# Patient Record
Sex: MALE | Race: WHITE | NOT HISPANIC OR LATINO
[De-identification: names, ages, dates, MRNs, and addresses within clinical notes are randomized per-mention and may not be internally consistent; named-entity substitution may affect disease eponyms.]

---

## 2018-12-13 ENCOUNTER — APPOINTMENT (OUTPATIENT)
Dept: ORTHOPEDIC SURGERY | Facility: CLINIC | Age: 78
End: 2018-12-13
Payer: MEDICARE

## 2018-12-13 ENCOUNTER — APPOINTMENT (OUTPATIENT)
Dept: ULTRASOUND IMAGING | Facility: CLINIC | Age: 78
End: 2018-12-13
Payer: MEDICARE

## 2018-12-13 ENCOUNTER — OUTPATIENT (OUTPATIENT)
Dept: OUTPATIENT SERVICES | Facility: HOSPITAL | Age: 78
LOS: 1 days | End: 2018-12-13
Payer: MEDICARE

## 2018-12-13 VITALS — HEIGHT: 70 IN | BODY MASS INDEX: 23.91 KG/M2 | WEIGHT: 167 LBS

## 2018-12-13 DIAGNOSIS — Z00.8 ENCOUNTER FOR OTHER GENERAL EXAMINATION: ICD-10-CM

## 2018-12-13 DIAGNOSIS — L03.115 CELLULITIS OF RIGHT LOWER LIMB: ICD-10-CM

## 2018-12-13 DIAGNOSIS — M79.604 PAIN IN RIGHT LEG: ICD-10-CM

## 2018-12-13 PROCEDURE — 99204 OFFICE O/P NEW MOD 45 MIN: CPT

## 2018-12-13 PROCEDURE — 93971 EXTREMITY STUDY: CPT

## 2018-12-13 PROCEDURE — 73610 X-RAY EXAM OF ANKLE: CPT | Mod: RT

## 2018-12-13 PROCEDURE — 73590 X-RAY EXAM OF LOWER LEG: CPT | Mod: RT

## 2018-12-13 PROCEDURE — 93971 EXTREMITY STUDY: CPT | Mod: 26,RT

## 2018-12-13 RX ORDER — AMOXICILLIN AND CLAVULANATE POTASSIUM 500; 125 MG/1; MG/1
500-125 TABLET, FILM COATED ORAL
Qty: 20 | Refills: 0 | Status: ACTIVE | COMMUNITY
Start: 2018-12-13 | End: 1900-01-01

## 2018-12-20 ENCOUNTER — OTHER (OUTPATIENT)
Age: 78
End: 2018-12-20

## 2021-02-16 ENCOUNTER — INPATIENT (INPATIENT)
Facility: HOSPITAL | Age: 81
LOS: 35 days | DRG: 291 | End: 2021-03-24
Attending: SPECIALIST | Admitting: INTERNAL MEDICINE
Payer: COMMERCIAL

## 2021-02-16 VITALS
HEART RATE: 85 BPM | WEIGHT: 164.91 LBS | HEIGHT: 68 IN | DIASTOLIC BLOOD PRESSURE: 64 MMHG | RESPIRATION RATE: 16 BRPM | TEMPERATURE: 98 F | SYSTOLIC BLOOD PRESSURE: 106 MMHG | OXYGEN SATURATION: 100 %

## 2021-02-16 DIAGNOSIS — R53.1 WEAKNESS: ICD-10-CM

## 2021-02-16 LAB
ALBUMIN SERPL ELPH-MCNC: 3.3 G/DL — SIGNIFICANT CHANGE UP (ref 3.3–5)
ALP SERPL-CCNC: 362 U/L — HIGH (ref 40–120)
ALT FLD-CCNC: 45 U/L — SIGNIFICANT CHANGE UP (ref 10–45)
ANION GAP SERPL CALC-SCNC: 12 MMOL/L — SIGNIFICANT CHANGE UP (ref 5–17)
APTT BLD: 32.7 SEC — SIGNIFICANT CHANGE UP (ref 27.5–35.5)
AST SERPL-CCNC: 48 U/L — HIGH (ref 10–40)
BASE EXCESS BLDV CALC-SCNC: -2 MMOL/L — SIGNIFICANT CHANGE UP (ref -2–2)
BASOPHILS # BLD AUTO: 0.04 K/UL — SIGNIFICANT CHANGE UP (ref 0–0.2)
BASOPHILS NFR BLD AUTO: 0.5 % — SIGNIFICANT CHANGE UP (ref 0–2)
BILIRUB SERPL-MCNC: 1.2 MG/DL — SIGNIFICANT CHANGE UP (ref 0.2–1.2)
BUN SERPL-MCNC: 96 MG/DL — HIGH (ref 7–23)
CA-I SERPL-SCNC: 1.35 MMOL/L — HIGH (ref 1.12–1.3)
CALCIUM SERPL-MCNC: 9.6 MG/DL — SIGNIFICANT CHANGE UP (ref 8.4–10.5)
CHLORIDE BLDV-SCNC: 100 MMOL/L — SIGNIFICANT CHANGE UP (ref 96–108)
CHLORIDE SERPL-SCNC: 95 MMOL/L — LOW (ref 96–108)
CO2 BLDV-SCNC: 25 MMOL/L — SIGNIFICANT CHANGE UP (ref 22–30)
CO2 SERPL-SCNC: 20 MMOL/L — LOW (ref 22–31)
CREAT SERPL-MCNC: 2.12 MG/DL — HIGH (ref 0.5–1.3)
EOSINOPHIL # BLD AUTO: 0.04 K/UL — SIGNIFICANT CHANGE UP (ref 0–0.5)
EOSINOPHIL NFR BLD AUTO: 0.5 % — SIGNIFICANT CHANGE UP (ref 0–6)
GAS PNL BLDV: 124 MMOL/L — LOW (ref 135–145)
GAS PNL BLDV: SIGNIFICANT CHANGE UP
GAS PNL BLDV: SIGNIFICANT CHANGE UP
GLUCOSE BLDV-MCNC: 175 MG/DL — HIGH (ref 70–99)
GLUCOSE SERPL-MCNC: 182 MG/DL — HIGH (ref 70–99)
HCO3 BLDV-SCNC: 24 MMOL/L — SIGNIFICANT CHANGE UP (ref 21–29)
HCT VFR BLD CALC: 29 % — LOW (ref 39–50)
HCT VFR BLDA CALC: 31 % — LOW (ref 39–50)
HGB BLD CALC-MCNC: 9.9 G/DL — LOW (ref 13–17)
HGB BLD-MCNC: 9.3 G/DL — LOW (ref 13–17)
IMM GRANULOCYTES NFR BLD AUTO: 0.7 % — SIGNIFICANT CHANGE UP (ref 0–1.5)
INR BLD: 1.36 RATIO — HIGH (ref 0.88–1.16)
LACTATE BLDV-MCNC: 1.5 MMOL/L — SIGNIFICANT CHANGE UP (ref 0.7–2)
LIDOCAIN IGE QN: 185 U/L — HIGH (ref 7–60)
LYMPHOCYTES # BLD AUTO: 0.63 K/UL — LOW (ref 1–3.3)
LYMPHOCYTES # BLD AUTO: 8.5 % — LOW (ref 13–44)
MCHC RBC-ENTMCNC: 30.1 PG — SIGNIFICANT CHANGE UP (ref 27–34)
MCHC RBC-ENTMCNC: 32.1 GM/DL — SIGNIFICANT CHANGE UP (ref 32–36)
MCV RBC AUTO: 93.9 FL — SIGNIFICANT CHANGE UP (ref 80–100)
MONOCYTES # BLD AUTO: 0.69 K/UL — SIGNIFICANT CHANGE UP (ref 0–0.9)
MONOCYTES NFR BLD AUTO: 9.3 % — SIGNIFICANT CHANGE UP (ref 2–14)
NEUTROPHILS # BLD AUTO: 5.98 K/UL — SIGNIFICANT CHANGE UP (ref 1.8–7.4)
NEUTROPHILS NFR BLD AUTO: 80.5 % — HIGH (ref 43–77)
NRBC # BLD: 0 /100 WBCS — SIGNIFICANT CHANGE UP (ref 0–0)
PCO2 BLDV: 46 MMHG — SIGNIFICANT CHANGE UP (ref 35–50)
PH BLDV: 7.32 — LOW (ref 7.35–7.45)
PLATELET # BLD AUTO: 284 K/UL — SIGNIFICANT CHANGE UP (ref 150–400)
PO2 BLDV: 24 MMHG — LOW (ref 25–45)
POTASSIUM BLDV-SCNC: 5.8 MMOL/L — HIGH (ref 3.5–5.3)
POTASSIUM SERPL-MCNC: 6 MMOL/L — HIGH (ref 3.5–5.3)
POTASSIUM SERPL-SCNC: 6 MMOL/L — HIGH (ref 3.5–5.3)
PROT SERPL-MCNC: 7.2 G/DL — SIGNIFICANT CHANGE UP (ref 6–8.3)
PROTHROM AB SERPL-ACNC: 16.1 SEC — HIGH (ref 10.6–13.6)
RBC # BLD: 3.09 M/UL — LOW (ref 4.2–5.8)
RBC # FLD: 18 % — HIGH (ref 10.3–14.5)
SAO2 % BLDV: 30 % — LOW (ref 67–88)
SODIUM SERPL-SCNC: 127 MMOL/L — LOW (ref 135–145)
TRIGL SERPL-MCNC: 57 MG/DL — SIGNIFICANT CHANGE UP
WBC # BLD: 7.43 K/UL — SIGNIFICANT CHANGE UP (ref 3.8–10.5)
WBC # FLD AUTO: 7.43 K/UL — SIGNIFICANT CHANGE UP (ref 3.8–10.5)

## 2021-02-16 PROCEDURE — 99285 EMERGENCY DEPT VISIT HI MDM: CPT

## 2021-02-16 PROCEDURE — 71045 X-RAY EXAM CHEST 1 VIEW: CPT | Mod: 26

## 2021-02-16 RX ORDER — PIPERACILLIN AND TAZOBACTAM 4; .5 G/20ML; G/20ML
3.38 INJECTION, POWDER, LYOPHILIZED, FOR SOLUTION INTRAVENOUS ONCE
Refills: 0 | Status: COMPLETED | OUTPATIENT
Start: 2021-02-16 | End: 2021-02-16

## 2021-02-16 RX ORDER — FUROSEMIDE 40 MG
40 TABLET ORAL EVERY 12 HOURS
Refills: 0 | Status: DISCONTINUED | OUTPATIENT
Start: 2021-02-16 | End: 2021-02-19

## 2021-02-16 RX ORDER — PIPERACILLIN AND TAZOBACTAM 4; .5 G/20ML; G/20ML
3.38 INJECTION, POWDER, LYOPHILIZED, FOR SOLUTION INTRAVENOUS EVERY 8 HOURS
Refills: 0 | Status: DISCONTINUED | OUTPATIENT
Start: 2021-02-17 | End: 2021-02-19

## 2021-02-16 RX ORDER — INFLUENZA VIRUS VACCINE 15; 15; 15; 15 UG/.5ML; UG/.5ML; UG/.5ML; UG/.5ML
0.5 SUSPENSION INTRAMUSCULAR ONCE
Refills: 0 | Status: DISCONTINUED | OUTPATIENT
Start: 2021-02-16 | End: 2021-03-24

## 2021-02-16 RX ORDER — SODIUM CHLORIDE 9 MG/ML
1000 INJECTION INTRAMUSCULAR; INTRAVENOUS; SUBCUTANEOUS
Refills: 0 | Status: DISCONTINUED | OUTPATIENT
Start: 2021-02-16 | End: 2021-02-16

## 2021-02-16 RX ORDER — HEPARIN SODIUM 5000 [USP'U]/ML
5000 INJECTION INTRAVENOUS; SUBCUTANEOUS EVERY 8 HOURS
Refills: 0 | Status: DISCONTINUED | OUTPATIENT
Start: 2021-02-16 | End: 2021-03-24

## 2021-02-16 RX ADMIN — Medication 40 MILLIGRAM(S): at 22:55

## 2021-02-16 RX ADMIN — SODIUM CHLORIDE 75 MILLILITER(S): 9 INJECTION INTRAMUSCULAR; INTRAVENOUS; SUBCUTANEOUS at 21:55

## 2021-02-16 RX ADMIN — PIPERACILLIN AND TAZOBACTAM 200 GRAM(S): 4; .5 INJECTION, POWDER, LYOPHILIZED, FOR SOLUTION INTRAVENOUS at 21:55

## 2021-02-16 NOTE — ED PROVIDER NOTE - CLINICAL SUMMARY MEDICAL DECISION MAKING FREE TEXT BOX
80 y old male with multiple medical issues send from nephrology office for worsening renal failure and hyponatremia ,pt complains of weakness fatigue and difficulty walking ,he also complains of abdominal and distention ,will repeated the blood work .nephrology consultation and admission to the hospital Z[facility]

## 2021-02-16 NOTE — H&P ADULT - HISTORY OF PRESENT ILLNESS
79 yo male w h/o DM, CAD, s/p CABG, CKD 3-4. He saw my partner Dr. Abel Luz yesterday as a new consult for elevated creatinine. Bloodwork today returned notable for  and Na 126; in light of these findings, Dr. Luz sent the patient to the ER. Lately ptn has been having abdominal pain with elevated amylase and lipase.   As per ptn's wife: on and off R abd pain w generalized weakness and poor po intake. PCP DCed po Lasix in past few days 2/2 abnormal blood test results.  Ptn denies recent NSAID use.  He was diagnosed with diabetes 18 years ago, and denies retinopathy (last saw Optho around 2 years ago). Cardiologist has alluded to the patient having mild renal impairment in the past, as per wife.    Patient's PCP is Dr. Shyann De León; GI Dr. Wilman Villagran; Cardiology Dr. Boyce.

## 2021-02-16 NOTE — H&P ADULT - NSHPPHYSICALEXAM_GEN_ALL_CORE
T(F): 97.5 (02-16-21 @ 20:07), Max: 98.1 (02-16-21 @ 14:56)  HR: 73 (02-16-21 @ 20:07) (73 - 85)  BP: 109/56 (02-16-21 @ 20:07) (100/57 - 109/56)  RR: 18 (02-16-21 @ 20:07) (16 - 18)  SpO2: 100% (02-16-21 @ 20:07) (100% - 100%)      PHYSICAL EXAM:  GENERAL: NAD, well-developed  HEAD:  Atraumatic, Normocephalic  EYES: EOMI, PERRLA, conjunctiva and sclera clear  NECK: Supple, No JVD  CHEST/LUNG: Clear to auscultation bilaterally; No wheeze  HEART: Regular rate and rhythm; No murmurs, rubs, or gallops  ABDOMEN: Soft, Nontender, Nondistended; Bowel sounds present  EXTREMITIES:  2+ Peripheral Pulses, No clubbing, cyanosis, or edema  PSYCH: AAOx3  NEUROLOGY: non-focal  SKIN: No rashes or lesions

## 2021-02-16 NOTE — ED PROVIDER NOTE - OBJECTIVE STATEMENT
80 year-old Icelandic-speaking man PMH diabetes mellitus, coronary artery disease s/p bypass, and chronic kidney disease. He saw Dr. Abel Luz as a new consult for elevated creatinine. Blood work returned notable for  and Na 126; in light of these findings, Dr. Luz sent the patient to the ER. Dr. Luz also noted on recent blood work to have an elevated amylase and lipase. Patient's PCP is Dr. Shyann De León; GI Dr. Wilman Villagran; Cardiology Dr. Boyce.     He has been dealing with mild intermittent right abdominal pain for "a long time"; he complains of worsening fatigue/generalized weakness for the past few months. His wife shares that routine blood work performed last week with Dr. De León returned just a few days ago; in light of the results, Dr. De León discontinued his standing daily Lasix 3 days prior to admission. Mr. Morelos does not drink much fluid in general; his wife admits that she has been pushing him to drink extra fluid the past few days, given the abnormal bloodwork results from last week. Mr. Morelos denies recent NSAID use. He admits to dark urine at times, and denies other urinary changes. He was diagnosed with diabetes 18 years ago, and denies retinopathy (last saw Optho around 2 years ago). Cardiologist has alluded to the patient having mild renal impairment in the past, per wife.

## 2021-02-16 NOTE — ED ADULT NURSE NOTE - OBJECTIVE STATEMENT
80 year old male, A+Ox3 and moves all four extremities. Pt sent to ED by PCP c/o abnormal labs and weakness. Pt reports right abdominal pain "for a long time", and worsening generalized weakness/fatigue for the past couple of months. Upon assessment, pt presents to ED well-appearing. Pt denies headache, dizziness, chest pain, SOB, N/V, urinary or bowel symptoms, fevers or chills. Pt resting comfortably in stretcher, call bell in reach. Pt aware of plan of care, pending lab results at this time.

## 2021-02-16 NOTE — ED PROVIDER NOTE - CONSTITUTIONAL, MLM
normal... chronically ill looking , awake, alert, oriented to person, place, time/situation and in no apparent distress.

## 2021-02-16 NOTE — H&P ADULT - ASSESSMENT
79 yo male w h/o ICM, CAD,  s/p CABG, has AICD, DM, HTN presents w volume overload and abd pain  get Abd sono   Clinically no sign of cholecystitis/pancreatitis.   place on Zosyn, get gi and id consults  ptn has abd distention prob ascites 2/2 systolic chf, probably worse 2/2 acute on chronic systolic chf,   will start diuresing w IV lasix  has AICD, will get interrogation   keep on tele  Hyponatremia  Izabela, CKD4  Hyperkalemia, Insulin, Dextrose, Lokelma as per   card renal consults  dvt ppx w HSC

## 2021-02-16 NOTE — H&P ADULT - NSICDXPASTMEDICALHX_GEN_ALL_CORE_FT
Yes
PAST MEDICAL HISTORY:  CAD (coronary artery disease)     Diabetes mellitus     Hypertension     Renal failure

## 2021-02-16 NOTE — ED PROVIDER NOTE - ENDOCRINE [+], MLM
Unable to get an appointment with derm in [place]. Gina states she would like to be placed on the wait list for [facility] in [place]. Plan to try Kenalog cream. Could consider biopsy if no relief or change in symptoms.   
HYPERGLYCEMIA

## 2021-02-16 NOTE — ED ADULT NURSE REASSESSMENT NOTE - NS ED NURSE REASSESS COMMENT FT1
VSS, medication administered. Pt aware of plan of care, admitted to medicine for weakness. Report given to JAVAD uA in ED Holding.

## 2021-02-16 NOTE — CONSULT NOTE ADULT - SUBJECTIVE AND OBJECTIVE BOX
HPI: Mr. Morelos is a 80 year-old man with history of multiple medical issues including diabetes mellitus, coronary artery disease s/p bypass, and chronic kidney disease. He saw my partner Dr. Abel Luz yesterday as a new consult for elevated creatinine. Bloodwork today returned notable for   PAST MEDICAL & SURGICAL HISTORY: DM2 CAD-CABG/stent ICD CKD  Allergies No Known Allergies  SOCIAL HISTORY: Denies ETOh,Smoking,   FAMILY HISTORY: (+)DM and CAD (father)  Current Medications Folic Acid 1 MG Oral Tablet: 1 Tablet(s) daily , Qty 90 Tablet(s) For 90 Day(s) Refill 3 Iron (Ferrous Sulfate) 325 (65 Fe) MG Oral Tablet: Oral Irbesartan 75 MG Oral Tablet: Take 1 tablet by mouth in the evenings, Qty 90 Tablet(s) For 90 Day(s) Refill 3 Atorvastatin Calcium 80 MG Oral Tablet: 1 Tablet(s) daily  Metoprolol Succinate ER 25 MG Oral Tablet Extended Release 24 Hour: 1 Tablet(s) nightly, Qty 90 Tablet(s) For 90 Day(s) Refill 2 Aspirin 81 81 MG Oral Tablet Delayed Release: 1 Tablet(s) daily  Glimepiride 1 MG Oral Tablet: .5 Tablet(s) daily Prasugrel HCl 10 MG Oral Tablet: 1 Tablet(s) daily   REVIEW OF SYSTEMS: CONSTITUTIONAL: No weakness, fevers or chills EYES/ENT: No visual changes;  No vertigo or throat pain  NECK: No pain or stiffness RESPIRATORY: No cough, wheezing, hemoptysis; No shortness of breath CARDIOVASCULAR: No chest pain or palpitations GASTROINTESTINAL: No abdominal or epigastric pain. No nausea, vomiting, or hematemesis; No diarrhea or constipation. No melena or hematochezia. GENITOURINARY: No dysuria, frequency or hematuria NEUROLOGICAL: No numbness or weakness SKIN: No itching, burning, rashes, or lesions  All other review of systems is negative unless indicated above.  VITAL: T(C): , Max: 36.7 (02-16-21 @ 14:56) T(F): , Max: 98.1 (02-16-21 @ 14:56) HR: 85 (02-16-21 @ 14:56) BP: 106/64 (02-16-21 @ 14:56) RR: 16 (02-16-21 @ 14:56) SpO2: 100% (02-16-21 @ 14:56)  PHYSICAL EXAM: Constitutional: NAD, Alert HEENT: NCAT, MMM Neck: Supple, No JVD Respiratory: CTA-b/l Cardiovascular: RRR s1s2, no m/r/g Gastrointestinal: BS+, soft, NT/ND Extremities: No peripheral edema b/l Neurological: no focal deficits; strength grossly intact Back: no CVAT b/l Skin: No rashes, no nevi   LABS: (2/15/21) - Urine protein/creatinine 740mg/g (2/15/21) - , Cr 2.27, Na 126, K 5.6, Cl 93, HCO3 19, Bili 1.3, Tsat 15, Uric 8.4, PO4 3.6, PTH 48, Alb 3.3   ASSESSMENT: (1)Renal -  (2)Hyponatremia -  (3)Metabolic acidosis -   RECOMMEND:   Thank you for involving [facility] in this patient's care.  With warm regards,  Suraj Garcia MD  [facility] Office: [phone] Cell: [phone]          HPI: Mr. Morelos is a 80 year-old Nigerian-speaking man with history of multiple medical issues including diabetes mellitus, coronary artery disease s/p bypass, and chronic kidney disease. He saw my partner Dr. Abel Luz yesterday as a new consult for elevated creatinine. Bloodwork today returned notable for  and Na 126; in light of these findings, Dr. Luz sent the patient to the ER. Dr. Luz shares with me that the patient was noted on recent bloodwork of late to also have an elevated amylase and lipase. Patient's PCP is Dr. Shyann De León; GI Dr. Wilman Villagran; Cardiology Dr. Boyce.   Mr. Morelos's wife is at his side and serves as a  on his behalf. They share that he has been dealing with mild intermittent right abdominal pain for "a long time"; he complains of worsening fatigue/generalized weakness for the past few months. His wife shares that routine bloodwork performed last week with Dr. De León returned just a few days ago; in light of the results, Dr. De León discontinued his standing daily Lasix 3 days prior to admission. Mr. Morelos does not drink much fluid in general; his wife admits that she has been pushing him to drink extra fluid the past few days, given the abnormal bloodwork results from last week. Mr. Morelos denies recent NSAID use. He admits to dark urine at times, and denies other urinary changes. He was diagnosed with diabetes 18 years ago, and denies retinopathy (last saw Optho around 2 years ago). Cardiologist has alluded to the patient having mild renal impairment in the past, per wife.  PAST MEDICAL & SURGICAL HISTORY: DM2 CAD-CABG/stent ICD CKD  Allergies No Known Allergies  SOCIAL HISTORY: Denies ETOh,Smoking,   FAMILY HISTORY: (+)DM and CAD (father)  Current Medications Folic Acid 1 MG Oral Tablet: 1 Tablet(s) daily , Qty 90 Tablet(s) For 90 Day(s) Refill 3 Iron (Ferrous Sulfate) 325 (65 Fe) MG Oral Tablet: Oral Irbesartan 75 MG Oral Tablet: Take 1 tablet by mouth in the evenings, Qty 90 Tablet(s) For 90 Day(s) Refill 3 Atorvastatin Calcium 80 MG Oral Tablet: 1 Tablet(s) daily  Metoprolol Succinate ER 25 MG Oral Tablet Extended Release 24 Hour: 1 Tablet(s) nightly, Qty 90 Tablet(s) For 90 Day(s) Refill 2 Aspirin 81 81 MG Oral Tablet Delayed Release: 1 Tablet(s) daily  Glimepiride 1 MG Oral Tablet: .5 Tablet(s) daily Prasugrel HCl 10 MG Oral Tablet: 1 Tablet(s) daily   REVIEW OF SYSTEMS: CONSTITUTIONAL: (+)generalized weakness, (+)fatigue; no fevers or chills EYES/ENT: No visual changes;  No vertigo or throat pain  NECK: No pain or stiffness RESPIRATORY: No cough, wheezing, hemoptysis; No shortness of breath CARDIOVASCULAR: No chest pain or palpitations GASTROINTESTINAL: (+)right abdominal pain; no nausea or vomiting GENITOURINARY: (+)dark urine; no dysuria, frequency or hematuria NEUROLOGICAL: (+)difficulty walking/gait instability SKIN: No itching, burning, rashes, or lesions  All other review of systems is negative unless indicated above.  VITAL: T(C): , Max: 36.7 (02-16-21 @ 14:56) T(F): , Max: 98.1 (02-16-21 @ 14:56) HR: 85 (02-16-21 @ 14:56) BP: 106/64 (02-16-21 @ 14:56) RR: 16 (02-16-21 @ 14:56) SpO2: 100% (02-16-21 @ 14:56)  PHYSICAL EXAM: Constitutional: NAD, Alert HEENT: NCAT, DMM Neck: Supple, No JVD Respiratory: CTA-b/l Cardiovascular: RRR s1s2, no m/r/g Gastrointestinal: BS+, soft, NT/ND Extremities: 2+ b/l LE edema Neurological: no focal deficits; strength grossly intact Back: no CVAT b/l Skin: No rashes, no nevi   LABS: (2/15/21) - Urine protein/creatinine 740mg/g (2/15/21) - , Cr 2.27, Na 126, K 5.6, Cl 93, HCO3 19, Bili 1.3, Tsat 15, Uric 8.4, PO4 3.6, PTH 48, Alb 3.3   ASSESSMENT: (1)Renal - presumed CKD3-4, with non-nephrotic range proteinuria. Very high BUN/creatinine ratio - due to intravascular depletion? (2)Hyponatremia - likely from RAAS hyperactivation (3)Hyperkalemia - likely due to renal disease, ARB, and lack of dietary K+ limitation. Doubt adrenal insufficency, but should rule it out given the fatigue and concomitant hyponatremia (4)Metabolic acidosis - likely renally mediated (5)GI - pancreatitis?   RECOMMEND: (1)D/C ARB (2)NS x 1L (3)Low-K+ diet/1.2L free water restriction (4)Check Amylase/Lipase in ER (5)Check CMP, uric acid, tsh, serum osm, cortisol (6)Check urine: lytes (Na/K/Cl), osm (7)Dose new meds for GFR 15-20ml/min (8)If for admission, please call me [phone]  Thank you for involving [facility] in this patient's care.  With warm regards,  Suraj Garcia MD  [facility] Office: [phone] Cell: [phone]

## 2021-02-16 NOTE — ED PROVIDER NOTE - PMH
Diabetes mellitus     CAD (coronary artery disease)    Diabetes mellitus    Hypertension    Renal failure

## 2021-02-16 NOTE — ED PROVIDER NOTE - CADM POA CENTRAL LINE
Patient is calling stating she I going out of town in an hour and didn't realize she has no refills.         Patient, Emiliana CELESTIN Calvin calling for medication refill. Medication(s) set up as pending orders from medication list.    Caller has been advised that their call does not guarantee an immediate refill. This refill will be reviewed within 72 hours by a qualified provider who will determine whether he or she can refill the medication.    Patient has contacted the pharmacy?  No    Additional information:     Patient is completely out of medications    Patient’s preferred pharmacy has been noted and populated.          No

## 2021-02-17 ENCOUNTER — APPOINTMENT (OUTPATIENT)
Dept: VASCULAR SURGERY | Facility: CLINIC | Age: 81
End: 2021-02-17

## 2021-02-17 DIAGNOSIS — E11.9 TYPE 2 DIABETES MELLITUS WITHOUT COMPLICATIONS: ICD-10-CM

## 2021-02-17 DIAGNOSIS — I50.20 UNSPECIFIED SYSTOLIC (CONGESTIVE) HEART FAILURE: ICD-10-CM

## 2021-02-17 DIAGNOSIS — I25.10 ATHEROSCLEROTIC HEART DISEASE OF NATIVE CORONARY ARTERY WITHOUT ANGINA PECTORIS: ICD-10-CM

## 2021-02-17 DIAGNOSIS — N19 UNSPECIFIED KIDNEY FAILURE: ICD-10-CM

## 2021-02-17 DIAGNOSIS — R10.9 UNSPECIFIED ABDOMINAL PAIN: ICD-10-CM

## 2021-02-17 LAB
ALBUMIN SERPL ELPH-MCNC: 3.2 G/DL — LOW (ref 3.3–5)
ALP SERPL-CCNC: 332 U/L — HIGH (ref 40–120)
ALT FLD-CCNC: 41 U/L — SIGNIFICANT CHANGE UP (ref 10–45)
AMYLASE P1 CFR SERPL: 217 U/L — HIGH (ref 25–125)
ANION GAP SERPL CALC-SCNC: 10 MMOL/L — SIGNIFICANT CHANGE UP (ref 5–17)
ANION GAP SERPL CALC-SCNC: 8 MMOL/L — SIGNIFICANT CHANGE UP (ref 5–17)
ANION GAP SERPL CALC-SCNC: 9 MMOL/L — SIGNIFICANT CHANGE UP (ref 5–17)
APPEARANCE UR: CLEAR — SIGNIFICANT CHANGE UP
AST SERPL-CCNC: 42 U/L — HIGH (ref 10–40)
BILIRUB DIRECT SERPL-MCNC: 0.7 MG/DL — HIGH (ref 0–0.2)
BILIRUB INDIRECT FLD-MCNC: 0.6 MG/DL — SIGNIFICANT CHANGE UP (ref 0.2–1)
BILIRUB SERPL-MCNC: 1.3 MG/DL — HIGH (ref 0.2–1.2)
BILIRUB UR-MCNC: NEGATIVE — SIGNIFICANT CHANGE UP
BUN SERPL-MCNC: 100 MG/DL — HIGH (ref 7–23)
BUN SERPL-MCNC: 90 MG/DL — HIGH (ref 7–23)
BUN SERPL-MCNC: 96 MG/DL — HIGH (ref 7–23)
CALCIUM SERPL-MCNC: 10.3 MG/DL — SIGNIFICANT CHANGE UP (ref 8.4–10.5)
CALCIUM SERPL-MCNC: 9.5 MG/DL — SIGNIFICANT CHANGE UP (ref 8.4–10.5)
CALCIUM SERPL-MCNC: 9.9 MG/DL — SIGNIFICANT CHANGE UP (ref 8.4–10.5)
CHLORIDE SERPL-SCNC: 95 MMOL/L — LOW (ref 96–108)
CHLORIDE SERPL-SCNC: 95 MMOL/L — LOW (ref 96–108)
CHLORIDE SERPL-SCNC: 96 MMOL/L — SIGNIFICANT CHANGE UP (ref 96–108)
CHLORIDE UR-SCNC: 58 MMOL/L — SIGNIFICANT CHANGE UP
CK MB BLD-MCNC: 5.9 % — HIGH (ref 0–3.5)
CK MB CFR SERPL CALC: 4.1 NG/ML — SIGNIFICANT CHANGE UP (ref 0–6.7)
CK SERPL-CCNC: 69 U/L — SIGNIFICANT CHANGE UP (ref 30–200)
CO2 SERPL-SCNC: 22 MMOL/L — SIGNIFICANT CHANGE UP (ref 22–31)
CO2 SERPL-SCNC: 23 MMOL/L — SIGNIFICANT CHANGE UP (ref 22–31)
CO2 SERPL-SCNC: 25 MMOL/L — SIGNIFICANT CHANGE UP (ref 22–31)
COLOR SPEC: SIGNIFICANT CHANGE UP
CORTIS AM PEAK SERPL-MCNC: 15.9 UG/DL — SIGNIFICANT CHANGE UP (ref 6–18.4)
CREAT SERPL-MCNC: 1.98 MG/DL — HIGH (ref 0.5–1.3)
CREAT SERPL-MCNC: 2 MG/DL — HIGH (ref 0.5–1.3)
CREAT SERPL-MCNC: 2.56 MG/DL — HIGH (ref 0.5–1.3)
D DIMER BLD IA.RAPID-MCNC: 327 NG/ML DDU — HIGH
DIFF PNL FLD: NEGATIVE — SIGNIFICANT CHANGE UP
FOLATE SERPL-MCNC: >20 NG/ML — SIGNIFICANT CHANGE UP
GGT SERPL-CCNC: 232 U/L — HIGH (ref 9–50)
GLUCOSE BLDC GLUCOMTR-MCNC: 158 MG/DL — HIGH (ref 70–99)
GLUCOSE BLDC GLUCOMTR-MCNC: 258 MG/DL — HIGH (ref 70–99)
GLUCOSE BLDC GLUCOMTR-MCNC: 317 MG/DL — HIGH (ref 70–99)
GLUCOSE BLDC GLUCOMTR-MCNC: 81 MG/DL — SIGNIFICANT CHANGE UP (ref 70–99)
GLUCOSE BLDC GLUCOMTR-MCNC: 86 MG/DL — SIGNIFICANT CHANGE UP (ref 70–99)
GLUCOSE SERPL-MCNC: 122 MG/DL — HIGH (ref 70–99)
GLUCOSE SERPL-MCNC: 123 MG/DL — HIGH (ref 70–99)
GLUCOSE SERPL-MCNC: 98 MG/DL — SIGNIFICANT CHANGE UP (ref 70–99)
GLUCOSE UR QL: NEGATIVE — SIGNIFICANT CHANGE UP
HCT VFR BLD CALC: 27.8 % — LOW (ref 39–50)
HGB BLD-MCNC: 9.1 G/DL — LOW (ref 13–17)
IRON SATN MFR SERPL: 35 UG/DL — LOW (ref 45–165)
KETONES UR-MCNC: NEGATIVE — SIGNIFICANT CHANGE UP
LEUKOCYTE ESTERASE UR-ACNC: NEGATIVE — SIGNIFICANT CHANGE UP
MAGNESIUM SERPL-MCNC: 2.5 MG/DL — SIGNIFICANT CHANGE UP (ref 1.6–2.6)
MCHC RBC-ENTMCNC: 29.9 PG — SIGNIFICANT CHANGE UP (ref 27–34)
MCHC RBC-ENTMCNC: 32.7 GM/DL — SIGNIFICANT CHANGE UP (ref 32–36)
MCV RBC AUTO: 91.4 FL — SIGNIFICANT CHANGE UP (ref 80–100)
NITRITE UR-MCNC: NEGATIVE — SIGNIFICANT CHANGE UP
NRBC # BLD: 0 /100 WBCS — SIGNIFICANT CHANGE UP (ref 0–0)
OSMOLALITY UR: 293 MOSM/KG — SIGNIFICANT CHANGE UP (ref 50–1200)
PH UR: 5.5 — SIGNIFICANT CHANGE UP (ref 5–8)
PHOSPHATE SERPL-MCNC: 4 MG/DL — SIGNIFICANT CHANGE UP (ref 2.5–4.5)
PLATELET # BLD AUTO: 261 K/UL — SIGNIFICANT CHANGE UP (ref 150–400)
POTASSIUM SERPL-MCNC: 4.6 MMOL/L — SIGNIFICANT CHANGE UP (ref 3.5–5.3)
POTASSIUM SERPL-MCNC: 5.5 MMOL/L — HIGH (ref 3.5–5.3)
POTASSIUM SERPL-MCNC: 5.7 MMOL/L — HIGH (ref 3.5–5.3)
POTASSIUM SERPL-SCNC: 4.6 MMOL/L — SIGNIFICANT CHANGE UP (ref 3.5–5.3)
POTASSIUM SERPL-SCNC: 5.5 MMOL/L — HIGH (ref 3.5–5.3)
POTASSIUM SERPL-SCNC: 5.7 MMOL/L — HIGH (ref 3.5–5.3)
POTASSIUM UR-SCNC: 24 MMOL/L — SIGNIFICANT CHANGE UP
PROCALCITONIN SERPL-MCNC: 0.2 NG/ML — HIGH (ref 0.02–0.1)
PROT SERPL-MCNC: 7.2 G/DL — SIGNIFICANT CHANGE UP (ref 6–8.3)
PROT UR-MCNC: NEGATIVE — SIGNIFICANT CHANGE UP
RBC # BLD: 3.04 M/UL — LOW (ref 4.2–5.8)
RBC # FLD: 17.9 % — HIGH (ref 10.3–14.5)
SARS-COV-2 IGG SERPL QL IA: NEGATIVE — SIGNIFICANT CHANGE UP
SARS-COV-2 IGM SERPL IA-ACNC: <0.1 INDEX — SIGNIFICANT CHANGE UP
SARS-COV-2 RNA SPEC QL NAA+PROBE: SIGNIFICANT CHANGE UP
SODIUM SERPL-SCNC: 127 MMOL/L — LOW (ref 135–145)
SODIUM SERPL-SCNC: 128 MMOL/L — LOW (ref 135–145)
SODIUM SERPL-SCNC: 128 MMOL/L — LOW (ref 135–145)
SODIUM UR-SCNC: 50 MMOL/L — SIGNIFICANT CHANGE UP
SP GR SPEC: 1.01 — SIGNIFICANT CHANGE UP (ref 1.01–1.02)
TROPONIN T, HIGH SENSITIVITY RESULT: 98 NG/L — HIGH (ref 0–51)
TSH SERPL-MCNC: 2.25 UIU/ML — SIGNIFICANT CHANGE UP (ref 0.27–4.2)
UROBILINOGEN FLD QL: NEGATIVE — SIGNIFICANT CHANGE UP
VIT B12 SERPL-MCNC: 1645 PG/ML — HIGH (ref 232–1245)
VIT D25+D1,25 OH+D1,25 PNL SERPL-MCNC: 25.3 PG/ML — SIGNIFICANT CHANGE UP (ref 19.9–79.3)
WBC # BLD: 8.21 K/UL — SIGNIFICANT CHANGE UP (ref 3.8–10.5)
WBC # FLD AUTO: 8.21 K/UL — SIGNIFICANT CHANGE UP (ref 3.8–10.5)

## 2021-02-17 PROCEDURE — 74176 CT ABD & PELVIS W/O CONTRAST: CPT | Mod: 26

## 2021-02-17 PROCEDURE — 93010 ELECTROCARDIOGRAM REPORT: CPT | Mod: 76

## 2021-02-17 PROCEDURE — 76700 US EXAM ABDOM COMPLETE: CPT | Mod: 26

## 2021-02-17 PROCEDURE — 78580 LUNG PERFUSION IMAGING: CPT | Mod: 26

## 2021-02-17 PROCEDURE — 93970 EXTREMITY STUDY: CPT | Mod: 26

## 2021-02-17 PROCEDURE — 71250 CT THORAX DX C-: CPT | Mod: 26

## 2021-02-17 PROCEDURE — 93010 ELECTROCARDIOGRAM REPORT: CPT

## 2021-02-17 PROCEDURE — 93010 ELECTROCARDIOGRAM REPORT: CPT | Mod: 77

## 2021-02-17 RX ORDER — INSULIN LISPRO 100/ML
VIAL (ML) SUBCUTANEOUS
Refills: 0 | Status: DISCONTINUED | OUTPATIENT
Start: 2021-02-17 | End: 2021-03-24

## 2021-02-17 RX ORDER — SODIUM CHLORIDE 9 MG/ML
1000 INJECTION, SOLUTION INTRAVENOUS
Refills: 0 | Status: DISCONTINUED | OUTPATIENT
Start: 2021-02-17 | End: 2021-03-24

## 2021-02-17 RX ORDER — DEXTROSE 50 % IN WATER 50 %
15 SYRINGE (ML) INTRAVENOUS ONCE
Refills: 0 | Status: DISCONTINUED | OUTPATIENT
Start: 2021-02-17 | End: 2021-03-24

## 2021-02-17 RX ORDER — METOPROLOL TARTRATE 50 MG
25 TABLET ORAL DAILY
Refills: 0 | Status: DISCONTINUED | OUTPATIENT
Start: 2021-02-17 | End: 2021-02-17

## 2021-02-17 RX ORDER — INSULIN LISPRO 100/ML
VIAL (ML) SUBCUTANEOUS AT BEDTIME
Refills: 0 | Status: DISCONTINUED | OUTPATIENT
Start: 2021-02-17 | End: 2021-03-24

## 2021-02-17 RX ORDER — DEXTROSE 50 % IN WATER 50 %
25 SYRINGE (ML) INTRAVENOUS ONCE
Refills: 0 | Status: DISCONTINUED | OUTPATIENT
Start: 2021-02-17 | End: 2021-03-24

## 2021-02-17 RX ORDER — GLUCAGON INJECTION, SOLUTION 0.5 MG/.1ML
1 INJECTION, SOLUTION SUBCUTANEOUS ONCE
Refills: 0 | Status: DISCONTINUED | OUTPATIENT
Start: 2021-02-17 | End: 2021-03-24

## 2021-02-17 RX ORDER — DEXTROSE 50 % IN WATER 50 %
12.5 SYRINGE (ML) INTRAVENOUS ONCE
Refills: 0 | Status: DISCONTINUED | OUTPATIENT
Start: 2021-02-17 | End: 2021-03-24

## 2021-02-17 RX ORDER — SODIUM ZIRCONIUM CYCLOSILICATE 10 G/10G
10 POWDER, FOR SUSPENSION ORAL ONCE
Refills: 0 | Status: COMPLETED | OUTPATIENT
Start: 2021-02-17 | End: 2021-02-17

## 2021-02-17 RX ORDER — METOPROLOL TARTRATE 50 MG
25 TABLET ORAL DAILY
Refills: 0 | Status: DISCONTINUED | OUTPATIENT
Start: 2021-02-17 | End: 2021-02-18

## 2021-02-17 RX ORDER — PANTOPRAZOLE SODIUM 20 MG/1
40 TABLET, DELAYED RELEASE ORAL
Refills: 0 | Status: DISCONTINUED | OUTPATIENT
Start: 2021-02-17 | End: 2021-03-24

## 2021-02-17 RX ADMIN — PIPERACILLIN AND TAZOBACTAM 25 GRAM(S): 4; .5 INJECTION, POWDER, LYOPHILIZED, FOR SOLUTION INTRAVENOUS at 16:35

## 2021-02-17 RX ADMIN — Medication 2: at 17:03

## 2021-02-17 RX ADMIN — HEPARIN SODIUM 5000 UNIT(S): 5000 INJECTION INTRAVENOUS; SUBCUTANEOUS at 06:12

## 2021-02-17 RX ADMIN — SODIUM ZIRCONIUM CYCLOSILICATE 10 GRAM(S): 10 POWDER, FOR SUSPENSION ORAL at 12:12

## 2021-02-17 RX ADMIN — PANTOPRAZOLE SODIUM 40 MILLIGRAM(S): 20 TABLET, DELAYED RELEASE ORAL at 12:17

## 2021-02-17 RX ADMIN — Medication 40 MILLIGRAM(S): at 12:17

## 2021-02-17 RX ADMIN — HEPARIN SODIUM 5000 UNIT(S): 5000 INJECTION INTRAVENOUS; SUBCUTANEOUS at 16:34

## 2021-02-17 RX ADMIN — Medication 25 MILLIGRAM(S): at 22:42

## 2021-02-17 RX ADMIN — PIPERACILLIN AND TAZOBACTAM 25 GRAM(S): 4; .5 INJECTION, POWDER, LYOPHILIZED, FOR SOLUTION INTRAVENOUS at 12:11

## 2021-02-17 RX ADMIN — PIPERACILLIN AND TAZOBACTAM 25 GRAM(S): 4; .5 INJECTION, POWDER, LYOPHILIZED, FOR SOLUTION INTRAVENOUS at 03:16

## 2021-02-17 NOTE — PROGRESS NOTE ADULT - ASSESSMENT
79 yo male w h/o ICM< CAD< s/p CABG, has AICD, DM, HTN presents w volume overload and abd pain  Abd sono shows gallstones, but no sign of cholecystitis/pancreatitis. abd pain resolved on Zosyn, will cont empirically for 7 days  ascites 2/2 systolic chf, probably worse 2/2 acute on chronic systolic chf, will cont diuresing w IV lasix  on interrogation of ICD, has a lof of arrhythmia burden, will consult EPS, card in agreement. cont BB  Hyponatremia  Izabela, CKD4  Hyperkalemia, resolved  dvt ppx w HSC

## 2021-02-17 NOTE — CONSULT NOTE ADULT - ASSESSMENT
79 yo male w h/o DM, CAD, s/p CABG, CKD 3-4.   Recently evaluated by Nephrology for elevated creatinine. Bloodwork  returned notable for  and Na 126; in light of these findings, Dr. Luz (Renal) sent the patient to the ER. Lately ptn has been having abdominal pain with elevated amylase and lipase.   As per ptn's wife: on and off R abd pain w generalized weakness and poor po intake. PCP DCed po Lasix in past few days 2/2 abnormal blood test results.  Ptn denies recent NSAID use.  He was diagnosed with diabetes 18 years ago, and denies retinopathy (last saw Optho around 2 years ago). Cardiologist has alluded to the patient having mild renal impairment in the past, as per wife.    Patient's PCP is Dr. Shyann De León; GI Dr. Wilman Villagran; Cardiology Dr. Boyce.  (16 Feb 2021 21:05)    ER vs: T 98.1, P 85, /64. WBC 8.2.  Bun/Cr - 100/2.0.  LFTs mildly elevated, Roxana 217, Lip 185.  Pct 0.2.  UA (-).  Ct c/a/p shows pulmonary edema, sm B/L pleural effusions, sm volume ascites.  VQ scan with very low probability for PE.      Pt started on zosyn to cover for intra-abd source.  ID consulted.     Gallstone pancreatitis:    - Pt p/w abdominal pain, epigastric and RUQ pain, noted to have elevated LFTs and amylase/lipase.  Possible gallstone pancreatitis.    - f/u  U/S abdomen pending, evaluate for cholelithiasis, cholecystitis.  CTap w/o acute intra-abd pathology    - monitor LFTs, Roxana, Lipase    - Monitor temp curve and WBC.      - f/u blood cultures.  Cont zosyn on empiric basis.        {[id number]}Renal failure:    - Renal following for CKD.  Dose abx for GFR 15-20      Will follow,    Rupali Schwarz  [phone]

## 2021-02-17 NOTE — CONSULT NOTE ADULT - ASSESSMENT
79 yo male w h/o DM, CAD, s/p CABG, ICM s/p ICD , CKD 3-4 and now admitted for elevated creatinine. He was sent to ER by his nephrologist.   Lately ptn has been having abdominal pain with elevated amylase and lipase.   As per ptn's wife: on and off R abd pain w generalized weakness and poor po intake. PCP DCed po Lasix in past few days 2/2 abnormal blood test results.  Ptn denies recent NSAID use.

## 2021-02-17 NOTE — PROGRESS NOTE ADULT - SUBJECTIVE AND OBJECTIVE BOX
Patient is a 80y old  Male who presents with a chief complaint of acute renal failure (2021 18:53)      SUBJECTIVE / OVERNIGHT EVENTS:    MEDICATIONS  (STANDING):  dextrose 40% Gel 15 Gram(s) Oral once  dextrose 5%. 1000 milliLiter(s) (50 mL/Hr) IV Continuous <Continuous>  dextrose 5%. 1000 milliLiter(s) (100 mL/Hr) IV Continuous <Continuous>  dextrose 50% Injectable 25 Gram(s) IV Push once  dextrose 50% Injectable 12.5 Gram(s) IV Push once  dextrose 50% Injectable 25 Gram(s) IV Push once  furosemide   Injectable 40 milliGRAM(s) IV Push every 12 hours  glucagon  Injectable 1 milliGRAM(s) IntraMuscular once  heparin   Injectable 5000 Unit(s) SubCutaneous every 12 hours  influenza   Vaccine 0.5 milliLiter(s) IntraMuscular once  insulin lispro (ADMELOG) corrective regimen sliding scale   SubCutaneous three times a day before meals  insulin lispro (ADMELOG) corrective regimen sliding scale   SubCutaneous at bedtime  pantoprazole    Tablet 40 milliGRAM(s) Oral before breakfast  piperacillin/tazobactam IVPB.. 3.375 Gram(s) IV Intermittent every 8 hours    MEDICATIONS  (PRN):      Vital Signs Last 24 Hrs  T(F): 97.8 (21 @ 21:05), Max: 97.9 (21 @ 19:31)  HR: 78 (21 @ 21:05) (70 - 78)  BP: 93/57 (21 @ 21:05) (90/42 - 112/52)  RR: 18 (21 @ 21:05) (18 - 20)  SpO2: 99% (21 @ 21:05) (96% - 100%)  Telemetry:   CAPILLARY BLOOD GLUCOSE      POCT Blood Glucose.: 158 mg/dL (2021 21:01)  POCT Blood Glucose.: 258 mg/dL (2021 16:54)  POCT Blood Glucose.: 317 mg/dL (2021 16:22)  POCT Blood Glucose.: 81 mg/dL (2021 12:20)  POCT Blood Glucose.: 86 mg/dL (2021 07:14)    I&O's Summary    2021 07:01  -  2021 07:00  --------------------------------------------------------  IN: 100 mL / OUT: 500 mL / NET: -400 mL    2021 07:01  -  2021 21:18  --------------------------------------------------------  IN: 800 mL / OUT: 2000 mL / NET: -1200 mL        PHYSICAL EXAM:  GENERAL: NAD, well-developed  HEAD:  Atraumatic, Normocephalic  EYES: EOMI, PERRLA, conjunctiva and sclera clear  NECK: Supple, No JVD  CHEST/LUNG: Clear to auscultation bilaterally; No wheeze  HEART: Regular rate and rhythm; No murmurs, rubs, or gallops  ABDOMEN: Soft, Nontender, Nondistended; Bowel sounds present  EXTREMITIES:  2+ Peripheral Pulses, No clubbing, cyanosis, or edema  PSYCH: AAOx3  NEUROLOGY: non-focal  SKIN: No rashes or lesions    LABS:                        9.1    8.21  )-----------( 261      ( 2021 03:21 )             27.8     02-17    127<L>  |  95<L>  |  96<H>  ----------------------------<  98  5.5<H>   |  22  |  1.98<H>    Ca    9.9      2021 03:17    TPro  7.2  /  Alb  3.2<L>  /  TBili  1.3<H>  /  DBili  0.7<H>  /  AST  42<H>  /  ALT  41  /  AlkPhos  332<H>      PT/INR - ( 2021 20:25 )   PT: 16.1 sec;   INR: 1.36 ratio         PTT - ( 2021 20:25 )  PTT:32.7 sec      Urinalysis Basic - ( 2021 07:16 )    Color: Light Yellow / Appearance: Clear / S.010 / pH: x  Gluc: x / Ketone: Negative  / Bili: Negative / Urobili: Negative   Blood: x / Protein: Negative / Nitrite: Negative   Leuk Esterase: Negative / RBC: x / WBC x   Sq Epi: x / Non Sq Epi: x / Bacteria: x        RADIOLOGY & ADDITIONAL TESTS:    Imaging Personally Reviewed:    Consultant(s) Notes Reviewed:      Care Discussed with Consultants/Other Providers:   Patient is a 80y old  Male who presents with a chief complaint of acute renal failure (2021 18:53)      SUBJECTIVE / OVERNIGHT EVENTS: dyspnea has improved, abd pain resolved, urinating a lot , no c/o otherwise    MEDICATIONS  (STANDING):  dextrose 40% Gel 15 Gram(s) Oral once  dextrose 5%. 1000 milliLiter(s) (50 mL/Hr) IV Continuous <Continuous>  dextrose 5%. 1000 milliLiter(s) (100 mL/Hr) IV Continuous <Continuous>  dextrose 50% Injectable 25 Gram(s) IV Push once  dextrose 50% Injectable 12.5 Gram(s) IV Push once  dextrose 50% Injectable 25 Gram(s) IV Push once  furosemide   Injectable 40 milliGRAM(s) IV Push every 12 hours  glucagon  Injectable 1 milliGRAM(s) IntraMuscular once  heparin   Injectable 5000 Unit(s) SubCutaneous every 12 hours  influenza   Vaccine 0.5 milliLiter(s) IntraMuscular once  insulin lispro (ADMELOG) corrective regimen sliding scale   SubCutaneous three times a day before meals  insulin lispro (ADMELOG) corrective regimen sliding scale   SubCutaneous at bedtime  pantoprazole    Tablet 40 milliGRAM(s) Oral before breakfast  piperacillin/tazobactam IVPB.. 3.375 Gram(s) IV Intermittent every 8 hours    MEDICATIONS  (PRN):      Vital Signs Last 24 Hrs  T(F): 97.8 (21 @ 21:05), Max: 97.9 (21 @ 19:31)  HR: 78 (21 @ 21:05) (70 - 78)  BP: 93/57 (21 @ 21:05) (90/42 - 112/52)  RR: 18 (21 @ 21:05) (18 - 20)  SpO2: 99% (21 @ 21:05) (96% - 100%)  Telemetry:   CAPILLARY BLOOD GLUCOSE      POCT Blood Glucose.: 158 mg/dL (2021 21:01)  POCT Blood Glucose.: 258 mg/dL (2021 16:54)  POCT Blood Glucose.: 317 mg/dL (2021 16:22)  POCT Blood Glucose.: 81 mg/dL (2021 12:20)  POCT Blood Glucose.: 86 mg/dL (2021 07:14)    I&O's Summary    2021 07:01  -  2021 07:00  --------------------------------------------------------  IN: 100 mL / OUT: 500 mL / NET: -400 mL    2021 07:01  -  2021 21:18  --------------------------------------------------------  IN: 800 mL / OUT: 2000 mL / NET: -1200 mL        PHYSICAL EXAM:  GENERAL: NAD, well-developed  HEAD:  Atraumatic, Normocephalic  EYES: EOMI, PERRLA, conjunctiva and sclera clear  NECK: Supple, No JVD  CHEST/LUNG: Clear to auscultation bilaterally; No wheeze  HEART: Regular rate and rhythm; No murmurs, rubs, or gallops  ABDOMEN: Soft, Nontender, Nondistended; Bowel sounds present  EXTREMITIES:  2+ Peripheral Pulses, No clubbing, cyanosis, or edema  PSYCH: AAOx3  NEUROLOGY: non-focal  SKIN: No rashes or lesions    LABS:                        9.1    8.21  )-----------( 261      ( 2021 03:21 )             27.8     02-17    127<L>  |  95<L>  |  96<H>  ----------------------------<  98  5.5<H>   |  22  |  1.98<H>    Ca    9.9      2021 03:17    TPro  7.2  /  Alb  3.2<L>  /  TBili  1.3<H>  /  DBili  0.7<H>  /  AST  42<H>  /  ALT  41  /  AlkPhos  332<H>  0217    PT/INR - ( 2021 20:25 )   PT: 16.1 sec;   INR: 1.36 ratio         PTT - ( 2021 20:25 )  PTT:32.7 sec      Urinalysis Basic - ( 2021 07:16 )    Color: Light Yellow / Appearance: Clear / S.010 / pH: x  Gluc: x / Ketone: Negative  / Bili: Negative / Urobili: Negative   Blood: x / Protein: Negative / Nitrite: Negative   Leuk Esterase: Negative / RBC: x / WBC x   Sq Epi: x / Non Sq Epi: x / Bacteria: x        RADIOLOGY & ADDITIONAL TESTS:    Imaging Personally Reviewed:    Consultant(s) Notes Reviewed:      Care Discussed with Consultants/Other Providers:

## 2021-02-17 NOTE — CONSULT NOTE ADULT - SUBJECTIVE AND OBJECTIVE BOX
Chief Complaint:  Patient is a 80y old  Male who presents with a chief complaint of acute renal failure (2021 07:34)      HPI:  81 yo male w h/o DM, CAD, s/p CABG, CKD 3-4 sent to ED by his nephrologist for abnormal lab findings.   GI asked to evaluate given abdominal pain with associated elevated amylase and lipase  The patient states he has been experiencing epigastric and RUQ abdominal pain for the last month with associated post-prandial pain, nausea, vomiting, and weakness. He denies recent NSAID use. He denies prior history of pancreatitis. He denies tobacco use or chronic ETOH use.     Allergies:  No Known Allergies      Medications:  dextrose 40% Gel 15 Gram(s) Oral once  dextrose 5%. 1000 milliLiter(s) IV Continuous <Continuous>  dextrose 5%. 1000 milliLiter(s) IV Continuous <Continuous>  dextrose 50% Injectable 25 Gram(s) IV Push once  dextrose 50% Injectable 12.5 Gram(s) IV Push once  dextrose 50% Injectable 25 Gram(s) IV Push once  furosemide   Injectable 40 milliGRAM(s) IV Push every 12 hours  glucagon  Injectable 1 milliGRAM(s) IntraMuscular once  heparin   Injectable 5000 Unit(s) SubCutaneous every 12 hours  influenza   Vaccine 0.5 milliLiter(s) IntraMuscular once  insulin lispro (ADMELOG) corrective regimen sliding scale   SubCutaneous three times a day before meals  insulin lispro (ADMELOG) corrective regimen sliding scale   SubCutaneous at bedtime  piperacillin/tazobactam IVPB.. 3.375 Gram(s) IV Intermittent every 8 hours  sodium zirconium cyclosilicate 10 Gram(s) Oral once      PMHX/PSHX:  Renal failure    CAD (coronary artery disease)    Hypertension    Diabetes mellitus        Family history:      Social History:     ROS:     General:  No wt loss, fevers, chills, night sweats, +fatigue,   Eyes:  Good vision, no reported pain  ENT:  No sore throat, pain, runny nose, dysphagia  CV:  No pain, palpitations, hypo/hypertension  Resp:  No dyspnea, cough, tachypnea, wheezing  GI:  +pain, +nausea, No vomiting, No diarrhea, No constipation, No weight loss, No fever, No pruritis, No rectal bleeding, No tarry stools, No dysphagia,  :  No pain, bleeding, incontinence, nocturia  Muscle:  No pain, weakness  Neuro:  No weakness, tingling, memory problems  Psych:  No fatigue, insomnia, mood problems, depression  Endocrine:  No polyuria, polydipsia, cold/heat intolerance  Heme:  No petechiae, ecchymosis, easy bruisability  Skin:  No rash, tattoos, scars, edema      PHYSICAL EXAM:   Vital Signs:  Vital Signs Last 24 Hrs  T(C): 36.3 (2021 07:03), Max: 36.7 (2021 14:56)  T(F): 97.4 (2021 07:03), Max: 98.1 (2021 14:56)  HR: 74 (2021 07:03) (70 - 85)  BP: 92/42 (2021 08:13) (90/42 - 112/52)  BP(mean): 69 (2021 21:56) (69 - 69)  RR: 18 (2021 07:03) (16 - 20)  SpO2: 96% (2021 07:03) (96% - 100%)  Daily Height in cm: 172.72 (2021 14:56)    Daily     GENERAL:   no distress  HEENT:  NC/AT  ABDOMEN:  Soft, mild epigastric/RUQ tenderness, non-distended  EXTEREMITIES:  +bilateral LE edema  NEURO:  awake, alert     LABS:                        9.1    8.21  )-----------( 261      ( 2021 03:21 )             27.8     02-17    127<L>  |  95<L>  |  96<H>  ----------------------------<  98  5.5<H>   |  22  |  1.98<H>    Ca    9.9      2021 03:17    TPro  7.2  /  Alb  3.2<L>  /  TBili  1.3<H>  /  DBili  0.7<H>  /  AST  42<H>  /  ALT  41  /  AlkPhos  332<H>  0217    LIVER FUNCTIONS - ( 2021 04:40 )  Alb: x     / Pro: x     / ALK PHOS: x     / ALT: x     / AST: x     / GGT: 232 U/L       PT/INR - ( 2021 20:25 )   PT: 16.1 sec;   INR: 1.36 ratio         PTT - ( 2021 20:25 )  PTT:32.7 sec  Urinalysis Basic - ( 2021 07:16 )    Color: Light Yellow / Appearance: Clear / S.010 / pH: x  Gluc: x / Ketone: Negative  / Bili: Negative / Urobili: Negative   Blood: x / Protein: Negative / Nitrite: Negative   Leuk Esterase: Negative / RBC: x / WBC x   Sq Epi: x / Non Sq Epi: x / Bacteria: x      Amylase [id number]      Lipase serum--       Ammonia--  Amylase Serum--      Lipase [id number]       Ammonia--      Imaging:

## 2021-02-17 NOTE — CHART NOTE - NSCHARTNOTEFT_GEN_A_CORE
Pt with 2 minutes WCT on tele.  Pt examined at bedside, with help of JULIENNE Street who translated [place] Island[place]. Pt denies SOB, chest pain, fatigue, palpitations, leg pain, and lightheadedness. States sometimes he feels short of breath in the mornings, but currently denies any dyspnea.      Vital Signs Last 24 Hrs  T(C): 36.4 (17 Feb 2021 22:33), Max: 36.6 (17 Feb 2021 19:31)  T(F): 97.6 (17 Feb 2021 22:33), Max: 97.9 (17 Feb 2021 19:31)  HR: 72 (17 Feb 2021 22:33) (72 - 78)  BP: 93/56 (17 Feb 2021 22:33) (90/42 - 112/52)  BP(mean): --  RR: 18 (17 Feb 2021 22:33) (18 - 18)  SpO2: 96% (17 Feb 2021 22:33) (96% - 99%)    Physical Exam:  General: WN/WD NAD  Neurology: A&Ox3, nonfocal, JOHNSON x 4  Head:  Normocephalic, atraumatic  Respiratory: CTA B/L  CV: RRR, S1S2  MSK: + peripheral pulses, FROM all 4 extremity  Labs:        Assessment & Plan:  81 yo male w h/o DM, CAD, s/p CABG, CKD 3-4, admitted for hyperkalemia, CALVIN on CKD, hyponatremia, with recurrent WCT on tele.  > 12-lead EKG:  v-paced WCT, HR of 96, RBBB  > STAT Labs: BMP, Mag, Phos, Cardiac Enzymes  > Pt on Metoprolol succinate 25 mg daily at home. D/w cards Dr. Velásquez, who agreed to start pt on Metoprolol    Monique Lees PA-C (Medicine PA)  # Pt with 2 minutes WCT on tele, HR of 120s.  Pt examined at bedside, with help of JULIENNE Street who translated Citizen of [place]. Pt denies SOB, chest pain, fatigue, palpitations, leg pain, and lightheadedness. States sometimes he feels short of breath in the mornings, but currently denies any dyspnea.      Vital Signs Last 24 Hrs  T(C): 36.4 (17 Feb 2021 22:33), Max: 36.6 (17 Feb 2021 19:31)  T(F): 97.6 (17 Feb 2021 22:33), Max: 97.9 (17 Feb 2021 19:31)  HR: 72 (17 Feb 2021 22:33) (72 - 78)  BP: 93/56 (17 Feb 2021 22:33) (90/42 - 112/52)  BP(mean): --  RR: 18 (17 Feb 2021 22:33) (18 - 18)  SpO2: 96% (17 Feb 2021 22:33) (96% - 99%)    Physical Exam:  General: WN/WD NAD  Neurology: A&Ox3, nonfocal, JOHNSON x 4  Head:  Normocephalic, atraumatic  Respiratory: CTA B/L  CV: RRR, S1S2  MSK: + peripheral pulses, FROM all 4 extremity      Labs:        Assessment & Plan:  79 yo male w h/o DM, CAD, s/p CABG, CKD 3-4, admitted for hyperkalemia, CALVIN on CKD, hyponatremia, with recurrent WCT on tele.  > 12-lead EKG:  v-paced WCT, HR of 96, RBBB  > STAT Labs: BMP, Mag, Phos, Cardiac Enzymes        > Troponin elevated, likely in setting of CKD, as pt is asymptomatic and no new ST changes on EKG. Will repeat cardiac enzymes in AM        > electrolytes wnl  > D/w cards Dr. Velásquez, who agreed to start pt on his home Metoprolol succinate dose (25 mg daily)  > Continue to monitor on tele  > Left voicemail for Dr. Rosario. Follow up with Dr. Rosario regarding resuming other home meds    Monique Lees PA-C (Medicine PA)  #[id number] Pt with 2 minutes WCT on tele, HR of 120s.  Pt examined at bedside, with help of JULIENNE Street who translated [place]. Pt denies SOB, chest pain, fatigue, palpitations, leg pain, and lightheadedness. States sometimes he feels short of breath in the mornings, but currently denies any dyspnea.      Vital Signs Last 24 Hrs  T(C): 36.4 (17 Feb 2021 22:33), Max: 36.6 (17 Feb 2021 19:31)  T(F): 97.6 (17 Feb 2021 22:33), Max: 97.9 (17 Feb 2021 19:31)  HR: 72 (17 Feb 2021 22:33) (72 - 78)  BP: 93/56 (17 Feb 2021 22:33) (90/42 - 112/52)  BP(mean): --  RR: 18 (17 Feb 2021 22:33) (18 - 18)  SpO2: 96% (17 Feb 2021 22:33) (96% - 99%)    Physical Exam:  General: WN/WD NAD  Neurology: A&Ox3, nonfocal, JOHNSON x 4  Head:  Normocephalic, atraumatic  Respiratory: CTA B/L  CV: RRR, S1S2  MSK: + peripheral pulses, FROM all 4 extremity      Labs:        Assessment & Plan:  81 yo male w h/o DM, CAD, s/p CABG, CKD 3-4, admitted for hyperkalemia, CALVIN on CKD, hyponatremia, with recurrent WCT on tele.  > 12-lead EKG:  v-paced WCT, HR of 96, RBBB  > STAT Labs: BMP, Mag, Phos, Cardiac Enzymes        > Troponin elevated, likely in setting of CKD, as pt is asymptomatic and no new ST changes on EKG. Will repeat cardiac enzymes in AM        > electrolytes wnl  > D/w cards Dr. Velásquez, who agreed to start pt on his home Metoprolol succinate dose (25 mg daily)  > Dr. Velásquez to call EP consult in AM  > Continue to monitor on tele  > Left voicemail for Dr. Rosario. Follow up with Dr. Rosario regarding resuming other home meds    Monique Lees PA-C (Medicine PA)  #[id number]

## 2021-02-17 NOTE — CONSULT NOTE ADULT - SUBJECTIVE AND OBJECTIVE BOX
CHIEF COMPLAINT:    HISTORY OF PRESENT ILLNESS:         PAST MEDICAL & SURGICAL HISTORY:  Renal failure    CAD (coronary artery disease)    Hypertension    Diabetes mellitus            MEDICATIONS:  furosemide   Injectable 40 milliGRAM(s) IV Push every 12 hours  heparin   Injectable 5000 Unit(s) SubCutaneous every 12 hours    piperacillin/tazobactam IVPB.. 3.375 Gram(s) IV Intermittent every 8 hours        pantoprazole    Tablet 40 milliGRAM(s) Oral before breakfast    dextrose 40% Gel 15 Gram(s) Oral once  dextrose 50% Injectable 25 Gram(s) IV Push once  dextrose 50% Injectable 12.5 Gram(s) IV Push once  dextrose 50% Injectable 25 Gram(s) IV Push once  glucagon  Injectable 1 milliGRAM(s) IntraMuscular once  insulin lispro (ADMELOG) corrective regimen sliding scale   SubCutaneous three times a day before meals  insulin lispro (ADMELOG) corrective regimen sliding scale   SubCutaneous at bedtime    dextrose 5%. 1000 milliLiter(s) IV Continuous <Continuous>  dextrose 5%. 1000 milliLiter(s) IV Continuous <Continuous>  influenza   Vaccine 0.5 milliLiter(s) IntraMuscular once      FAMILY HISTORY:      SOCIAL HISTORY:    [ ] Non-smoker  [ ] Smoker  [ ] Alcohol    Allergies    No Known Allergies    Intolerances    	    REVIEW OF SYSTEMS:  CONSTITUTIONAL: No fever, weight loss, + fatigue  EYES: No eye pain, visual disturbances, or discharge  ENMT:  No difficulty hearing, tinnitus, vertigo; No sinus or throat pain  NECK: No pain or stiffness  RESPIRATORY: No cough, wheezing, chills or hemoptysis;+ Shortness of Breath  CARDIOVASCULAR: No chest pain, palpitations, passing out, dizziness, or leg swelling  GASTROINTESTINAL: No abdominal or epigastric pain. No nausea, vomiting, or hematemesis; No diarrhea or constipation. No melena or hematochezia.  GENITOURINARY: No dysuria, frequency, hematuria, or incontinence  NEUROLOGICAL: No headaches, memory loss, loss of strength, numbness, or tremors  SKIN: No itching, burning, rashes, or lesions   LYMPH Nodes: No enlarged glands  ENDOCRINE: No heat or cold intolerance; No hair loss  MUSCULOSKELETAL: No joint pain or swelling; No muscle, back, or extremity pain  PSYCHIATRIC: No depression, anxiety, mood swings, or difficulty sleeping  HEME/LYMPH: No easy bruising, or bleeding gums  ALLERY AND IMMUNOLOGIC: No hives or eczema	    [ ] All others negative	  [ ] Unable to obtain    PHYSICAL EXAM:  T(C): 36.4 (02-17-21 @ 12:08), Max: 36.4 (02-16-21 @ 20:07)  HR: 78 (02-17-21 @ 12:08) (70 - 78)  BP: 101/59 (02-17-21 @ 12:08) (90/42 - 112/52)  RR: 18 (02-17-21 @ 12:08) (18 - 20)  SpO2: 96% (02-17-21 @ 12:08) (96% - 100%)  Wt(kg): --  I&O's Summary    16 Feb 2021 07:01  -  17 Feb 2021 07:00  --------------------------------------------------------  IN: 100 mL / OUT: 500 mL / NET: -400 mL    17 Feb 2021 07:01  -  17 Feb 2021 18:54  --------------------------------------------------------  IN: 800 mL / OUT: 2000 mL / NET: -1200 mL        Appearance: Normal	  HEENT:   Normal oral mucosa, PERRL, EOMI	  Lymphatic: No lymphadenopathy  Cardiovascular: Normal S1 S2, No JVD, No murmurs  Respiratory: Decreased bs   Psychiatry: A & O x 3, Mood & affect appropriate  Gastrointestinal:  Soft, Non-tender, + BS	  Skin: No rashes, No ecchymoses, No cyanosis	  Neurologic: Non-focal  Extremities: Normal range of motion, No clubbing, cyanosis + edema  Vascular: Peripheral pulses palpable 2+ bilaterally    TELEMETRY: SR 	    ECG:  	NSR, First degree AVB, lateral infarct   RADIOLOGY:  < from: Xray Chest 1 View-PORTABLE IMMEDIATE (Xray Chest 1 View-PORTABLE IMMEDIATE .) (02.16.21 @ 20:27) >    EXAM:  XR CHEST PORTABLE IMMED 1V                            PROCEDURE DATE:  02/16/2021            INTERPRETATION:  A single chest x-ray was obtained on February 16, 2021.    Indication: Cough.    Impression:    The heart is markedly enlarged. Pulmonary vascular congestion. Underlying chronic lung changes cannot be ruled out. No previous studies for comparison are available. A pacer is in good position. Status post sternotomy.      < end of copied text >  < from: CT Chest No Cont (02.17.21 @ 01:24) >    EXAM:  CT CHEST                          EXAM:  CT ABDOMEN AND PELVIS OC                            PROCEDURE DATE:  02/17/2021            INTERPRETATION:  CLINICAL INFORMATION: Dyspnea. Abdominal pain, diabetes, CALVIN.    COMPARISON: None.    PROCEDURE:  CT of the Chest, Abdomen and Pelvis was performed without intravenous contrast.  Intravenous contrast: None.  Oral contrast: Positive contrast was administered.  Sagittal and coronal reformats were performed.    FINDINGS:    CHEST:  LUNGS AND LARGE AIRWAYS: Patent central airways. Interlobular septal thickening and bilateral groundglass opacities suggestive of pulmonary edema.  PLEURA: Small bilateral pleural effusions, right greater than left.  VESSELS: Atherosclerotic changes of the aorta and coronary arteries.  HEART: Cardiomegaly. No pericardial effusion. Status post CABG.  MEDIASTINUM AND EVELYN: No lymphadenopathy.  CHEST WALL AND LOWER NECK: Status post median sternotomy. Left chest wall cardiac device.    ABDOMEN AND PELVIS:  LIVER: Within normal limits.  BILE DUCTS: Normal caliber.  GALLBLADDER: Within normal limits.  SPLEEN: Within normal limits.  PANCREAS: Within normal limits.  ADRENALS: Within normal limits.  KIDNEYS/URETERS: No hydronephrosis. Punctate nonobstructing right renal calculus. Bilateral renal cysts and hypodensities too small to characterize.    BLADDER: Within normal limits.  REPRODUCTIVE ORGANS: Prostate is enlarged.    BOWEL: No bowel obstruction. Appendix is not visualized.  PERITONEUM: Small volume ascites.  VESSELS: Atherosclerotic changes.  RETROPERITONEUM/LYMPH NODES: No lymphadenopathy.  ABDOMINAL WALL: Fluid and fat-containing right inguinal hernia and fat-containing left inguinal hernia.  BONES: Degenerative changes.    IMPRESSION:    Pulmonary edema with small bilateral pleural effusions.    Small volume ascites.                MCKENNA SUE MD; Resident Radiology  This document has been electronically signed.  MONE NAVARRETE MD; Attending Radiologist  This document has been electronically signed. Feb 17 2021 11:51AM    < end of copied text >    OTHER: 	  	  LABS:	 	    CARDIAC MARKERS:                                  9.1    8.21  )-----------( 261      ( 17 Feb 2021 03:21 )             27.8     02-17    127<L>  |  95<L>  |  96<H>  ----------------------------<  98  5.5<H>   |  22  |  1.98<H>    Ca    9.9      17 Feb 2021 03:17    TPro  7.2  /  Alb  3.2<L>  /  TBili  1.3<H>  /  DBili  0.7<H>  /  AST  42<H>  /  ALT  41  /  AlkPhos  332<H>  02-17    proBNP:   Lipid Profile:   HgA1c:   TSH: Thyroid Stimulating Hormone, Serum: 2.25 uIU/mL (02-17 @ 06:11)             CHIEF COMPLAINT:   He sees Dr. Luis Sharpe for his cardiology care.   I called and discussed case with him.       HISTORY OF PRESENT ILLNESS: 81 yo male w h/o DM, CAD, s/p CABG, ICM s/p ICD , CKD 3-4 and now admitted for elevated creatinine. He was sent to ER by his nephrologist.   Lately ptn has been having abdominal pain with elevated amylase and lipase.   As per ptn's wife: on and off R abd pain w generalized weakness and poor po intake. PCP DCed po Lasix in past few days 2/2 abnormal blood test results.  Ptn denies recent NSAID use.          PAST MEDICAL & SURGICAL HISTORY:  Renal failure    CAD (coronary artery disease)    Hypertension    Diabetes mellitus            MEDICATIONS:  furosemide   Injectable 40 milliGRAM(s) IV Push every 12 hours  heparin   Injectable 5000 Unit(s) SubCutaneous every 12 hours    piperacillin/tazobactam IVPB.. 3.375 Gram(s) IV Intermittent every 8 hours        pantoprazole    Tablet 40 milliGRAM(s) Oral before breakfast    dextrose 40% Gel 15 Gram(s) Oral once  dextrose 50% Injectable 25 Gram(s) IV Push once  dextrose 50% Injectable 12.5 Gram(s) IV Push once  dextrose 50% Injectable 25 Gram(s) IV Push once  glucagon  Injectable 1 milliGRAM(s) IntraMuscular once  insulin lispro (ADMELOG) corrective regimen sliding scale   SubCutaneous three times a day before meals  insulin lispro (ADMELOG) corrective regimen sliding scale   SubCutaneous at bedtime    dextrose 5%. 1000 milliLiter(s) IV Continuous <Continuous>  dextrose 5%. 1000 milliLiter(s) IV Continuous <Continuous>  influenza   Vaccine 0.5 milliLiter(s) IntraMuscular once      FAMILY HISTORY:      SOCIAL HISTORY:    [ ] Non-smoker  [ ] Smoker  [ ] Alcohol    Allergies    No Known Allergies    Intolerances    	    REVIEW OF SYSTEMS:  CONSTITUTIONAL: No fever, weight loss, + fatigue  EYES: No eye pain, visual disturbances, or discharge  ENMT:  No difficulty hearing, tinnitus, vertigo; No sinus or throat pain  NECK: No pain or stiffness  RESPIRATORY: No cough, wheezing, chills or hemoptysis;+ Shortness of Breath  CARDIOVASCULAR: No chest pain, palpitations, passing out, dizziness, or leg swelling  GASTROINTESTINAL: No abdominal or epigastric pain. No nausea, vomiting, or hematemesis; No diarrhea or constipation. No melena or hematochezia.  GENITOURINARY: No dysuria, frequency, hematuria, or incontinence  NEUROLOGICAL: No headaches, memory loss, loss of strength, numbness, or tremors  SKIN: No itching, burning, rashes, or lesions   LYMPH Nodes: No enlarged glands  ENDOCRINE: No heat or cold intolerance; No hair loss  MUSCULOSKELETAL: No joint pain or swelling; No muscle, back, or extremity pain  PSYCHIATRIC: No depression, anxiety, mood swings, or difficulty sleeping  HEME/LYMPH: No easy bruising, or bleeding gums  ALLERY AND IMMUNOLOGIC: No hives or eczema	    [ ] All others negative	  [ ] Unable to obtain    PHYSICAL EXAM:  T(C): 36.4 (02-17-21 @ 12:08), Max: 36.4 (02-16-21 @ 20:07)  HR: 78 (02-17-21 @ 12:08) (70 - 78)  BP: 101/59 (02-17-21 @ 12:08) (90/42 - 112/52)  RR: 18 (02-17-21 @ 12:08) (18 - 20)  SpO2: 96% (02-17-21 @ 12:08) (96% - 100%)  Wt(kg): --  I&O's Summary    16 Feb 2021 07:01  -  17 Feb 2021 07:00  --------------------------------------------------------  IN: 100 mL / OUT: 500 mL / NET: -400 mL    17 Feb 2021 07:01  -  17 Feb 2021 18:54  --------------------------------------------------------  IN: 800 mL / OUT: 2000 mL / NET: -1200 mL        Appearance: NAD  HEENT:   Dry  oral mucosa, PERRL, EOMI	  Lymphatic: No lymphadenopathy  Cardiovascular: Normal S1 S2, No JVD, No murmurs  Respiratory: Decreased bs   Psychiatry: A & O x 3, Mood & affect appropriate  Gastrointestinal:  Soft, Non-tender, + BS	  Skin: No rashes, No ecchymoses, No cyanosis	  Neurologic: Non-focal  Extremities: Normal range of motion, No clubbing, cyanosis + edema  Vascular: Peripheral pulses palpable 2+ bilaterally    TELEMETRY: SR 	    ECG:  	NSR, First degree AVB, lateral infarct   RADIOLOGY:  < from: Xray Chest 1 View-PORTABLE IMMEDIATE (Xray Chest 1 View-PORTABLE IMMEDIATE .) (02.16.21 @ 20:27) >    EXAM:  XR CHEST PORTABLE IMMED 1V                            PROCEDURE DATE:  02/16/2021            INTERPRETATION:  A single chest x-ray was obtained on February 16, 2021.    Indication: Cough.    Impression:    The heart is markedly enlarged. Pulmonary vascular congestion. Underlying chronic lung changes cannot be ruled out. No previous studies for comparison are available. A pacer is in good position. Status post sternotomy.      < end of copied text >  < from: CT Chest No Cont (02.17.21 @ 01:24) >    EXAM:  CT CHEST                          EXAM:  CT ABDOMEN AND PELVIS OC                            PROCEDURE DATE:  02/17/2021            INTERPRETATION:  CLINICAL INFORMATION: Dyspnea. Abdominal pain, diabetes, CALVIN.    COMPARISON: None.    PROCEDURE:  CT of the Chest, Abdomen and Pelvis was performed without intravenous contrast.  Intravenous contrast: None.  Oral contrast: Positive contrast was administered.  Sagittal and coronal reformats were performed.    FINDINGS:    CHEST:  LUNGS AND LARGE AIRWAYS: Patent central airways. Interlobular septal thickening and bilateral groundglass opacities suggestive of pulmonary edema.  PLEURA: Small bilateral pleural effusions, right greater than left.  VESSELS: Atherosclerotic changes of the aorta and coronary arteries.  HEART: Cardiomegaly. No pericardial effusion. Status post CABG.  MEDIASTINUM AND EVELYN: No lymphadenopathy.  CHEST WALL AND LOWER NECK: Status post median sternotomy. Left chest wall cardiac device.    ABDOMEN AND PELVIS:  LIVER: Within normal limits.  BILE DUCTS: Normal caliber.  GALLBLADDER: Within normal limits.  SPLEEN: Within normal limits.  PANCREAS: Within normal limits.  ADRENALS: Within normal limits.  KIDNEYS/URETERS: No hydronephrosis. Punctate nonobstructing right renal calculus. Bilateral renal cysts and hypodensities too small to characterize.    BLADDER: Within normal limits.  REPRODUCTIVE ORGANS: Prostate is enlarged.    BOWEL: No bowel obstruction. Appendix is not visualized.  PERITONEUM: Small volume ascites.  VESSELS: Atherosclerotic changes.  RETROPERITONEUM/LYMPH NODES: No lymphadenopathy.  ABDOMINAL WALL: Fluid and fat-containing right inguinal hernia and fat-containing left inguinal hernia.  BONES: Degenerative changes.    IMPRESSION:    Pulmonary edema with small bilateral pleural effusions.    Small volume ascites.                MCKENNA SUE MD; Resident Radiology  This document has been electronically signed.  MONE NAVARRETE MD; Attending Radiologist  This document has been electronically signed. Feb 17 2021 11:51AM    < end of copied text >    OTHER: 	  	  LABS:	 	    CARDIAC MARKERS:                                  9.1    8.21  )-----------( 261      ( 17 Feb 2021 03:21 )             27.8     02-17    127<L>  |  95<L>  |  96<H>  ----------------------------<  98  5.5<H>   |  22  |  1.98<H>    Ca    9.9      17 Feb 2021 03:17    TPro  7.2  /  Alb  3.2<L>  /  TBili  1.3<H>  /  DBili  0.7<H>  /  AST  42<H>  /  ALT  41  /  AlkPhos  332<H>  02-17    proBNP:   Lipid Profile:   HgA1c:   TSH: Thyroid Stimulating Hormone, Serum: 2.25 uIU/mL (02-17 @ 06:11)

## 2021-02-17 NOTE — CONSULT NOTE ADULT - PROBLEM SELECTOR RECOMMENDATION 3
ICM s/p ICD   he had an angiogram last year in [facility]. As per Dr. Sharpe, severe MV CAD not amenable to revascularization anymore  EP consult to optimize device

## 2021-02-17 NOTE — CONSULT NOTE ADULT - SUBJECTIVE AND OBJECTIVE BOX
Patient is a 80y old  Male who presents with a chief complaint of acute renal failure (2021 10:03)      HPI:  81 yo male w h/o DM, CAD, s/p CABG, CKD 3-4.   Recently evaluated by Nephrology for elevated creatinine. Bloodwork  returned notable for  and Na 126; in light of these findings, Dr. Luz (Renal) sent the patient to the ER. Lately ptn has been having abdominal pain with elevated amylase and lipase.   As per ptn's wife: on and off R abd pain w generalized weakness and poor po intake. PCP DCed po Lasix in past few days 2/2 abnormal blood test results.  Ptn denies recent NSAID use.  He was diagnosed with diabetes 18 years ago, and denies retinopathy (last saw Optho around 2 years ago). Cardiologist has alluded to the patient having mild renal impairment in the past, as per wife.    Patient's PCP is Dr. Shyann De León; GI Dr. Wilman Villagran; Cardiology Dr. Boyce.  (2021 21:05)          REVIEW OF SYSTEMS:    CONSTITUTIONAL: No fever, weight loss, or fatigue  EYES: No eye pain, visual disturbances, or discharge  ENMT:  No sore throat  NECK: No pain or stiffness  RESPIRATORY: No cough, wheezing, chills or hemoptysis; No shortness of breath  CARDIOVASCULAR: No chest pain, palpitations, dizziness, or leg swelling  GASTROINTESTINAL: No abdominal or epigastric pain. No nausea, vomiting, or hematemesis; No diarrhea or constipation. No melena or hematochezia.  GENITOURINARY: No dysuria, frequency, hematuria, or incontinence  NEUROLOGICAL: No headaches, memory loss, loss of strength, numbness, or tremors  SKIN: No itching, burning, rashes, or lesions   LYMPH NODES: No enlarged glands  MUSCULOSKELETAL: No joint pain or swelling; No muscle, back, or extremity pain      PAST MEDICAL & SURGICAL HISTORY:  Renal failure    CAD (coronary artery disease)    Hypertension    Diabetes mellitus        Allergies    No Known Allergies    Intolerances        FAMILY HISTORY:    No pertinent fam hx    SOCIAL HISTORY:        MEDICATIONS  (STANDING):  dextrose 40% Gel 15 Gram(s) Oral once  dextrose 5%. 1000 milliLiter(s) (50 mL/Hr) IV Continuous <Continuous>  dextrose 5%. 1000 milliLiter(s) (100 mL/Hr) IV Continuous <Continuous>  dextrose 50% Injectable 25 Gram(s) IV Push once  dextrose 50% Injectable 12.5 Gram(s) IV Push once  dextrose 50% Injectable 25 Gram(s) IV Push once  furosemide   Injectable 40 milliGRAM(s) IV Push every 12 hours  glucagon  Injectable 1 milliGRAM(s) IntraMuscular once  heparin   Injectable 5000 Unit(s) SubCutaneous every 12 hours  influenza   Vaccine 0.5 milliLiter(s) IntraMuscular once  insulin lispro (ADMELOG) corrective regimen sliding scale   SubCutaneous three times a day before meals  insulin lispro (ADMELOG) corrective regimen sliding scale   SubCutaneous at bedtime  pantoprazole    Tablet 40 milliGRAM(s) Oral before breakfast  piperacillin/tazobactam IVPB.. 3.375 Gram(s) IV Intermittent every 8 hours    MEDICATIONS  (PRN):      Vital Signs Last 24 Hrs  T(C): 36.4 (2021 12:08), Max: 36.7 (2021 14:56)  T(F): 97.5 (2021 12:08), Max: 98.1 (2021 14:56)  HR: 78 (2021 12:08) (70 - 85)  BP: 101/59 (2021 12:08) (90/42 - 112/52)  BP(mean): 69 (2021 21:56) (69 - 69)  RR: 18 (2021 12:08) (16 - 20)  SpO2: 96% (2021 12:08) (96% - 100%)    PHYSICAL EXAM:    GENERAL: NAD, well-groomed  HEAD:  Atraumatic, Normocephalic  EYES: EOMI, PERRLA, conjunctiva and sclera clear  ENMT: No tonsillar erythema, exudates, or enlargement; Moist mucous membranes  NECK: Supple, No JVD  CHEST/LUNG: Clear to percussion bilaterally; No rales, rhonchi, wheezing, or rubs  HEART: Regular rate and rhythm; No murmurs, rubs, or gallops  ABDOMEN: Soft, Nontender, Nondistended; Bowel sounds present  EXTREMITIES:  2+ Peripheral Pulses, No clubbing, cyanosis, or edema  LYMPH: No lymphadenopathy noted  SKIN: No rashes or lesions    LABS:  CBC Full  -  ( 2021 03:21 )  WBC Count : 8.21 K/uL  RBC Count : 3.04 M/uL  Hemoglobin : 9.1 g/dL  Hematocrit : 27.8 %  Platelet Count - Automated : 261 K/uL  Mean Cell Volume : 91.4 fl  Mean Cell Hemoglobin : 29.9 pg  Mean Cell Hemoglobin Concentration : 32.7 gm/dL  Auto Neutrophil # : x  Auto Lymphocyte # : x  Auto Monocyte # : x  Auto Eosinophil # : x  Auto Basophil # : x  Auto Neutrophil % : x  Auto Lymphocyte % : x  Auto Monocyte % : x  Auto Eosinophil % : x  Auto Basophil % : x          127<L>  |  95<L>  |  96<H>  ----------------------------<  98  5.5<H>   |  22  |  1.98<H>    Ca    9.9      2021 03:17    TPro  7.2  /  Alb  3.2<L>  /  TBili  1.3<H>  /  DBili  0.7<H>  /  AST  42<H>  /  ALT  41  /  AlkPhos  332<H>        LIVER FUNCTIONS - ( 2021 04:40 )  Alb: x     / Pro: x     / ALK PHOS: x     / ALT: x     / AST: x     / GGT: 232 U/L                           MICROBIOLOGY:        Urinalysis Basic - ( 2021 07:16 )    Color: Light Yellow / Appearance: Clear / S.010 / pH: x  Gluc: x / Ketone: Negative  / Bili: Negative / Urobili: Negative   Blood: x / Protein: Negative / Nitrite: Negative   Leuk Esterase: Negative / RBC: x / WBC x   Sq Epi: x / Non Sq Epi: x / Bacteria: x          COVID-19 PCR . (21 @ 20:12)   COVID-19 PCR: NotDetec: You can help in the fight against COVID-19. [employer] may contact   you to see if you are interested in voluntarily participating in one of   our clinical trials.   Testing is performed using polymerase chain reaction (PCR) or   transcription mediated amplification (TMA). This COVID-19 (SARS-CoV-2)   nucleic acid amplification test was validated by [employer] and is   in use under the FDA Emergency Use Authorization (EUA) for clinical labs   CLIA-certified to perform high complexity testing. Test results should be   correlated with clinical presentation, patient history, and epidemiology.       RADIOLOGY:    < from: VA Duplex Lower Ext Vein Scan, Bilat (21 @ 11:51) >    FINDINGS:    There is an occluded right superficial femoral artery.    There is normal compressibility of the bilateral common femoral, femoral and popliteal veins.  Doppler examination shows normal spontaneous and phasic flow.    No calf vein thrombosis is detected.    IMPRESSION:  No evidence of deep venous thrombosis in either lower extremity.    < end of copied text >      < from: US Abdomen Complete (US Abdomen Complete .) (21 @ 11:18) >  IMPRESSION:  *  Cholelithiasis without acute cholecystitis. Patient's borderline to mild gallbladder thickening is likely due to ascites.  *  Moderate ascites.  *  Suggestion of mild renal parenchymal disease.  *  Bilateral pleural effusions.  *  Mildly prominent inferior vena cava is likely on the basis of patient's heart disease.    < end of copied text >      < from: CT Chest No Cont (21 @ 01:24) >  IMPRESSION:    Pulmonary edema with small bilateral pleural effusions.    Small volume ascites.    < end of copied text >      < from: CT Abdomen and Pelvis w/ Oral Cont (21 @ 01:24) >  IMPRESSION:    Pulmonary edema with small bilateral pleural effusions.    Small volume ascites.    < end of copied text >        < from: NM Pulmonary Perfusion Scan (21 @ 00:34) >  FINDINGS: There are nonsegmental areas of decreased perfusion in both mid and lower lungs. There is mildly heterogeneous distribution of radiotracer in the remainder of the lungs. There is no segmental perfusion defect.    IMPRESSION: Very low probability of pulmonary embolus.    < end of copied text >        < from: Xray Chest 1 View-PORTABLE IMMEDIATE (Xray Chest 1 View-PORTABLE IMMEDIATE .) (21 @ 20:27) >  INTERPRETATION:  A single chest x-ray was obtained on 2021.    Indication: Cough.    Impression:    The heart is markedly enlarged. Pulmonary vascular congestion. Underlying chronic lung changes cannot be ruled out. No previous studies for comparison are available. A pacer is in good position. Status post sternotomy.    < end of copied text >       Patient is a 80y old  Male who presents with a chief complaint of acute renal failure (2021 10:03)      HPI:  79 yo male w h/o DM, CAD, s/p CABG, CKD 3-4.   Recently evaluated by Nephrology for elevated creatinine. Bloodwork  returned notable for  and Na 126; in light of these findings, Dr. Luz (Renal) sent the patient to the ER. Lately ptn has been having abdominal pain with elevated amylase and lipase.   As per ptn's wife: on and off R abd pain w generalized weakness and poor po intake. PCP DCed po Lasix in past few days 2/2 abnormal blood test results.  Ptn denies recent NSAID use.  He was diagnosed with diabetes 18 years ago, and denies retinopathy (last saw Optho around 2 years ago). Cardiologist has alluded to the patient having mild renal impairment in the past, as per wife.    Patient's PCP is Dr. Shyann De León; GI Dr. Wilman Villagran; Cardiology Dr. Boyce.  (2021 21:05)    ER vs: T 98.1, P 85, /64. WBC 8.2.  Bun/Cr - 100/2.0.  LFTs mildly elevated, Roxana 217, Lip 185.  Pct 0.2.  UA (-).  Ct c/a/p shows pulmonary edema, sm B/L pleural effusions, sm volume ascites.  VQ scan with very low probability for PE.      Pt started on zosyn to cover for intra-abd source.  ID consulted.       REVIEW OF SYSTEMS:    CONSTITUTIONAL: No fever, weight loss, or fatigue  EYES: No eye pain, visual disturbances, or discharge  ENMT:  No sore throat  NECK: No pain or stiffness  RESPIRATORY: No cough, wheezing, chills or hemoptysis; No shortness of breath  CARDIOVASCULAR: No chest pain, palpitations, dizziness, or leg swelling  GASTROINTESTINAL: No abdominal or epigastric pain. No nausea, vomiting, or hematemesis; No diarrhea or constipation. No melena or hematochezia.  GENITOURINARY: No dysuria, frequency, hematuria, or incontinence  NEUROLOGICAL: No headaches, memory loss, loss of strength, numbness, or tremors  SKIN: No itching, burning, rashes, or lesions   LYMPH NODES: No enlarged glands  MUSCULOSKELETAL: No joint pain or swelling; No muscle, back, or extremity pain      PAST MEDICAL & SURGICAL HISTORY:  Renal failure    CAD (coronary artery disease)    Hypertension    Diabetes mellitus        Allergies    No Known Allergies    Intolerances        FAMILY HISTORY:    No pertinent fam hx    SOCIAL HISTORY:  Denies etoh, smoking, ivdu      MEDICATIONS  (STANDING):  dextrose 40% Gel 15 Gram(s) Oral once  dextrose 5%. 1000 milliLiter(s) (50 mL/Hr) IV Continuous <Continuous>  dextrose 5%. 1000 milliLiter(s) (100 mL/Hr) IV Continuous <Continuous>  dextrose 50% Injectable 25 Gram(s) IV Push once  dextrose 50% Injectable 12.5 Gram(s) IV Push once  dextrose 50% Injectable 25 Gram(s) IV Push once  furosemide   Injectable 40 milliGRAM(s) IV Push every 12 hours  glucagon  Injectable 1 milliGRAM(s) IntraMuscular once  heparin   Injectable 5000 Unit(s) SubCutaneous every 12 hours  influenza   Vaccine 0.5 milliLiter(s) IntraMuscular once  insulin lispro (ADMELOG) corrective regimen sliding scale   SubCutaneous three times a day before meals  insulin lispro (ADMELOG) corrective regimen sliding scale   SubCutaneous at bedtime  pantoprazole    Tablet 40 milliGRAM(s) Oral before breakfast  piperacillin/tazobactam IVPB.. 3.375 Gram(s) IV Intermittent every 8 hours    MEDICATIONS  (PRN):      Vital Signs Last 24 Hrs  T(C): 36.4 (2021 12:08), Max: 36.7 (2021 14:56)  T(F): 97.5 (2021 12:08), Max: 98.1 (2021 14:56)  HR: 78 (2021 12:08) (70 - 85)  BP: 101/59 (2021 12:08) (90/42 - 112/52)  BP(mean): 69 (2021 21:56) (69 - 69)  RR: 18 (2021 12:08) (16 - 20)  SpO2: 96% (2021 12:08) (96% - 100%)    PHYSICAL EXAM:    GENERAL: NAD, well-groomed  HEAD:  Atraumatic, Normocephalic  EYES: EOMI, PERRLA, conjunctiva and sclera clear  ENMT: No tonsillar erythema, exudates, or enlargement; Moist mucous membranes  NECK: Supple, No JVD  CHEST/LUNG: Clear to percussion bilaterally; No rales, rhonchi, wheezing, or rubs  HEART: Regular rate and rhythm; No murmurs, rubs, or gallops  ABDOMEN: Soft, Nontender, Nondistended; Bowel sounds present  EXTREMITIES:  2+ Peripheral Pulses, No clubbing, cyanosis, or edema  LYMPH: No lymphadenopathy noted  SKIN: No rashes or lesions    LABS:  CBC Full  -  ( 2021 03:21 )  WBC Count : 8.21 K/uL  RBC Count : 3.04 M/uL  Hemoglobin : 9.1 g/dL  Hematocrit : 27.8 %  Platelet Count - Automated : 261 K/uL  Mean Cell Volume : 91.4 fl  Mean Cell Hemoglobin : 29.9 pg  Mean Cell Hemoglobin Concentration : 32.7 gm/dL  Auto Neutrophil # : x  Auto Lymphocyte # : x  Auto Monocyte # : x  Auto Eosinophil # : x  Auto Basophil # : x  Auto Neutrophil % : x  Auto Lymphocyte % : x  Auto Monocyte % : x  Auto Eosinophil % : x  Auto Basophil % : x          127<L>  |  95<L>  |  96<H>  ----------------------------<  98  5.5<H>   |  22  |  1.98<H>    Ca    9.9      2021 03:17    TPro  7.2  /  Alb  3.2<L>  /  TBili  1.3<H>  /  DBili  0.7<H>  /  AST  42<H>  /  ALT  41  /  AlkPhos  332<H>        LIVER FUNCTIONS - ( 2021 04:40 )  Alb: x     / Pro: x     / ALK PHOS: x     / ALT: x     / AST: x     / GGT: 232 U/L                           MICROBIOLOGY:        Urinalysis Basic - ( 2021 07:16 )    Color: Light Yellow / Appearance: Clear / S.010 / pH: x  Gluc: x / Ketone: Negative  / Bili: Negative / Urobili: Negative   Blood: x / Protein: Negative / Nitrite: Negative   Leuk Esterase: Negative / RBC: x / WBC x   Sq Epi: x / Non Sq Epi: x / Bacteria: x          COVID-19 PCR . (21 @ 20:12)   COVID-19 PCR: NotDetec: You can help in the fight against COVID-19. [facility] may contact   you to see if you are interested in voluntarily participating in one of   our clinical trials.   Testing is performed using polymerase chain reaction (PCR) or   transcription mediated amplification (TMA). This COVID-19 (SARS-CoV-2)   nucleic acid amplification test was validated by [employer] [facility] and is   in use under the FDA Emergency Use Authorization (EUA) for clinical labs   CLIA-certified to perform high complexity testing. Test results should be   correlated with clinical presentation, patient history, and epidemiology.       RADIOLOGY:    < from: VA Duplex Lower Ext Vein Scan, Bilat (21 @ 11:51) >    FINDINGS:    There is an occluded right superficial femoral artery.    There is normal compressibility of the bilateral common femoral, femoral and popliteal veins.  Doppler examination shows normal spontaneous and phasic flow.    No calf vein thrombosis is detected.    IMPRESSION:  No evidence of deep venous thrombosis in either lower extremity.    < end of copied text >      < from: US Abdomen Complete (US Abdomen Complete .) (21 @ 11:18) >  IMPRESSION:  *  Cholelithiasis without acute cholecystitis. Patient's borderline to mild gallbladder thickening is likely due to ascites.  *  Moderate ascites.  *  Suggestion of mild renal parenchymal disease.  *  Bilateral pleural effusions.  *  Mildly prominent inferior vena cava is likely on the basis of patient's heart disease.    < end of copied text >      < from: CT Chest No Cont (21 @ 01:24) >  IMPRESSION:    Pulmonary edema with small bilateral pleural effusions.    Small volume ascites.    < end of copied text >      < from: CT Abdomen and Pelvis w/ Oral Cont (21 @ 01:24) >  IMPRESSION:    Pulmonary edema with small bilateral pleural effusions.    Small volume ascites.    < end of copied text >        < from: NM Pulmonary Perfusion Scan (21 @ 00:34) >  FINDINGS: There are nonsegmental areas of decreased perfusion in both mid and lower lungs. There is mildly heterogeneous distribution of radiotracer in the remainder of the lungs. There is no segmental perfusion defect.    IMPRESSION: Very low probability of pulmonary embolus.    < end of copied text >        < from: Xray Chest 1 View-PORTABLE IMMEDIATE (Xray Chest 1 View-PORTABLE IMMEDIATE .) (21 @ 20:27) >  INTERPRETATION:  A single chest x-ray was obtained on 2021.    Indication: Cough.    Impression:    The heart is markedly enlarged. Pulmonary vascular congestion. Underlying chronic lung changes cannot be ruled out. No previous studies for comparison are available. A pacer is in good position. Status post sternotomy.    < end of copied text >

## 2021-02-17 NOTE — CONSULT NOTE ADULT - PROBLEM SELECTOR RECOMMENDATION 9
- given epigastric/RUQ pain with elevated LFTs and amylase/lipase, concern for possible gallstone pancreatitis  - U/S abdomen pending   - start protonix PO 40mg daily   - monitor LFTs   - diet as tolerated  - further recommendations pending above  - d/w Dr. Sosa  - d/w NP
acute n chronic   Cont with IV lasix as ordered

## 2021-02-17 NOTE — PROGRESS NOTE ADULT - SUBJECTIVE AND OBJECTIVE BOX
      Overnight events noted      VITAL:  T(C): , Max: 36.7 (02-16-21 @ 14:56)  T(F): , Max: 98.1 (02-16-21 @ 14:56)  HR: 74 (02-17-21 @ 07:03)  BP: 90/42 (02-17-21 @ 07:03)  BP(mean): 69 (02-16-21 @ 21:56)  RR: 18 (02-17-21 @ 07:03)  SpO2: 96% (02-17-21 @ 07:03)      PHYSICAL EXAM:  Constitutional: NAD, Alert  HEENT: NCAT, DMM  Neck: Supple, No JVD  Respiratory: CTA-b/l  Cardiovascular: RRR s1s2, no m/r/g  Gastrointestinal: BS+, soft, NT/ND  Extremities: 2+ b/l LE edema  Neurological: no focal deficits; strength grossly intact  Back: no CVAT b/l  Skin: No rashes, no nevi    LABS:                        9.1    8.21  )-----------( 261      ( 17 Feb 2021 03:21 )             27.8     Na(127)/K(5.5)/Cl(95)/HCO3(22)/BUN(96)/Cr(1.98)Glu(98)/Ca(9.9)/Mg(--)/PO4(--)    02-17 @ 03:17  Na(128)/K(5.7)/Cl(96)/HCO3(23)/BUN(100)/Cr(2.00)Glu(122)/Ca(10.3)/Mg(--)/PO4(--)    02-16 @ 23:54  Na(127)/K(6.0)/Cl(95)/HCO3(20)/BUN(96)/Cr(2.12)Glu(182)/Ca(9.6)/Mg(--)/PO4(--)    02-16 @ 20:25    TSH 2.25  Urine: Na 50, K 24, Cl 58; UA-no prot, no blood, SG 1.010    (2/3/21) - , Cr 2.81, Na 136, K 4.2, Bili 1.6, Alb 3.9, , Roxana 250, Lip 205; PSA 2.9, UA-trace prot, trace blood    IMAGING:  VQ - very low probability for PE        IMPRESSION: 80M w/ DM2, CAD-CABG, and CKD, 2/16/21 a/w azotemia/hyponatremia/hyperkalemia/weakness  (1)Renal - azotemia - unclear baseline creatinine - GFR ~15-20  (2)Hyperkalemia - likely combination of renal impairment, ARB, and diet; awaiting cortisol level to rule out adrenal insufficiency  (3)Hyponatremia - urine studies most consistent with SIADH, to date; adrenal insufficiency could also cause similar values  (4)Weakness - unclear etiology  (5)GI - high amylase and lipase of late, as well as cholestasis      RECOMMEND:  (1)Urine: lytes, osm  (2)Low K diet/1.2L free water restriction  (3)No objection to IV Lasix as ordered for now  (4)GI evaluation/Cardiology evaluation  (5)BMP daily for now  (6)Renal ultrasound  (7)CXR  (8)Lokelma 10gm po x 1 now and prn K of 5.5 or higher  (9)D50/Insulin/Albuterol/IV Ca prn K of 6.0 or higher  (10)Dose new meds for GFR 15-20ml/min (present dosing is ok)  (11)Will try to obtain further old bloodwork      Suraj Garcia MD  [facility]  Office: [phone]  Cell: [phone]               no pain, no sob      VITAL:  T(C): , Max: 36.7 (02-16-21 @ 14:56)  T(F): , Max: 98.1 (02-16-21 @ 14:56)  HR: 74 (02-17-21 @ 07:03)  BP: 90/42 (02-17-21 @ 07:03)  BP(mean): 69 (02-16-21 @ 21:56)  RR: 18 (02-17-21 @ 07:03)  SpO2: 96% (02-17-21 @ 07:03)      PHYSICAL EXAM:  Constitutional: NAD, Alert  HEENT: NCAT, DMM  Neck: Supple, No JVD  Respiratory: CTA-b/l  Cardiovascular: RRR s1s2, no m/r/g  Gastrointestinal: BS+, soft, NT/ND  Extremities: 2+ b/l LE edema  Neurological: no focal deficits; strength grossly intact  Back: no CVAT b/l  Skin: No rashes, no nevi    LABS:                        9.1    8.21  )-----------( 261      ( 17 Feb 2021 03:21 )             27.8     Na(127)/K(5.5)/Cl(95)/HCO3(22)/BUN(96)/Cr(1.98)Glu(98)/Ca(9.9)/Mg(--)/PO4(--)    02-17 @ 03:17  Na(128)/K(5.7)/Cl(96)/HCO3(23)/BUN(100)/Cr(2.00)Glu(122)/Ca(10.3)/Mg(--)/PO4(--)    02-16 @ 23:54  Na(127)/K(6.0)/Cl(95)/HCO3(20)/BUN(96)/Cr(2.12)Glu(182)/Ca(9.6)/Mg(--)/PO4(--)    02-16 @ 20:25    TSH 2.25  Urine: Na 50, K 24, Cl 58; UA-no prot, no blood, SG 1.010    (2/3/21) - , Cr 2.81, Na 136, K 4.2, Bili 1.6, Alb 3.9, , Roxana 250, Lip 205; PSA 2.9, UA-trace prot, trace blood    IMAGING:  VQ - very low probability for PE        IMPRESSION: 80M w/ DM2, CAD-CABG, and CKD, 2/16/21 a/w azotemia/hyponatremia/hyperkalemia/weakness  (1)Renal - azotemia - unclear baseline creatinine - GFR ~15-20  (2)Hyperkalemia - likely combination of renal impairment, ARB, and diet; awaiting cortisol level to rule out adrenal insufficiency  (3)Hyponatremia - urine studies most consistent with SIADH, to date; adrenal insufficiency could also cause similar values  (4)Weakness - unclear etiology  (5)GI - high amylase and lipase of late, as well as cholestasis      RECOMMEND:  (1)Urine: lytes, osm  (2)Low K diet/1.2L free water restriction  (3)No objection to IV Lasix as ordered for now  (4)GI evaluation/Cardiology evaluation  (5)BMP daily for now  (6)Renal ultrasound  (7)CXR  (8)Lokelma 10gm po x 1 now and prn K of 5.5 or higher  (9)D50/Insulin/Albuterol/IV Ca prn K of 6.0 or higher  (10)Dose new meds for GFR 15-20ml/min (present dosing is ok)  (11)Will try to obtain further old bloodwork      Suraj Garcia MD  [facility]  Office: [phone]  Cell: [phone]

## 2021-02-17 NOTE — PROVIDER CONTACT NOTE (OTHER) - ASSESSMENT
Patient A&O x4. VS as per flowsheet, SBP in low 90's. Staff on unit proficient in Sudanese for translation; patient offers mild SOB, though denies chest pain, palpitations, SOB.

## 2021-02-17 NOTE — CHART NOTE - NSCHARTNOTEFT_GEN_A_CORE
Pt with 3 episodes of 10 beats WCT vs pacemaker mediated tachycardia on tele.  Pt seen and examined at bedside. Pt denies difficulty breathing, chest pain, SOB.      Vital Signs Last 24 Hrs  T(C): 36.4 (17 Feb 2021 03:55), Max: 36.7 (16 Feb 2021 14:56)  T(F): 97.6 (17 Feb 2021 03:55), Max: 98.1 (16 Feb 2021 14:56)  HR: 75 (17 Feb 2021 03:55) (70 - 85)  BP: 98/52 (17 Feb 2021 03:59) (98/52 - 112/52)  BP(mean): 69 (16 Feb 2021 21:56) (69 - 69)  RR: 18 (17 Feb 2021 03:55) (16 - 20)  SpO2: 96% (17 Feb 2021 03:55) (96% - 100%)    Physical Exam:  General: WN/WD NAD  Neurology: A&Ox3, nonfocal, JOHNSON x 4  Head:  Normocephalic, atraumatic  Respiratory: CTA B/L  CV: RRR, S1S2  MSK:  + peripheral pulses,       Assessment & Plan:  81 yo male w h/o DM, CAD, s/p CABG, CKD 3-4, admitted for CALVIN on CKD, hyponatremia, and hyperkalemia, now with ectopy on tele.    1. Impression: WCT vs pacemaker mediated tachycardia   > 12-lead EKG  > Follow up with attending in AM.  > Consider EP interrogation in AM.    2. Hyperkalemia (K 5.5)  > as per renal, likely due to renal disease, ARB, and lack of dietary K+ limitation.  > IV Lasix held for low blood pressure (94/51)  > Potassium trending downward (6.0 --> 5.7 --> 5.5)  > ARB on hold  > Will d/w Dr. Dickey for other treatment options    Monique Lees PA-C (Medicine PA)  #[id number] Pt with 3 episodes of 10 beats WCT vs pacemaker mediated tachycardia on tele.  Pt seen and examined at bedside. Pt denies difficulty breathing, chest pain, SOB.      Vital Signs Last 24 Hrs  T(C): 36.4 (17 Feb 2021 03:55), Max: 36.7 (16 Feb 2021 14:56)  T(F): 97.6 (17 Feb 2021 03:55), Max: 98.1 (16 Feb 2021 14:56)  HR: 75 (17 Feb 2021 03:55) (70 - 85)  BP: 98/52 (17 Feb 2021 03:59) (98/52 - 112/52)  BP(mean): 69 (16 Feb 2021 21:56) (69 - 69)  RR: 18 (17 Feb 2021 03:55) (16 - 20)  SpO2: 96% (17 Feb 2021 03:55) (96% - 100%)    Physical Exam:  General: WN/WD NAD  Neurology: A&Ox3, nonfocal, JOHNSON x 4  Head:  Normocephalic, atraumatic  Respiratory: CTA B/L  CV: RRR, S1S2  MSK:  + peripheral pulses,       Assessment & Plan:  81 yo male w h/o DM, CAD, s/p CABG, CKD 3-4, admitted for CALVIN on CKD, hyponatremia, and hyperkalemia, now with ectopy on tele.    1. Impression: WCT vs pacemaker mediated tachycardia   > 12-lead EKG  > Follow up with attending in AM.  > Consider EP interrogation in AM.  > EKG: NSR @ 70 bpm with 1st degree AV block    2. Hyperkalemia (K 5.5)  > as per renal, likely due to renal disease, ARB, and lack of dietary K+ limitation.  > IV Lasix held for low blood pressure (94/51)  > Potassium trending downward (6.0 --> 5.7 --> 5.5)  > ARB on hold  > Will d/w Dr. Dickey for other treatment options    Monique Lees PA-C (Medicine PA)  #[id number]

## 2021-02-17 NOTE — PROVIDER CONTACT NOTE (OTHER) - ASSESSMENT
Patient A&O x4. VS as per flowsheet, SBP in low 90's. Patient offers mild SOB, 2L NC applied for comfort. Denies chest pain, palpitations, SOB.

## 2021-02-17 NOTE — PROVIDER CONTACT NOTE (OTHER) - ASSESSMENT
Patient A&O x4. VS as per flowsheet. Patient asleep during time of ectopies. When awoken, asymptomatic, denied chest pain, palpitations, SOB. No complaints at this time.

## 2021-02-17 NOTE — PROVIDER CONTACT NOTE (OTHER) - ASSESSMENT
Patient A&O x4. VS as per flowsheet. SBP now in low 90's. Patient asleep during time of ectopies. When awoken, asymptomatic, denied chest pain, palpitations, SOB. No complaints at this time.

## 2021-02-18 LAB
A1C WITH ESTIMATED AVERAGE GLUCOSE RESULT: 6.2 % — HIGH (ref 4–5.6)
ALBUMIN SERPL ELPH-MCNC: 2.9 G/DL — LOW (ref 3.3–5)
ALP SERPL-CCNC: 266 U/L — HIGH (ref 40–120)
ALT FLD-CCNC: 34 U/L — SIGNIFICANT CHANGE UP (ref 10–45)
AMYLASE P1 CFR SERPL: 175 U/L — HIGH (ref 25–125)
ANION GAP SERPL CALC-SCNC: 10 MMOL/L — SIGNIFICANT CHANGE UP (ref 5–17)
AST SERPL-CCNC: 32 U/L — SIGNIFICANT CHANGE UP (ref 10–40)
BILIRUB SERPL-MCNC: 1.3 MG/DL — HIGH (ref 0.2–1.2)
BUN SERPL-MCNC: 89 MG/DL — HIGH (ref 7–23)
CALCIUM SERPL-MCNC: 9.5 MG/DL — SIGNIFICANT CHANGE UP (ref 8.4–10.5)
CHLORIDE SERPL-SCNC: 96 MMOL/L — SIGNIFICANT CHANGE UP (ref 96–108)
CK MB CFR SERPL CALC: 3.5 NG/ML — SIGNIFICANT CHANGE UP (ref 0–6.7)
CK SERPL-CCNC: 52 U/L — SIGNIFICANT CHANGE UP (ref 30–200)
CO2 SERPL-SCNC: 23 MMOL/L — SIGNIFICANT CHANGE UP (ref 22–31)
CREAT SERPL-MCNC: 2.56 MG/DL — HIGH (ref 0.5–1.3)
CULTURE RESULTS: NO GROWTH — SIGNIFICANT CHANGE UP
ESTIMATED AVERAGE GLUCOSE: 131 MG/DL — HIGH (ref 68–114)
GLUCOSE BLDC GLUCOMTR-MCNC: 127 MG/DL — HIGH (ref 70–99)
GLUCOSE BLDC GLUCOMTR-MCNC: 143 MG/DL — HIGH (ref 70–99)
GLUCOSE BLDC GLUCOMTR-MCNC: 184 MG/DL — HIGH (ref 70–99)
GLUCOSE BLDC GLUCOMTR-MCNC: 85 MG/DL — SIGNIFICANT CHANGE UP (ref 70–99)
GLUCOSE SERPL-MCNC: 89 MG/DL — SIGNIFICANT CHANGE UP (ref 70–99)
HCT VFR BLD CALC: 26.7 % — LOW (ref 39–50)
HGB BLD-MCNC: 8.8 G/DL — LOW (ref 13–17)
LIDOCAIN IGE QN: 132 U/L — HIGH (ref 7–60)
MAGNESIUM SERPL-MCNC: 2.4 MG/DL — SIGNIFICANT CHANGE UP (ref 1.6–2.6)
MCHC RBC-ENTMCNC: 30.4 PG — SIGNIFICANT CHANGE UP (ref 27–34)
MCHC RBC-ENTMCNC: 33 GM/DL — SIGNIFICANT CHANGE UP (ref 32–36)
MCV RBC AUTO: 92.4 FL — SIGNIFICANT CHANGE UP (ref 80–100)
NRBC # BLD: 0 /100 WBCS — SIGNIFICANT CHANGE UP (ref 0–0)
OB PNL STL: NEGATIVE — SIGNIFICANT CHANGE UP
PHOSPHATE SERPL-MCNC: 4.4 MG/DL — SIGNIFICANT CHANGE UP (ref 2.5–4.5)
PLATELET # BLD AUTO: 240 K/UL — SIGNIFICANT CHANGE UP (ref 150–400)
POTASSIUM SERPL-MCNC: 4.4 MMOL/L — SIGNIFICANT CHANGE UP (ref 3.5–5.3)
POTASSIUM SERPL-SCNC: 4.4 MMOL/L — SIGNIFICANT CHANGE UP (ref 3.5–5.3)
PROT SERPL-MCNC: 6.9 G/DL — SIGNIFICANT CHANGE UP (ref 6–8.3)
RBC # BLD: 2.89 M/UL — LOW (ref 4.2–5.8)
RBC # FLD: 17.9 % — HIGH (ref 10.3–14.5)
SARS-COV-2 RNA SPEC QL NAA+PROBE: SIGNIFICANT CHANGE UP
SODIUM SERPL-SCNC: 129 MMOL/L — LOW (ref 135–145)
SPECIMEN SOURCE: SIGNIFICANT CHANGE UP
TROPONIN T, HIGH SENSITIVITY RESULT: 100 NG/L — HIGH (ref 0–51)
WBC # BLD: 7.41 K/UL — SIGNIFICANT CHANGE UP (ref 3.8–10.5)
WBC # FLD AUTO: 7.41 K/UL — SIGNIFICANT CHANGE UP (ref 3.8–10.5)

## 2021-02-18 PROCEDURE — 93284 PRGRMG EVAL IMPLANTABLE DFB: CPT | Mod: 26

## 2021-02-18 PROCEDURE — 99222 1ST HOSP IP/OBS MODERATE 55: CPT | Mod: 25

## 2021-02-18 PROCEDURE — 93010 ELECTROCARDIOGRAM REPORT: CPT | Mod: 76

## 2021-02-18 PROCEDURE — 93306 TTE W/DOPPLER COMPLETE: CPT | Mod: 26

## 2021-02-18 RX ORDER — FERROUS SULFATE 325(65) MG
325 TABLET ORAL
Refills: 0 | Status: DISCONTINUED | OUTPATIENT
Start: 2021-02-18 | End: 2021-03-03

## 2021-02-18 RX ORDER — ASPIRIN/CALCIUM CARB/MAGNESIUM 324 MG
81 TABLET ORAL DAILY
Refills: 0 | Status: DISCONTINUED | OUTPATIENT
Start: 2021-02-18 | End: 2021-03-24

## 2021-02-18 RX ORDER — PRASUGREL 5 MG/1
10 TABLET, FILM COATED ORAL DAILY
Refills: 0 | Status: DISCONTINUED | OUTPATIENT
Start: 2021-02-18 | End: 2021-03-24

## 2021-02-18 RX ORDER — METOPROLOL TARTRATE 50 MG
25 TABLET ORAL
Refills: 0 | Status: DISCONTINUED | OUTPATIENT
Start: 2021-02-18 | End: 2021-02-18

## 2021-02-18 RX ORDER — METOPROLOL TARTRATE 50 MG
25 TABLET ORAL EVERY 12 HOURS
Refills: 0 | Status: DISCONTINUED | OUTPATIENT
Start: 2021-02-18 | End: 2021-02-18

## 2021-02-18 RX ORDER — METOPROLOL TARTRATE 50 MG
25 TABLET ORAL EVERY 12 HOURS
Refills: 0 | Status: DISCONTINUED | OUTPATIENT
Start: 2021-02-18 | End: 2021-03-24

## 2021-02-18 RX ORDER — ATORVASTATIN CALCIUM 80 MG/1
80 TABLET, FILM COATED ORAL AT BEDTIME
Refills: 0 | Status: DISCONTINUED | OUTPATIENT
Start: 2021-02-18 | End: 2021-03-24

## 2021-02-18 RX ADMIN — Medication 40 MILLIGRAM(S): at 00:47

## 2021-02-18 RX ADMIN — PIPERACILLIN AND TAZOBACTAM 25 GRAM(S): 4; .5 INJECTION, POWDER, LYOPHILIZED, FOR SOLUTION INTRAVENOUS at 02:32

## 2021-02-18 RX ADMIN — PANTOPRAZOLE SODIUM 40 MILLIGRAM(S): 20 TABLET, DELAYED RELEASE ORAL at 05:49

## 2021-02-18 RX ADMIN — PRASUGREL 10 MILLIGRAM(S): 5 TABLET, FILM COATED ORAL at 10:25

## 2021-02-18 RX ADMIN — Medication 40 MILLIGRAM(S): at 11:11

## 2021-02-18 RX ADMIN — HEPARIN SODIUM 5000 UNIT(S): 5000 INJECTION INTRAVENOUS; SUBCUTANEOUS at 17:12

## 2021-02-18 RX ADMIN — PIPERACILLIN AND TAZOBACTAM 25 GRAM(S): 4; .5 INJECTION, POWDER, LYOPHILIZED, FOR SOLUTION INTRAVENOUS at 17:13

## 2021-02-18 RX ADMIN — ATORVASTATIN CALCIUM 80 MILLIGRAM(S): 80 TABLET, FILM COATED ORAL at 21:10

## 2021-02-18 RX ADMIN — PIPERACILLIN AND TAZOBACTAM 25 GRAM(S): 4; .5 INJECTION, POWDER, LYOPHILIZED, FOR SOLUTION INTRAVENOUS at 10:25

## 2021-02-18 RX ADMIN — Medication 325 MILLIGRAM(S): at 04:00

## 2021-02-18 RX ADMIN — HEPARIN SODIUM 5000 UNIT(S): 5000 INJECTION INTRAVENOUS; SUBCUTANEOUS at 04:00

## 2021-02-18 RX ADMIN — Medication 25 MILLIGRAM(S): at 04:00

## 2021-02-18 RX ADMIN — Medication 81 MILLIGRAM(S): at 10:25

## 2021-02-18 RX ADMIN — Medication 325 MILLIGRAM(S): at 17:12

## 2021-02-18 NOTE — PROGRESS NOTE ADULT - SUBJECTIVE AND OBJECTIVE BOX
[facility]  Pro Strickland PA-C  [address] [place] [address]  [phone]      INTERVAL HPI/OVERNIGHT EVENTS:    patient s/e  denies abdominal pain  "bloating" is improved    MEDICATIONS  (STANDING):  aspirin  chewable 81 milliGRAM(s) Oral daily  atorvastatin 80 milliGRAM(s) Oral at bedtime  dextrose 40% Gel 15 Gram(s) Oral once  dextrose 5%. 1000 milliLiter(s) (50 mL/Hr) IV Continuous <Continuous>  dextrose 5%. 1000 milliLiter(s) (100 mL/Hr) IV Continuous <Continuous>  dextrose 50% Injectable 25 Gram(s) IV Push once  dextrose 50% Injectable 12.5 Gram(s) IV Push once  dextrose 50% Injectable 25 Gram(s) IV Push once  ferrous    sulfate 325 milliGRAM(s) Oral two times a day  furosemide   Injectable 40 milliGRAM(s) IV Push every 12 hours  glucagon  Injectable 1 milliGRAM(s) IntraMuscular once  heparin   Injectable 5000 Unit(s) SubCutaneous every 12 hours  influenza   Vaccine 0.5 milliLiter(s) IntraMuscular once  insulin lispro (ADMELOG) corrective regimen sliding scale   SubCutaneous three times a day before meals  insulin lispro (ADMELOG) corrective regimen sliding scale   SubCutaneous at bedtime  pantoprazole    Tablet 40 milliGRAM(s) Oral before breakfast  piperacillin/tazobactam IVPB.. 3.375 Gram(s) IV Intermittent every 8 hours  prasugrel 10 milliGRAM(s) Oral daily    MEDICATIONS  (PRN):      Allergies    No Known Allergies    Intolerances        ROS:   General:  No wt loss, fevers, chills, night sweats, fatigue,   Eyes:  Good vision, no reported pain  ENT:  No sore throat, pain, runny nose, dysphagia  CV:  No pain, palpitations, hypo/hypertension  Resp:  No dyspnea, cough, tachypnea, wheezing  GI:  No pain, No nausea, No vomiting, No diarrhea, No constipation, No weight loss, No fever, No pruritis, No rectal bleeding, No tarry stools, No dysphagia,  :  No pain, bleeding, incontinence, nocturia  Muscle:  No pain, weakness  Neuro:  No weakness, tingling, memory problems  Psych:  No fatigue, insomnia, mood problems, depression  Endocrine:  No polyuria, polydipsia, cold/heat intolerance  Heme:  No petechiae, ecchymosis, easy bruisability  Skin:  No rash, tattoos, scars, edema      PHYSICAL EXAM:   Vital Signs:  Vital Signs Last 24 Hrs  T(C): 36.6 (2021 12:07), Max: 36.6 (2021 19:31)  T(F): 97.9 (2021 12:07), Max: 97.9 (2021 19:31)  HR: 87 (2021 17:08) (71 - 100)  BP: 101/50 (2021 17:08) (90/51 - 112/60)  BP(mean): 66 (2021 10:29) (66 - 66)  RR: 18 (2021 12:07) (18 - 18)  SpO2: 95% (2021 12:07) (95% - 100%)  Daily     Daily     GENERAL:  Appears stated age, well-groomed, well-nourished, no distress  HEENT:  NC/AT,  conjunctivae clear and pink, no thyromegaly, nodules, adenopathy, no JVD, sclera -anicteric  CHEST:  Full & symmetric excursion, no increased effort, breath sounds clear  HEART:  Regular rhythm, S1, S2, no murmur/rub/S3/S4, no abdominal bruit, no edema  ABDOMEN:  Soft, non-tender, non-distended, normoactive bowel sounds,  no masses ,no hepato-splenomegaly, no signs of chronic liver disease  EXTEREMITIES:  no cyanosis,clubbing or edema  SKIN:  No rash/erythema/ecchymoses/petechiae/wounds/abscess/warm/dry  NEURO:  Alert, oriented, no asterixis, no tremor, no encephalopathy      LABS:                        8.8    7.41  )-----------( 240      ( 2021 07:05 )             26.7     02-18    129<L>  |  96  |  89<H>  ----------------------------<  89  4.4   |  23  |  2.56<H>    Ca    9.5      2021 07:01  Phos  4.4       Mg     2.4         TPro  6.9  /  Alb  2.9<L>  /  TBili  1.3<H>  /  DBili  x   /  AST  32  /  ALT  34  /  AlkPhos  266<H>      PT/INR - ( 2021 20:25 )   PT: 16.1 sec;   INR: 1.36 ratio         PTT - ( 2021 20:25 )  PTT:32.7 sec  Urinalysis Basic - ( 2021 07:16 )    Color: Light Yellow / Appearance: Clear / S.010 / pH: x  Gluc: x / Ketone: Negative  / Bili: Negative / Urobili: Negative   Blood: x / Protein: Negative / Nitrite: Negative   Leuk Esterase: Negative / RBC: x / WBC x   Sq Epi: x / Non Sq Epi: x / Bacteria: x        RADIOLOGY & ADDITIONAL TESTS:

## 2021-02-18 NOTE — PROGRESS NOTE ADULT - SUBJECTIVE AND OBJECTIVE BOX
Patient is a 80y old  Male who presents with a chief complaint of acute renal failure (2021 11:43)      SUBJECTIVE / OVERNIGHT EVENTS: orthopnea improving, abd pain resolved, NSVT and sustained VT overnight, EPS called, BB changed, may need an ablation    MEDICATIONS  (STANDING):  aspirin  chewable 81 milliGRAM(s) Oral daily  atorvastatin 80 milliGRAM(s) Oral at bedtime  dextrose 40% Gel 15 Gram(s) Oral once  dextrose 5%. 1000 milliLiter(s) (50 mL/Hr) IV Continuous <Continuous>  dextrose 5%. 1000 milliLiter(s) (100 mL/Hr) IV Continuous <Continuous>  dextrose 50% Injectable 25 Gram(s) IV Push once  dextrose 50% Injectable 12.5 Gram(s) IV Push once  dextrose 50% Injectable 25 Gram(s) IV Push once  ferrous    sulfate 325 milliGRAM(s) Oral two times a day  furosemide   Injectable 40 milliGRAM(s) IV Push every 12 hours  glucagon  Injectable 1 milliGRAM(s) IntraMuscular once  heparin   Injectable 5000 Unit(s) SubCutaneous every 12 hours  influenza   Vaccine 0.5 milliLiter(s) IntraMuscular once  insulin lispro (ADMELOG) corrective regimen sliding scale   SubCutaneous three times a day before meals  insulin lispro (ADMELOG) corrective regimen sliding scale   SubCutaneous at bedtime  pantoprazole    Tablet 40 milliGRAM(s) Oral before breakfast  piperacillin/tazobactam IVPB.. 3.375 Gram(s) IV Intermittent every 8 hours  prasugrel 10 milliGRAM(s) Oral daily    MEDICATIONS  (PRN):      Vital Signs Last 24 Hrs  T(F): 97.9 (21 @ 12:07), Max: 97.9 (21 @ 19:31)  HR: 74 (21 @ 12:07) (71 - 100)  BP: 90/51 (21 @ 12:07) (90/51 - 112/60)  RR: 18 (21 @ 12:07) (18 - 18)  SpO2: 95% (21 @ 12:07) (95% - 100%)  Telemetry:   CAPILLARY BLOOD GLUCOSE      POCT Blood Glucose.: 143 mg/dL (2021 11:31)  POCT Blood Glucose.: 85 mg/dL (2021 07:48)  POCT Blood Glucose.: 158 mg/dL (2021 21:01)  POCT Blood Glucose.: 258 mg/dL (2021 16:54)  POCT Blood Glucose.: 317 mg/dL (2021 16:22)    I&O's Summary    2021 07:01  -  2021 07:00  --------------------------------------------------------  IN: 1150 mL / OUT: 3580 mL / NET: -2430 mL    2021 07:01  -  2021 13:58  --------------------------------------------------------  IN: 630 mL / OUT: 450 mL / NET: 180 mL        PHYSICAL EXAM:  GENERAL: NAD, well-developed  HEAD:  Atraumatic, Normocephalic  EYES: EOMI, PERRLA, conjunctiva and sclera clear  NECK: Supple, No JVD  CHEST/LUNG: Clear to auscultation bilaterally; No wheeze  HEART: Regular rate and rhythm; No murmurs, rubs, or gallops  ABDOMEN: Soft, Nontender, Nondistended; Bowel sounds present  EXTREMITIES:  2+ Peripheral Pulses, No clubbing, cyanosis, or edema  PSYCH: AAOx3  NEUROLOGY: non-focal  SKIN: No rashes or lesions    LABS:                        8.8    7.41  )-----------( 240      ( 2021 07:05 )             26.7     02-18    129<L>  |  96  |  89<H>  ----------------------------<  89  4.4   |  23  |  2.56<H>    Ca    9.5      2021 07:01  Phos  4.4     02-18  Mg     2.4     02-18    TPro  6.9  /  Alb  2.9<L>  /  TBili  1.3<H>  /  DBili  x   /  AST  32  /  ALT  34  /  AlkPhos  266<H>  02-18    PT/INR - ( 2021 20:25 )   PT: 16.1 sec;   INR: 1.36 ratio         PTT - ( 2021 20:25 )  PTT:32.7 sec  CARDIAC MARKERS ( 2021 07:01 )  x     / x     / 52 U/L / x     / 3.5 ng/mL  CARDIAC MARKERS ( 2021 21:53 )  x     / x     / 69 U/L / x     / 4.1 ng/mL      Urinalysis Basic - ( 2021 07:16 )    Color: Light Yellow / Appearance: Clear / S.010 / pH: x  Gluc: x / Ketone: Negative  / Bili: Negative / Urobili: Negative   Blood: x / Protein: Negative / Nitrite: Negative   Leuk Esterase: Negative / RBC: x / WBC x   Sq Epi: x / Non Sq Epi: x / Bacteria: x        RADIOLOGY & ADDITIONAL TESTS:    Imaging Personally Reviewed:    Consultant(s) Notes Reviewed:      Care Discussed with Consultants/Other Providers:

## 2021-02-18 NOTE — PROGRESS NOTE ADULT - ASSESSMENT
Problem: Abdominal pain. Recommendation: - given epigastric/RUQ pain with elevated LFTs and amylase/lipase, concern for possible gallstone pancreatitis  - U/S abdomen noted  - suspected ascites 2/2 chf  - cont diuresis  - hopefully can avoid paracentesis  - this is not pancreatitis  - cont dash diet  - no gi objection to dual antiplatlet therapy  - will follow      Problem/Recommendation - 2:  ·  Problem: Renal failure.  Recommendation: - nephrology input noted.

## 2021-02-18 NOTE — PROGRESS NOTE ADULT - ASSESSMENT
79 yo male w h/o ICM< CAD< s/p CABG, has AICD, DM, HTN presents w volume overload and abd pain  Abd sono shows gallstones, but no sign of cholecystitis/pancreatitis. abd pain resolved on Zosyn,   will cont empirically for 7 days  ascites 2/2 systolic chf, probably worse 2/2 acute on chronic systolic chf, will cont diuresing w IV lasix  on interrogation of ICD, has a lof of arrhythmia burden,   NSVT and sustained VT overnight, has inoperable ischemic HD  BB changed to 12.5 mg Lopressor bid  EPS consult reveiwed  Hyponatremia  Izabela, CKD4  Hyperkalemia, resolved  dvt ppx w HSC

## 2021-02-18 NOTE — PROCEDURE NOTE - ADDITIONAL PROCEDURE DETAILS
ICD interrogation for WCT on telemetry   normal sensing and pacing thresholds stable lead impedence   stored data review reveal no stored episodes since last interrogation January 27, 2021, prior episodes updated with no results noted   [place] Impedence monitoring below impedence line indicating fluid accumulation    [phone] ICD interrogation for WCT on telemetry   normal sensing and pacing thresholds stable lead impedence   stored data review reveal no stored episodes since last interrogation January 27, 2021, prior episodes updated with no results noted   [place] Impedence monitoring below impedence line indicating fluid accumulation    [phone]  addendum  repeat interrogation to inhibit pacing to check and document underlying rhythm on telemetry and EKG to compare to WCT rhythm seen on telemetry and to R/O PMT and SVT. Underlying EKG SR 1st degree AV delay LBBB  ms  Episodes seen on telemetry consistent with slow VT ICD interrogation for WCT on telemetry   normal sensing and pacing thresholds stable lead impedence   stored data review reveal no stored episodes since last interrogation January 27, 2021, prior episodes updated with no results noted   [place] Impedence monitoring below impedence line indicating fluid accumulation    [phone]  addendum  repeat interrogation to inhibit pacing to check and document underlying rhythm on telemetry and EKG to compare to WCT rhythm seen on telemetry and to R/O PMT and SVT. Underlying EKG SR 1st degree AV delay LBBB  ms  Episodes seen on telemetry consistent with slow VT  AV delay changed to 150 ms to promote higher BIV pacing   Potassium more normal, no further slow VT on telemetry   continue beta blockers  reviewed with attending Dr Green

## 2021-02-18 NOTE — CONSULT NOTE ADULT - ASSESSMENT
79 yo male w h/o DM, CAD s/p CABG, ICM s/p ICD, CKD 3-4. EP consulted for multiple episodes of NSVT and sustained VT (40 seconds)    NSVT: Patient does have a significant burden of arrhythmia, with rates around 125, however is asymptomatic.  - would try switching from Toprol 25 (home dose) daily to lopressor 25 BID (can uptitrate to 50 BID if BP and HR can tolerate)  - if patient does not tolerate higher dose of BB or does not improve from NSVT standpoint, could consider ablation  - would obtain an echo to assess LVEF and also obtain records of previous echo to assess if his cardiomyopathy may be worsened by his frequent episodes of tachycardia    Geraldine Gaitan MD  Cardiology Fellow, PGY-4  [phone]  For all other Cardiology service contact information, go to [email or web address] and use "[email or web address]" to login.

## 2021-02-18 NOTE — PROGRESS NOTE ADULT - SUBJECTIVE AND OBJECTIVE BOX
Subjective: Patient seen and examined. No new events except as noted.   NSVT and sustained VT (40 seconds)  restarted Toprol   feels ok   weak    REVIEW OF SYSTEMS:    CONSTITUTIONAL: + weakness, fevers or chills  EYES/ENT: No visual changes;  No vertigo or throat pain   NECK: No pain or stiffness  RESPIRATORY: No cough, wheezing, hemoptysis; No shortness of breath  CARDIOVASCULAR: No chest pain or palpitations  GASTROINTESTINAL: No abdominal or epigastric pain. No nausea, vomiting, or hematemesis; No diarrhea or constipation. No melena or hematochezia.  GENITOURINARY: No dysuria, frequency or hematuria  NEUROLOGICAL: No numbness or weakness  SKIN: No itching, burning, rashes, or lesions   All other review of systems is negative unless indicated above.    MEDICATIONS:  MEDICATIONS  (STANDING):  aspirin  chewable 81 milliGRAM(s) Oral daily  atorvastatin 80 milliGRAM(s) Oral at bedtime  dextrose 40% Gel 15 Gram(s) Oral once  dextrose 5%. 1000 milliLiter(s) (50 mL/Hr) IV Continuous <Continuous>  dextrose 5%. 1000 milliLiter(s) (100 mL/Hr) IV Continuous <Continuous>  dextrose 50% Injectable 25 Gram(s) IV Push once  dextrose 50% Injectable 12.5 Gram(s) IV Push once  dextrose 50% Injectable 25 Gram(s) IV Push once  ferrous    sulfate 325 milliGRAM(s) Oral two times a day  furosemide   Injectable 40 milliGRAM(s) IV Push every 12 hours  glucagon  Injectable 1 milliGRAM(s) IntraMuscular once  heparin   Injectable 5000 Unit(s) SubCutaneous every 12 hours  influenza   Vaccine 0.5 milliLiter(s) IntraMuscular once  insulin lispro (ADMELOG) corrective regimen sliding scale   SubCutaneous three times a day before meals  insulin lispro (ADMELOG) corrective regimen sliding scale   SubCutaneous at bedtime  pantoprazole    Tablet 40 milliGRAM(s) Oral before breakfast  piperacillin/tazobactam IVPB.. 3.375 Gram(s) IV Intermittent every 8 hours  prasugrel 10 milliGRAM(s) Oral daily      PHYSICAL EXAM:  T(C): 36.4 (02-18-21 @ 10:29), Max: 36.6 (02-17-21 @ 19:31)  HR: 71 (02-18-21 @ 10:29) (71 - 100)  BP: 94/53 (02-18-21 @ 10:29) (93/56 - 112/60)  RR: 18 (02-18-21 @ 10:29) (18 - 18)  SpO2: 100% (02-18-21 @ 10:29) (96% - 100%)  Wt(kg): --  I&O's Summary    17 Feb 2021 07:01  -  18 Feb 2021 07:00  --------------------------------------------------------  IN: 1150 mL / OUT: 3580 mL / NET: -2430 mL    18 Feb 2021 07:01  -  18 Feb 2021 11:43  --------------------------------------------------------  IN: 0 mL / OUT: 450 mL / NET: -450 mL          Appearance: NAD	  HEENT:   Dry oral mucosa, PERRL, EOMI	  Lymphatic: No lymphadenopathy   Cardiovascular: Normal S1 S2, No JVD, No murmurs , Peripheral pulses palpable 2+ bilaterally  Respiratory: Lungs clear to auscultation, normal effort 	  Gastrointestinal:  Soft, Non-tender, + BS	  Skin: No rashes, No ecchymoses, No cyanosis, warm to touch  Musculoskeletal: Normal range of motion, normal strength  Psychiatry:  Mood & affect appropriate  Ext: + edema      LABS:    CARDIAC MARKERS:  CARDIAC MARKERS ( 18 Feb 2021 07:01 )  x     / x     / 52 U/L / x     / 3.5 ng/mL  CARDIAC MARKERS ( 17 Feb 2021 21:53 )  x     / x     / 69 U/L / x     / 4.1 ng/mL                                8.8    7.41  )-----------( 240      ( 18 Feb 2021 07:05 )             26.7     02-18    129<L>  |  96  |  89<H>  ----------------------------<  89  4.4   |  23  |  2.56<H>    Ca    9.5      18 Feb 2021 07:01  Phos  4.4     02-18  Mg     2.4     02-18    TPro  6.9  /  Alb  2.9<L>  /  TBili  1.3<H>  /  DBili  x   /  AST  32  /  ALT  34  /  AlkPhos  266<H>  02-18    proBNP:   Lipid Profile:   HgA1c:   TSH:           TELEMETRY: SR	  NSVT and sustained VT (40 seconds)    ECG:  	  RADIOLOGY:   DIAGNOSTIC TESTING:  [ ] Echocardiogram:  [ ]  Catheterization:  [ ] Stress Test:    OTHER:

## 2021-02-18 NOTE — CONSULT NOTE ADULT - SUBJECTIVE AND OBJECTIVE BOX
Patient seen and evaluated at bedside    Chief Complaint:    HPI:  79 yo male w h/o DM, CAD s/p CABG, ICM s/p ICD, CKD 3-4. He saw Dr. Abel Luz as a new consult for elevated creatinine. Bloodwork today returned notable for  and Na 126; in light of these findings, Dr. Luz sent the patient to the ER. Lately ptn has been having abdominal pain with elevated amylase and lipase.   As per ptn's wife: on and off R abd pain w generalized weakness and poor po intake. PCP DCed po Lasix in past few days 2/2 abnormal blood test results.  Ptn denies recent NSAID use.  He was diagnosed with diabetes 18 years ago, and denies retinopathy (last saw Optho around 2 years ago). Cardiologist has alluded to the patient having mild renal impairment in the past, as per wife.    Patient's PCP is Dr. Shyann De León; GI Dr. Wilman Villagran; Cardiology Dr. Boyce.  (2021 21:05)    Was called as patient was having more wide complex tachycardia overnight. The patient only had a few episodes of NSVT yesterday when he first presented, however overnight he has had multiple (dozens) of NSVT, sometimes sustained VT for 40 seconds. The patient did receive 25 Toprol overnight around 10 PM with minimal improvement in NSVT episodes.     Patient was interviewed with [place] , however patient was not answering to all questions.     PMHx:   Renal failure    CAD (coronary artery disease)    Hypertension    Diabetes mellitus        PSHx:       Allergies:  No Known Allergies      Home Meds:    Current Medications:   aspirin  chewable 81 milliGRAM(s) Oral daily  atorvastatin 80 milliGRAM(s) Oral at bedtime  dextrose 40% Gel 15 Gram(s) Oral once  dextrose 5%. 1000 milliLiter(s) IV Continuous <Continuous>  dextrose 5%. 1000 milliLiter(s) IV Continuous <Continuous>  dextrose 50% Injectable 25 Gram(s) IV Push once  dextrose 50% Injectable 12.5 Gram(s) IV Push once  dextrose 50% Injectable 25 Gram(s) IV Push once  ferrous    sulfate 325 milliGRAM(s) Oral two times a day  furosemide   Injectable 40 milliGRAM(s) IV Push every 12 hours  glucagon  Injectable 1 milliGRAM(s) IntraMuscular once  heparin   Injectable 5000 Unit(s) SubCutaneous every 12 hours  influenza   Vaccine 0.5 milliLiter(s) IntraMuscular once  insulin lispro (ADMELOG) corrective regimen sliding scale   SubCutaneous three times a day before meals  insulin lispro (ADMELOG) corrective regimen sliding scale   SubCutaneous at bedtime  pantoprazole    Tablet 40 milliGRAM(s) Oral before breakfast  piperacillin/tazobactam IVPB.. 3.375 Gram(s) IV Intermittent every 8 hours  prasugrel 10 milliGRAM(s) Oral daily      FAMILY HISTORY:  Unable to obtain    Social History:  Smoking History:  Alcohol Use:  Drug Use:    REVIEW OF SYSTEMS:  CONSTITUTIONAL: No weakness, fevers or chills  EYES/ENT: No visual changes;  No dysphagia  NECK: No pain or stiffness  RESPIRATORY: No cough, wheezing, hemoptysis; No shortness of breath  CARDIOVASCULAR: No chest pain or palpitations; No lower extremity edema  GASTROINTESTINAL: No abdominal or epigastric pain. No nausea, vomiting, or hematemesis; No diarrhea or constipation. No melena or hematochezia.  BACK: No back pain  GENITOURINARY: No dysuria, frequency or hematuria  NEUROLOGICAL: No numbness or weakness  SKIN: No itching, burning, rashes, or lesions   All other review of systems is negative unless indicated above.    Physical Exam:  T(F): 97.6 (-18), Max: 97.9 (-17)  HR: 73 (-18) (72 - 86)  BP: 100/58 (02-18) (90/42 - 112/60)  RR: 18 (02-18)  SpO2: 100% (-18)  GENERAL: No acute distress, well-developed, somnolent  HEAD:  Atraumatic, Normocephalic  ENT: EOMI, PERRLA, conjunctiva and sclera clear, Neck supple, No JVD, moist mucosa  CHEST/LUNG: Clear to auscultation bilaterally; No wheeze, equal breath sounds bilaterally   BACK: No spinal tenderness  HEART: Regular rate and rhythm; No murmurs, rubs, or gallops  ABDOMEN: Soft, Nontender, Nondistended; Bowel sounds present  EXTREMITIES:  No clubbing, cyanosis, or edema  PSYCH: Nl behavior, nl affect  NEUROLOGY: AAOx3, non-focal, cranial nerves intact  SKIN: Normal color, No rashes or lesions  LINES:    Cardiovascular Diagnostic Testing:    ECG: Personally reviewed:  NSR with 1st degree AV block  Telemetry - multiple episodes of NSVT, sometimes sustained VT 40 seconds    Echo: Personally reviewed:    Stress Testing:    Cath:    Imaging:    CXR: Personally reviewed    Labs: Personally reviewed                        9.1    8  )-----------( 261      ( 2021 03:21 )             27.8         128<L>  |  95<L>  |  90<H>  ----------------------------<  123<H>  4.6   |  25  |  2.56<H>    Ca    9.5      2021 21:53  Phos  4.0       Mg     2.5         TPro  7.2  /  Alb  3.2<L>  /  TBili  1.3<H>  /  DBili  0.7<H>  /  AST  42<H>  /  ALT  41  /  AlkPhos  332<H>      PT/INR - ( 2021 20:25 )   PT: 16.1 sec;   INR: 1.36 ratio         PTT - ( 2021 20:25 )  PTT:32.7 sec    CARDIAC MARKERS ( 2021 21:53 )  98 ng/L / x     / x     / 69 U/L / x     / 4.1 ng/mL      Total Cholesterol: --  LDL: --  HDL: --  T      Thyroid Stimulating Hormone, Serum: 2.25 uIU/mL ( @ 06:11)

## 2021-02-18 NOTE — PROGRESS NOTE ADULT - SUBJECTIVE AND OBJECTIVE BOX
      Overnight events noted      VITAL:  T(C): , Max: 36.6 (21 @ 19:31)  T(F): , Max: 97.9 (21 @ 19:31)  HR: 100 (21 @ 06:49)  BP: 96/59 (21 @ 06:49)  RR: 18 (21 @ 06:49)  SpO2: 100% (21 @ 06:49)  urine output 3580cc/24h      PHYSICAL EXAM:  Constitutional: NAD, Alert  HEENT: NCAT, DMM  Neck: Supple, No JVD  Respiratory: CTA-b/l  Cardiovascular: RRR s1s2, no m/r/g  Gastrointestinal: BS+, soft, NT/ND  Extremities: 2+ b/l LE edema  Neurological: no focal deficits; strength grossly intact  Back: no CVAT b/l  Skin: No rashes, no nevi      LABS:                        8.8    7.41  )-----------( 240      ( 2021 07:05 )             26.7     Na(129)/K(4.4)/Cl(96)/HCO3(23)/BUN(89)/Cr(2.56)Glu(89)/Ca(9.5)/Mg(2.4)/PO4(4.4)     @ 07:01  Na(128)/K(4.6)/Cl(95)/HCO3(25)/BUN(90)/Cr(2.56)Glu(123)/Ca(9.5)/Mg(2.5)/PO4(4.0)     @ 21:53  Na(127)/K(5.5)/Cl(95)/HCO3(22)/BUN(96)/Cr(1.98)Glu(98)/Ca(9.9)/Mg(--)/PO4(--)     @ 03:17  Na(128)/K(5.7)/Cl(96)/HCO3(23)/BUN(100)/Cr(2.00)Glu(122)/Ca(10.3)/Mg(--)/PO4(--)     @ 23:54  Na(127)/K(6.0)/Cl(95)/HCO3(20)/BUN(96)/Cr(2.12)Glu(182)/Ca(9.6)/Mg(--)/PO4(--)     @ 20:25    Urinalysis Basic - ( 2021 07:16 )  Color: Light Yellow / Appearance: Clear / S.010 / pH: x  Gluc: x / Ketone: Negative  / Bili: Negative / Urobili: Negative   Blood: x / Protein: Negative / Nitrite: Negative   Leuk Esterase: Negative / RBC: x / WBC x   Sq Epi: x / Non Sq Epi: x / Bacteria: x  Osmolality, Random Urine: 293 mosm/Kg ( @ 08:32)  Sodium, Random Urine: 50 mmol/L ( @ 07:10)  Chloride, Random Urine: 58 mmol/L ( @ 07:10)  Potassium, Random Urine: 24 mmol/L ( @ 07:10)      IMPRESSION: 80M w/ DM2, CAD-CABG, and CKD, 21 a/w azotemia/lyte derangements/weakness    (1)Renal - azotemia - unclear baseline creatinine - GFR ~20-25 - attempting to get old bloodwork    (2)Hyperkalemia - likely combination of renal impairment, ARB, and diet; cortisol level appropriate. Improved as of today, off ARB, on low-K+ diet, and with diuresis    (3)Hyponatremia - urine studies suggestive of SIADH. Slowly improving with diuresis/free water restriction    (4)CV - tenuous hemodynamics/hypervolemia - presumed acute on chronic HFrEF - echo pending        RECOMMEND:  (1)Continue 1.2L free water restriction; can forgo dietary K+ restriction at this point  (2)Lasix as ordered; Is<Os  (3)F/U TTE  (4)Dose new meds for GFR 20-25ml/min (present dosing is acceptable)          Suraj Garcia MD  [facility]  Office: [phone]  Cell: [phone]               (+)Mild sob; no pain      VITAL:  T(C): , Max: 36.6 (21 @ 19:31)  T(F): , Max: 97.9 (21 @ 19:31)  HR: 100 (21 @ 06:49)  BP: 96/59 (21 @ 06:49)  RR: 18 (21 @ 06:49)  SpO2: 100% (21 @ 06:49)  urine output 3580cc/24h      PHYSICAL EXAM:  Constitutional: NAD, Alert  HEENT: NCAT, DMM  Neck: Supple, (+)JVD  Respiratory: CTA-b/l  Cardiovascular: RRR s1s2, no m/r/g  Gastrointestinal: BS+, soft, NT/ND  Extremities: 2+ b/l LE edema  Neurological: no focal deficits; strength grossly intact  Back: no CVAT b/l  Skin: No rashes, no nevi      LABS:                        8.8    7.41  )-----------( 240      ( 2021 07:05 )             26.7     Na(129)/K(4.4)/Cl(96)/HCO3(23)/BUN(89)/Cr(2.56)Glu(89)/Ca(9.5)/Mg(2.4)/PO4(4.4)     @ 07:01  Na(128)/K(4.6)/Cl(95)/HCO3(25)/BUN(90)/Cr(2.56)Glu(123)/Ca(9.5)/Mg(2.5)/PO4(4.0)     @ 21:53  Na(127)/K(5.5)/Cl(95)/HCO3(22)/BUN(96)/Cr(1.98)Glu(98)/Ca(9.9)/Mg(--)/PO4(--)     @ 03:17  Na(128)/K(5.7)/Cl(96)/HCO3(23)/BUN(100)/Cr(2.00)Glu(122)/Ca(10.3)/Mg(--)/PO4(--)    02-16 @ 23:54  Na(127)/K(6.0)/Cl(95)/HCO3(20)/BUN(96)/Cr(2.12)Glu(182)/Ca(9.6)/Mg(--)/PO4(--)     @ 20:25    Urinalysis Basic - ( 2021 07:16 )  Color: Light Yellow / Appearance: Clear / S.010 / pH: x  Gluc: x / Ketone: Negative  / Bili: Negative / Urobili: Negative   Blood: x / Protein: Negative / Nitrite: Negative   Leuk Esterase: Negative / RBC: x / WBC x   Sq Epi: x / Non Sq Epi: x / Bacteria: x  Osmolality, Random Urine: 293 mosm/Kg ( @ 08:32)  Sodium, Random Urine: 50 mmol/L ( @ 07:10)  Chloride, Random Urine: 58 mmol/L ( @ 07:10)  Potassium, Random Urine: 24 mmol/L ( @ 07:10)      IMPRESSION: 80M w/ DM2, CAD-CABG, and CKD, 21 a/w azotemia/lyte derangements/weakness    (1)Renal - azotemia - unclear baseline creatinine - GFR ~20-25 - attempting to get old bloodwork    (2)Hyperkalemia - likely combination of renal impairment, ARB, and diet; cortisol level appropriate. Improved as of today, off ARB, on low-K+ diet, and with diuresis    (3)Hyponatremia - urine studies suggestive of SIADH. Slowly improving with diuresis/free water restriction    (4)CV - tenuous hemodynamics/hypervolemia - presumed acute on chronic HFrEF - echo pending        RECOMMEND:  (1)Continue 1.2L free water restriction; can forgo dietary K+ restriction at this point  (2)Lasix as ordered; Is<Os  (3)F/U TTE  (4)Dose new meds for GFR 20-25ml/min (present dosing is acceptable)          Suraj Garcia MD  [facility]  Office: [phone]  Cell: [phone]

## 2021-02-19 DIAGNOSIS — I35.0 NONRHEUMATIC AORTIC (VALVE) STENOSIS: ICD-10-CM

## 2021-02-19 LAB
ALBUMIN SERPL ELPH-MCNC: 3.3 G/DL — SIGNIFICANT CHANGE UP (ref 3.3–5)
ALP SERPL-CCNC: 254 U/L — HIGH (ref 40–120)
ALT FLD-CCNC: 32 U/L — SIGNIFICANT CHANGE UP (ref 10–45)
ANION GAP SERPL CALC-SCNC: 15 MMOL/L — SIGNIFICANT CHANGE UP (ref 5–17)
AST SERPL-CCNC: 31 U/L — SIGNIFICANT CHANGE UP (ref 10–40)
BILIRUB SERPL-MCNC: 1.6 MG/DL — HIGH (ref 0.2–1.2)
BUN SERPL-MCNC: 93 MG/DL — HIGH (ref 7–23)
CALCIUM SERPL-MCNC: 10.1 MG/DL — SIGNIFICANT CHANGE UP (ref 8.4–10.5)
CHLORIDE SERPL-SCNC: 97 MMOL/L — SIGNIFICANT CHANGE UP (ref 96–108)
CO2 SERPL-SCNC: 23 MMOL/L — SIGNIFICANT CHANGE UP (ref 22–31)
CREAT SERPL-MCNC: 2.81 MG/DL — HIGH (ref 0.5–1.3)
GLUCOSE BLDC GLUCOMTR-MCNC: 118 MG/DL — HIGH (ref 70–99)
GLUCOSE BLDC GLUCOMTR-MCNC: 120 MG/DL — HIGH (ref 70–99)
GLUCOSE BLDC GLUCOMTR-MCNC: 127 MG/DL — HIGH (ref 70–99)
GLUCOSE BLDC GLUCOMTR-MCNC: 129 MG/DL — HIGH (ref 70–99)
GLUCOSE BLDC GLUCOMTR-MCNC: 154 MG/DL — HIGH (ref 70–99)
GLUCOSE SERPL-MCNC: 97 MG/DL — SIGNIFICANT CHANGE UP (ref 70–99)
MAGNESIUM SERPL-MCNC: 2.2 MG/DL — SIGNIFICANT CHANGE UP (ref 1.6–2.6)
POTASSIUM SERPL-MCNC: 4.1 MMOL/L — SIGNIFICANT CHANGE UP (ref 3.5–5.3)
POTASSIUM SERPL-SCNC: 4.1 MMOL/L — SIGNIFICANT CHANGE UP (ref 3.5–5.3)
PROT SERPL-MCNC: 7.3 G/DL — SIGNIFICANT CHANGE UP (ref 6–8.3)
SODIUM SERPL-SCNC: 135 MMOL/L — SIGNIFICANT CHANGE UP (ref 135–145)

## 2021-02-19 PROCEDURE — 99232 SBSQ HOSP IP/OBS MODERATE 35: CPT

## 2021-02-19 PROCEDURE — 99223 1ST HOSP IP/OBS HIGH 75: CPT

## 2021-02-19 RX ORDER — PIPERACILLIN AND TAZOBACTAM 4; .5 G/20ML; G/20ML
3.38 INJECTION, POWDER, LYOPHILIZED, FOR SOLUTION INTRAVENOUS EVERY 12 HOURS
Refills: 0 | Status: DISCONTINUED | OUTPATIENT
Start: 2021-02-20 | End: 2021-02-26

## 2021-02-19 RX ORDER — FUROSEMIDE 40 MG
80 TABLET ORAL DAILY
Refills: 0 | Status: DISCONTINUED | OUTPATIENT
Start: 2021-02-19 | End: 2021-02-20

## 2021-02-19 RX ORDER — FUROSEMIDE 40 MG
40 TABLET ORAL ONCE
Refills: 0 | Status: COMPLETED | OUTPATIENT
Start: 2021-02-19 | End: 2021-02-19

## 2021-02-19 RX ORDER — PIPERACILLIN AND TAZOBACTAM 4; .5 G/20ML; G/20ML
3.38 INJECTION, POWDER, LYOPHILIZED, FOR SOLUTION INTRAVENOUS EVERY 12 HOURS
Refills: 0 | Status: DISCONTINUED | OUTPATIENT
Start: 2021-02-19 | End: 2021-02-19

## 2021-02-19 RX ADMIN — Medication 325 MILLIGRAM(S): at 04:52

## 2021-02-19 RX ADMIN — HEPARIN SODIUM 5000 UNIT(S): 5000 INJECTION INTRAVENOUS; SUBCUTANEOUS at 04:51

## 2021-02-19 RX ADMIN — PIPERACILLIN AND TAZOBACTAM 25 GRAM(S): 4; .5 INJECTION, POWDER, LYOPHILIZED, FOR SOLUTION INTRAVENOUS at 03:00

## 2021-02-19 RX ADMIN — Medication 81 MILLIGRAM(S): at 11:36

## 2021-02-19 RX ADMIN — ATORVASTATIN CALCIUM 80 MILLIGRAM(S): 80 TABLET, FILM COATED ORAL at 21:36

## 2021-02-19 RX ADMIN — Medication 325 MILLIGRAM(S): at 16:47

## 2021-02-19 RX ADMIN — Medication 40 MILLIGRAM(S): at 00:27

## 2021-02-19 RX ADMIN — Medication 25 MILLIGRAM(S): at 16:47

## 2021-02-19 RX ADMIN — HEPARIN SODIUM 5000 UNIT(S): 5000 INJECTION INTRAVENOUS; SUBCUTANEOUS at 16:51

## 2021-02-19 RX ADMIN — PIPERACILLIN AND TAZOBACTAM 25 GRAM(S): 4; .5 INJECTION, POWDER, LYOPHILIZED, FOR SOLUTION INTRAVENOUS at 11:36

## 2021-02-19 RX ADMIN — PRASUGREL 10 MILLIGRAM(S): 5 TABLET, FILM COATED ORAL at 11:36

## 2021-02-19 RX ADMIN — Medication 25 MILLIGRAM(S): at 04:53

## 2021-02-19 RX ADMIN — PIPERACILLIN AND TAZOBACTAM 25 GRAM(S): 4; .5 INJECTION, POWDER, LYOPHILIZED, FOR SOLUTION INTRAVENOUS at 16:48

## 2021-02-19 RX ADMIN — PANTOPRAZOLE SODIUM 40 MILLIGRAM(S): 20 TABLET, DELAYED RELEASE ORAL at 04:53

## 2021-02-19 RX ADMIN — Medication 40 MILLIGRAM(S): at 16:47

## 2021-02-19 NOTE — PROGRESS NOTE ADULT - SUBJECTIVE AND OBJECTIVE BOX
      Overnight events noted      VITAL:  T(C): , Max: 37.2 (02-19-21 @ 04:22)  T(F): , Max: 98.9 (02-19-21 @ 04:22)  HR: 63 (02-19-21 @ 08:34)  BP: 97/59 (02-19-21 @ 08:34)  BP(mean): 66 (02-18-21 @ 10:29)  RR: 18 (02-19-21 @ 08:34)  SpO2: 96% (02-19-21 @ 08:34)  urine output 2610cc/24h      PHYSICAL EXAM:  Constitutional: NAD, Alert  HEENT: NCAT, DMM  Neck: Supple, (+)JVD  Respiratory: CTA-b/l  Cardiovascular: RRR s1s2, no m/r/g  Gastrointestinal: BS+, soft, NT/ND  Extremities: 2+ b/l LE edema  Neurological: no focal deficits; strength grossly intact  Back: no CVAT b/l  Skin: No rashes, no nevi    LABS:                        8.8    7.41  )-----------( 240      ( 18 Feb 2021 07:05 )             26.7     Na(135)/K(4.1)/Cl(97)/HCO3(23)/BUN(93)/Cr(2.81)Glu(97)/Ca(10.1)/Mg(2.2)/PO4(--)    02-19 @ 06:40  Na(129)/K(4.4)/Cl(96)/HCO3(23)/BUN(89)/Cr(2.56)Glu(89)/Ca(9.5)/Mg(2.4)/PO4(4.4)    02-18 @ 07:01  Na(128)/K(4.6)/Cl(95)/HCO3(25)/BUN(90)/Cr(2.56)Glu(123)/Ca(9.5)/Mg(2.5)/PO4(4.0)    02-17 @ 21:53  Na(127)/K(5.5)/Cl(95)/HCO3(22)/BUN(96)/Cr(1.98)Glu(98)/Ca(9.9)/Mg(--)/PO4(--)    02-17 @ 03:17  Na(128)/K(5.7)/Cl(96)/HCO3(23)/BUN(100)/Cr(2.00)Glu(122)/Ca(10.3)/Mg(--)/PO4(--)    02-16 @ 23:54  Na(127)/K(6.0)/Cl(95)/HCO3(20)/BUN(96)/Cr(2.12)Glu(182)/Ca(9.6)/Mg(--)/PO4(--)    02-16 @ 20:25    < from: TTE with Doppler (w/Cont) (02.18.21 @ 08:26) >  mild-mod MR. Calcified trileaflet aortic valve with decreased  opening. Peak transaortic valve gradient equals 31 mm Hg,  mean transaortic valve gradient equals 18 mm Hg, estimated  aortic valve area equals 0.9 sqcm (by continuity equation),  aortic valve velocity time integral equals 68 cm,  consistent with severe aortic stenosis. Moderate-severe  aortic regurgitation.  2. Eccentric left ventricular hypertrophy (dilated left  ventricle with normal relative wall thickness).  3. Severe global left ventricular systolic dysfunction.  Endocardial visualization enhanced with intravenous  injection of Ultrasonic Enhancing Agent (Definity).  4. A device wire is noted in the right heart.  5. Right ventricular enlargement with decreased right  ventricular systolic function.  6. Normal tricuspid valve. Moderate tricuspid  regurgitation.      IMPRESSION: 80M w/ DM2, CAD-CABG, and CKD, 2/16/21 a/w azotemia/lyte derangements/weakness    (1)Renal - azotemia - unclear baseline creatinine - GFR ~20-25 - attempting to get old bloodwork    (2)Hyperkalemia - resolved, off ARB, on low-K+ diet, and with diuresis    (3)Hyponatremia - urine studies suggest SIADH - improved, with free water restriction    (4)CV - tenuous hemodynamics/hypervolemia - biventricular systolic dysfunction, severe AS, mod-sev AR. Is he a candidate for TAVR?      RECOMMEND:  (1)Advance to oral diuretics - Lasix 80mg po qd  (2)Continue 1.2L free water restriction  (3)Dose new meds for GFR 15ml/min - if to remain on Zosyn, would reduce to q12h dosing  (4)TAVR eval? Defer to Cardiology  (5)BMP+Mg+PO4 daily              Suraj Garcia MD  [facility]  Office: [phone]  Cell: [phone]               No pain, no sob      VITAL:  T(C): , Max: 37.2 (02-19-21 @ 04:22)  T(F): , Max: 98.9 (02-19-21 @ 04:22)  HR: 63 (02-19-21 @ 08:34)  BP: 97/59 (02-19-21 @ 08:34)  BP(mean): 66 (02-18-21 @ 10:29)  RR: 18 (02-19-21 @ 08:34)  SpO2: 96% (02-19-21 @ 08:34)  urine output 2610cc/24h      PHYSICAL EXAM:  Constitutional: NAD, Alert  HEENT: NCAT, DMM  Neck: Supple, (+)JVD  Respiratory: CTA-b/l  Cardiovascular: RRR s1s2, no m/r/g  Gastrointestinal: BS+, soft, NT/ND  Extremities: 2+ b/l LE edema  Neurological: no focal deficits; strength grossly intact  Back: no CVAT b/l  Skin: No rashes, no nevi    LABS:                        8.8    7.41  )-----------( 240      ( 18 Feb 2021 07:05 )             26.7     Na(135)/K(4.1)/Cl(97)/HCO3(23)/BUN(93)/Cr(2.81)Glu(97)/Ca(10.1)/Mg(2.2)/PO4(--)    02-19 @ 06:40  Na(129)/K(4.4)/Cl(96)/HCO3(23)/BUN(89)/Cr(2.56)Glu(89)/Ca(9.5)/Mg(2.4)/PO4(4.4)    02-18 @ 07:01  Na(128)/K(4.6)/Cl(95)/HCO3(25)/BUN(90)/Cr(2.56)Glu(123)/Ca(9.5)/Mg(2.5)/PO4(4.0)    02-17 @ 21:53  Na(127)/K(5.5)/Cl(95)/HCO3(22)/BUN(96)/Cr(1.98)Glu(98)/Ca(9.9)/Mg(--)/PO4(--)    02-17 @ 03:17  Na(128)/K(5.7)/Cl(96)/HCO3(23)/BUN(100)/Cr(2.00)Glu(122)/Ca(10.3)/Mg(--)/PO4(--)    02-16 @ 23:54  Na(127)/K(6.0)/Cl(95)/HCO3(20)/BUN(96)/Cr(2.12)Glu(182)/Ca(9.6)/Mg(--)/PO4(--)    02-16 @ 20:25    < from: TTE with Doppler (w/Cont) (02.18.21 @ 08:26) >  mild-mod MR. Calcified trileaflet aortic valve with decreased  opening. Peak transaortic valve gradient equals 31 mm Hg,  mean transaortic valve gradient equals 18 mm Hg, estimated  aortic valve area equals 0.9 sqcm (by continuity equation),  aortic valve velocity time integral equals 68 cm,  consistent with severe aortic stenosis. Moderate-severe  aortic regurgitation.  2. Eccentric left ventricular hypertrophy (dilated left  ventricle with normal relative wall thickness).  3. Severe global left ventricular systolic dysfunction.  Endocardial visualization enhanced with intravenous  injection of Ultrasonic Enhancing Agent (Definity).  4. A device wire is noted in the right heart.  5. Right ventricular enlargement with decreased right  ventricular systolic function.  6. Normal tricuspid valve. Moderate tricuspid  regurgitation.      IMPRESSION: 80M w/ DM2, CAD-CABG, and CKD, 2/16/21 a/w azotemia/lyte derangements/weakness    (1)Renal - azotemia - unclear baseline creatinine - GFR ~20-25 - attempting to get old bloodwork    (2)Hyperkalemia - resolved, off ARB, on low-K+ diet, and with diuresis    (3)Hyponatremia - urine studies suggest SIADH - improved, with free water restriction    (4)CV - tenuous hemodynamics/hypervolemia - biventricular systolic dysfunction, severe AS, mod-sev AR. Is he a candidate for TAVR?      RECOMMEND:  (1)Advance to oral diuretics - Lasix 80mg po qd  (2)Continue 1.2L free water restriction  (3)Dose new meds for GFR 15ml/min - if to remain on Zosyn, would reduce to q12h dosing  (4)TAVR eval? Defer to Cardiology  (5)BMP+Mg+PO4 daily  (6)If for discharge, can f/u at my office with myself or my partner Dr. Luz in 2-3weeks            Suraj Garcia MD  [facility]  Office: [phone]  Cell: [phone]

## 2021-02-19 NOTE — PROGRESS NOTE ADULT - SUBJECTIVE AND OBJECTIVE BOX
24H hour events:   Sitting up in bed; c/o fatigue and feeling weak, breathing improved; denies chest pain, palpitations, dizziness, N/V    MEDICATIONS:  aspirin  chewable 81 milliGRAM(s) Oral daily  furosemide    Tablet 80 milliGRAM(s) Oral daily  heparin   Injectable 5000 Unit(s) SubCutaneous every 12 hours  metoprolol tartrate 25 milliGRAM(s) Oral every 12 hours  prasugrel 10 milliGRAM(s) Oral daily  piperacillin/tazobactam IVPB.. 3.375 Gram(s) IV Intermittent every 8 hours  pantoprazole    Tablet 40 milliGRAM(s) Oral before breakfast  atorvastatin 80 milliGRAM(s) Oral at bedtime  dextrose 40% Gel 15 Gram(s) Oral once  dextrose 50% Injectable 25 Gram(s) IV Push once  dextrose 50% Injectable 12.5 Gram(s) IV Push once  dextrose 50% Injectable 25 Gram(s) IV Push once  glucagon  Injectable 1 milliGRAM(s) IntraMuscular once  insulin lispro (ADMELOG) corrective regimen sliding scale   SubCutaneous three times a day before meals  insulin lispro (ADMELOG) corrective regimen sliding scale   SubCutaneous at bedtime  dextrose 5%. 1000 milliLiter(s) IV Continuous <Continuous>  dextrose 5%. 1000 milliLiter(s) IV Continuous <Continuous>  ferrous    sulfate 325 milliGRAM(s) Oral two times a day  influenza   Vaccine 0.5 milliLiter(s) IntraMuscular once      REVIEW OF SYSTEMS:  Complete 10point ROS negative except as noted above    PHYSICAL EXAM:  T(C): 37.2 (02-19-21 @ 04:22), Max: 37.2 (02-19-21 @ 04:22)  HR: 63 (02-19-21 @ 08:34) (63 - 87)  BP: 97/59 (02-19-21 @ 08:34) (90/51 - 104/57)  RR: 18 (02-19-21 @ 08:34) (17 - 18)  SpO2: 96% (02-19-21 @ 08:34) (94% - 100%)  Wt(kg): --  I&O's Summary    18 Feb 2021 07:01  -  19 Feb 2021 07:00  --------------------------------------------------------  IN: 1070 mL / OUT: 2610 mL / NET: -1540 mL        Appearance: Normal, in NAD	  HEENT: Normocephalic, atraumatic, normal oral mucosa  Cardiovascular: Normal S1 S2, No JVD, No murmurs  Respiratory: CTA, on room air	  Psychiatry: A & O x 3, Mood & affect appropriate  Gastrointestinal:  Soft, Non-tender, + BS	  Skin: No rashes, No ecchymoses, No cyanosis	  Extremities: Normal range of motion, No clubbing, cyanosis, (+) pedal edema  Vascular: Peripheral pulses palpable 2+ bilaterally        LABS:	 	    CBC Full  -  ( 18 Feb 2021 07:05 )  WBC Count : 7.41 K/uL  Hemoglobin : 8.8 g/dL  Hematocrit : 26.7 %  Platelet Count - Automated : 240 K/uL  Mean Cell Volume : 92.4 fl  Mean Cell Hemoglobin : 30.4 pg  Mean Cell Hemoglobin Concentration : 33.0 gm/dL  Auto Neutrophil # : x  Auto Lymphocyte # : x  Auto Monocyte # : x  Auto Eosinophil # : x  Auto Basophil # : x  Auto Neutrophil % : x  Auto Lymphocyte % : x  Auto Monocyte % : x  Auto Eosinophil % : x  Auto Basophil % : x    02-19    135  |  97  |  93<H>  ----------------------------<  97  4.1   |  23  |  2.81<H>  02-18    129<L>  |  96  |  89<H>  ----------------------------<  89  4.4   |  23  |  2.56<H>    Ca    10.1      19 Feb 2021 06:40  Ca    9.5      18 Feb 2021 07:01  Phos  4.4     02-18  Phos  4.0     02-17  Mg     2.2     02-19  Mg     2.4     02-18    TPro  7.3  /  Alb  3.3  /  TBili  1.6<H>  /  DBili  x   /  AST  31  /  ALT  32  /  AlkPhos  254<H>  02-19  TPro  6.9  /  Alb  2.9<L>  /  TBili  1.3<H>  /  DBili  x   /  AST  32  /  ALT  34  /  AlkPhos  266<H>  02-18      proBNP:   Lipid Profile:   HgA1c:   TSH:       CARDIAC MARKERS:      TELEMETRY: NSR/Apacec Vsense 70-80's, intermittent WCT up to 120s   	      Study Date: 2/18/2021      PROCEDURE: Transthoracic echocardiogram with 2-D, M-Mode  and complete spectral and color flow Doppler.    Dimensions:    Normal Values:  LA:     4.9    2.0 - 4.0 cm  Ao:     4.1    2.0 - 3.8 cm  SEPTUM: 1.2    0.6 - 1.2 cm  PWT:    1.2    0.6 - 1.1 cm  LVIDd:  5.7    3.0 - 5.6 cm  LVIDs:  5.4    1.8 - 4.0 cm  Derived variables:  LVMI: 154 g/m2  RWT: 0.42  Fractional short: 5 %  EF (Visual Estimate): 15 %  Doppler Peak Velocity (m/sec): MV=1.7 AoV=2.8  ------------------------------------------------------------------------  Observations:  Mitral Valve: Mitral annular calcification, otherwise  normal mitral valve. Mild-moderate mitral regurgitation.  Peak mitral valve gradient equals 12 mm Hg, mean  transmitral valve gradient equals 5 mm Hg, consistent with  mild  mitral stenosis.  Aortic Valve/Aorta: Calcified trileaflet aortic valve with  decreased opening. Peak transaortic valve gradient equals  31 mm Hg, mean transaortic valve gradient equals 18 mm Hg,  estimated aortic valve area equals 0.9sqcm (by continuity  equation), aortic valve velocity time integral equals 68  cm, consistent with severe aortic stenosis. Moderate-severe  aortic regurgitation.  Aortic Root: 4.1 cm.  Left Atrium: Severely dilated left atrium.  LA volume index  = 63cc/m2.  Left Ventricle: Severe global left ventricular systolic  dysfunction. Endocardial visualization enhanced with  intravenous injection of Ultrasonic Enhancing Agent  (Definity). Eccentric left ventricular hypertrophy (dilated  left ventricle with normal relative wall thickness).  Increased E/e'  is consistent with elevated left  ventricular filling pressure.  Right Heart: A device wire is noted in the right heart.  Right ventricular enlargement with decreased right  ventricular systolic function. Normal tricuspid valve.  Moderate tricuspid regurgitation. Normal pulmonic valve.  Minimal pulmonic regurgitation.  Pericardium/Pleura: Normal pericardium with no pericardial  effusion.  Hemodynamic: Estimated right atrial pressure is 8 mm Hg.  Estimated right ventricular systolic pressure equals 51 mm  Hg, assuming right atrial pressure equals 8 mm Hg,  consistent with moderate pulmonary hypertension.  ------------------------------------------------------------------------  Conclusions:  1. Calcified trileaflet aortic valve with decreased  opening. Peak transaortic valve gradient equals 31 mm Hg,  mean transaortic valve gradient equals 18 mm Hg, estimated  aortic valve area equals 0.9 sqcm (by continuity equation),  aortic valve velocity time integral equals 68 cm,  consistent with severe aortic stenosis. Moderate-severe  aortic regurgitation.  2. Eccentric left ventricular hypertrophy (dilated left  ventricle with normal relative wall thickness).  3. Severe global left ventricular systolic dysfunction.  Endocardial visualization enhanced with intravenous  injection of Ultrasonic Enhancing Agent (Definity).  4. A device wire is noted in the right heart.  5. Right ventricular enlargement with decreased right  ventricular systolic function.  6. Normal tricuspid valve. Moderate tricuspid  regurgitation.  *** No previous Echo exam.  ------------------------------------------------------------------------  Confirmed on  2/18/2021 - 16:48:38 by CHRIS Burleson  ------------------------------------------------------------------------

## 2021-02-19 NOTE — PROGRESS NOTE ADULT - SUBJECTIVE AND OBJECTIVE BOX
Infectious Diseases progress note:    Subjective: Afebrile, last WBC 7.4.  US with ascites.      ROS:  CONSTITUTIONAL:  No fever, chills, rigors  CARDIOVASCULAR:  No chest pain or palpitations  RESPIRATORY:   No SOB, cough, dyspnea on exertion.  No wheezing  GASTROINTESTINAL:  No abd pain, N/V, diarrhea/constipation  EXTREMITIES:  No swelling or joint pain  GENITOURINARY:  No burning on urination, increased frequency or urgency.  No flank pain  NEUROLOGIC:  No HA, visual disturbances  SKIN: No rashes    Allergies    No Known Allergies    Intolerances        ANTIBIOTICS/RELEVANT:  antimicrobials  piperacillin/tazobactam IVPB.. 3.375 Gram(s) IV Intermittent every 12 hours    immunologic:  influenza   Vaccine 0.5 milliLiter(s) IntraMuscular once    OTHER:  aspirin  chewable 81 milliGRAM(s) Oral daily  atorvastatin 80 milliGRAM(s) Oral at bedtime  dextrose 40% Gel 15 Gram(s) Oral once  dextrose 5%. 1000 milliLiter(s) IV Continuous <Continuous>  dextrose 5%. 1000 milliLiter(s) IV Continuous <Continuous>  dextrose 50% Injectable 25 Gram(s) IV Push once  dextrose 50% Injectable 12.5 Gram(s) IV Push once  dextrose 50% Injectable 25 Gram(s) IV Push once  ferrous    sulfate 325 milliGRAM(s) Oral two times a day  furosemide    Tablet 80 milliGRAM(s) Oral daily  glucagon  Injectable 1 milliGRAM(s) IntraMuscular once  heparin   Injectable 5000 Unit(s) SubCutaneous every 12 hours  insulin lispro (ADMELOG) corrective regimen sliding scale   SubCutaneous three times a day before meals  insulin lispro (ADMELOG) corrective regimen sliding scale   SubCutaneous at bedtime  metoprolol tartrate 25 milliGRAM(s) Oral every 12 hours  pantoprazole    Tablet 40 milliGRAM(s) Oral before breakfast  prasugrel 10 milliGRAM(s) Oral daily      Objective:  Vital Signs Last 24 Hrs  T(C): 36.7 (19 Feb 2021 20:16), Max: 37.2 (19 Feb 2021 04:22)  T(F): 98 (19 Feb 2021 20:16), Max: 98.9 (19 Feb 2021 04:22)  HR: 78 (19 Feb 2021 20:16) (63 - 120)  BP: 95/54 (19 Feb 2021 20:16) (95/53 - 104/57)  BP(mean): --  RR: 18 (19 Feb 2021 20:16) (17 - 18)  SpO2: 97% (19 Feb 2021 20:16) (96% - 97%)    PHYSICAL EXAM:  Constitutional:NAD  Eyes:CHERI, EOMI  Ear/Nose/Throat: no thrush, mucositis.  Moist mucous membranes	  Neck:no JVD, no lymphadenopathy, supple  Respiratory: CTA nadiya  Cardiovascular: S1S2 RRR, no murmurs  Gastrointestinal:soft, nontender,  nondistended (+) BS  Extremities:no e/e/c  Skin:  no rashes, open wounds or ulcerations        LABS:                        8.8    7.41  )-----------( 240      ( 18 Feb 2021 07:05 )             26.7     02-19    135  |  97  |  93<H>  ----------------------------<  97  4.1   |  23  |  2.81<H>    Ca    10.1      19 Feb 2021 06:40  Phos  4.4     02-18  Mg     2.2     02-19    TPro  7.3  /  Alb  3.3  /  TBili  1.6<H>  /  DBili  x   /  AST  31  /  ALT  32  /  AlkPhos  254<H>  02-19          Procalcitonin, Serum: 0.20 (02-16 @ 23:54)                    MICROBIOLOGY:    Culture - Urine (02.17.21 @ 09:06)   Specimen Source: .Urine Clean Catch (Midstream)   Culture Results:   No growth     Culture - Blood (02.17.21 @ 05:49)   Specimen Source: .Blood Blood-Peripheral   Culture Results:   No growth to date.     RADIOLOGY & ADDITIONAL STUDIES:    < from: VA Duplex Lower Ext Vein Scan, Bilat (02.17.21 @ 11:51) >  FINDINGS:    There is an occluded right superficial femoral artery.    There is normal compressibility of the bilateral common femoral, femoral and popliteal veins.  Doppler examination shows normal spontaneous and phasic flow.    No calf vein thrombosis is detected.    IMPRESSION:  No evidence of deep venous thrombosis in either lower extremity.    < end of copied text >      < from: US Abdomen Complete (US Abdomen Complete .) (02.17.21 @ 11:18) >    EXAM:  US ABDOMEN COMPLETE                            PROCEDURE DATE:  02/17/2021            INTERPRETATION:  CLINICAL INFORMATION: Abdominal pain. Evaluate for acute cholecystitis. AK I. Findings 2.12. Cardiomegaly. Pulmonary vascular congestion. Pacemaker. Status post sternotomy.    COMPARISON: CT abdomen and pelvis 2/17/2021. Plain chest film dated 2/16/2021.    TECHNIQUE: Sonography of the abdomen.    FINDINGS:    Liver: Within normal limits.  Bile ducts: Normal caliber. Common bile duct measures 4 mm.  Gallbladder: Sludge with small stone. Wall is borderline to mildly thickened. No sonographic Beard sign.  Pancreas: Visualized portions are within normal limits.  Spleen: 12.3 cm. Within normal limits.  Right kidney: 13.3 cm. No hydronephrosis. Multiple renal cysts, including upper pole cyst with septations measuring 3.0 x 3.5 cm and mid renal cyst measuring 4.9 x 2.5 x 5.0 cm. Mild increased parenchymal echogenicity.  Left kidney: 10.5 cm.  No hydronephrosis. Multiple renal cysts. Mild increased parenchymal echogenicity.  Ascites: Moderate ascites in all four quadrants.  Aorta and IVC: Visualized inferior vena cava is mildly prominent. Visualized abdominal aorta reveals atherosclerotic change.    Bilateral pleural effusions.    IMPRESSION:  *  Cholelithiasis without acute cholecystitis. Patient's borderline to mild gallbladder thickening is likely due to ascites.  *  Moderate ascites.  *  Suggestion of mild renal parenchymal disease.  *  Bilateral pleural effusions.  *  Mildly prominent inferior vena cava is likely on the basis of patient's heart disease.    < end of copied text >      < from: CT Chest No Cont (02.17.21 @ 01:24) >    EXAM:  CT CHEST                          EXAM:  CT ABDOMEN AND PELVIS OC                            PROCEDURE DATE:  02/17/2021            INTERPRETATION:  CLINICAL INFORMATION: Dyspnea. Abdominal pain, diabetes, CALVIN.    COMPARISON: None.    PROCEDURE:  CT of the Chest, Abdomen and Pelvis was performed without intravenous contrast.  Intravenous contrast: None.  Oral contrast: Positive contrast was administered.  Sagittal and coronal reformats were performed.    FINDINGS:    CHEST:  LUNGS AND LARGE AIRWAYS: Patent central airways. Interlobular septal thickening and bilateral groundglass opacities suggestive of pulmonary edema.  PLEURA: Small bilateral pleural effusions, right greater than left.  VESSELS: Atherosclerotic changes of the aorta and coronary arteries.  HEART: Cardiomegaly. No pericardial effusion. Status post CABG.  MEDIASTINUM AND EVELYN: No lymphadenopathy.  CHEST WALL AND LOWER NECK: Status post median sternotomy. Left chest wall cardiac device.    ABDOMEN AND PELVIS:  LIVER: Within normal limits.  BILE DUCTS: Normal caliber.  GALLBLADDER: Within normal limits.  SPLEEN: Within normal limits.  PANCREAS: Within normal limits.  ADRENALS: Within normal limits.  KIDNEYS/URETERS: No hydronephrosis. Punctate nonobstructing right renal calculus. Bilateral renal cysts and hypodensities too small to characterize.    BLADDER: Within normal limits.  REPRODUCTIVE ORGANS: Prostate is enlarged.    BOWEL: No bowel obstruction. Appendix is not visualized.  PERITONEUM: Small volume ascites.  VESSELS: Atherosclerotic changes.  RETROPERITONEUM/LYMPH NODES: No lymphadenopathy.  ABDOMINAL WALL: Fluid and fat-containing right inguinal hernia and fat-containing left inguinal hernia.  BONES: Degenerative changes.    IMPRESSION:    Pulmonary edema with small bilateral pleural effusions.    Small volume ascites.    < end of copied text >      < from: NM Pulmonary Perfusion Scan (02.17.21 @ 00:34) >    EXAM:  NM PULM PERFUSION IMG                                PROCEDURE DATE:  02/17/2021          INTERPRETATION:  RADIOPHARMACEUTICAL: 3.3 mCi Tc-99m-MAA, I.V.    CLINICAL STATEMENT: 80-year-old male with shortness of breath and weakness. Evaluate for pulmonary embolus.    TECHNIQUE: Perfusion images of the lungs were obtained following administration of Tc-99m-MAA. Images were obtained in the anterior, posterior, both lateral, and all 4 oblique projections. The study was interpreted in conjunction with chest radiograph of 2/16/2021.    FINDINGS: There are nonsegmental areas of decreased perfusion in both mid and lower lungs. There is mildly heterogeneous distribution of radiotracer in the remainder of the lungs. There is no segmental perfusion defect.    IMPRESSION: Very low probability of pulmonary embolus.    < end of copied text >

## 2021-02-19 NOTE — PROGRESS NOTE ADULT - SUBJECTIVE AND OBJECTIVE BOX
[facility]  Pro Strickland PA-C  [address] [place] [address]  [phone]      INTERVAL HPI/OVERNIGHT EVENTS:    patient seen and examined       MEDICATIONS  (STANDING):  aspirin  chewable 81 milliGRAM(s) Oral daily  atorvastatin 80 milliGRAM(s) Oral at bedtime  dextrose 40% Gel 15 Gram(s) Oral once  dextrose 5%. 1000 milliLiter(s) (50 mL/Hr) IV Continuous <Continuous>  dextrose 5%. 1000 milliLiter(s) (100 mL/Hr) IV Continuous <Continuous>  dextrose 50% Injectable 25 Gram(s) IV Push once  dextrose 50% Injectable 12.5 Gram(s) IV Push once  dextrose 50% Injectable 25 Gram(s) IV Push once  ferrous    sulfate 325 milliGRAM(s) Oral two times a day  furosemide   Injectable 40 milliGRAM(s) IV Push every 12 hours  glucagon  Injectable 1 milliGRAM(s) IntraMuscular once  heparin   Injectable 5000 Unit(s) SubCutaneous every 12 hours  influenza   Vaccine 0.5 milliLiter(s) IntraMuscular once  insulin lispro (ADMELOG) corrective regimen sliding scale   SubCutaneous three times a day before meals  insulin lispro (ADMELOG) corrective regimen sliding scale   SubCutaneous at bedtime  pantoprazole    Tablet 40 milliGRAM(s) Oral before breakfast  piperacillin/tazobactam IVPB.. 3.375 Gram(s) IV Intermittent every 8 hours  prasugrel 10 milliGRAM(s) Oral daily    MEDICATIONS  (PRN):      Allergies    No Known Allergies    Intolerances        ROS:   General:  No wt loss, fevers, chills, night sweats, fatigue,   Eyes:  Good vision, no reported pain  ENT:  No sore throat, pain, runny nose, dysphagia  CV:  No pain, palpitations, hypo/hypertension  Resp:  No dyspnea, cough, tachypnea, wheezing  GI:  No pain, No nausea, No vomiting, No diarrhea, No constipation, No weight loss, No fever, No pruritis, No rectal bleeding, No tarry stools, No dysphagia,  :  No pain, bleeding, incontinence, nocturia  Muscle:  No pain, weakness  Neuro:  No weakness, tingling, memory problems  Psych:  No fatigue, insomnia, mood problems, depression  Endocrine:  No polyuria, polydipsia, cold/heat intolerance  Heme:  No petechiae, ecchymosis, easy bruisability  Skin:  No rash, tattoos, scars, edema      PHYSICAL EXAM:   Vital Signs:  Vital Signs Last 24 Hrs  T(C): 36.6 (2021 12:07), Max: 36.6 (2021 19:31)  T(F): 97.9 (2021 12:07), Max: 97.9 (2021 19:31)  HR: 87 (2021 17:08) (71 - 100)  BP: 101/50 (2021 17:08) (90/51 - 112/60)  BP(mean): 66 (2021 10:29) (66 - 66)  RR: 18 (2021 12:07) (18 - 18)  SpO2: 95% (2021 12:07) (95% - 100%)  Daily     Daily     GENERAL:  no distress  HEENT:  NC/AT  ABDOMEN:  Soft, non-tender, non-distended  EXTEREMITIES:  +bilateral LE edema   NEURO:  Alert, oriented, no asterixis, no tremor, no encephalopathy      LABS:                        8.8    7.41  )-----------( 240      ( 2021 07:05 )             26.7     02-18    129<L>  |  96  |  89<H>  ----------------------------<  89  4.4   |  23  |  2.56<H>    Ca    9.5      2021 07:01  Phos  4.4     -18  Mg     2.4     -18    TPro  6.9  /  Alb  2.9<L>  /  TBili  1.3<H>  /  DBili  x   /  AST  32  /  ALT  34  /  AlkPhos  266<H>  02-18    PT/INR - ( 2021 20:25 )   PT: 16.1 sec;   INR: 1.36 ratio         PTT - ( 2021 20:25 )  PTT:32.7 sec  Urinalysis Basic - ( 2021 07:16 )    Color: Light Yellow / Appearance: Clear / S.010 / pH: x  Gluc: x / Ketone: Negative  / Bili: Negative / Urobili: Negative   Blood: x / Protein: Negative / Nitrite: Negative   Leuk Esterase: Negative / RBC: x / WBC x   Sq Epi: x / Non Sq Epi: x / Bacteria: x        RADIOLOGY & ADDITIONAL TESTS:   [facility]  Pro Strickland PA-C  [address] [place] [address]  [phone]      INTERVAL HPI/OVERNIGHT EVENTS:    patient seen and examined   events noted     MEDICATIONS  (STANDING):  aspirin  chewable 81 milliGRAM(s) Oral daily  atorvastatin 80 milliGRAM(s) Oral at bedtime  dextrose 40% Gel 15 Gram(s) Oral once  dextrose 5%. 1000 milliLiter(s) (50 mL/Hr) IV Continuous <Continuous>  dextrose 5%. 1000 milliLiter(s) (100 mL/Hr) IV Continuous <Continuous>  dextrose 50% Injectable 25 Gram(s) IV Push once  dextrose 50% Injectable 12.5 Gram(s) IV Push once  dextrose 50% Injectable 25 Gram(s) IV Push once  ferrous    sulfate 325 milliGRAM(s) Oral two times a day  furosemide   Injectable 40 milliGRAM(s) IV Push every 12 hours  glucagon  Injectable 1 milliGRAM(s) IntraMuscular once  heparin   Injectable 5000 Unit(s) SubCutaneous every 12 hours  influenza   Vaccine 0.5 milliLiter(s) IntraMuscular once  insulin lispro (ADMELOG) corrective regimen sliding scale   SubCutaneous three times a day before meals  insulin lispro (ADMELOG) corrective regimen sliding scale   SubCutaneous at bedtime  pantoprazole    Tablet 40 milliGRAM(s) Oral before breakfast  piperacillin/tazobactam IVPB.. 3.375 Gram(s) IV Intermittent every 8 hours  prasugrel 10 milliGRAM(s) Oral daily    MEDICATIONS  (PRN):      Allergies    No Known Allergies    Intolerances        ROS:   General:  No wt loss, fevers, chills, night sweats, fatigue,   Eyes:  Good vision, no reported pain  ENT:  No sore throat, pain, runny nose, dysphagia  CV:  No pain, palpitations, hypo/hypertension  Resp:  No dyspnea, cough, tachypnea, wheezing  GI:  No pain, No nausea, No vomiting, No diarrhea, No constipation, No weight loss, No fever, No pruritis, No rectal bleeding, No tarry stools, No dysphagia,  :  No pain, bleeding, incontinence, nocturia  Muscle:  No pain, weakness  Neuro:  No weakness, tingling, memory problems  Psych:  No fatigue, insomnia, mood problems, depression  Endocrine:  No polyuria, polydipsia, cold/heat intolerance  Heme:  No petechiae, ecchymosis, easy bruisability  Skin:  No rash, tattoos, scars, edema      PHYSICAL EXAM:   Vital Signs:  Vital Signs Last 24 Hrs  T(C): 36.6 (2021 12:07), Max: 36.6 (2021 19:31)  T(F): 97.9 (2021 12:07), Max: 97.9 (2021 19:31)  HR: 87 (2021 17:08) (71 - 100)  BP: 101/50 (2021 17:08) (90/51 - 112/60)  BP(mean): 66 (2021 10:29) (66 - 66)  RR: 18 (2021 12:07) (18 - 18)  SpO2: 95% (2021 12:07) (95% - 100%)  Daily     Daily     GENERAL:  no distress  HEENT:  NC/AT  ABDOMEN:  Soft, non-tender, non-distended  EXTEREMITIES:  +bilateral LE edema   NEURO:  Alert, oriented, no asterixis, no tremor, no encephalopathy      LABS:                        8.8    7.41  )-----------( 240      ( 2021 07:05 )             26.7     02-18    129<L>  |  96  |  89<H>  ----------------------------<  89  4.4   |  23  |  2.56<H>    Ca    9.5      2021 07:01  Phos  4.4     -18  Mg     2.4     -18    TPro  6.9  /  Alb  2.9<L>  /  TBili  1.3<H>  /  DBili  x   /  AST  32  /  ALT  34  /  AlkPhos  266<H>  02-18    PT/INR - ( 2021 20:25 )   PT: 16.1 sec;   INR: 1.36 ratio         PTT - ( 2021 20:25 )  PTT:32.7 sec  Urinalysis Basic - ( 2021 07:16 )    Color: Light Yellow / Appearance: Clear / S.010 / pH: x  Gluc: x / Ketone: Negative  / Bili: Negative / Urobili: Negative   Blood: x / Protein: Negative / Nitrite: Negative   Leuk Esterase: Negative / RBC: x / WBC x   Sq Epi: x / Non Sq Epi: x / Bacteria: x        RADIOLOGY & ADDITIONAL TESTS:

## 2021-02-19 NOTE — PROVIDER CONTACT NOTE (OTHER) - ASSESSMENT
A&Ox4. No c/o CP or discomfort. Possible WTC on tele, HR up to 120s. PPM was interrogated yesterday. VSS. Currently NS 1st degree HR 70s on tele.

## 2021-02-19 NOTE — PROGRESS NOTE ADULT - ASSESSMENT
79 yo male w h/o ICM< CAD< s/p CABG, has AICD, DM, HTN presents w volume overload and abd pain  Abd sono shows gallstones, but no sign of cholecystitis/pancreatitis. abd pain resolved on Zosyn,   will cont empirically for 7 days ( through 2/22)  ascites 2/2 systolic chf, probably worse 2/2 acute on chronic systolic chf, now on po Lasix  on interrogation of ICD, has a lof of arrhythmia burden,   NSVT and sustained VT, seen by EPS, cont  Mmofdxsdh81.5 mg  bid. has inoperable ischemic HD,   ICM, EF 15%, severe AS, seen by structural heart for TAVR eval. prob a high risk and poor candidate 2/2 low EF, advanced CKD and overall  frail general  medical condition. structural heart recs pending.   Hyponatremia  Izabela, CKD4  Hyperkalemia, resolved  dvt ppx w HSC

## 2021-02-19 NOTE — PROGRESS NOTE ADULT - ASSESSMENT
81 yo male w h/o DM, CAD, s/p CABG, CKD 3-4.   Recently evaluated by Nephrology for elevated creatinine. Bloodwork  returned notable for  and Na 126; in light of these findings, Dr. Luz (Renal) sent the patient to the ER. Lately ptn has been having abdominal pain with elevated amylase and lipase.   As per ptn's wife: on and off R abd pain w generalized weakness and poor po intake. PCP DCed po Lasix in past few days 2/2 abnormal blood test results.  Ptn denies recent NSAID use.  He was diagnosed with diabetes 18 years ago, and denies retinopathy (last saw Optho around 2 years ago). Cardiologist has alluded to the patient having mild renal impairment in the past, as per wife.    Patient's PCP is Dr. Shyann De León; GI Dr. Wilman Villagran; Cardiology Dr. Boyce.  (16 Feb 2021 21:05)    ER vs: T 98.1, P 85, /64. WBC 8.2.  Bun/Cr - 100/2.0.  LFTs mildly elevated, Roxana 217, Lip 185.  Pct 0.2.  UA (-).  Ct c/a/p shows pulmonary edema, sm B/L pleural effusions, sm volume ascites.  VQ scan with very low probability for PE.      Pt started on zosyn to cover for intra-abd source.  ID consulted.     r/o Gallstone pancreatitis:    - Pt p/w abdominal pain, epigastric and RUQ pain, noted to have elevated LFTs and amylase/lipase.  Possible gallstone pancreatitis?    - U/S abdomen shows cholelithiasis without acute cholecystitis.  Borderline to mild gallbladder thickening is likely due to ascites.   Moderate ascites noted.  Suggestion of mild renal parenchymal disease and bilateral pleural effusions.    - CTap w/o acute intra-abd pathology    - monitor LFTs, Roxana, Lipase.  As per GI, ascites likely secondary to CHF, no evidence for pancreatitis.     - Monitor temp curve and WBC.      - bcx neg.  On empiric abx coverage with zosyn x 7 days.  Abd pain resolving.       {[id number]}Renal failure:    - Renal following for CKD.  Dose abx for GFR 15-20      Rupali Schwarz  [phone]

## 2021-02-19 NOTE — PROGRESS NOTE ADULT - SUBJECTIVE AND OBJECTIVE BOX
Structural Heart Team    HPI:  79 yo male w h/o DM, CAD, s/p CABG, CKD 3-4.  Prior to admission, bloodwork done by PCP was notable for  and Na 126; in light of these findings, Dr. Luz sent the patient to the ER. Lately pt. has been having abdominal pain with elevated amylase and lipase.   As per pt.'s wife: on and off R abd pain w generalized weakness and poor po intake.  Cardiologist has alluded to the patient having mild renal impairment in the past, as per wife.    Patient's PCP is Dr. Shyann De León; GI Dr. Wilman Villagran; Cardiology Dr. Boyce ([phone])       Mr Morelos was found on recent echo to have moderate to severe AS with an EF of 15%, as well as mild MS, mild/moderate MR, moderate TR and depressed RV function.  He complains of some intermittent chest pain.  He reports that he had a CABG 10-12 years ago in [place].  He also reportedly had a cardiac cath recently at [facility] which showed CAD that was not amenable to revascularization.  On exam, he is sitting on the edge of the bed with no active complaints.  He denies sob and lightheadedness.         Allergies    No Known Allergies    Intolerances      PAST MEDICAL & SURGICAL HISTORY:  Renal failure    CAD (coronary artery disease)    Hypertension    Diabetes mellitus      MEDICATIONS  (STANDING):  aspirin  chewable 81 milliGRAM(s) Oral daily  atorvastatin 80 milliGRAM(s) Oral at bedtime  dextrose 40% Gel 15 Gram(s) Oral once  dextrose 5%. 1000 milliLiter(s) (50 mL/Hr) IV Continuous <Continuous>  dextrose 5%. 1000 milliLiter(s) (100 mL/Hr) IV Continuous <Continuous>  dextrose 50% Injectable 25 Gram(s) IV Push once  dextrose 50% Injectable 12.5 Gram(s) IV Push once  dextrose 50% Injectable 25 Gram(s) IV Push once  ferrous    sulfate 325 milliGRAM(s) Oral two times a day  furosemide    Tablet 80 milliGRAM(s) Oral daily  furosemide    Tablet 40 milliGRAM(s) Oral once  glucagon  Injectable 1 milliGRAM(s) IntraMuscular once  heparin   Injectable 5000 Unit(s) SubCutaneous every 12 hours  influenza   Vaccine 0.5 milliLiter(s) IntraMuscular once  insulin lispro (ADMELOG) corrective regimen sliding scale   SubCutaneous three times a day before meals  insulin lispro (ADMELOG) corrective regimen sliding scale   SubCutaneous at bedtime  metoprolol tartrate 25 milliGRAM(s) Oral every 12 hours  pantoprazole    Tablet 40 milliGRAM(s) Oral before breakfast  piperacillin/tazobactam IVPB.. 3.375 Gram(s) IV Intermittent every 8 hours  prasugrel 10 milliGRAM(s) Oral daily      REVIEW OF SYSTEMS:    CONSTITUTIONAL: No weakness, fevers or chills  EYES/ENT: No visual changes;  No vertigo or throat pain   NECK: No pain or stiffness  RESPIRATORY: No cough, wheezing, hemoptysis; No shortness of breath  CARDIOVASCULAR: No chest pain or palpitations  GASTROINTESTINAL: No abdominal or epigastric pain. No nausea, vomiting, or hematemesis; No diarrhea or constipation. No melena or hematochezia.  GENITOURINARY: No dysuria, frequency or hematuria  NEUROLOGICAL: No numbness or weakness  SKIN: No itching, rashes      Vital Signs Last 24 Hrs  T(C): 36.3 (19 Feb 2021 11:28), Max: 37.2 (19 Feb 2021 04:22)  T(F): 97.3 (19 Feb 2021 11:28), Max: 98.9 (19 Feb 2021 04:22)  HR: 120 (19 Feb 2021 11:28) (63 - 120)  BP: 99/65 (19 Feb 2021 11:28) (93/62 - 104/57)  BP(mean): --  RR: 18 (19 Feb 2021 11:28) (17 - 18)  SpO2: 97% (19 Feb 2021 11:28) (94% - 97%)                          8.8    7.41  )-----------( 240      ( 18 Feb 2021 07:05 )             26.7   02-19    135  |  97  |  93<H>  ----------------------------<  97  4.1   |  23  |  2.81<H>    Ca    10.1      19 Feb 2021 06:40  Phos  4.4     02-18  Mg     2.2     02-19    TPro  7.3  /  Alb  3.3  /  TBili  1.6<H>  /  DBili  x   /  AST  31  /  ALT  32  /  AlkPhos  254<H>  02-19        I&O's Summary    18 Feb 2021 07:01  -  19 Feb 2021 07:00  --------------------------------------------------------  IN: 1070 mL / OUT: 2610 mL / NET: -1540 mL    19 Feb 2021 07:01  -  19 Feb 2021 14:50  --------------------------------------------------------  IN: 580 mL / OUT: 400 mL / NET: 180 mL          Physical Exam  General: NAD  Cardiac: s1s2, RRR, no murmur noted  Pulmonary: CTA b/l, no w/r/r, no use of accessory muscles  Gastrointestinal: soft abdomen, nontender, nondistended, + bowel sounds throughout  Extremities: 1+ pitting edema, severe discoloration of both feet   Neuro: A&Ox3, nonfocal   EKG: SR, Paced      < from: TTE with Doppler (w/Cont) (02.18.21 @ 08:26) >  Dimensions:    Normal Values:  LA:     4.9    2.0 - 4.0 cm  Ao:     4.1    2.0 - 3.8 cm  SEPTUM: 1.2    0.6 - 1.2 cm  PWT:    1.2    0.6 - 1.1 cm  LVIDd:  5.7    3.0 - 5.6 cm  LVIDs:  5.4    1.8 - 4.0 cm  Derived variables:  LVMI: 154 g/m2  RWT: 0.42  Fractional short: 5 %  EF (Visual Estimate): 15 %  Doppler Peak Velocity (m/sec): MV=1.7 AoV=2.8  ------------------------------------------------------------------------  Observations:  Mitral Valve: Mitral annular calcification, otherwise  normal mitral valve. Mild-moderate mitral regurgitation.  Peak mitral valve gradient equals 12 mm Hg, mean  transmitral valve gradient equals 5 mm Hg, consistent with  mild  mitral stenosis.  Aortic Valve/Aorta: Calcified trileaflet aortic valve with  decreased opening. Peak transaortic valve gradient equals  31 mm Hg, mean transaortic valve gradient equals 18 mm Hg,  estimated aortic valve area equals 0.9sqcm (by continuity  equation), aortic valve velocity time integral equals 68  cm, consistent with severe aortic stenosis. Moderate-severe  aortic regurgitation.  Aortic Root: 4.1 cm.  Left Atrium: Severely dilated left atrium.  LA volume index  = 63cc/m2.  Left Ventricle: Severe global left ventricular systolic  dysfunction. Endocardial visualization enhanced with  intravenous injection of Ultrasonic Enhancing Agent  (Definity). Eccentric left ventricular hypertrophy (dilated  left ventricle with normal relative wall thickness).  Increased E/e'  is consistent with elevated left  ventricular filling pressure.  Right Heart: A device wire is noted in the right heart.  Right ventricular enlargement with decreased right  ventricular systolic function. Normal tricuspid valve.  Moderate tricuspid regurgitation. Normal pulmonic valve.  Minimal pulmonic regurgitation.  Pericardium/Pleura: Normal pericardium with no pericardial  effusion.  Hemodynamic: Estimated right atrial pressure is 8 mm Hg.  Estimated right ventricular systolic pressure equals 51 mm  Hg, assuming right atrial pressure equals 8 mm Hg,  consistent with moderate pulmonary hypertension.  ------------------------------------------------------------------------  Conclusions:  1. Calcified trileaflet aortic valve with decreased  opening. Peak transaortic valve gradient equals 31 mm Hg,  mean transaortic valve gradient equals 18 mm Hg, estimated  aortic valve area equals 0.9 sqcm (by continuity equation),  aortic valve velocity time integral equals 68 cm,  consistent with severe aortic stenosis. Moderate-severe  aortic regurgitation.  2. Eccentric left ventricular hypertrophy (dilated left  ventricle with normal relative wall thickness).  3. Severe global left ventricular systolic dysfunction.  Endocardial visualization enhanced with intravenous  injection of Ultrasonic Enhancing Agent (Definity).  4. A device wire is noted in the right heart.  5. Right ventricular enlargement with decreased right  ventricular systolic function.  6. Normal tricuspid valve. Moderate tricuspid  regurgitation.  *** No previous Echo exam.  ------------------------------------------------------------------------  Confirmed on  2/18/2021 - 16:48:38 by CHRIS Burleson    < end of copied text >

## 2021-02-19 NOTE — PROGRESS NOTE ADULT - SUBJECTIVE AND OBJECTIVE BOX
Subjective: Patient seen and examined. No new events except as noted.   feels sob     REVIEW OF SYSTEMS:    CONSTITUTIONAL: + weakness, fevers or chills  EYES/ENT: No visual changes;  No vertigo or throat pain   NECK: No pain or stiffness  RESPIRATORY: No cough, wheezing, hemoptysis; +shortness of breath  CARDIOVASCULAR: No chest pain or palpitations  GASTROINTESTINAL: No abdominal or epigastric pain. No nausea, vomiting, or hematemesis; No diarrhea or constipation. No melena or hematochezia.  GENITOURINARY: No dysuria, frequency or hematuria  NEUROLOGICAL: No numbness or weakness  SKIN: No itching, burning, rashes, or lesions   All other review of systems is negative unless indicated above.    MEDICATIONS:  MEDICATIONS  (STANDING):  aspirin  chewable 81 milliGRAM(s) Oral daily  atorvastatin 80 milliGRAM(s) Oral at bedtime  dextrose 40% Gel 15 Gram(s) Oral once  dextrose 5%. 1000 milliLiter(s) (50 mL/Hr) IV Continuous <Continuous>  dextrose 5%. 1000 milliLiter(s) (100 mL/Hr) IV Continuous <Continuous>  dextrose 50% Injectable 25 Gram(s) IV Push once  dextrose 50% Injectable 12.5 Gram(s) IV Push once  dextrose 50% Injectable 25 Gram(s) IV Push once  ferrous    sulfate 325 milliGRAM(s) Oral two times a day  furosemide    Tablet 80 milliGRAM(s) Oral daily  glucagon  Injectable 1 milliGRAM(s) IntraMuscular once  heparin   Injectable 5000 Unit(s) SubCutaneous every 12 hours  influenza   Vaccine 0.5 milliLiter(s) IntraMuscular once  insulin lispro (ADMELOG) corrective regimen sliding scale   SubCutaneous three times a day before meals  insulin lispro (ADMELOG) corrective regimen sliding scale   SubCutaneous at bedtime  metoprolol tartrate 25 milliGRAM(s) Oral every 12 hours  pantoprazole    Tablet 40 milliGRAM(s) Oral before breakfast  piperacillin/tazobactam IVPB.. 3.375 Gram(s) IV Intermittent every 8 hours  prasugrel 10 milliGRAM(s) Oral daily      PHYSICAL EXAM:  T(C): 37.2 (02-19-21 @ 04:22), Max: 37.2 (02-19-21 @ 04:22)  HR: 63 (02-19-21 @ 08:34) (63 - 87)  BP: 97/59 (02-19-21 @ 08:34) (90/51 - 104/57)  RR: 18 (02-19-21 @ 08:34) (17 - 18)  SpO2: 96% (02-19-21 @ 08:34) (94% - 97%)  Wt(kg): --  I&O's Summary    18 Feb 2021 07:01  -  19 Feb 2021 07:00  --------------------------------------------------------  IN: 1070 mL / OUT: 2610 mL / NET: -1540 mL    19 Feb 2021 07:01  -  19 Feb 2021 11:03  --------------------------------------------------------  IN: 240 mL / OUT: 0 mL / NET: 240 mL            Appearance: NAD	  HEENT:   Dry oral mucosa, PERRL, EOMI	  Lymphatic: No lymphadenopathy   Cardiovascular: Normal S1 S2, No JVD, No murmurs , Peripheral pulses palpable 2+ bilaterally  Respiratory: Lungs clear to auscultation, normal effort 	  Gastrointestinal:  Soft, Non-tender, + BS	  Skin: No rashes, No ecchymoses, No cyanosis, warm to touch  Musculoskeletal: Normal range of motion, normal strength  Psychiatry:  Mood & affect appropriate  Ext: + edema          LABS:    CARDIAC MARKERS:  CARDIAC MARKERS ( 18 Feb 2021 07:01 )  x     / x     / 52 U/L / x     / 3.5 ng/mL  CARDIAC MARKERS ( 17 Feb 2021 21:53 )  x     / x     / 69 U/L / x     / 4.1 ng/mL                                8.8    7.41  )-----------( 240      ( 18 Feb 2021 07:05 )             26.7     02-19    135  |  97  |  93<H>  ----------------------------<  97  4.1   |  23  |  2.81<H>    Ca    10.1      19 Feb 2021 06:40  Phos  4.4     02-18  Mg     2.2     02-19    TPro  7.3  /  Alb  3.3  /  TBili  1.6<H>  /  DBili  x   /  AST  31  /  ALT  32  /  AlkPhos  254<H>  02-19    proBNP:   Lipid Profile:   HgA1c:   TSH:     Negative          TELEMETRY: 	  NSR/Apacec Vsense 70-80's, intermittent WCT up to 120s    ECG:  	  RADIOLOGY:   DIAGNOSTIC TESTING:  [ ] Echocardiogram:< from: TTE with Doppler (w/Cont) (02.18.21 @ 08:26) >    Patient name: CHAPIN RAMOS  YOB: 1940   Age: 80 (M)   MR#: [id number]  Study Date: 2/18/2021  [facility]  Study quality: Technically fair  Referring Physician: Korin Dickey MD  Blood Pressure: 96/59 mmHg  Height: 173 cm  Weight: 75 kg  BSA: 1.9 m2  ------------------------------------------------------------------------  PROCEDURE: Transthoracic echocardiogram with 2-D, M-Mode  and complete spectral and color flow Doppler. Verbal  consent was obtained for injection of  Ultrasonic Enhancing  Agent following a discussion of risks and benefits.  Following intravenous injection of Ultrasonic Enhancing  Agent , harmonic imaging was performed.  INDICATION: Abnormal electrocardiogram (ECG) (EKG) (R94.31)  ------------------------------------------------------------------------  Dimensions:    Normal Values:  LA:     4.9    2.0 - 4.0 cm  Ao:     4.1    2.0 - 3.8 cm  SEPTUM: 1.2    0.6 - 1.2 cm  PWT:    1.2    0.6 - 1.1 cm  LVIDd:  5.7    3.0 - 5.6 cm  LVIDs:  5.4    1.8 - 4.0 cm  Derived variables:  LVMI: 154 g/m2  RWT: 0.42  Fractional short: 5 %  EF (Visual Estimate): 15 %  Doppler Peak Velocity (m/sec): MV=1.7 AoV=2.8  ------------------------------------------------------------------------  Observations:  Mitral Valve: Mitral annular calcification, otherwise  normal mitral valve. Mild-moderate mitral regurgitation.  Peak mitral valve gradient equals 12 mm Hg, mean  transmitral valve gradient equals 5 mm Hg, consistent with  mild  mitral stenosis.  Aortic Valve/Aorta: Calcified trileaflet aortic valve with  decreased opening. Peak transaortic valve gradient equals  31 mm Hg, mean transaortic valve gradient equals 18 mm Hg,  estimated aortic valve area equals 0.9sqcm (by continuity  equation), aortic valve velocity time integral equals 68  cm, consistent with severe aortic stenosis. Moderate-severe  aortic regurgitation.  Aortic Root: 4.1 cm.  Left Atrium: Severely dilated left atrium.  LA volume index  = 63cc/m2.  Left Ventricle: Severe global left ventricular systolic  dysfunction. Endocardial visualization enhanced with  intravenous injection of Ultrasonic Enhancing Agent  (Definity). Eccentric left ventricular hypertrophy (dilated  left ventricle with normal relative wall thickness).  Increased E/e'  is consistent with elevated left  ventricular filling pressure.  Right Heart: A device wire is noted in the right heart.  Right ventricular enlargement with decreased right  ventricular systolic function. Normal tricuspid valve.  Moderate tricuspid regurgitation. Normal pulmonic valve.  Minimal pulmonic regurgitation.  Pericardium/Pleura: Normal pericardium with no pericardial  effusion.  Hemodynamic: Estimated right atrial pressure is 8 mm Hg.  Estimated right ventricular systolic pressure equals 51 mm  Hg, assuming right atrial pressure equals 8 mm Hg,  consistent with moderate pulmonary hypertension.  ------------------------------------------------------------------------  Conclusions:  1. Calcified trileaflet aortic valve with decreased  opening. Peak transaortic valve gradient equals 31 mm Hg,  mean transaortic valve gradient equals 18 mm Hg, estimated  aortic valve area equals 0.9 sqcm (by continuity equation),  aortic valve velocity time integral equals 68 cm,  consistent with severe aortic stenosis. Moderate-severe  aortic regurgitation.  2. Eccentric left ventricular hypertrophy (dilated left  ventricle with normal relative wall thickness).  3. Severe global left ventricular systolic dysfunction.  Endocardial visualization enhanced with intravenous  injection of Ultrasonic Enhancing Agent (Definity).  4. A device wire is noted in the right heart.  5. Right ventricular enlargement with decreased right  ventricular systolic function.  6. Normal tricuspid valve. Moderate tricuspid  regurgitation.  *** No previous Echo exam.  ------------------------------------------------------------------------  Confirmed on  2/18/2021 - 16:48:38 by CHRIS Burleson    < end of copied text >    [ ]  Catheterization:  [ ] Stress Test:    OTHER: 	    PROCEDURE:   · Procedure Name  CRT-D (Cardiac Resynchronization Therapy with Defibrillation Capabilities) Interrogation Note    · Procedure Performed By  Myself    ·   [place]    · Model  CRT D Quadra AssuraMP [id number]    · Mode  DDD    · Rate  60 -120 bpm    · Atrial Lead  Yes    · P-wave amplitude (mV)  1    · Impedance (ohms)  330    · Threshold (V)  1    · Threshold at (ms)  0.4    · Right Ventricular Lead  Yes    · R-wave amplitude (mV)  10.7    · RV lead Impedance (ohms)  310    · Threshold (V)  1    · Threshold at (ms)  0.5    · Left Ventricular Lead  Yes    · Impedance (ohms)  700    · Threshold (V)  0.75    · Threshold at (ms)  0.5    · VT/VF episode detected or delivered?  No    · Shock Impedance  43    · Battery  Adequate    · % Bi-V Paced  95%    · Underlying Rhythm  SR 60's not pacemaker dependent    · Comments  battery longevity 2.9 years    · Additional Procedure Details  ICD interrogation for WCT on telemetry   normal sensing and pacing thresholds stable lead impedence   stored data review reveal no stored episodes since last interrogation January 27, 2021, prior episodes updated with no results noted   [place] Impedence monitoring below impedence line indicating fluid accumulation    [id number]  addendum  repeat interrogation to inhibit pacing to check and document underlying rhythm on telemetry and EKG to compare to WCT rhythm seen on telemetry and to R/O PMT and SVT. Underlying EKG SR 1st degree AV delay LBBB  ms  Episodes seen on telemetry consistent with slow VT  AV delay changed to 150 ms to promote higher BIV pacing   Potassium more normal, no further slow VT on telemetry   continue beta blockers  reviewed with attending Dr Green              Electronic Signatures:  Julianna Casarez (NP)  (Signed 18-Feb-2021 16:19)  	Authored: PROCEDURE      Last Updated: 18-Feb-2021 16:19 by Julianna Casarez)

## 2021-02-19 NOTE — PROGRESS NOTE ADULT - SUBJECTIVE AND OBJECTIVE BOX
Patient is a 80y old  Male who presents with a chief complaint of acute renal failure (19 Feb 2021 15:22)      SUBJECTIVE / OVERNIGHT EVENTS: ptn feels better    MEDICATIONS  (STANDING):  aspirin  chewable 81 milliGRAM(s) Oral daily  atorvastatin 80 milliGRAM(s) Oral at bedtime  dextrose 40% Gel 15 Gram(s) Oral once  dextrose 5%. 1000 milliLiter(s) (50 mL/Hr) IV Continuous <Continuous>  dextrose 5%. 1000 milliLiter(s) (100 mL/Hr) IV Continuous <Continuous>  dextrose 50% Injectable 25 Gram(s) IV Push once  dextrose 50% Injectable 12.5 Gram(s) IV Push once  dextrose 50% Injectable 25 Gram(s) IV Push once  ferrous    sulfate 325 milliGRAM(s) Oral two times a day  furosemide    Tablet 80 milliGRAM(s) Oral daily  glucagon  Injectable 1 milliGRAM(s) IntraMuscular once  heparin   Injectable 5000 Unit(s) SubCutaneous every 12 hours  influenza   Vaccine 0.5 milliLiter(s) IntraMuscular once  insulin lispro (ADMELOG) corrective regimen sliding scale   SubCutaneous three times a day before meals  insulin lispro (ADMELOG) corrective regimen sliding scale   SubCutaneous at bedtime  metoprolol tartrate 25 milliGRAM(s) Oral every 12 hours  pantoprazole    Tablet 40 milliGRAM(s) Oral before breakfast  piperacillin/tazobactam IVPB.. 3.375 Gram(s) IV Intermittent every 12 hours  prasugrel 10 milliGRAM(s) Oral daily    MEDICATIONS  (PRN):      Vital Signs Last 24 Hrs  T(F): 98 (02-19-21 @ 20:16), Max: 98.9 (02-19-21 @ 04:22)  HR: 78 (02-19-21 @ 20:16) (63 - 120)  BP: 95/54 (02-19-21 @ 20:16) (95/53 - 104/57)  RR: 18 (02-19-21 @ 20:16) (17 - 18)  SpO2: 97% (02-19-21 @ 20:16) (96% - 97%)  Telemetry:   CAPILLARY BLOOD GLUCOSE      POCT Blood Glucose.: 129 mg/dL (19 Feb 2021 16:44)  POCT Blood Glucose.: 120 mg/dL (19 Feb 2021 16:38)  POCT Blood Glucose.: 127 mg/dL (19 Feb 2021 11:56)  POCT Blood Glucose.: 118 mg/dL (19 Feb 2021 07:19)  POCT Blood Glucose.: 184 mg/dL (18 Feb 2021 20:56)    I&O's Summary    18 Feb 2021 07:01  -  19 Feb 2021 07:00  --------------------------------------------------------  IN: 1070 mL / OUT: 2610 mL / NET: -1540 mL    19 Feb 2021 07:01  -  19 Feb 2021 20:29  --------------------------------------------------------  IN: 920 mL / OUT: 700 mL / NET: 220 mL        PHYSICAL EXAM:  GENERAL: NAD, well-developed  HEAD:  Atraumatic, Normocephalic  EYES: EOMI, PERRLA, conjunctiva and sclera clear  NECK: Supple, No JVD  CHEST/LUNG: Clear to auscultation bilaterally; No wheeze  HEART: Regular rate and rhythm; No murmurs, rubs, or gallops  ABDOMEN: Soft, Nontender, Nondistended; Bowel sounds present  EXTREMITIES:  2+ Peripheral Pulses, No clubbing, cyanosis, or edema  PSYCH: AAOx3  NEUROLOGY: non-focal  SKIN: No rashes or lesions    LABS:                        8.8    7.41  )-----------( 240      ( 18 Feb 2021 07:05 )             26.7     02-19    135  |  97  |  93<H>  ----------------------------<  97  4.1   |  23  |  2.81<H>    Ca    10.1      19 Feb 2021 06:40  Phos  4.4     02-18  Mg     2.2     02-19    TPro  7.3  /  Alb  3.3  /  TBili  1.6<H>  /  DBili  x   /  AST  31  /  ALT  32  /  AlkPhos  254<H>  02-19      CARDIAC MARKERS ( 18 Feb 2021 07:01 )  x     / x     / 52 U/L / x     / 3.5 ng/mL  CARDIAC MARKERS ( 17 Feb 2021 21:53 )  x     / x     / 69 U/L / x     / 4.1 ng/mL          RADIOLOGY & ADDITIONAL TESTS:    Imaging Personally Reviewed:    Consultant(s) Notes Reviewed:      Care Discussed with Consultants/Other Providers:

## 2021-02-19 NOTE — PROGRESS NOTE ADULT - ASSESSMENT
Mr Morelos is a 81 y/o male with DM, s/p CRT-D, CAD s/p CABG admitted with abdominal pain and acute on chronic kidney disease.  Found to have AS.      NYHA II  STS AVR risk: 5.35%

## 2021-02-19 NOTE — PROGRESS NOTE ADULT - ASSESSMENT
81 yo male w h/o DM, CAD s/p CABG, ICM s/p ICD, CKD 3-4. EP consulted for multiple episodes of NSVT and sustained VT (40 seconds). Patient have significant burden of arrhythmia but asymptomatic    # NSVT  # Sustained VT  - Review of telemetry overnight with intermittent WCT in the 120's, duration 2 secs to 1.5 mins  - Continue Lopressor 25 BID,  uptitrate to 50mg BID if BP can tolerate  - If patient does not tolerate higher dose of BB or does not improve from NSVT standpoint, could consider ablation  - TTE from 2/18/21 revealed LVEF of 15%, mild to mod MR, severe AS  - K+ level normalized at 4.1,    Dania Penn, ANP-C     79 yo male w h/o DM, CAD s/p CABG, ICM s/p ICD, CKD 3-4. EP consulted for multiple episodes of NSVT and sustained VT (40 seconds). Patient have significant burden of arrhythmia but asymptomatic    # NSVT  # Sustained VT  - Review of telemetry overnight with intermittent WCT in the 120's, duration 2 secs to 1.5 mins, strips c/w Atrial Tachycardia  - Continue Lopressor 25 BID,  uptitrate to 50mg BID if BP can tolerate  - If patient does not tolerate higher dose of BB or does not improve from NSVT standpoint, could consider ablation  - TTE from 2/18/21 revealed LVEF of 15%, mild to mod MR, severe AS  - K+ level normalized at 4.1  - Cardio f/u re: TTE c/w AS  - Will sign off, please reconsult as needed    Dania Penn, SONAM-C

## 2021-02-20 LAB
ANION GAP SERPL CALC-SCNC: 15 MMOL/L — SIGNIFICANT CHANGE UP (ref 5–17)
BUN SERPL-MCNC: 100 MG/DL — HIGH (ref 7–23)
CALCIUM SERPL-MCNC: 10 MG/DL — SIGNIFICANT CHANGE UP (ref 8.4–10.5)
CHLORIDE SERPL-SCNC: 97 MMOL/L — SIGNIFICANT CHANGE UP (ref 96–108)
CO2 SERPL-SCNC: 22 MMOL/L — SIGNIFICANT CHANGE UP (ref 22–31)
CREAT SERPL-MCNC: 3 MG/DL — HIGH (ref 0.5–1.3)
GLUCOSE BLDC GLUCOMTR-MCNC: 148 MG/DL — HIGH (ref 70–99)
GLUCOSE BLDC GLUCOMTR-MCNC: 151 MG/DL — HIGH (ref 70–99)
GLUCOSE BLDC GLUCOMTR-MCNC: 178 MG/DL — HIGH (ref 70–99)
GLUCOSE BLDC GLUCOMTR-MCNC: 264 MG/DL — HIGH (ref 70–99)
GLUCOSE SERPL-MCNC: 135 MG/DL — HIGH (ref 70–99)
HCT VFR BLD CALC: 28.9 % — LOW (ref 39–50)
HGB BLD-MCNC: 9.3 G/DL — LOW (ref 13–17)
MCHC RBC-ENTMCNC: 29.8 PG — SIGNIFICANT CHANGE UP (ref 27–34)
MCHC RBC-ENTMCNC: 32.2 GM/DL — SIGNIFICANT CHANGE UP (ref 32–36)
MCV RBC AUTO: 92.6 FL — SIGNIFICANT CHANGE UP (ref 80–100)
NRBC # BLD: 0 /100 WBCS — SIGNIFICANT CHANGE UP (ref 0–0)
PLATELET # BLD AUTO: 203 K/UL — SIGNIFICANT CHANGE UP (ref 150–400)
POTASSIUM SERPL-MCNC: 4.1 MMOL/L — SIGNIFICANT CHANGE UP (ref 3.5–5.3)
POTASSIUM SERPL-SCNC: 4.1 MMOL/L — SIGNIFICANT CHANGE UP (ref 3.5–5.3)
RBC # BLD: 3.12 M/UL — LOW (ref 4.2–5.8)
RBC # FLD: 18.1 % — HIGH (ref 10.3–14.5)
SODIUM SERPL-SCNC: 134 MMOL/L — LOW (ref 135–145)
WBC # BLD: 7.01 K/UL — SIGNIFICANT CHANGE UP (ref 3.8–10.5)
WBC # FLD AUTO: 7.01 K/UL — SIGNIFICANT CHANGE UP (ref 3.8–10.5)

## 2021-02-20 RX ADMIN — Medication 80 MILLIGRAM(S): at 06:36

## 2021-02-20 RX ADMIN — Medication 3: at 11:49

## 2021-02-20 RX ADMIN — Medication 325 MILLIGRAM(S): at 16:34

## 2021-02-20 RX ADMIN — ATORVASTATIN CALCIUM 80 MILLIGRAM(S): 80 TABLET, FILM COATED ORAL at 20:41

## 2021-02-20 RX ADMIN — Medication 1: at 16:32

## 2021-02-20 RX ADMIN — HEPARIN SODIUM 5000 UNIT(S): 5000 INJECTION INTRAVENOUS; SUBCUTANEOUS at 16:34

## 2021-02-20 RX ADMIN — PIPERACILLIN AND TAZOBACTAM 25 GRAM(S): 4; .5 INJECTION, POWDER, LYOPHILIZED, FOR SOLUTION INTRAVENOUS at 16:32

## 2021-02-20 RX ADMIN — PRASUGREL 10 MILLIGRAM(S): 5 TABLET, FILM COATED ORAL at 10:06

## 2021-02-20 RX ADMIN — Medication 25 MILLIGRAM(S): at 06:36

## 2021-02-20 RX ADMIN — PANTOPRAZOLE SODIUM 40 MILLIGRAM(S): 20 TABLET, DELAYED RELEASE ORAL at 06:36

## 2021-02-20 RX ADMIN — Medication 325 MILLIGRAM(S): at 04:35

## 2021-02-20 RX ADMIN — PIPERACILLIN AND TAZOBACTAM 25 GRAM(S): 4; .5 INJECTION, POWDER, LYOPHILIZED, FOR SOLUTION INTRAVENOUS at 04:35

## 2021-02-20 RX ADMIN — Medication 25 MILLIGRAM(S): at 16:34

## 2021-02-20 RX ADMIN — Medication 81 MILLIGRAM(S): at 10:06

## 2021-02-20 RX ADMIN — HEPARIN SODIUM 5000 UNIT(S): 5000 INJECTION INTRAVENOUS; SUBCUTANEOUS at 04:35

## 2021-02-20 NOTE — PHYSICAL THERAPY INITIAL EVALUATION ADULT - ADDITIONAL COMMENTS
PTA, pt lives w/ wife  in private apartment. No steps to enter & elevator within. Pt reports being I w/ all ADLs, no devices, hearing intact, +glasses

## 2021-02-20 NOTE — PROGRESS NOTE ADULT - SUBJECTIVE AND OBJECTIVE BOX
      Overnight events noted      VITAL:  T(C): , Max: 36.7 (02-19-21 @ 20:16)  T(F): , Max: 98.1 (02-20-21 @ 11:35)  HR: 72 (02-20-21 @ 11:35)  BP: 98/51 (02-20-21 @ 11:35)  BP(mean): --  RR: 18 (02-20-21 @ 11:35)  SpO2: 96% (02-20-21 @ 11:35)      PHYSICAL EXAM:  Constitutional: NAD, Alert  HEENT: NCAT, DMM  Neck: Supple, (+)JVD  Respiratory: CTA-b/l  Cardiovascular: RRR s1s2, no m/r/g  Gastrointestinal: BS+, soft, NT/ND  Extremities: 2+ b/l LE edema  Neurological: no focal deficits; strength grossly intact  Back: no CVAT b/l  Skin: No rashes, no nevi    LABS:                        9.3    7.01  )-----------( 203      ( 20 Feb 2021 06:45 )             28.9     Na(134)/K(4.1)/Cl(97)/HCO3(22)/BUN(100)/Cr(3.00)Glu(135)/Ca(10.0)/Mg(--)/PO4(--)    02-20 @ 06:45  Na(135)/K(4.1)/Cl(97)/HCO3(23)/BUN(93)/Cr(2.81)Glu(97)/Ca(10.1)/Mg(2.2)/PO4(--)    02-19 @ 06:40  Na(129)/K(4.4)/Cl(96)/HCO3(23)/BUN(89)/Cr(2.56)Glu(89)/Ca(9.5)/Mg(2.4)/PO4(4.4)    02-18 @ 07:01  Na(128)/K(4.6)/Cl(95)/HCO3(25)/BUN(90)/Cr(2.56)Glu(123)/Ca(9.5)/Mg(2.5)/PO4(4.0)    02-17 @ 21:53        IMPRESSION: 80M w/ DM2, CAD-CABG, and CKD, 2/16/21 a/w azotemia/lyte derangements/weakness    (1)Renal - CALVIN on CKD4 - cardiorenal - best that we hold diuretics for now, to allow the creatinine to level off    (2)Hyperkalemia - resolved, off ARB, on low-K+ diet, and with diuresis    (3)Hyponatremia - urine studies suggest SIADH - improved, with free water restriction    (4)CV - tenuous hemodynamics/hypervolemia - biventricular systolic dysfunction, severe AS, mod-sev AR. Structural Heart input appreciated; being evaluated for TAVR      RECOMMEND:  (1)Hold diuretics for now  (2)Continue 1.2L free water restriction  (3)Dose new meds for GFR 15ml/min   (4)F/U Cardiology/Structural Heart input  (5)BMP+Mg+PO4 daily            Suraj Garcia MD  [facility]  Office: [phone]  Cell: [phone]               No pain, no sob      VITAL:  T(C): , Max: 36.7 (02-19-21 @ 20:16)  T(F): , Max: 98.1 (02-20-21 @ 11:35)  HR: 72 (02-20-21 @ 11:35)  BP: 98/51 (02-20-21 @ 11:35)  BP(mean): --  RR: 18 (02-20-21 @ 11:35)  SpO2: 96% (02-20-21 @ 11:35)      PHYSICAL EXAM:  Constitutional: NAD, Alert  HEENT: NCAT, DMM  Neck: Supple, (+)JVD  Respiratory: CTA-b/l  Cardiovascular: RRR s1s2, no m/r/g  Gastrointestinal: BS+, soft, NT; (+)distension  Extremities: 2+ b/l LE edema  Neurological: no focal deficits; strength grossly intact  Back: no CVAT b/l  Skin: No rashes, no nevi    LABS:                        9.3    7.01  )-----------( 203      ( 20 Feb 2021 06:45 )             28.9     Na(134)/K(4.1)/Cl(97)/HCO3(22)/BUN(100)/Cr(3.00)Glu(135)/Ca(10.0)/Mg(--)/PO4(--)    02-20 @ 06:45  Na(135)/K(4.1)/Cl(97)/HCO3(23)/BUN(93)/Cr(2.81)Glu(97)/Ca(10.1)/Mg(2.2)/PO4(--)    02-19 @ 06:40  Na(129)/K(4.4)/Cl(96)/HCO3(23)/BUN(89)/Cr(2.56)Glu(89)/Ca(9.5)/Mg(2.4)/PO4(4.4)    02-18 @ 07:01  Na(128)/K(4.6)/Cl(95)/HCO3(25)/BUN(90)/Cr(2.56)Glu(123)/Ca(9.5)/Mg(2.5)/PO4(4.0)    02-17 @ 21:53        IMPRESSION: 80M w/ DM2, CAD-CABG, and CKD, 2/16/21 a/w azotemia/lyte derangements/weakness    (1)Renal - CALVIN on CKD4 - cardiorenal - best that we hold diuretics for now, to allow the creatinine to level off    (2)Hyperkalemia - resolved, off ARB, on low-K+ diet, and with diuresis    (3)Hyponatremia - urine studies suggest SIADH - improved, with free water restriction    (4)CV - tenuous hemodynamics/hypervolemia - biventricular systolic dysfunction, severe AS, mod-sev AR. Structural Heart input appreciated; being evaluated for TAVR      RECOMMEND:  (1)Hold diuretics for now  (2)Continue 1.2L free water restriction  (3)Dose new meds for GFR 15ml/min   (4)F/U Cardiology/Structural Heart input  (5)BMP+Mg+PO4 daily            Suraj Garcia MD  [facility]  Office: [phone]  Cell: [phone]

## 2021-02-20 NOTE — PROGRESS NOTE ADULT - SUBJECTIVE AND OBJECTIVE BOX
Subjective: Patient seen and examined. No new events except as noted.   resting comfortably      REVIEW OF SYSTEMS:    CONSTITUTIONAL:+ weakness, fevers or chills  EYES/ENT: No visual changes;  No vertigo or throat pain   NECK: No pain or stiffness  RESPIRATORY: No cough, wheezing, hemoptysis; No shortness of breath  CARDIOVASCULAR: No chest pain or palpitations  GASTROINTESTINAL: No abdominal or epigastric pain. No nausea, vomiting, or hematemesis; No diarrhea or constipation. No melena or hematochezia.  GENITOURINARY: No dysuria, frequency or hematuria  NEUROLOGICAL: No numbness or weakness  SKIN: No itching, burning, rashes, or lesions   All other review of systems is negative unless indicated above.    MEDICATIONS:  MEDICATIONS  (STANDING):  aspirin  chewable 81 milliGRAM(s) Oral daily  atorvastatin 80 milliGRAM(s) Oral at bedtime  dextrose 40% Gel 15 Gram(s) Oral once  dextrose 5%. 1000 milliLiter(s) (50 mL/Hr) IV Continuous <Continuous>  dextrose 5%. 1000 milliLiter(s) (100 mL/Hr) IV Continuous <Continuous>  dextrose 50% Injectable 25 Gram(s) IV Push once  dextrose 50% Injectable 12.5 Gram(s) IV Push once  dextrose 50% Injectable 25 Gram(s) IV Push once  ferrous    sulfate 325 milliGRAM(s) Oral two times a day  glucagon  Injectable 1 milliGRAM(s) IntraMuscular once  heparin   Injectable 5000 Unit(s) SubCutaneous every 12 hours  influenza   Vaccine 0.5 milliLiter(s) IntraMuscular once  insulin lispro (ADMELOG) corrective regimen sliding scale   SubCutaneous three times a day before meals  insulin lispro (ADMELOG) corrective regimen sliding scale   SubCutaneous at bedtime  metoprolol tartrate 25 milliGRAM(s) Oral every 12 hours  pantoprazole    Tablet 40 milliGRAM(s) Oral before breakfast  piperacillin/tazobactam IVPB.. 3.375 Gram(s) IV Intermittent every 12 hours  prasugrel 10 milliGRAM(s) Oral daily      PHYSICAL EXAM:  T(C): 36.7 (02-20-21 @ 20:09), Max: 36.7 (02-20-21 @ 11:35)  HR: 68 (02-20-21 @ 20:09) (68 - 84)  BP: 88/50 (02-20-21 @ 20:09) (85/51 - 101/56)  RR: 18 (02-20-21 @ 20:09) (18 - 18)  SpO2: 97% (02-20-21 @ 20:09) (96% - 97%)  Wt(kg): --  I&O's Summary    19 Feb 2021 07:01  -  20 Feb 2021 07:00  --------------------------------------------------------  IN: 920 mL / OUT: 1275 mL / NET: -355 mL    20 Feb 2021 07:01  -  20 Feb 2021 20:25  --------------------------------------------------------  IN: 790 mL / OUT: 600 mL / NET: 190 mL            Appearance: NAD	  HEENT:   Dry oral mucosa, PERRL, EOMI	  Lymphatic: No lymphadenopathy   Cardiovascular: Normal S1 S2, No JVD, + murmurs , Peripheral pulses palpable 2+ bilaterally  Respiratory: Lungs clear to auscultation, normal effort 	  Gastrointestinal:  Soft, Non-tender, + BS	  Skin: No rashes, No ecchymoses, No cyanosis, warm to touch  Musculoskeletal: Normal range of motion, normal strength  Psychiatry:  Mood & affect appropriate  Ext: + edema          LABS:    CARDIAC MARKERS:  CARDIAC MARKERS ( 18 Feb 2021 07:01 )  x     / x     / 52 U/L / x     / 3.5 ng/mL  CARDIAC MARKERS ( 17 Feb 2021 21:53 )  x     / x     / 69 U/L / x     / 4.1 ng/mL                                9.3    7.01  )-----------( 203      ( 20 Feb 2021 06:45 )             28.9     02-20    134<L>  |  97  |  100<H>  ----------------------------<  135<H>  4.1   |  22  |  3.00<H>    Ca    10.0      20 Feb 2021 06:45  Mg     2.2     02-19    TPro  7.3  /  Alb  3.3  /  TBili  1.6<H>  /  DBili  x   /  AST  31  /  ALT  32  /  AlkPhos  254<H>  02-19    proBNP:   Lipid Profile:   HgA1c:   TSH:             TELEMETRY: 	  SR PVCs  ECG:  	  RADIOLOGY:   DIAGNOSTIC TESTING:  [ ] Echocardiogram:  [ ]  Catheterization:  [ ] Stress Test:    OTHER:

## 2021-02-20 NOTE — PHYSICAL THERAPY INITIAL EVALUATION ADULT - PRECAUTIONS/LIMITATIONS, REHAB EVAL
CONTINUED- VA DUPLEX: No evidence of deep venous thrombosis in either lower extremity.; US Abd:  Cholelithiasis without acute cholecystitis. Patient's borderline to mild gallbladder thickening is likely due to ascites.  Moderate ascites. Suggestion of mild renal parenchymal disease.  Bilateral pleural effusions.  Mildly prominent inferior vena cava is likely on the basis of patient's heart disease.; CT CHEST/A&P: Pulmonary edema with small bilateral pleural effusions. Small volume ascites.; CXR: The heart is markedly enlarged. Pulmonary vascular congestion. Underlying chronic lung changes cannot be ruled out. No previous studies for comparison are available. A pacer is in good position. Status post sternotomy./cardiac precautions/fall precautions
no known precautions/limitations

## 2021-02-20 NOTE — PROGRESS NOTE ADULT - SUBJECTIVE AND OBJECTIVE BOX
[facility]  Pro Strickland PA-C  [address] [place] [address]  [phone]      INTERVAL HPI/OVERNIGHT EVENTS:    patient seen and examined   events noted     MEDICATIONS  (STANDING):  aspirin  chewable 81 milliGRAM(s) Oral daily  atorvastatin 80 milliGRAM(s) Oral at bedtime  dextrose 40% Gel 15 Gram(s) Oral once  dextrose 5%. 1000 milliLiter(s) (50 mL/Hr) IV Continuous <Continuous>  dextrose 5%. 1000 milliLiter(s) (100 mL/Hr) IV Continuous <Continuous>  dextrose 50% Injectable 25 Gram(s) IV Push once  dextrose 50% Injectable 12.5 Gram(s) IV Push once  dextrose 50% Injectable 25 Gram(s) IV Push once  ferrous    sulfate 325 milliGRAM(s) Oral two times a day  furosemide   Injectable 40 milliGRAM(s) IV Push every 12 hours  glucagon  Injectable 1 milliGRAM(s) IntraMuscular once  heparin   Injectable 5000 Unit(s) SubCutaneous every 12 hours  influenza   Vaccine 0.5 milliLiter(s) IntraMuscular once  insulin lispro (ADMELOG) corrective regimen sliding scale   SubCutaneous three times a day before meals  insulin lispro (ADMELOG) corrective regimen sliding scale   SubCutaneous at bedtime  pantoprazole    Tablet 40 milliGRAM(s) Oral before breakfast  piperacillin/tazobactam IVPB.. 3.375 Gram(s) IV Intermittent every 8 hours  prasugrel 10 milliGRAM(s) Oral daily    MEDICATIONS  (PRN):      Allergies    No Known Allergies    Intolerances        ROS:   General:  No wt loss, fevers, chills, night sweats, fatigue,   Eyes:  Good vision, no reported pain  ENT:  No sore throat, pain, runny nose, dysphagia  CV:  No pain, palpitations, hypo/hypertension  Resp:  No dyspnea, cough, tachypnea, wheezing  GI:  No pain, No nausea, No vomiting, No diarrhea, No constipation, No weight loss, No fever, No pruritis, No rectal bleeding, No tarry stools, No dysphagia,  :  No pain, bleeding, incontinence, nocturia  Muscle:  No pain, weakness  Neuro:  No weakness, tingling, memory problems  Psych:  No fatigue, insomnia, mood problems, depression  Endocrine:  No polyuria, polydipsia, cold/heat intolerance  Heme:  No petechiae, ecchymosis, easy bruisability  Skin:  No rash, tattoos, scars, edema      PHYSICAL EXAM:   Vital Signs:  Vital Signs Last 24 Hrs  T(C): 36.6 (2021 12:07), Max: 36.6 (2021 19:31)  T(F): 97.9 (2021 12:07), Max: 97.9 (2021 19:31)  HR: 87 (2021 17:08) (71 - 100)  BP: 101/50 (2021 17:08) (90/51 - 112/60)  BP(mean): 66 (2021 10:29) (66 - 66)  RR: 18 (2021 12:07) (18 - 18)  SpO2: 95% (2021 12:07) (95% - 100%)  Daily     Daily     GENERAL:  no distress  HEENT:  NC/AT  ABDOMEN:  Soft, non-tender, non-distended  EXTEREMITIES:  +bilateral LE edema   NEURO:  Alert, oriented, no asterixis, no tremor, no encephalopathy      LABS:                        8.8    7.41  )-----------( 240      ( 2021 07:05 )             26.7     02-18    129<L>  |  96  |  89<H>  ----------------------------<  89  4.4   |  23  |  2.56<H>    Ca    9.5      2021 07:01  Phos  4.4     -18  Mg     2.4     -18    TPro  6.9  /  Alb  2.9<L>  /  TBili  1.3<H>  /  DBili  x   /  AST  32  /  ALT  34  /  AlkPhos  266<H>  02-18    PT/INR - ( 2021 20:25 )   PT: 16.1 sec;   INR: 1.36 ratio         PTT - ( 2021 20:25 )  PTT:32.7 sec  Urinalysis Basic - ( 2021 07:16 )    Color: Light Yellow / Appearance: Clear / S.010 / pH: x  Gluc: x / Ketone: Negative  / Bili: Negative / Urobili: Negative   Blood: x / Protein: Negative / Nitrite: Negative   Leuk Esterase: Negative / RBC: x / WBC x   Sq Epi: x / Non Sq Epi: x / Bacteria: x        RADIOLOGY & ADDITIONAL TESTS:

## 2021-02-20 NOTE — PHYSICAL THERAPY INITIAL EVALUATION ADULT - PERTINENT HX OF CURRENT PROBLEM, REHAB EVAL
79 yo male w h/o DM, CAD, s/p CABG, ICM s/p ICD , CKD 3-4 and now admitted for elevated creatinine. He was sent to ER by his nephrologist. Pt with multiple episodes of NSVT and sustained VT (40 seconds), awaiting Pacemaker interrogation.
Pt is a80 yo Wolof/Mauritian speaking male w h/o DM, CAD, s/p CABG, CKD 3-4.   Recently evaluated by Nephrology for elevated creatinine. Brenda/ bloodwork notable sent the patient to the ER on 2/16. Lately ptn has been having abdominal pain, generalized weakness and poor po intake.

## 2021-02-20 NOTE — PROGRESS NOTE ADULT - ASSESSMENT
Abdominal pain.    given epigastric/RUQ pain with elevated LFTs and amylase/lipase, concern for possible gallstone pancreatitis  - U/S abdomen noted  - suspected ascites 2/2 chf  - cont diuresis  - cont dash diet  - no gi objection to dual antiplatlet therapy  - will follow       Renal failure.   - nephrology input noted.

## 2021-02-21 LAB
ALBUMIN SERPL ELPH-MCNC: 3.3 G/DL — SIGNIFICANT CHANGE UP (ref 3.3–5)
ALP SERPL-CCNC: 223 U/L — HIGH (ref 40–120)
ALT FLD-CCNC: 31 U/L — SIGNIFICANT CHANGE UP (ref 10–45)
ANION GAP SERPL CALC-SCNC: 15 MMOL/L — SIGNIFICANT CHANGE UP (ref 5–17)
APPEARANCE UR: CLEAR — SIGNIFICANT CHANGE UP
AST SERPL-CCNC: 27 U/L — SIGNIFICANT CHANGE UP (ref 10–40)
BACTERIA # UR AUTO: NEGATIVE — SIGNIFICANT CHANGE UP
BILIRUB SERPL-MCNC: 1.8 MG/DL — HIGH (ref 0.2–1.2)
BILIRUB UR-MCNC: NEGATIVE — SIGNIFICANT CHANGE UP
BUN SERPL-MCNC: 102 MG/DL — HIGH (ref 7–23)
CALCIUM SERPL-MCNC: 10.3 MG/DL — SIGNIFICANT CHANGE UP (ref 8.4–10.5)
CHLORIDE SERPL-SCNC: 98 MMOL/L — SIGNIFICANT CHANGE UP (ref 96–108)
CHLORIDE UR-SCNC: <35 MMOL/L — SIGNIFICANT CHANGE UP
CO2 SERPL-SCNC: 24 MMOL/L — SIGNIFICANT CHANGE UP (ref 22–31)
COLOR SPEC: YELLOW — SIGNIFICANT CHANGE UP
CREAT ?TM UR-MCNC: 91 MG/DL — SIGNIFICANT CHANGE UP
CREAT SERPL-MCNC: 3.42 MG/DL — HIGH (ref 0.5–1.3)
DIFF PNL FLD: NEGATIVE — SIGNIFICANT CHANGE UP
EPI CELLS # UR: 1 /HPF — SIGNIFICANT CHANGE UP
GLUCOSE BLDC GLUCOMTR-MCNC: 155 MG/DL — HIGH (ref 70–99)
GLUCOSE BLDC GLUCOMTR-MCNC: 155 MG/DL — HIGH (ref 70–99)
GLUCOSE BLDC GLUCOMTR-MCNC: 158 MG/DL — HIGH (ref 70–99)
GLUCOSE BLDC GLUCOMTR-MCNC: 208 MG/DL — HIGH (ref 70–99)
GLUCOSE SERPL-MCNC: 107 MG/DL — HIGH (ref 70–99)
GLUCOSE UR QL: NEGATIVE — SIGNIFICANT CHANGE UP
HYALINE CASTS # UR AUTO: 5 /LPF — HIGH (ref 0–2)
KETONES UR-MCNC: NEGATIVE — SIGNIFICANT CHANGE UP
LEUKOCYTE ESTERASE UR-ACNC: NEGATIVE — SIGNIFICANT CHANGE UP
MAGNESIUM SERPL-MCNC: 2.3 MG/DL — SIGNIFICANT CHANGE UP (ref 1.6–2.6)
NITRITE UR-MCNC: NEGATIVE — SIGNIFICANT CHANGE UP
PH UR: 5.5 — SIGNIFICANT CHANGE UP (ref 5–8)
PHOSPHATE SERPL-MCNC: 4.5 MG/DL — SIGNIFICANT CHANGE UP (ref 2.5–4.5)
POTASSIUM SERPL-MCNC: 4.4 MMOL/L — SIGNIFICANT CHANGE UP (ref 3.5–5.3)
POTASSIUM SERPL-SCNC: 4.4 MMOL/L — SIGNIFICANT CHANGE UP (ref 3.5–5.3)
POTASSIUM UR-SCNC: 39 MMOL/L — SIGNIFICANT CHANGE UP
PROT SERPL-MCNC: 6.9 G/DL — SIGNIFICANT CHANGE UP (ref 6–8.3)
PROT UR-MCNC: ABNORMAL
RBC CASTS # UR COMP ASSIST: 5 /HPF — HIGH (ref 0–4)
SODIUM SERPL-SCNC: 137 MMOL/L — SIGNIFICANT CHANGE UP (ref 135–145)
SODIUM UR-SCNC: <35 MMOL/L — SIGNIFICANT CHANGE UP
SP GR SPEC: 1.02 — SIGNIFICANT CHANGE UP (ref 1.01–1.02)
UROBILINOGEN FLD QL: NEGATIVE — SIGNIFICANT CHANGE UP
WBC UR QL: 1 /HPF — SIGNIFICANT CHANGE UP (ref 0–5)

## 2021-02-21 RX ORDER — SODIUM CHLORIDE 9 MG/ML
1000 INJECTION INTRAMUSCULAR; INTRAVENOUS; SUBCUTANEOUS
Refills: 0 | Status: DISCONTINUED | OUTPATIENT
Start: 2021-02-21 | End: 2021-02-22

## 2021-02-21 RX ADMIN — Medication 325 MILLIGRAM(S): at 16:42

## 2021-02-21 RX ADMIN — Medication 1: at 16:40

## 2021-02-21 RX ADMIN — Medication 81 MILLIGRAM(S): at 10:57

## 2021-02-21 RX ADMIN — Medication 25 MILLIGRAM(S): at 16:42

## 2021-02-21 RX ADMIN — ATORVASTATIN CALCIUM 80 MILLIGRAM(S): 80 TABLET, FILM COATED ORAL at 20:37

## 2021-02-21 RX ADMIN — HEPARIN SODIUM 5000 UNIT(S): 5000 INJECTION INTRAVENOUS; SUBCUTANEOUS at 04:32

## 2021-02-21 RX ADMIN — Medication 1: at 07:41

## 2021-02-21 RX ADMIN — SODIUM CHLORIDE 100 MILLILITER(S): 9 INJECTION INTRAMUSCULAR; INTRAVENOUS; SUBCUTANEOUS at 09:19

## 2021-02-21 RX ADMIN — HEPARIN SODIUM 5000 UNIT(S): 5000 INJECTION INTRAVENOUS; SUBCUTANEOUS at 16:42

## 2021-02-21 RX ADMIN — PRASUGREL 10 MILLIGRAM(S): 5 TABLET, FILM COATED ORAL at 10:57

## 2021-02-21 RX ADMIN — Medication 25 MILLIGRAM(S): at 04:31

## 2021-02-21 RX ADMIN — PIPERACILLIN AND TAZOBACTAM 25 GRAM(S): 4; .5 INJECTION, POWDER, LYOPHILIZED, FOR SOLUTION INTRAVENOUS at 16:41

## 2021-02-21 RX ADMIN — Medication 325 MILLIGRAM(S): at 04:31

## 2021-02-21 RX ADMIN — PANTOPRAZOLE SODIUM 40 MILLIGRAM(S): 20 TABLET, DELAYED RELEASE ORAL at 04:32

## 2021-02-21 RX ADMIN — PIPERACILLIN AND TAZOBACTAM 25 GRAM(S): 4; .5 INJECTION, POWDER, LYOPHILIZED, FOR SOLUTION INTRAVENOUS at 04:32

## 2021-02-21 RX ADMIN — Medication 2: at 11:39

## 2021-02-21 NOTE — PROGRESS NOTE ADULT - ASSESSMENT
Abdominal pain.    lfts downtrending   Tolerating diet       Renal failure.   - nephrology input noted.

## 2021-02-21 NOTE — PROGRESS NOTE ADULT - SUBJECTIVE AND OBJECTIVE BOX
      Overnight events noted      VITAL:  T(C): , Max: 36.7 (02-20-21 @ 11:35)  T(F): , Max: 98.1 (02-20-21 @ 11:35)  HR: 73 (02-21-21 @ 04:49)  BP: 104/56 (02-21-21 @ 04:49)  BP(mean): --  RR: 18 (02-21-21 @ 04:49)  SpO2: 98% (02-21-21 @ 04:49)      PHYSICAL EXAM:  Constitutional: NAD, Alert  HEENT: NCAT, DMM  Neck: Supple, (+)JVD  Respiratory: CTA-b/l  Cardiovascular: RRR s1s2, no m/r/g  Gastrointestinal: BS+, soft, NT; (+)distension  Extremities: 2+ b/l LE edema  Neurological: no focal deficits; strength grossly intact  Back: no CVAT b/l  Skin: No rashes, no nevi    LABS:                        9.3    7.01  )-----------( 203      ( 20 Feb 2021 06:45 )             28.9     Na(137)/K(4.4)/Cl(98)/HCO3(24)/BUN(102)/Cr(3.42)Glu(107)/Ca(10.3)/Mg(2.3)/PO4(4.5)    02-21 @ 06:52  Na(134)/K(4.1)/Cl(97)/HCO3(22)/BUN(100)/Cr(3.00)Glu(135)/Ca(10.0)/Mg(--)/PO4(--)    02-20 @ 06:45  Na(135)/K(4.1)/Cl(97)/HCO3(23)/BUN(93)/Cr(2.81)Glu(97)/Ca(10.1)/Mg(2.2)/PO4(--)    02-19 @ 06:40      IMPRESSION: 80M w/ DM2, CAD-CABG, and CKD, 2/16/21 a/w azotemia/lyte derangements/weakness    (1)Renal - CALVIN on CKD4 - cardiorenal? Creatinine continuing to rise, despite d/c of diuretics. AIN from Zosyn?    (2)Hyperkalemia - resolved, off ARB, on low-K+ diet, and with diuresis    (3)Hyponatremia - urine studies suggest SIADH - improved, with free water restriction    (4)CV - tenuous hemodynamics/hypervolemia - biventricular systolic dysfunction, severe AS, mod-sev AR. Structural Heart input appreciated; being evaluated for TAVR      RECOMMEND:  (1)Urine: lytes, creatinine, UA  (2)NS 100cc/h x 5h  (3)Can continue Zosyn as ordered for now  (4)BMP+Mg+PO4 daily  (5)F/U Dobutamine stress echo          Suraj Garcia MD  [facility]  Office: [phone]  Cell: [phone]               (+)intermittent chest pain (no change from prior days). No SOB at present.      VITAL:  T(C): , Max: 36.7 (02-20-21 @ 11:35)  T(F): , Max: 98.1 (02-20-21 @ 11:35)  HR: 73 (02-21-21 @ 04:49)  BP: 104/56 (02-21-21 @ 04:49)  BP(mean): --  RR: 18 (02-21-21 @ 04:49)  SpO2: 98% (02-21-21 @ 04:49)      PHYSICAL EXAM:  Constitutional: NAD, Alert  HEENT: NCAT, DMM  Neck: Supple, (+)JVD  Respiratory: CTA-b/l  Cardiovascular: RRR s1s2, no m/r/g  Gastrointestinal: BS+, soft, NT; (+)distension  Extremities: 2+ b/l LE edema  Neurological: no focal deficits; strength grossly intact  Back: no CVAT b/l  Skin: No rashes, no nevi    LABS:                        9.3    7.01  )-----------( 203      ( 20 Feb 2021 06:45 )             28.9     Na(137)/K(4.4)/Cl(98)/HCO3(24)/BUN(102)/Cr(3.42)Glu(107)/Ca(10.3)/Mg(2.3)/PO4(4.5)    02-21 @ 06:52  Na(134)/K(4.1)/Cl(97)/HCO3(22)/BUN(100)/Cr(3.00)Glu(135)/Ca(10.0)/Mg(--)/PO4(--)    02-20 @ 06:45  Na(135)/K(4.1)/Cl(97)/HCO3(23)/BUN(93)/Cr(2.81)Glu(97)/Ca(10.1)/Mg(2.2)/PO4(--)    02-19 @ 06:40      IMPRESSION: 80M w/ DM2, CAD-CABG, and CKD, 2/16/21 a/w azotemia/lyte derangements/weakness    (1)Renal - ACLVIN on CKD4 - cardiorenal? Creatinine continuing to rise, despite d/c of diuretics. AIN from Zosyn?    (2)Hyperkalemia - resolved, off ARB, on low-K+ diet, and with diuresis    (3)Hyponatremia - urine studies suggest SIADH - improved, with free water restriction    (4)CV - tenuous hemodynamics/hypervolemia - biventricular systolic dysfunction, severe AS, mod-sev AR. Structural Heart input appreciated; being evaluated for TAVR      RECOMMEND:  (1)Urine: lytes, creatinine, UA  (2)NS 100cc/h x 5h  (3)Can continue Zosyn as ordered for now  (4)BMP+Mg+PO4 daily  (5)F/U Dobutamine stress echo          Suraj Garcia MD  [facility]  Office: [phone]  Cell: [phone]

## 2021-02-21 NOTE — PROGRESS NOTE ADULT - SUBJECTIVE AND OBJECTIVE BOX
[facility]  Pro Strickland PA-C  [address] [place] [address]  [phone]      INTERVAL HPI/OVERNIGHT EVENTS:      events noted     MEDICATIONS  (STANDING):  aspirin  chewable 81 milliGRAM(s) Oral daily  atorvastatin 80 milliGRAM(s) Oral at bedtime  dextrose 40% Gel 15 Gram(s) Oral once  dextrose 5%. 1000 milliLiter(s) (50 mL/Hr) IV Continuous <Continuous>  dextrose 5%. 1000 milliLiter(s) (100 mL/Hr) IV Continuous <Continuous>  dextrose 50% Injectable 25 Gram(s) IV Push once  dextrose 50% Injectable 12.5 Gram(s) IV Push once  dextrose 50% Injectable 25 Gram(s) IV Push once  ferrous    sulfate 325 milliGRAM(s) Oral two times a day  furosemide   Injectable 40 milliGRAM(s) IV Push every 12 hours  glucagon  Injectable 1 milliGRAM(s) IntraMuscular once  heparin   Injectable 5000 Unit(s) SubCutaneous every 12 hours  influenza   Vaccine 0.5 milliLiter(s) IntraMuscular once  insulin lispro (ADMELOG) corrective regimen sliding scale   SubCutaneous three times a day before meals  insulin lispro (ADMELOG) corrective regimen sliding scale   SubCutaneous at bedtime  pantoprazole    Tablet 40 milliGRAM(s) Oral before breakfast  piperacillin/tazobactam IVPB.. 3.375 Gram(s) IV Intermittent every 8 hours  prasugrel 10 milliGRAM(s) Oral daily    MEDICATIONS  (PRN):      Allergies    No Known Allergies    Intolerances        ROS:   General:  No wt loss, fevers, chills, night sweats, fatigue,   Eyes:  Good vision, no reported pain  ENT:  No sore throat, pain, runny nose, dysphagia  CV:  No pain, palpitations, hypo/hypertension  Resp:  No dyspnea, cough, tachypnea, wheezing  GI:  No pain, No nausea, No vomiting, No diarrhea, No constipation, No weight loss, No fever, No pruritis, No rectal bleeding, No tarry stools, No dysphagia,  :  No pain, bleeding, incontinence, nocturia  Muscle:  No pain, weakness  Neuro:  No weakness, tingling, memory problems  Psych:  No fatigue, insomnia, mood problems, depression  Endocrine:  No polyuria, polydipsia, cold/heat intolerance  Heme:  No petechiae, ecchymosis, easy bruisability  Skin:  No rash, tattoos, scars, edema      PHYSICAL EXAM:   Vital Signs:  Vital Signs Last 24 Hrs  T(C): 36.6 (2021 12:07), Max: 36.6 (2021 19:31)  T(F): 97.9 (2021 12:07), Max: 97.9 (2021 19:31)  HR: 87 (2021 17:08) (71 - 100)  BP: 101/50 (2021 17:08) (90/51 - 112/60)  BP(mean): 66 (2021 10:29) (66 - 66)  RR: 18 (2021 12:07) (18 - 18)  SpO2: 95% (2021 12:07) (95% - 100%)  Daily     Daily     GENERAL:  no distress  HEENT:  NC/AT  ABDOMEN:  Soft, non-tender, non-distended  EXTEREMITIES:  +bilateral LE edema   NEURO:  Alert, oriented, no asterixis, no tremor, no encephalopathy      LABS:                        8.8    7.41  )-----------( 240      ( 2021 07:05 )             26.7     02-18    129<L>  |  96  |  89<H>  ----------------------------<  89  4.4   |  23  |  2.56<H>    Ca    9.5      2021 07:01  Phos  4.4     02-18  Mg     2.4     -18    TPro  6.9  /  Alb  2.9<L>  /  TBili  1.3<H>  /  DBili  x   /  AST  32  /  ALT  34  /  AlkPhos  266<H>  02-18    PT/INR - ( 2021 20:25 )   PT: 16.1 sec;   INR: 1.36 ratio         PTT - ( 2021 20:25 )  PTT:32.7 sec  Urinalysis Basic - ( 2021 07:16 )    Color: Light Yellow / Appearance: Clear / S.010 / pH: x  Gluc: x / Ketone: Negative  / Bili: Negative / Urobili: Negative   Blood: x / Protein: Negative / Nitrite: Negative   Leuk Esterase: Negative / RBC: x / WBC x   Sq Epi: x / Non Sq Epi: x / Bacteria: x        RADIOLOGY & ADDITIONAL TESTS:

## 2021-02-21 NOTE — PROGRESS NOTE ADULT - ASSESSMENT
81 yo male w h/o ICM< CAD< s/p CABG, has AICD, DM, HTN presents w volume overload and abd pain  Abd sono shows gallstones, but no sign of cholecystitis/pancreatitis. abd pain resolved on Zosyn,   will cont empirically for 7 days ( through 2/22)  ascites 2/2 systolic chf, probably worse 2/2 acute on chronic systolic chf, now on po Lasix  on interrogation of ICD, has a lof of arrhythmia burden,   NSVT and sustained VT, seen by EPS, cont  [id number] mg  bid. has inoperable ischemic HD,   ICM, EF 15%, severe AS, seen by structural heart for TAVR eval. prob a high risk and poor candidate 2/2 low EF, advanced CKD and overall  frail general  medical condition. structural heart recs pending.   Hyponatremia  Iz[place]a, CKD4  Hyperkalemia, resolved  dvt ppx w HSC

## 2021-02-21 NOTE — PROGRESS NOTE ADULT - SUBJECTIVE AND OBJECTIVE BOX
Subjective: Patient seen and examined. No new events except as noted.     REVIEW OF SYSTEMS:    CONSTITUTIONAL: + weakness, fevers or chills  EYES/ENT: No visual changes;  No vertigo or throat pain   NECK: No pain or stiffness  RESPIRATORY: No cough, wheezing, hemoptysis; No shortness of breath  CARDIOVASCULAR: No chest pain or palpitations  GASTROINTESTINAL: No abdominal or epigastric pain. No nausea, vomiting, or hematemesis; No diarrhea or constipation. No melena or hematochezia.  GENITOURINARY: No dysuria, frequency or hematuria  NEUROLOGICAL: No numbness or weakness  SKIN: No itching, burning, rashes, or lesions   All other review of systems is negative unless indicated above.    MEDICATIONS:  MEDICATIONS  (STANDING):  aspirin  chewable 81 milliGRAM(s) Oral daily  atorvastatin 80 milliGRAM(s) Oral at bedtime  dextrose 40% Gel 15 Gram(s) Oral once  dextrose 5%. 1000 milliLiter(s) (50 mL/Hr) IV Continuous <Continuous>  dextrose 5%. 1000 milliLiter(s) (100 mL/Hr) IV Continuous <Continuous>  dextrose 50% Injectable 25 Gram(s) IV Push once  dextrose 50% Injectable 12.5 Gram(s) IV Push once  dextrose 50% Injectable 25 Gram(s) IV Push once  ferrous    sulfate 325 milliGRAM(s) Oral two times a day  glucagon  Injectable 1 milliGRAM(s) IntraMuscular once  heparin   Injectable 5000 Unit(s) SubCutaneous every 12 hours  influenza   Vaccine 0.5 milliLiter(s) IntraMuscular once  insulin lispro (ADMELOG) corrective regimen sliding scale   SubCutaneous three times a day before meals  insulin lispro (ADMELOG) corrective regimen sliding scale   SubCutaneous at bedtime  metoprolol tartrate 25 milliGRAM(s) Oral every 12 hours  pantoprazole    Tablet 40 milliGRAM(s) Oral before breakfast  piperacillin/tazobactam IVPB.. 3.375 Gram(s) IV Intermittent every 12 hours  prasugrel 10 milliGRAM(s) Oral daily  sodium chloride 0.9%. 1000 milliLiter(s) (100 mL/Hr) IV Continuous <Continuous>      PHYSICAL EXAM:  T(C): 36.4 (02-21-21 @ 04:49), Max: 36.7 (02-20-21 @ 11:35)  HR: 73 (02-21-21 @ 04:49) (66 - 78)  BP: 104/56 (02-21-21 @ 04:49) (88/50 - 104/56)  RR: 18 (02-21-21 @ 04:49) (18 - 18)  SpO2: 98% (02-21-21 @ 04:49) (96% - 98%)  Wt(kg): --  I&O's Summary    20 Feb 2021 07:01  -  21 Feb 2021 07:00  --------------------------------------------------------  IN: 790 mL / OUT: 900 mL / NET: -110 mL          Appearance: NAD	  HEENT:   Dry oral mucosa, PERRL, EOMI	  Lymphatic: No lymphadenopathy , no edema  Cardiovascular: Normal S1 S2, No JVD, +murmurs , Peripheral pulses palpable 2+ bilaterally  Respiratory: decreased bs 	  Gastrointestinal:  Soft, Non-tender, + BS	  Skin: No rashes, No ecchymoses, No cyanosis, warm to touch  Musculoskeletal: Normal range of motion, normal strength  Psychiatry:  Mood & affect appropriate  Ext: No edema      LABS:    CARDIAC MARKERS:                                9.3    7.01  )-----------( 203      ( 20 Feb 2021 06:45 )             28.9     02-21    137  |  98  |  102<H>  ----------------------------<  107<H>  4.4   |  24  |  3.42<H>    Ca    10.3      21 Feb 2021 06:52  Phos  4.5     02-21  Mg     2.3     02-21    TPro  6.9  /  Alb  3.3  /  TBili  1.8<H>  /  DBili  x   /  AST  27  /  ALT  31  /  AlkPhos  223<H>  02-21    proBNP:   Lipid Profile:   HgA1c:   TSH:             TELEMETRY: 	  SR, V paced, PVCs  ECG:  	  RADIOLOGY:   DIAGNOSTIC TESTING:  [ ] Echocardiogram:  [ ]  Catheterization:  [ ] Stress Test:    OTHER:

## 2021-02-21 NOTE — PROGRESS NOTE ADULT - SUBJECTIVE AND OBJECTIVE BOX
Patient is a 80y old  Male who presents with a chief complaint of acute renal failure (2021 19:56)      SUBJECTIVE / OVERNIGHT EVENTS:    MEDICATIONS  (STANDING):  aspirin  chewable 81 milliGRAM(s) Oral daily  atorvastatin 80 milliGRAM(s) Oral at bedtime  dextrose 40% Gel 15 Gram(s) Oral once  dextrose 5%. 1000 milliLiter(s) (50 mL/Hr) IV Continuous <Continuous>  dextrose 5%. 1000 milliLiter(s) (100 mL/Hr) IV Continuous <Continuous>  dextrose 50% Injectable 25 Gram(s) IV Push once  dextrose 50% Injectable 12.5 Gram(s) IV Push once  dextrose 50% Injectable 25 Gram(s) IV Push once  ferrous    sulfate 325 milliGRAM(s) Oral two times a day  glucagon  Injectable 1 milliGRAM(s) IntraMuscular once  heparin   Injectable 5000 Unit(s) SubCutaneous every 12 hours  influenza   Vaccine 0.5 milliLiter(s) IntraMuscular once  insulin lispro (ADMELOG) corrective regimen sliding scale   SubCutaneous three times a day before meals  insulin lispro (ADMELOG) corrective regimen sliding scale   SubCutaneous at bedtime  metoprolol tartrate 25 milliGRAM(s) Oral every 12 hours  pantoprazole    Tablet 40 milliGRAM(s) Oral before breakfast  piperacillin/tazobactam IVPB.. 3.375 Gram(s) IV Intermittent every 12 hours  prasugrel 10 milliGRAM(s) Oral daily  sodium chloride 0.9%. 1000 milliLiter(s) (100 mL/Hr) IV Continuous <Continuous>    MEDICATIONS  (PRN):      Vital Signs Last 24 Hrs  T(F): 98.6 (21 @ 20:22), Max: 98.6 (21 @ 20:22)  HR: 64 (21 @ 20:22) (64 - 73)  BP: 101/60 (21 @ 20:22) (95/55 - 104/56)  RR: 18 (21 @ 20:22) (18 - 18)  SpO2: 98% (21 @ 20:22) (97% - 98%)  Telemetry:   CAPILLARY BLOOD GLUCOSE      POCT Blood Glucose.: 155 mg/dL (2021 21:19)  POCT Blood Glucose.: 158 mg/dL (2021 16:23)  POCT Blood Glucose.: 208 mg/dL (2021 11:31)  POCT Blood Glucose.: 155 mg/dL (2021 07:19)    I&O's Summary    2021 07:01  -  2021 07:00  --------------------------------------------------------  IN: 790 mL / OUT: 900 mL / NET: -110 mL    2021 07:01  -  2021 23:40  --------------------------------------------------------  IN: 1080 mL / OUT: 900 mL / NET: 180 mL        PHYSICAL EXAM:  GENERAL: NAD, well-developed  HEAD:  Atraumatic, Normocephalic  EYES: EOMI, PERRLA, conjunctiva and sclera clear  NECK: Supple, No JVD  CHEST/LUNG: Clear to auscultation bilaterally; No wheeze  HEART: Regular rate and rhythm; No murmurs, rubs, or gallops  ABDOMEN: Soft, Nontender, Nondistended; Bowel sounds present  EXTREMITIES:  2+ Peripheral Pulses, No clubbing, cyanosis, or edema  PSYCH: AAOx3  NEUROLOGY: non-focal  SKIN: No rashes or lesions    LABS:                        9.3    7.01  )-----------( 203      ( 2021 06:45 )             28.9         137  |  98  |  102<H>  ----------------------------<  107<H>  4.4   |  24  |  3.42<H>    Ca    10.3      2021 06:52  Phos  4.5       Mg     2.3         TPro  6.9  /  Alb  3.3  /  TBili  1.8<H>  /  DBili  x   /  AST  27  /  ALT  31  /  AlkPhos  223<H>            Urinalysis Basic - ( 2021 12:04 )    Color: Yellow / Appearance: Clear / S.019 / pH: x  Gluc: x / Ketone: Negative  / Bili: Negative / Urobili: Negative   Blood: x / Protein: Trace / Nitrite: Negative   Leuk Esterase: Negative / RBC: 5 /hpf / WBC 1 /HPF   Sq Epi: x / Non Sq Epi: 1 /hpf / Bacteria: Negative        RADIOLOGY & ADDITIONAL TESTS:    Imaging Personally Reviewed:    Consultant(s) Notes Reviewed:      Care Discussed with Consultants/Other Providers:

## 2021-02-22 LAB
ALBUMIN SERPL ELPH-MCNC: 3.2 G/DL — LOW (ref 3.3–5)
ALP SERPL-CCNC: 204 U/L — HIGH (ref 40–120)
ALT FLD-CCNC: 29 U/L — SIGNIFICANT CHANGE UP (ref 10–45)
ANION GAP SERPL CALC-SCNC: 16 MMOL/L — SIGNIFICANT CHANGE UP (ref 5–17)
AST SERPL-CCNC: 29 U/L — SIGNIFICANT CHANGE UP (ref 10–40)
BILIRUB SERPL-MCNC: 1.7 MG/DL — HIGH (ref 0.2–1.2)
BUN SERPL-MCNC: 104 MG/DL — HIGH (ref 7–23)
CALCIUM SERPL-MCNC: 10 MG/DL — SIGNIFICANT CHANGE UP (ref 8.4–10.5)
CHLORIDE SERPL-SCNC: 99 MMOL/L — SIGNIFICANT CHANGE UP (ref 96–108)
CO2 SERPL-SCNC: 21 MMOL/L — LOW (ref 22–31)
CREAT SERPL-MCNC: 3.55 MG/DL — HIGH (ref 0.5–1.3)
CULTURE RESULTS: SIGNIFICANT CHANGE UP
CULTURE RESULTS: SIGNIFICANT CHANGE UP
GLUCOSE BLDC GLUCOMTR-MCNC: 111 MG/DL — HIGH (ref 70–99)
GLUCOSE BLDC GLUCOMTR-MCNC: 134 MG/DL — HIGH (ref 70–99)
GLUCOSE BLDC GLUCOMTR-MCNC: 173 MG/DL — HIGH (ref 70–99)
GLUCOSE BLDC GLUCOMTR-MCNC: 196 MG/DL — HIGH (ref 70–99)
GLUCOSE SERPL-MCNC: 98 MG/DL — SIGNIFICANT CHANGE UP (ref 70–99)
MAGNESIUM SERPL-MCNC: 2.4 MG/DL — SIGNIFICANT CHANGE UP (ref 1.6–2.6)
PHOSPHATE SERPL-MCNC: 4.4 MG/DL — SIGNIFICANT CHANGE UP (ref 2.5–4.5)
POTASSIUM SERPL-MCNC: 4.2 MMOL/L — SIGNIFICANT CHANGE UP (ref 3.5–5.3)
POTASSIUM SERPL-SCNC: 4.2 MMOL/L — SIGNIFICANT CHANGE UP (ref 3.5–5.3)
PROT SERPL-MCNC: 6.9 G/DL — SIGNIFICANT CHANGE UP (ref 6–8.3)
SARS-COV-2 RNA SPEC QL NAA+PROBE: SIGNIFICANT CHANGE UP
SODIUM SERPL-SCNC: 136 MMOL/L — SIGNIFICANT CHANGE UP (ref 135–145)
SPECIMEN SOURCE: SIGNIFICANT CHANGE UP
SPECIMEN SOURCE: SIGNIFICANT CHANGE UP
TROPONIN T, HIGH SENSITIVITY RESULT: 119 NG/L — HIGH (ref 0–51)

## 2021-02-22 PROCEDURE — 93010 ELECTROCARDIOGRAM REPORT: CPT | Mod: 76

## 2021-02-22 PROCEDURE — 75571 CT HRT W/O DYE W/CA TEST: CPT | Mod: 26

## 2021-02-22 PROCEDURE — 99233 SBSQ HOSP IP/OBS HIGH 50: CPT

## 2021-02-22 RX ORDER — AMIODARONE HYDROCHLORIDE 400 MG/1
400 TABLET ORAL EVERY 8 HOURS
Refills: 0 | Status: COMPLETED | OUTPATIENT
Start: 2021-02-22 | End: 2021-02-26

## 2021-02-22 RX ADMIN — Medication 325 MILLIGRAM(S): at 17:06

## 2021-02-22 RX ADMIN — HEPARIN SODIUM 5000 UNIT(S): 5000 INJECTION INTRAVENOUS; SUBCUTANEOUS at 17:06

## 2021-02-22 RX ADMIN — PANTOPRAZOLE SODIUM 40 MILLIGRAM(S): 20 TABLET, DELAYED RELEASE ORAL at 03:41

## 2021-02-22 RX ADMIN — AMIODARONE HYDROCHLORIDE 400 MILLIGRAM(S): 400 TABLET ORAL at 06:36

## 2021-02-22 RX ADMIN — AMIODARONE HYDROCHLORIDE 400 MILLIGRAM(S): 400 TABLET ORAL at 12:02

## 2021-02-22 RX ADMIN — PIPERACILLIN AND TAZOBACTAM 25 GRAM(S): 4; .5 INJECTION, POWDER, LYOPHILIZED, FOR SOLUTION INTRAVENOUS at 03:40

## 2021-02-22 RX ADMIN — ATORVASTATIN CALCIUM 80 MILLIGRAM(S): 80 TABLET, FILM COATED ORAL at 20:52

## 2021-02-22 RX ADMIN — Medication 25 MILLIGRAM(S): at 03:40

## 2021-02-22 RX ADMIN — PRASUGREL 10 MILLIGRAM(S): 5 TABLET, FILM COATED ORAL at 12:02

## 2021-02-22 RX ADMIN — Medication 25 MILLIGRAM(S): at 17:06

## 2021-02-22 RX ADMIN — Medication 325 MILLIGRAM(S): at 03:40

## 2021-02-22 RX ADMIN — AMIODARONE HYDROCHLORIDE 400 MILLIGRAM(S): 400 TABLET ORAL at 20:52

## 2021-02-22 RX ADMIN — Medication 81 MILLIGRAM(S): at 12:02

## 2021-02-22 RX ADMIN — PIPERACILLIN AND TAZOBACTAM 25 GRAM(S): 4; .5 INJECTION, POWDER, LYOPHILIZED, FOR SOLUTION INTRAVENOUS at 17:05

## 2021-02-22 RX ADMIN — HEPARIN SODIUM 5000 UNIT(S): 5000 INJECTION INTRAVENOUS; SUBCUTANEOUS at 03:41

## 2021-02-22 RX ADMIN — Medication 1: at 12:02

## 2021-02-22 NOTE — PROGRESS NOTE ADULT - ASSESSMENT
79 yo male w h/o ICM< CAD< s/p CABG, has AICD, DM, HTN presents w volume overload and abd pain  Abd sono shows gallstones, but no sign of cholecystitis/pancreatitis. abd pain resolved on Zosyn,   will cont empirically for 7 days ( through 2/22)  ascites 2/2 systolic chf, probably worse 2/2 acute on chronic systolic chf, now on po Lasix  on interrogation of ICD, has a lof of arrhythmia burden,   NSVT and sustained VT, seen by EPS, cont  Aydlqozyc76.5 mg  bid. has inoperable ischemic HD,   ICM, EF 15%, severe AS, seen by structural heart for TAVR eval. prob a high risk and poor candidate 2/2 low EF, advanced CKD and overall  frail general  medical condition. structural heart recs pending.   Hyponatremia  Izabela, CKD4  Hyperkalemia, resolved  dvt ppx w HSC

## 2021-02-22 NOTE — PROGRESS NOTE ADULT - SUBJECTIVE AND OBJECTIVE BOX
Subjective: Patient seen and examined. No new events except as noted.   recurrent VT and NSVT overnight     REVIEW OF SYSTEMS:    CONSTITUTIONAL: + weakness, fevers or chills  EYES/ENT: No visual changes;  No vertigo or throat pain   NECK: No pain or stiffness  RESPIRATORY: No cough, wheezing, hemoptysis; No shortness of breath  CARDIOVASCULAR: No chest pain or palpitations  GASTROINTESTINAL: No abdominal or epigastric pain. No nausea, vomiting, or hematemesis; No diarrhea or constipation. No melena or hematochezia.  GENITOURINARY: No dysuria, frequency or hematuria  NEUROLOGICAL: No numbness or weakness  SKIN: No itching, burning, rashes, or lesions   All other review of systems is negative unless indicated above.    MEDICATIONS:  MEDICATIONS  (STANDING):  aMIOdarone    Tablet 400 milliGRAM(s) Oral every 8 hours  aspirin  chewable 81 milliGRAM(s) Oral daily  atorvastatin 80 milliGRAM(s) Oral at bedtime  dextrose 40% Gel 15 Gram(s) Oral once  dextrose 5%. 1000 milliLiter(s) (50 mL/Hr) IV Continuous <Continuous>  dextrose 5%. 1000 milliLiter(s) (100 mL/Hr) IV Continuous <Continuous>  dextrose 50% Injectable 25 Gram(s) IV Push once  dextrose 50% Injectable 12.5 Gram(s) IV Push once  dextrose 50% Injectable 25 Gram(s) IV Push once  ferrous    sulfate 325 milliGRAM(s) Oral two times a day  glucagon  Injectable 1 milliGRAM(s) IntraMuscular once  heparin   Injectable 5000 Unit(s) SubCutaneous every 12 hours  influenza   Vaccine 0.5 milliLiter(s) IntraMuscular once  insulin lispro (ADMELOG) corrective regimen sliding scale   SubCutaneous three times a day before meals  insulin lispro (ADMELOG) corrective regimen sliding scale   SubCutaneous at bedtime  metoprolol tartrate 25 milliGRAM(s) Oral every 12 hours  pantoprazole    Tablet 40 milliGRAM(s) Oral before breakfast  piperacillin/tazobactam IVPB.. 3.375 Gram(s) IV Intermittent every 12 hours  prasugrel 10 milliGRAM(s) Oral daily      PHYSICAL EXAM:  T(C): 36.3 (02-22-21 @ 08:45), Max: 37 (02-21-21 @ 20:22)  HR: 89 (02-22-21 @ 08:45) (64 - 120)  BP: 104/51 (02-22-21 @ 08:45) (95/55 - 104/51)  RR: 18 (02-22-21 @ 08:45) (18 - 19)  SpO2: 100% (02-22-21 @ 08:45) (96% - 100%)  Wt(kg): --  I&O's Summary    21 Feb 2021 07:01  -  22 Feb 2021 07:00  --------------------------------------------------------  IN: 1080 mL / OUT: 900 mL / NET: 180 mL          Appearance: NAD	  HEENT:  Dry oral mucosa, PERRL, EOMI	  Lymphatic: No lymphadenopathy , no edema  Cardiovascular: Normal S1 S2, No JVD, + murmurs , Peripheral pulses palpable 2+ bilaterally  Respiratory: decreased bs   Gastrointestinal:  Soft, Non-tender, + BS	  Skin: No rashes, No ecchymoses, No cyanosis, warm to touch  Musculoskeletal: Normal range of motion, normal strength  Psychiatry:  sleepy   Ext: No edema      LABS:    CARDIAC MARKERS:            02-22    136  |  99  |  104<H>  ----------------------------<  98  4.2   |  21<L>  |  3.55<H>    Ca    10.0      22 Feb 2021 03:41  Phos  4.4     02-22  Mg     2.4     02-22    TPro  6.9  /  Alb  3.2<L>  /  TBili  1.7<H>  /  DBili  x   /  AST  29  /  ALT  29  /  AlkPhos  204<H>  02-22    proBNP:   Lipid Profile:   HgA1c:   TSH:     5          TELEMETRY: SR , NSVT, VT	    ECG:  	  RADIOLOGY:   DIAGNOSTIC TESTING:  [ ] Echocardiogram:  [ ]  Catheterization:  [ ] Stress Test:    OTHER:

## 2021-02-22 NOTE — PROGRESS NOTE ADULT - ASSESSMENT
81 yo male w h/o DM, CAD, s/p CABG, CKD 3-4.   Recently evaluated by Nephrology for elevated creatinine. Bloodwork  returned notable for  and Na 126; in light of these findings, Dr. Luz (Renal) sent the patient to the ER. Lately ptn has been having abdominal pain with elevated amylase and lipase.   As per ptn's wife: on and off R abd pain w generalized weakness and poor po intake. PCP DCed po Lasix in past few days 2/2 abnormal blood test results.  Ptn denies recent NSAID use.  He was diagnosed with diabetes 18 years ago, and denies retinopathy (last saw Optho around 2 years ago). Cardiologist has alluded to the patient having mild renal impairment in the past, as per wife.    Patient's PCP is Dr. Shyann De León; GI Dr. Wilman Villagran; Cardiology Dr. Boyce.  (16 Feb 2021 21:05)    ER vs: T 98.1, P 85, /64. WBC 8.2.  Bun/Cr - 100/2.0.  LFTs mildly elevated, Roxana 217, Lip 185.  Pct 0.2.  UA (-).  Ct c/a/p shows pulmonary edema, sm B/L pleural effusions, sm volume ascites.  VQ scan with very low probability for PE.      Pt started on zosyn to cover for intra-abd source.  ID consulted.     r/o Gallstone pancreatitis:    - Pt p/w abdominal pain, epigastric and RUQ pain, noted to have elevated LFTs and amylase/lipase.  Possible gallstone pancreatitis?    - U/S abdomen shows cholelithiasis without acute cholecystitis.  Borderline to mild gallbladder thickening is likely due to ascites.   Moderate ascites noted.  Suggestion of mild renal parenchymal disease and bilateral pleural effusions.    - CTap w/o acute intra-abd pathology    - monitor LFTs, Roxana, Lipase.  As per GI, ascites likely secondary to CHF, no evidence for pancreatitis.     - Monitor temp curve and WBC.      - bcx neg.  On empiric abx coverage with zosyn x 7 days, completing on 2/23.  Abd pain resolving.       {[id number]}Renal failure:    - Renal following for CKD.  Dose abx for GFR 15-20.  On q12 hr dosing of zosyn.     - Cr rising.  CALVIN on CKD does not appear to be antibiotic mediated, renal f/u noted.      Aortic stenosis:    - Followed by Structural health team for TAVR evaluation      Rupali Schwarz  [phone]

## 2021-02-22 NOTE — PROGRESS NOTE ADULT - ASSESSMENT
Abdominal pain.  - now resolved  - lfts downtrending   - Tolerating diet       Renal failure.   - nephrology input noted.

## 2021-02-22 NOTE — PROGRESS NOTE ADULT - ASSESSMENT
81 yo male w h/o DM, CAD, s/p CABG, ICM s/p ICD , CKD 3-4 and now admitted for elevated creatinine. He was sent to ER by his nephrologist.   Lately ptn has been having abdominal pain with elevated amylase and lipase.   As per ptn's wife: on and off R abd pain w generalized weakness and poor po intake. PCP DCed po Lasix in past few days 2/2 abnormal blood test results.  Ptn denies recent NSAID use.

## 2021-02-22 NOTE — PROGRESS NOTE ADULT - SUBJECTIVE AND OBJECTIVE BOX
Patient is a 80y old  Male who presents with a chief complaint of acute renal failure (2021 10:50)      SUBJECTIVE / OVERNIGHT EVENTS:    MEDICATIONS  (STANDING):  aMIOdarone    Tablet 400 milliGRAM(s) Oral every 8 hours  aspirin  chewable 81 milliGRAM(s) Oral daily  atorvastatin 80 milliGRAM(s) Oral at bedtime  dextrose 40% Gel 15 Gram(s) Oral once  dextrose 5%. 1000 milliLiter(s) (50 mL/Hr) IV Continuous <Continuous>  dextrose 5%. 1000 milliLiter(s) (100 mL/Hr) IV Continuous <Continuous>  dextrose 50% Injectable 25 Gram(s) IV Push once  dextrose 50% Injectable 12.5 Gram(s) IV Push once  dextrose 50% Injectable 25 Gram(s) IV Push once  ferrous    sulfate 325 milliGRAM(s) Oral two times a day  glucagon  Injectable 1 milliGRAM(s) IntraMuscular once  heparin   Injectable 5000 Unit(s) SubCutaneous every 12 hours  influenza   Vaccine 0.5 milliLiter(s) IntraMuscular once  insulin lispro (ADMELOG) corrective regimen sliding scale   SubCutaneous three times a day before meals  insulin lispro (ADMELOG) corrective regimen sliding scale   SubCutaneous at bedtime  metoprolol tartrate 25 milliGRAM(s) Oral every 12 hours  pantoprazole    Tablet 40 milliGRAM(s) Oral before breakfast  piperacillin/tazobactam IVPB.. 3.375 Gram(s) IV Intermittent every 12 hours  prasugrel 10 milliGRAM(s) Oral daily    MEDICATIONS  (PRN):      Vital Signs Last 24 Hrs  T(F): 97.3 (21 @ 11:56), Max: 98.6 (21 @ 20:22)  HR: 68 (21 @ 11:56) (64 - 120)  BP: 95/53 (21 @ 11:56) (95/53 - 104/51)  RR: 18 (21 @ 11:56) (18 - 19)  SpO2: 99% (21 @ 11:56) (96% - 100%)  Telemetry:   CAPILLARY BLOOD GLUCOSE      POCT Blood Glucose.: 196 mg/dL (2021 11:30)  POCT Blood Glucose.: 111 mg/dL (2021 07:55)  POCT Blood Glucose.: 155 mg/dL (2021 21:19)  POCT Blood Glucose.: 158 mg/dL (2021 16:23)    I&O's Summary    2021 07:01  -  2021 07:00  --------------------------------------------------------  IN: 1080 mL / OUT: 900 mL / NET: 180 mL        PHYSICAL EXAM:  GENERAL: NAD, well-developed  HEAD:  Atraumatic, Normocephalic  EYES: EOMI, PERRLA, conjunctiva and sclera clear  NECK: Supple, No JVD  CHEST/LUNG: Clear to auscultation bilaterally; No wheeze  HEART: Regular rate and rhythm; No murmurs, rubs, or gallops  ABDOMEN: Soft, Nontender, Nondistended; Bowel sounds present  EXTREMITIES:  2+ Peripheral Pulses, No clubbing, cyanosis, or edema  PSYCH: AAOx3  NEUROLOGY: non-focal  SKIN: No rashes or lesions    LABS:        136  |  99  |  104<H>  ----------------------------<  98  4.2   |  21<L>  |  3.55<H>    Ca    10.0      2021 03:41  Phos  4.4       Mg     2.4         TPro  6.9  /  Alb  3.2<L>  /  TBili  1.7<H>  /  DBili  x   /  AST  29  /  ALT  29  /  AlkPhos  204<H>            Urinalysis Basic - ( 2021 12:04 )    Color: Yellow / Appearance: Clear / S.019 / pH: x  Gluc: x / Ketone: Negative  / Bili: Negative / Urobili: Negative   Blood: x / Protein: Trace / Nitrite: Negative   Leuk Esterase: Negative / RBC: 5 /hpf / WBC 1 /HPF   Sq Epi: x / Non Sq Epi: 1 /hpf / Bacteria: Negative        RADIOLOGY & ADDITIONAL TESTS:    Imaging Personally Reviewed:    Consultant(s) Notes Reviewed:      Care Discussed with Consultants/Other Providers:

## 2021-02-22 NOTE — PROGRESS NOTE ADULT - SUBJECTIVE AND OBJECTIVE BOX
      Overnight events noted      VITAL:  T(C): , Max: 37 (21 @ 20:22)  T(F): , Max: 98.6 (21 @ 20:22)  HR: 89 (21 @ 08:45)  BP: 104/51 (21 @ 08:45)  BP(mean): --  RR: 18 (21 @ 08:45)  SpO2: 100% (21 @ 08:45)      PHYSICAL EXAM:  Constitutional: NAD, Alert  HEENT: NCAT, DMM  Neck: Supple, (+)JVD  Respiratory: CTA-b/l  Cardiovascular: RRR s1s2, no m/r/g  Gastrointestinal: BS+, soft, NT; (+)distension  Extremities: 2+ b/l LE edema  Neurological: no focal deficits; strength grossly intact  Back: no CVAT b/l  Skin: No rashes, no nevi    LABS:    Na(136)/K(4.2)/Cl(99)/HCO3(21)/BUN(104)/Cr(3.55)Glu(98)/Ca(10.0)/Mg(2.4)/PO4(4.4)     @ 03:41  Na(137)/K(4.4)/Cl(98)/HCO3(24)/BUN(102)/Cr(3.42)Glu(107)/Ca(10.3)/Mg(2.3)/PO4(4.5)     @ 06:52  Na(134)/K(4.1)/Cl(97)/HCO3(22)/BUN(100)/Cr(3.00)Glu(135)/Ca(10.0)/Mg(--)/PO4(--)     @ 06:45    Urinalysis Basic - ( 2021 12:04 )  Color: Yellow / Appearance: Clear / S.019 / pH: x  Gluc: x / Ketone: Negative  / Bili: Negative / Urobili: Negative   Blood: x / Protein: Trace / Nitrite: Negative   Leuk Esterase: Negative / RBC: 5 /hpf / WBC 1 /HPF   Sq Epi: x / Non Sq Epi: 1 /hpf / Bacteria: Negative  Sodium, Random Urine: <35 mmol/L ( @ 12:04)  Potassium, Random Urine: 39 mmol/L ( @ 12:04)  Chloride, Random Urine: <35 mmol/L ( @ 12:04)  Creatinine, Random Urine: 91 mg/dL ( @ 12:04)      IMPRESSION: 80M w/ DM2, CAD-CABG, and CKD, 21 a/w azotemia/lyte derangements/weakness    (1)Renal - CALVIN on CKD4 - cardiorenal? Not antibiotic-mediated CALVIN, based on urine studies. Creatinine seems to be plateauing in the mid-3s    (2)Hyperkalemia - resolved    (3)Hyponatremia - resolved    (4)CV - tenuous hemodynamics/hypervolemia - biventricular systolic dysfunction, severe AS, mod-sev AR. Structural Heart input appreciated; being evaluated for TAVR      RECOMMEND:  (1)No IVF/No diuretics for now  (2)Can continue Zosyn as ordered for now  (3)BMP+Mg+PO4 daily  (4)F/U Dobutamine stress echo          Suraj Garcia MD  [facility]  Office: [phone]  Cell: [phone]               No pain, no sob      VITAL:  T(C): , Max: 37 (21 @ 20:22)  T(F): , Max: 98.6 (21 @ 20:22)  HR: 89 (21 @ 08:45)  BP: 104/51 (21 @ 08:45)  RR: 18 (21 @ 08:45)  SpO2: 100% (21 @ 08:45)      PHYSICAL EXAM:  Constitutional: NAD, Alert  HEENT: NCAT, DMM  Neck: Supple, no JVD  Respiratory: CTA-b/l  Cardiovascular: RRR s1s2, no m/r/g  Gastrointestinal: BS+, soft, NT; (+)mild distension  Extremities: 1+ b/l LE edema  Neurological: no focal deficits; strength grossly intact  Back: no CVAT b/l  Skin: No rashes, no nevi    LABS:    Na(136)/K(4.2)/Cl(99)/HCO3(21)/BUN(104)/Cr(3.55)Glu(98)/Ca(10.0)/Mg(2.4)/PO4(4.4)     @ 03:41  Na(137)/K(4.4)/Cl(98)/HCO3(24)/BUN(102)/Cr(3.42)Glu(107)/Ca(10.3)/Mg(2.3)/PO4(4.5)     @ 06:52  Na(134)/K(4.1)/Cl(97)/HCO3(22)/BUN(100)/Cr(3.00)Glu(135)/Ca(10.0)/Mg(--)/PO4(--)     @ 06:45    Urinalysis Basic - ( 2021 12:04 )  Color: Yellow / Appearance: Clear / S.019 / pH: x  Gluc: x / Ketone: Negative  / Bili: Negative / Urobili: Negative   Blood: x / Protein: Trace / Nitrite: Negative   Leuk Esterase: Negative / RBC: 5 /hpf / WBC 1 /HPF   Sq Epi: x / Non Sq Epi: 1 /hpf / Bacteria: Negative  Sodium, Random Urine: <35 mmol/L ( @ 12:04)  Potassium, Random Urine: 39 mmol/L ( @ 12:04)  Chloride, Random Urine: <35 mmol/L ( @ 12:04)  Creatinine, Random Urine: 91 mg/dL ( @ 12:04)      IMPRESSION: 80M w/ DM2, CAD-CABG, and CKD, 21 a/w azotemia/lyte derangements/weakness    (1)Renal - CALVIN on CKD4 - cardiorenal? Not antibiotic-mediated CALVIN, based on urine studies. Creatinine seems to be plateauing in the mid-3s    (2)Hyperkalemia - resolved    (3)Hyponatremia - resolved    (4)CV - tenuous hemodynamics/hypervolemia - biventricular systolic dysfunction, severe AS, mod-sev AR. Structural Heart input appreciated; being evaluated for TAVR      RECOMMEND:  (1)No IVF/No diuretics for now  (2)Can continue Zosyn as ordered for now  (3)BMP+Mg+PO4 daily  (4)F/U Dobutamine stress echo          Suraj Garcia MD  [facility]  Office: [phone]  Cell: [phone]

## 2021-02-22 NOTE — PROGRESS NOTE ADULT - SUBJECTIVE AND OBJECTIVE BOX
[facility]  Pro Strickland PA-C  [address] [place] [address]  [phone]      INTERVAL HPI/OVERNIGHT EVENTS:    overnight events noted  no acute GI complaints     MEDICATIONS  (STANDING):  aspirin  chewable 81 milliGRAM(s) Oral daily  atorvastatin 80 milliGRAM(s) Oral at bedtime  dextrose 40% Gel 15 Gram(s) Oral once  dextrose 5%. 1000 milliLiter(s) (50 mL/Hr) IV Continuous <Continuous>  dextrose 5%. 1000 milliLiter(s) (100 mL/Hr) IV Continuous <Continuous>  dextrose 50% Injectable 25 Gram(s) IV Push once  dextrose 50% Injectable 12.5 Gram(s) IV Push once  dextrose 50% Injectable 25 Gram(s) IV Push once  ferrous    sulfate 325 milliGRAM(s) Oral two times a day  furosemide   Injectable 40 milliGRAM(s) IV Push every 12 hours  glucagon  Injectable 1 milliGRAM(s) IntraMuscular once  heparin   Injectable 5000 Unit(s) SubCutaneous every 12 hours  influenza   Vaccine 0.5 milliLiter(s) IntraMuscular once  insulin lispro (ADMELOG) corrective regimen sliding scale   SubCutaneous three times a day before meals  insulin lispro (ADMELOG) corrective regimen sliding scale   SubCutaneous at bedtime  pantoprazole    Tablet 40 milliGRAM(s) Oral before breakfast  piperacillin/tazobactam IVPB.. 3.375 Gram(s) IV Intermittent every 8 hours  prasugrel 10 milliGRAM(s) Oral daily    MEDICATIONS  (PRN):      Allergies    No Known Allergies    Intolerances        ROS:   General:  No wt loss, fevers, chills, night sweats, +fatigue,   Eyes:  Good vision, no reported pain  ENT:  No sore throat, pain, runny nose, dysphagia  CV:  No pain, palpitations, hypo/hypertension  Resp:  No dyspnea, cough, tachypnea, wheezing  GI:  No pain, No nausea, No vomiting, No diarrhea, No constipation, No weight loss, No fever, No pruritis, No rectal bleeding, No tarry stools, No dysphagia,  :  No pain, bleeding, incontinence, nocturia  Muscle:  No pain, weakness  Neuro:  No weakness, tingling, memory problems  Psych:  No fatigue, insomnia, mood problems, depression  Endocrine:  No polyuria, polydipsia, cold/heat intolerance  Heme:  No petechiae, ecchymosis, easy bruisability  Skin:  No rash, tattoos, scars, edema      PHYSICAL EXAM:   Vital Signs:  Vital Signs Last 24 Hrs  T(C): 36.6 (2021 12:07), Max: 36.6 (2021 19:31)  T(F): 97.9 (2021 12:07), Max: 97.9 (2021 19:31)  HR: 87 (2021 17:08) (71 - 100)  BP: 101/50 (2021 17:08) (90/51 - 112/60)  BP(mean): 66 (2021 10:29) (66 - 66)  RR: 18 (2021 12:07) (18 - 18)  SpO2: 95% (2021 12:07) (95% - 100%)  Daily     Daily     GENERAL:  no distress  HEENT:  NC/AT  ABDOMEN:  Soft, non-tender, non-distended  EXTEREMITIES:  +bilateral LE edema   NEURO:  Alert, oriented, no asterixis, no tremor, no encephalopathy      LABS:                        8.8    7.41  )-----------( 240      ( 2021 07:05 )             26.7     02-18    129<L>  |  96  |  89<H>  ----------------------------<  89  4.4   |  23  |  2.56<H>    Ca    9.5      2021 07:01  Phos  4.4     -18  Mg     2.4     -18    TPro  6.9  /  Alb  2.9<L>  /  TBili  1.3<H>  /  DBili  x   /  AST  32  /  ALT  34  /  AlkPhos  266<H>  02-18    PT/INR - ( 2021 20:25 )   PT: 16.1 sec;   INR: 1.36 ratio         PTT - ( 2021 20:25 )  PTT:32.7 sec  Urinalysis Basic - ( 2021 07:16 )    Color: Light Yellow / Appearance: Clear / S.010 / pH: x  Gluc: x / Ketone: Negative  / Bili: Negative / Urobili: Negative   Blood: x / Protein: Negative / Nitrite: Negative   Leuk Esterase: Negative / RBC: x / WBC x   Sq Epi: x / Non Sq Epi: x / Bacteria: x        RADIOLOGY & ADDITIONAL TESTS:

## 2021-02-22 NOTE — PROGRESS NOTE ADULT - SUBJECTIVE AND OBJECTIVE BOX
Infectious Diseases progress note:    Subjective: Afebrile, last WBC wnl.  No abd pain.  Lfts overall improved.  Cr trending upwards    ROS:  CONSTITUTIONAL:  No fever, chills, rigors  CARDIOVASCULAR:  No chest pain or palpitations  RESPIRATORY:   No SOB, cough, dyspnea on exertion.  No wheezing  GASTROINTESTINAL:  No abd pain, N/V, diarrhea/constipation  EXTREMITIES:  No swelling or joint pain  GENITOURINARY:  No burning on urination, increased frequency or urgency.  No flank pain  NEUROLOGIC:  No HA, visual disturbances  SKIN: No rashes    Allergies    No Known Allergies    Intolerances        ANTIBIOTICS/RELEVANT:  antimicrobials  piperacillin/tazobactam IVPB.. 3.375 Gram(s) IV Intermittent every 12 hours    immunologic:  influenza   Vaccine 0.5 milliLiter(s) IntraMuscular once    OTHER:  aMIOdarone    Tablet 400 milliGRAM(s) Oral every 8 hours  aspirin  chewable 81 milliGRAM(s) Oral daily  atorvastatin 80 milliGRAM(s) Oral at bedtime  dextrose 40% Gel 15 Gram(s) Oral once  dextrose 5%. 1000 milliLiter(s) IV Continuous <Continuous>  dextrose 5%. 1000 milliLiter(s) IV Continuous <Continuous>  dextrose 50% Injectable 25 Gram(s) IV Push once  dextrose 50% Injectable 12.5 Gram(s) IV Push once  dextrose 50% Injectable 25 Gram(s) IV Push once  ferrous    sulfate 325 milliGRAM(s) Oral two times a day  glucagon  Injectable 1 milliGRAM(s) IntraMuscular once  heparin   Injectable 5000 Unit(s) SubCutaneous every 12 hours  insulin lispro (ADMELOG) corrective regimen sliding scale   SubCutaneous three times a day before meals  insulin lispro (ADMELOG) corrective regimen sliding scale   SubCutaneous at bedtime  metoprolol tartrate 25 milliGRAM(s) Oral every 12 hours  pantoprazole    Tablet 40 milliGRAM(s) Oral before breakfast  prasugrel 10 milliGRAM(s) Oral daily      Objective:  Vital Signs Last 24 Hrs  T(C): 36.6 (2021 19:54), Max: 36.6 (2021 19:54)  T(F): 97.9 (2021 19:54), Max: 97.9 (2021 19:54)  HR: 67 (2021 19:54) (65 - 120)  BP: 100/56 (2021 19:54) (95/53 - 111/69)  BP(mean): --  RR: 18 (2021 19:54) (18 - 19)  SpO2: 97% (2021 19:54) (96% - 100%)    PHYSICAL EXAM:  Constitutional:NAD  Eyes:CHERI, EOMI  Ear/Nose/Throat: no thrush, mucositis.  Moist mucous membranes	  Neck:no JVD, no lymphadenopathy, supple  Respiratory: CTA nadiya  Cardiovascular: S1S2 RRR, no murmurs  Gastrointestinal:soft, nontender,  nondistended (+) BS  Extremities:no e/e/c  Skin:  no rashes, open wounds or ulcerations        LABS:        136  |  99  |  104<H>  ----------------------------<  98  4.2   |  21<L>  |  3.55<H>    Ca    10.0      2021 03:41  Phos  4.4       Mg     2.4         TPro  6.9  /  Alb  3.2<L>  /  TBili  1.7<H>  /  DBili  x   /  AST  29  /  ALT  29  /  AlkPhos  204<H>        Urinalysis Basic - ( 2021 12:04 )    Color: Yellow / Appearance: Clear / S.019 / pH: x  Gluc: x / Ketone: Negative  / Bili: Negative / Urobili: Negative   Blood: x / Protein: Trace / Nitrite: Negative   Leuk Esterase: Negative / RBC: 5 /hpf / WBC 1 /HPF   Sq Epi: x / Non Sq Epi: 1 /hpf / Bacteria: Negative        Procalcitonin, Serum: 0.20 ( @ 23:54)        MICROBIOLOGY:    Culture - Urine (21 @ 09:06)   Specimen Source: .Urine Clean Catch (Midstream)   Culture Results:   No growth     Culture - Blood (21 @ 05:49)   Specimen Source: .Blood Blood-Peripheral   Culture Results:   No Growth Final   RADIOLOGY & ADDITIONAL STUDIES:    < from: CT Heart Calcium Scoring (21 @ 09:56) >    EXAM:  CT HEART CALCIUM SCORE                            PROCEDURE DATE:  2021            INTERPRETATION:  Indication:  Aortic stenosis    History:  Mr. Morelos is a 80-year-old man with possible low-flow low-gradient aortic stenosis.    Scanner:  [facility]    Acquisition:  Non-contrast prospectively-gated 320-multidetector volumetric computed tomography heart    Quality:  Good    Post-processing:  Three-dimensional volume-rendered and maximal intensity projection images reconstructed with "[employer]a software.    FINDINGS:    Cardiovascular:  A biventricular implantable cardioverter-defibrillator is present in the left chest wall.  Device wires terminate in the right atrial appendage, right ventricle and left marginal coronary vein.    Median sternotomy wires as surgical clips noted.    Aortic valve  Moderately to severely calcified (Agatston calcium score 1826).    Mitral valve  There is mild mitral annular calcification.    Aorta  Ascending aorta measures approximately 40 mm at the level of the right pulmonary artery (double oblique short-axis plane in mid diastole).  No significant change in the caliber of the ascending aorta compared to computed tomography 2.    Moderate calcified plaque is present in the visualized thoracic aorta.    Pulmonary artery  The main pulmonary artery measures approximately 33 mm (axial plane in mid diastole).    Coronary arteries  Coronary artery calcifications are noted in the coronary arteries.  The patient is status-post coronary artery bypass graft surgery.  Stent noted in a graft originating from the ascending aorta.    Pericardium  There is no pericardial effusion.    Chambers  The left ventricle and left atrium are qualitatively dilated.    Non-cardiac:  No hilar and/or mediastinal adenopathy is noted. Minimal groundglass opacities are noted within both lungs. Small bilateral pleural effusions are noted. Pleural effusions have decreased when compared to prior CT scan of 2021. Ascites is noted. Degenerative changes of the spine are noted.    IMPRESSION:  Moderately to severely calcified (Agatston calcium score 1826) aortic valve.    < end of copied text >

## 2021-02-22 NOTE — CHART NOTE - NSCHARTNOTEFT_GEN_A_CORE
Called by RN as pt had multiple NSVT and sustained VT for 6 minutes. Pt complained of substernal non-radiating chest pain with sustained VT. Pt seen and evaluated. Denies headache, dizziness, palpitations, shortness of breath, abd pain, nausea, vomiting, diarrhea or LE edema.       Vital Signs Last 24 Hrs  T(C): 36.4 (22 Feb 2021 03:30), Max: 37 (21 Feb 2021 20:22)  T(F): 97.5 (22 Feb 2021 03:30), Max: 98.6 (21 Feb 2021 20:22)  HR: 120 (22 Feb 2021 03:30) (64 - 120)  BP: 99/65 (22 Feb 2021 03:30) (95/55 - 104/56)  BP(mean): --  RR: 19 (22 Feb 2021 03:30) (18 - 19)  SpO2: 96% (22 Feb 2021 03:30) (96% - 98%)      Labs:                          9.3    7.01  )-----------( 203      ( 20 Feb 2021 06:45 )             28.9     02-21    137  |  98  |  102<H>  ----------------------------<  107<H>  4.4   |  24  |  3.42<H>    Ca    10.3      21 Feb 2021 06:52  Phos  4.5     02-21  Mg     2.3     02-21    TPro  6.9  /  Alb  3.3  /  TBili  1.8<H>  /  DBili  x   /  AST  27  /  ALT  31  /  AlkPhos  223<H>  02-21        Radiology:    Physical Exam:  General: WN/WD NAD  Neurology: A&Ox3, nonfocal, JOHNSON x 4  Head:  Normocephalic, atraumatic  Respiratory: CTA B/L  CV: RRR, S1S2,   Abdominal: Soft, NT, ND no palpable mass  MSK:+ B/L LE edema.  + peripheral pulses, FROM all 4 extremity    Assessment & Plan:  79 yo male w h/o ICM< CAD< s/p CABG, has AICD, DM, HTN presents w volume overload and abd pain, Abd sono shows gallstones, but no sign of cholecystitis/pancreatitis. abd pain resolved on Zosyn, will cont empirically for 7 days ( through 2/22) ascites 2/2 systolic chf, probably worse 2/2 acute on chronic systolic chf, on interrogation of ICD, has a lof of arrhythmia burden, NSVT and sustained VT, seen by EPS, on Lopressor 25 BID. has inoperable ischemic HD, ICM, EF 15%, severe AS, seen by structural heart for TAVR eval. now being evaluated for sustained VT and chest pain    Sustainted VT and Chest pain  - Pt symptomatic with chest pain with sustained VT for approximately 6 minutes  - Chest pain resolved with resolution of VT  - CMP, Mg, Phos, Trop stat  - EKG with WCT, but no other ST/T wave changed  - Continue Lopressor 25 mg po BID, will give AM dose now  - Unable to up titrate lopressor as pt's BP is soft with systolic in 90's.   - D/W Dr. Gaitan cardiology fellow who recommend to call back EP in AM to consider ablation  - Will continue to monitor pt   Follow up with Attending in AM. Called by RN as pt had multiple NSVT and sustained VT for 6 minutes. Pt complained of substernal non-radiating chest pain with sustained VT. Pt seen and evaluated. Denies headache, dizziness, palpitations, shortness of breath, abd pain, nausea, vomiting, diarrhea or LE edema.       Vital Signs Last 24 Hrs  T(C): 36.4 (22 Feb 2021 03:30), Max: 37 (21 Feb 2021 20:22)  T(F): 97.5 (22 Feb 2021 03:30), Max: 98.6 (21 Feb 2021 20:22)  HR: 120 (22 Feb 2021 03:30) (64 - 120)  BP: 99/65 (22 Feb 2021 03:30) (95/55 - 104/56)  BP(mean): --  RR: 19 (22 Feb 2021 03:30) (18 - 19)  SpO2: 96% (22 Feb 2021 03:30) (96% - 98%)      Labs:                          9.3    7.01  )-----------( 203      ( 20 Feb 2021 06:45 )             28.9     02-21    137  |  98  |  102<H>  ----------------------------<  107<H>  4.4   |  24  |  3.42<H>    Ca    10.3      21 Feb 2021 06:52  Phos  4.5     02-21  Mg     2.3     02-21    TPro  6.9  /  Alb  3.3  /  TBili  1.8<H>  /  DBili  x   /  AST  27  /  ALT  31  /  AlkPhos  223<H>  02-21        Radiology:    Physical Exam:  General: WN/WD NAD  Neurology: A&Ox3, nonfocal, JOHNSON x 4  Head:  Normocephalic, atraumatic  Respiratory: CTA B/L  CV: RRR, S1S2,   Abdominal: Soft, NT, ND no palpable mass  MSK:+ B/L LE edema.  + peripheral pulses, FROM all 4 extremity    Assessment & Plan:  79 yo male w h/o ICM< CAD< s/p CABG, has AICD, DM, HTN presents w volume overload and abd pain, Abd sono shows gallstones, but no sign of cholecystitis/pancreatitis. abd pain resolved on Zosyn, will cont empirically for 7 days ( through 2/22) ascites 2/2 systolic chf, probably worse 2/2 acute on chronic systolic chf, on interrogation of ICD, has a lof of arrhythmia burden, NSVT and sustained VT, seen by EPS, on Lopressor 25 BID. has inoperable ischemic HD, ICM, EF 15%, severe AS, seen by structural heart for TAVR eval. now being evaluated for sustained VT and chest pain    Sustainted VT and Chest pain  - Pt symptomatic with chest pain with sustained VT for approximately 6 minutes  - Chest pain resolved with resolution of VT  - CMP, Mg, Phos, Trop stat  - EKG with WCT, but no other ST/T wave changed  - Continue Lopressor 25 mg po BID, will give AM dose now  - Unable to up titrate lopressor as pt's BP is soft with systolic in 90's.   - D/W Dr. Gaitan cardiology fellow who recommend to call back EP in AM to consider ablation  - Will continue to monitor pt   Follow up with Attending in AM.    Addendum @ 2422  Trop noted at 118. Pt denies any chest pain now. Elevated trop likely due to CKD.  D/W cardiologist Dr. Velásquez, started on Amiodarone loading 400 mg Q8hrs.  Will continue to monitor pt  Will endorse pt to day team.     Getachew Agrawal NP  [phone]

## 2021-02-22 NOTE — PROGRESS NOTE ADULT - SUBJECTIVE AND OBJECTIVE BOX
Structural Heart Team    No complaints this afternoon.  No acute events.       REVIEW OF SYSTEMS:    CONSTITUTIONAL: No weakness, fevers or chills  EYES/ENT: No visual changes;  No vertigo or throat pain   NECK: No pain or stiffness  RESPIRATORY: No cough, wheezing, hemoptysis; No shortness of breath  CARDIOVASCULAR: No chest pain or palpitations  GASTROINTESTINAL: No abdominal or epigastric pain. No nausea, vomiting, or hematemesis; No diarrhea or constipation. No melena or hematochezia.  GENITOURINARY: No dysuria, frequency or hematuria  NEUROLOGICAL: No numbness or weakness  SKIN: No itching, rashes      Allergies    No Known Allergies    Intolerances      Vital Signs Last 24 Hrs  T(C): 36.3 (2021 11:56), Max: 37 (2021 20:22)  T(F): 97.3 (2021 11:56), Max: 98.6 (2021 20:22)  HR: 68 (2021 11:56) (64 - 120)  BP: 95/53 (2021 11:56) (95/53 - 104/51)  BP(mean): --  RR: 18 (2021 11:56) (18 - 19)  SpO2: 99% (2021 11:56) (96% - 100%)    MEDICATIONS  (STANDING):  aMIOdarone    Tablet 400 milliGRAM(s) Oral every 8 hours  aspirin  chewable 81 milliGRAM(s) Oral daily  atorvastatin 80 milliGRAM(s) Oral at bedtime  dextrose 40% Gel 15 Gram(s) Oral once  dextrose 5%. 1000 milliLiter(s) (50 mL/Hr) IV Continuous <Continuous>  dextrose 5%. 1000 milliLiter(s) (100 mL/Hr) IV Continuous <Continuous>  dextrose 50% Injectable 25 Gram(s) IV Push once  dextrose 50% Injectable 12.5 Gram(s) IV Push once  dextrose 50% Injectable 25 Gram(s) IV Push once  ferrous    sulfate 325 milliGRAM(s) Oral two times a day  glucagon  Injectable 1 milliGRAM(s) IntraMuscular once  heparin   Injectable 5000 Unit(s) SubCutaneous every 12 hours  influenza   Vaccine 0.5 milliLiter(s) IntraMuscular once  insulin lispro (ADMELOG) corrective regimen sliding scale   SubCutaneous three times a day before meals  insulin lispro (ADMELOG) corrective regimen sliding scale   SubCutaneous at bedtime  metoprolol tartrate 25 milliGRAM(s) Oral every 12 hours  pantoprazole    Tablet 40 milliGRAM(s) Oral before breakfast  piperacillin/tazobactam IVPB.. 3.375 Gram(s) IV Intermittent every 12 hours  prasugrel 10 milliGRAM(s) Oral daily      Exam-  General: NAD  Cardiac: s1s2, RRR, no murmur noted  Pulmonary: CTA b/l, no w/r/r, no use of accessory muscles  Gastrointestinal: soft abdomen, nontender, nondistended, + bowel sounds throughout  Extremities: 1+ pitting edema, severe discoloration of both feet   Neuro: A&Ox3, nonfocal           136  |  99  |  104<H>  ----------------------------<  98  4.2   |  21<L>  |  3.55<H>    Ca    10.0      2021 03:41  Phos  4.4       Mg     2.4         TPro  6.9  /  Alb  3.2<L>  /  TBili  1.7<H>  /  DBili  x   /  AST  29  /  ALT  29  /  AlkPhos  204<H>      I&O's Summary    2021 07:  -  2021 07:00  --------------------------------------------------------  IN: 1080 mL / OUT: 900 mL / NET: 180 mL    2021 07:  -  2021 16:01  --------------------------------------------------------  IN: 240 mL / OUT: 0 mL / NET: 240 mL              Assessment/Plan:  Mr Morelos is a 81 y/o male with DM, s/p CRT-D, CAD s/p CABG admitted with abdominal pain and acute on chronic kidney disease.  Found to have AS.  - [facility] cath report from 2018 was obtained  -- CABGx5 from  with LIMA- LAD and SVG sequential grafts to the left and right (with subsequent PCI to both vein grafts in )  -- in 2018, PCI to the SVG to D1  - CT scan demonstrated calcium score of 1826  - would avoid  stress in this patient with extensive CAD and severely depressed LVEF  - will discuss with team regarding next steps    MARIZOL Sequeira  [phone]

## 2021-02-23 LAB
ALBUMIN SERPL ELPH-MCNC: 3.1 G/DL — LOW (ref 3.3–5)
ALP SERPL-CCNC: 233 U/L — HIGH (ref 40–120)
ALT FLD-CCNC: 39 U/L — SIGNIFICANT CHANGE UP (ref 10–45)
ANION GAP SERPL CALC-SCNC: 16 MMOL/L — SIGNIFICANT CHANGE UP (ref 5–17)
AST SERPL-CCNC: 47 U/L — HIGH (ref 10–40)
BILIRUB SERPL-MCNC: 1.8 MG/DL — HIGH (ref 0.2–1.2)
BUN SERPL-MCNC: 108 MG/DL — HIGH (ref 7–23)
CALCIUM SERPL-MCNC: 9.8 MG/DL — SIGNIFICANT CHANGE UP (ref 8.4–10.5)
CHLORIDE SERPL-SCNC: 95 MMOL/L — LOW (ref 96–108)
CO2 SERPL-SCNC: 22 MMOL/L — SIGNIFICANT CHANGE UP (ref 22–31)
CREAT SERPL-MCNC: 3.81 MG/DL — HIGH (ref 0.5–1.3)
GLUCOSE BLDC GLUCOMTR-MCNC: 156 MG/DL — HIGH (ref 70–99)
GLUCOSE BLDC GLUCOMTR-MCNC: 172 MG/DL — HIGH (ref 70–99)
GLUCOSE BLDC GLUCOMTR-MCNC: 172 MG/DL — HIGH (ref 70–99)
GLUCOSE BLDC GLUCOMTR-MCNC: 183 MG/DL — HIGH (ref 70–99)
GLUCOSE SERPL-MCNC: 132 MG/DL — HIGH (ref 70–99)
HCT VFR BLD CALC: 32.3 % — LOW (ref 39–50)
HGB BLD-MCNC: 10.2 G/DL — LOW (ref 13–17)
MCHC RBC-ENTMCNC: 29.8 PG — SIGNIFICANT CHANGE UP (ref 27–34)
MCHC RBC-ENTMCNC: 31.6 GM/DL — LOW (ref 32–36)
MCV RBC AUTO: 94.4 FL — SIGNIFICANT CHANGE UP (ref 80–100)
NRBC # BLD: 0 /100 WBCS — SIGNIFICANT CHANGE UP (ref 0–0)
PLATELET # BLD AUTO: 195 K/UL — SIGNIFICANT CHANGE UP (ref 150–400)
POTASSIUM SERPL-MCNC: 4.6 MMOL/L — SIGNIFICANT CHANGE UP (ref 3.5–5.3)
POTASSIUM SERPL-SCNC: 4.6 MMOL/L — SIGNIFICANT CHANGE UP (ref 3.5–5.3)
PROT SERPL-MCNC: 6.9 G/DL — SIGNIFICANT CHANGE UP (ref 6–8.3)
RBC # BLD: 3.42 M/UL — LOW (ref 4.2–5.8)
RBC # FLD: 19.4 % — HIGH (ref 10.3–14.5)
SODIUM SERPL-SCNC: 133 MMOL/L — LOW (ref 135–145)
WBC # BLD: 10.71 K/UL — HIGH (ref 3.8–10.5)
WBC # FLD AUTO: 10.71 K/UL — HIGH (ref 3.8–10.5)

## 2021-02-23 PROCEDURE — 99233 SBSQ HOSP IP/OBS HIGH 50: CPT

## 2021-02-23 RX ORDER — SODIUM CHLORIDE 9 MG/ML
1000 INJECTION INTRAMUSCULAR; INTRAVENOUS; SUBCUTANEOUS
Refills: 0 | Status: COMPLETED | OUTPATIENT
Start: 2021-02-23 | End: 2021-02-23

## 2021-02-23 RX ADMIN — Medication 81 MILLIGRAM(S): at 12:09

## 2021-02-23 RX ADMIN — Medication 325 MILLIGRAM(S): at 05:36

## 2021-02-23 RX ADMIN — PRASUGREL 10 MILLIGRAM(S): 5 TABLET, FILM COATED ORAL at 12:09

## 2021-02-23 RX ADMIN — HEPARIN SODIUM 5000 UNIT(S): 5000 INJECTION INTRAVENOUS; SUBCUTANEOUS at 17:02

## 2021-02-23 RX ADMIN — Medication 25 MILLIGRAM(S): at 05:36

## 2021-02-23 RX ADMIN — ATORVASTATIN CALCIUM 80 MILLIGRAM(S): 80 TABLET, FILM COATED ORAL at 20:53

## 2021-02-23 RX ADMIN — Medication 325 MILLIGRAM(S): at 17:02

## 2021-02-23 RX ADMIN — HEPARIN SODIUM 5000 UNIT(S): 5000 INJECTION INTRAVENOUS; SUBCUTANEOUS at 05:37

## 2021-02-23 RX ADMIN — SODIUM CHLORIDE 50 MILLILITER(S): 9 INJECTION INTRAMUSCULAR; INTRAVENOUS; SUBCUTANEOUS at 12:08

## 2021-02-23 RX ADMIN — AMIODARONE HYDROCHLORIDE 400 MILLIGRAM(S): 400 TABLET ORAL at 05:36

## 2021-02-23 RX ADMIN — Medication 1: at 07:47

## 2021-02-23 RX ADMIN — AMIODARONE HYDROCHLORIDE 400 MILLIGRAM(S): 400 TABLET ORAL at 12:09

## 2021-02-23 RX ADMIN — PANTOPRAZOLE SODIUM 40 MILLIGRAM(S): 20 TABLET, DELAYED RELEASE ORAL at 05:37

## 2021-02-23 RX ADMIN — AMIODARONE HYDROCHLORIDE 400 MILLIGRAM(S): 400 TABLET ORAL at 20:53

## 2021-02-23 RX ADMIN — Medication 25 MILLIGRAM(S): at 17:02

## 2021-02-23 RX ADMIN — Medication 1: at 12:09

## 2021-02-23 RX ADMIN — PIPERACILLIN AND TAZOBACTAM 25 GRAM(S): 4; .5 INJECTION, POWDER, LYOPHILIZED, FOR SOLUTION INTRAVENOUS at 17:02

## 2021-02-23 RX ADMIN — Medication 1: at 17:02

## 2021-02-23 RX ADMIN — PIPERACILLIN AND TAZOBACTAM 25 GRAM(S): 4; .5 INJECTION, POWDER, LYOPHILIZED, FOR SOLUTION INTRAVENOUS at 03:48

## 2021-02-23 NOTE — DIETITIAN INITIAL EVALUATION ADULT. - CHIEF COMPLAINT
The patient is a 80y Male complaining of abnormal lab result. Per chart, pt is 80yoM with PMHx significant for CAD s/p CABG, T2DM, HTN, AICD, ICM, CKD, presents with volume overload and abdominal pain. Found with ascites in setting of acute on chronic HF. Diuretics currently on hold. Pt with severe AS, undergoing TAVR eval.

## 2021-02-23 NOTE — PROGRESS NOTE ADULT - ASSESSMENT
Abdominal pain.  - now resolved  - lfts downtrending   - Tolerating diet       Renal failure.   - nephrology input noted.   -  lasix on hold in setting of rising SCr

## 2021-02-23 NOTE — PROGRESS NOTE ADULT - SUBJECTIVE AND OBJECTIVE BOX
Subjective: Patient seen and examined. No new events except as noted.   FEELS OK   no cp or sob   i discussed his case with his wife yesterday at length on the phone.   Cath report from 2 years ago in [facility] obtained and reviewed.   REVIEW OF SYSTEMS:    CONSTITUTIONAL:+ weakness, fevers or chills  EYES/ENT: No visual changes;  No vertigo or throat pain   NECK: No pain or stiffness  RESPIRATORY: No cough, wheezing, hemoptysis; No shortness of breath  CARDIOVASCULAR: No chest pain or palpitations  GASTROINTESTINAL: No abdominal or epigastric pain. No nausea, vomiting, or hematemesis; No diarrhea or constipation. No melena or hematochezia.  GENITOURINARY: No dysuria, frequency or hematuria  NEUROLOGICAL: No numbness or weakness  SKIN: No itching, burning, rashes, or lesions   All other review of systems is negative unless indicated above.    MEDICATIONS:  MEDICATIONS  (STANDING):  aMIOdarone    Tablet 400 milliGRAM(s) Oral every 8 hours  aspirin  chewable 81 milliGRAM(s) Oral daily  atorvastatin 80 milliGRAM(s) Oral at bedtime  dextrose 40% Gel 15 Gram(s) Oral once  dextrose 5%. 1000 milliLiter(s) (50 mL/Hr) IV Continuous <Continuous>  dextrose 5%. 1000 milliLiter(s) (100 mL/Hr) IV Continuous <Continuous>  dextrose 50% Injectable 25 Gram(s) IV Push once  dextrose 50% Injectable 12.5 Gram(s) IV Push once  dextrose 50% Injectable 25 Gram(s) IV Push once  ferrous    sulfate 325 milliGRAM(s) Oral two times a day  glucagon  Injectable 1 milliGRAM(s) IntraMuscular once  heparin   Injectable 5000 Unit(s) SubCutaneous every 12 hours  influenza   Vaccine 0.5 milliLiter(s) IntraMuscular once  insulin lispro (ADMELOG) corrective regimen sliding scale   SubCutaneous three times a day before meals  insulin lispro (ADMELOG) corrective regimen sliding scale   SubCutaneous at bedtime  metoprolol tartrate 25 milliGRAM(s) Oral every 12 hours  pantoprazole    Tablet 40 milliGRAM(s) Oral before breakfast  piperacillin/tazobactam IVPB.. 3.375 Gram(s) IV Intermittent every 12 hours  prasugrel 10 milliGRAM(s) Oral daily      PHYSICAL EXAM:  T(C): 36.2 (02-23-21 @ 05:16), Max: 36.6 (02-22-21 @ 19:54)  HR: 66 (02-23-21 @ 05:16) (66 - 71)  BP: 101/59 (02-23-21 @ 05:16) (95/53 - 111/69)  RR: 18 (02-23-21 @ 05:16) (18 - 18)  SpO2: 95% (02-23-21 @ 05:16) (95% - 99%)  Wt(kg): --  I&O's Summary    22 Feb 2021 07:01  -  23 Feb 2021 07:00  --------------------------------------------------------  IN: 360 mL / OUT: 300 mL / NET: 60 mL          Appearance: NAD	  HEENT:  Dry oral mucosa, PERRL, EOMI	  Lymphatic: No lymphadenopathy , no edema  Cardiovascular: Normal S1 S2, No JVD, + murmurs , Peripheral pulses palpable 2+ bilaterally  Respiratory: decreased bs   Gastrointestinal:  Soft, Non-tender, + BS	  Skin: No rashes, No ecchymoses, No cyanosis, warm to touch  Musculoskeletal: Normal range of motion, normal strength  Psychiatry: more awake and alert   Ext: No edema      LABS:    CARDIAC MARKERS:                                10.2   10.71 )-----------( 195      ( 23 Feb 2021 06:58 )             32.3     02-23    133<L>  |  95<L>  |  108<H>  ----------------------------<  132<H>  4.6   |  22  |  3.81<H>    Ca    9.8      23 Feb 2021 06:58  Phos  4.4     02-22  Mg     2.4     02-22    TPro  6.9  /  Alb  3.1<L>  /  TBili  1.8<H>  /  DBili  x   /  AST  47<H>  /  ALT  39  /  AlkPhos  233<H>  02-23    proBNP:   Lipid Profile:   HgA1c:   TSH:          TELEMETRY: 	AV paced, V paced, PVCs    ECG:  	  RADIOLOGY:   DIAGNOSTIC TESTING:  [ ] Echocardiogram:  [ ]  Catheterization:  [ ] Stress Test:    OTHER:

## 2021-02-23 NOTE — PROGRESS NOTE ADULT - ASSESSMENT
79 yo male w h/o ICM< CAD< s/p CABG, has AICD, DM, HTN presents w volume overload and abd pain  Abd sono shows gallstones, but no sign of cholecystitis/pancreatitis. abd pain resolved on Zosyn,   will cont empirically for 7 days .  ID following    ascites 2/2 systolic chf, probably worse 2/2 acute on chronic systolic chf.  responded well to lasix. now held due to rise creat.   renal following    on interrogation of ICD, has a lof of arrhythmia burden,   NSVT and sustained VT, seen by EPS, cont  [id number] mg  bid. has inoperable ischemic HD,   ICM, EF 15%, severe AS, seen by structural heart for TAVR eval. prob a high risk and poor candidate 2/2 low EF, advanced CKD and overall  frail general  medical condition. structural heart following.  had heart CT.  f/u plan with structural heart team      dvt ppx w [facility]

## 2021-02-23 NOTE — DIETITIAN INITIAL EVALUATION ADULT. - PERTINENT LABORATORY DATA
02-23 @ 06:58: Na 133<L>, <H>, Cr 3.81<H>, <H>, K+ 4.6, Alk Phos 233<H>, ALT/SGPT 39, AST/SGOT 47<H>  02-18: HbA1c 6.2%    CAPILLARY BLOOD GLUCOSE  POCT Blood Glucose.: 183 mg/dL (23 Feb 2021 11:34)  POCT Blood Glucose.: 172 mg/dL (23 Feb 2021 07:39)  POCT Blood Glucose.: 173 mg/dL (22 Feb 2021 20:55)  POCT Blood Glucose.: 134 mg/dL (22 Feb 2021 16:26)

## 2021-02-23 NOTE — DIETITIAN INITIAL EVALUATION ADULT. - PERTINENT MEDS FT
MEDICATIONS  (STANDING):  aMIOdarone    Tablet 400 milliGRAM(s) Oral every 8 hours  aspirin  chewable 81 milliGRAM(s) Oral daily  atorvastatin 80 milliGRAM(s) Oral at bedtime  dextrose 40% Gel 15 Gram(s) Oral once  dextrose 5%. 1000 milliLiter(s) (50 mL/Hr) IV Continuous <Continuous>  dextrose 5%. 1000 milliLiter(s) (100 mL/Hr) IV Continuous <Continuous>  dextrose 50% Injectable 25 Gram(s) IV Push once  dextrose 50% Injectable 12.5 Gram(s) IV Push once  dextrose 50% Injectable 25 Gram(s) IV Push once  ferrous    sulfate 325 milliGRAM(s) Oral two times a day  glucagon  Injectable 1 milliGRAM(s) IntraMuscular once  heparin   Injectable 5000 Unit(s) SubCutaneous every 12 hours  influenza   Vaccine 0.5 milliLiter(s) IntraMuscular once  insulin lispro (ADMELOG) corrective regimen sliding scale   SubCutaneous three times a day before meals  insulin lispro (ADMELOG) corrective regimen sliding scale   SubCutaneous at bedtime  metoprolol tartrate 25 milliGRAM(s) Oral every 12 hours  pantoprazole    Tablet 40 milliGRAM(s) Oral before breakfast  piperacillin/tazobactam IVPB.. 3.375 Gram(s) IV Intermittent every 12 hours  prasugrel 10 milliGRAM(s) Oral daily  sodium chloride 0.9%. 1000 milliLiter(s) (50 mL/Hr) IV Continuous <Continuous>

## 2021-02-23 NOTE — DIETITIAN INITIAL EVALUATION ADULT. - ORAL INTAKE PTA/DIET HISTORY
Pt wife reports overall pt with good appetite at home and eats well. Pt wife does the cooking at home and she provides pt low sodium, heart healthy and low sugar meals. Pt wife does not cook with salt, no take out foods. No chewing/swallowing difficulties reported. NKFA reported.

## 2021-02-23 NOTE — PROGRESS NOTE ADULT - SUBJECTIVE AND OBJECTIVE BOX
NEPHROLOGY         Patient seen and examined. Pt without complaints of pain or sob, in no acute distress. No overnight events noted.     MEDICATIONS  (STANDING):  aMIOdarone    Tablet 400 milliGRAM(s) Oral every 8 hours  aspirin  chewable 81 milliGRAM(s) Oral daily  atorvastatin 80 milliGRAM(s) Oral at bedtime  dextrose 40% Gel 15 Gram(s) Oral once  dextrose 5%. 1000 milliLiter(s) (50 mL/Hr) IV Continuous <Continuous>  dextrose 5%. 1000 milliLiter(s) (100 mL/Hr) IV Continuous <Continuous>  dextrose 50% Injectable 25 Gram(s) IV Push once  dextrose 50% Injectable 12.5 Gram(s) IV Push once  dextrose 50% Injectable 25 Gram(s) IV Push once  ferrous    sulfate 325 milliGRAM(s) Oral two times a day  glucagon  Injectable 1 milliGRAM(s) IntraMuscular once  heparin   Injectable 5000 Unit(s) SubCutaneous every 12 hours  influenza   Vaccine 0.5 milliLiter(s) IntraMuscular once  insulin lispro (ADMELOG) corrective regimen sliding scale   SubCutaneous three times a day before meals  insulin lispro (ADMELOG) corrective regimen sliding scale   SubCutaneous at bedtime  metoprolol tartrate 25 milliGRAM(s) Oral every 12 hours  pantoprazole    Tablet 40 milliGRAM(s) Oral before breakfast  piperacillin/tazobactam IVPB.. 3.375 Gram(s) IV Intermittent every 12 hours  prasugrel 10 milliGRAM(s) Oral daily    VITALS:  T(C): , Max: 36.6 (21 @ 19:54)  T(F): , Max: 97.9 (21 @ 19:54)  HR: 66 (21 @ 05:16)  BP: 101/59 (21 @ 05:16)  RR: 18 (21 @ 05:16)  SpO2: 95% (21 @ 05:16)    I and O's:     @ 07:01  -   @ 07:00  --------------------------------------------------------  IN: 360 mL / OUT: 300 mL / NET: 60 mL    PHYSICAL EXAM:  Constitutional: NAD, Alert  HEENT: NCAT, DMM  Neck: Supple, no JVD  Respiratory: CTA-b/l  Cardiovascular: RRR s1s2, no m/r/g  Gastrointestinal: BS+, soft, NT; (+)mild distension  Extremities: 1+ b/l LE edema  Neurological: no focal deficits; strength grossly intact  Back: no CVAT b/l  Skin: No rashes, no nevi    LABS:                        10.2   10.71 )-----------( 195      ( 2021 06:58 )             32.3         133<L>  |  95<L>  |  108<H>  ----------------------------<  132<H>  4.6   |  22  |  3.81<H>    Ca    9.8      2021 06:58  Phos  4.4       Mg     2.4         TPro  6.9  /  Alb  3.1<L>  /  TBili  1.8<H>  /  DBili  x   /  AST  47<H>  /  ALT  39  /  AlkPhos  233<H>      Urine Studies:  Urinalysis Basic - ( 2021 12:04 )    Color: Yellow / Appearance: Clear / S.019 / pH: x  Gluc: x / Ketone: Negative  / Bili: Negative / Urobili: Negative   Blood: x / Protein: Trace / Nitrite: Negative   Leuk Esterase: Negative / RBC: 5 /hpf / WBC 1 /HPF   Sq Epi: x / Non Sq Epi: 1 /hpf / Bacteria: Negative    Sodium, Random Urine: <35 mmol/L ( @ 12:04)  Potassium, Random Urine: 39 mmol/L ( @ 12:04)  Chloride, Random Urine: <35 mmol/L ( @ 12:04)  Creatinine, Random Urine: 91 mg/dL ( @ 12:04)

## 2021-02-23 NOTE — PROGRESS NOTE ADULT - SUBJECTIVE AND OBJECTIVE BOX
INTERVAL HPI/OVERNIGHT EVENTS:    overnight events noted  no acute GI complaints     MEDICATIONS  (STANDING):  aMIOdarone    Tablet 400 milliGRAM(s) Oral every 8 hours  aspirin  chewable 81 milliGRAM(s) Oral daily  atorvastatin 80 milliGRAM(s) Oral at bedtime  dextrose 40% Gel 15 Gram(s) Oral once  dextrose 5%. 1000 milliLiter(s) (50 mL/Hr) IV Continuous <Continuous>  dextrose 5%. 1000 milliLiter(s) (100 mL/Hr) IV Continuous <Continuous>  dextrose 50% Injectable 25 Gram(s) IV Push once  dextrose 50% Injectable 12.5 Gram(s) IV Push once  dextrose 50% Injectable 25 Gram(s) IV Push once  ferrous    sulfate 325 milliGRAM(s) Oral two times a day  glucagon  Injectable 1 milliGRAM(s) IntraMuscular once  heparin   Injectable 5000 Unit(s) SubCutaneous every 12 hours  influenza   Vaccine 0.5 milliLiter(s) IntraMuscular once  insulin lispro (ADMELOG) corrective regimen sliding scale   SubCutaneous three times a day before meals  insulin lispro (ADMELOG) corrective regimen sliding scale   SubCutaneous at bedtime  metoprolol tartrate 25 milliGRAM(s) Oral every 12 hours  pantoprazole    Tablet 40 milliGRAM(s) Oral before breakfast  piperacillin/tazobactam IVPB.. 3.375 Gram(s) IV Intermittent every 12 hours  prasugrel 10 milliGRAM(s) Oral daily  sodium chloride 0.9%. 1000 milliLiter(s) (50 mL/Hr) IV Continuous <Continuous>    MEDICATIONS  (PRN):      Allergies    No Known Allergies    Intolerances        Review of Systems:    General:  No wt loss, fevers, chills, night sweats,fatigue,   Eyes:  Good vision, no reported pain  ENT:  No sore throat, pain, runny nose, dysphagia  CV:  No pain, palpitatioins, hypo/hypertension  Resp:  No dyspnea, cough, tachypnea, wheezing  GI:  No pain, No nausea, No vomiting, No diarrhea, No constipatiion, No weight loss, No fever, No pruritis, No rectal bleeding, No tarry stools, No dysphagia,  :  No pain, bleeding, incontinence, nocturia  Muscle:  No pain, weakness  Neuro:  No weakness, tingling, memory problems  Psych:  No fatigue, insomnia, mood problems, depression  Endocrine:  No polyuria, polydypsia, cold/heat intolerance  Heme:  No petechiae, ecchymosis, easy bruisability  Skin:  No rash, tattoos, scars, edema      Vital Signs Last 24 Hrs  T(C): 36.2 (23 Feb 2021 12:18), Max: 36.6 (22 Feb 2021 19:54)  T(F): 97.2 (23 Feb 2021 12:18), Max: 97.9 (22 Feb 2021 19:54)  HR: 62 (23 Feb 2021 12:18) (62 - 71)  BP: 100/49 (23 Feb 2021 12:18) (100/49 - 111/69)  BP(mean): --  RR: 18 (23 Feb 2021 12:18) (18 - 18)  SpO2: 99% (23 Feb 2021 12:18) (95% - 99%)    PHYSICAL EXAM:    GENERAL:  no distress  HEENT:  NC/AT  ABDOMEN:  Soft, non-tender, non-distended  EXTEREMITIES:  +bilateral LE edema   NEURO:  Alert, oriented, no asterixis, no tremor, no encephalopathy      LABS:                        10.2   10.71 )-----------( 195      ( 23 Feb 2021 06:58 )             32.3     02-23    133<L>  |  95<L>  |  108<H>  ----------------------------<  132<H>  4.6   |  22  |  3.81<H>    Ca    9.8      23 Feb 2021 06:58  Phos  4.4     02-22  Mg     2.4     02-22    TPro  6.9  /  Alb  3.1<L>  /  TBili  1.8<H>  /  DBili  x   /  AST  47<H>  /  ALT  39  /  AlkPhos  233<H>  02-23          RADIOLOGY & ADDITIONAL TESTS:

## 2021-02-23 NOTE — DIETITIAN INITIAL EVALUATION ADULT. - OTHER INFO
Pt wife reports pt SMBG x1-2/daily with usual BG values  mg/dL and no hypoglycemia reported. Pt was taking glimepiride and metformin for BG management PTA. HgbA1c obtained on current admission 6.2% indicative of adequate BG control PTA.    Nutrition Supplements PTA: omega-3, folic acid, "vitamin B"    Pt UBW: unknown. Pt was not weighing himself regularly at home. Pt wife thinks he lost wt over the past two years secondary to "cardiac issues," however she is unable to quantify wt loss, nor is she able to state definite time frame. Pt wife does not know what pt weighed PTA.  Pt dosing wt noted as 164.9 lbs and wt obtained on 2/20 noted as 154.1 lbs. Suspect fluid shifts contributing to wt change noted in setting of diuresis, will continue to monitor.    Pt noted with good po intake on current admission, per nursing flow sheet consuming >75% of meals.  Pt has no c/o nausea, vomiting, diarrhea, or constipation noted.     Nutrition education provided to pt wife at this time: discussed heart failure nutrition recommendations as well as importance of adequate protein-energy intake if pt did indeed lost wt unintentionally. Also discussed recommendations for CKD, including high potassium foods to monitor in setting of hyperkalemia noted on admission. Of note, pt lytes now WNL.

## 2021-02-23 NOTE — PROGRESS NOTE ADULT - ASSESSMENT
IMPRESSION: 80M w/ DM2, CAD-CABG, and CKD, 2/16/21 a/w azotemia/lyte derangements/weakness    (1)Renal - CALVIN on CKD4 - cardiorenal? Not antibiotic-mediated CALVIN, based on urine studies. Creatinine slightly higher     (2)Hyperkalemia - resolved    (3)Hyponatremia - improved relative to admission though slightly lower compared to yesterday     (4)CV - tenuous hemodynamics/hypervolemia - biventricular systolic dysfunction, severe AS, mod-sev AR. Structural Heart input appreciated; being evaluated for TAVR;    RECOMMEND:  (1)Give NS 50cc x 12 hours   (2)No diuretics for now  (3)Can continue Zosyn as ordered for now  (4)BMP+Mg+PO4 daily    D/w Dr. Jose Nevarez, NP-C  [facility]   [phone]

## 2021-02-23 NOTE — DIETITIAN INITIAL EVALUATION ADULT. - REASON INDICATOR FOR ASSESSMENT
Pt seen for routine length of stay  Information obtained from: pt wife via phone, electronic medical record (pt Polish speaking, visited at bedside and offered  services, however pt too tired and defers interview to his wife)

## 2021-02-23 NOTE — DIETITIAN INITIAL EVALUATION ADULT. - ADD RECOMMEND
1. Recommend continue DASH/TLC and Consistent Carbohydrate therapeutic diet as indicated, fluids per team 2. Provide/review nutrition education as indicated 3. Recommend daily weight monitoring 4. Will continue to monitor nutrient intake, wt, labs, f/u per protocol

## 2021-02-23 NOTE — PROGRESS NOTE ADULT - SUBJECTIVE AND OBJECTIVE BOX
Structural Heart Team    Pt denies active chest pain/pressure, sob and dizziness.  No acute events overnight.         REVIEW OF SYSTEMS:    CONSTITUTIONAL: No weakness, fevers or chills  EYES/ENT: No visual changes;  No vertigo or throat pain   NECK: No pain or stiffness  RESPIRATORY: No cough, wheezing, hemoptysis; No shortness of breath  CARDIOVASCULAR: No chest pain or palpitations  GASTROINTESTINAL: No abdominal or epigastric pain. No nausea, vomiting, or hematemesis; No diarrhea or constipation. No melena or hematochezia.  GENITOURINARY: No dysuria, frequency or hematuria  NEUROLOGICAL: No numbness or weakness  SKIN: No itching, rashes      Allergies    No Known Allergies    Intolerances      Vital Signs Last 24 Hrs  T(C): 36.2 (23 Feb 2021 12:18), Max: 36.6 (22 Feb 2021 19:54)  T(F): 97.2 (23 Feb 2021 12:18), Max: 97.9 (22 Feb 2021 19:54)  HR: 62 (23 Feb 2021 12:18) (62 - 71)  BP: 100/49 (23 Feb 2021 12:18) (100/49 - 111/69)  BP(mean): --  RR: 18 (23 Feb 2021 12:18) (18 - 18)  SpO2: 99% (23 Feb 2021 12:18) (95% - 99%)    MEDICATIONS  (STANDING):  aMIOdarone    Tablet 400 milliGRAM(s) Oral every 8 hours  aspirin  chewable 81 milliGRAM(s) Oral daily  atorvastatin 80 milliGRAM(s) Oral at bedtime  dextrose 40% Gel 15 Gram(s) Oral once  dextrose 5%. 1000 milliLiter(s) (50 mL/Hr) IV Continuous <Continuous>  dextrose 5%. 1000 milliLiter(s) (100 mL/Hr) IV Continuous <Continuous>  dextrose 50% Injectable 25 Gram(s) IV Push once  dextrose 50% Injectable 12.5 Gram(s) IV Push once  dextrose 50% Injectable 25 Gram(s) IV Push once  ferrous    sulfate 325 milliGRAM(s) Oral two times a day  glucagon  Injectable 1 milliGRAM(s) IntraMuscular once  heparin   Injectable 5000 Unit(s) SubCutaneous every 12 hours  influenza   Vaccine 0.5 milliLiter(s) IntraMuscular once  insulin lispro (ADMELOG) corrective regimen sliding scale   SubCutaneous three times a day before meals  insulin lispro (ADMELOG) corrective regimen sliding scale   SubCutaneous at bedtime  metoprolol tartrate 25 milliGRAM(s) Oral every 12 hours  pantoprazole    Tablet 40 milliGRAM(s) Oral before breakfast  piperacillin/tazobactam IVPB.. 3.375 Gram(s) IV Intermittent every 12 hours  prasugrel 10 milliGRAM(s) Oral daily  sodium chloride 0.9%. 1000 milliLiter(s) (50 mL/Hr) IV Continuous <Continuous>      Exam-  General: NAD  Cardiac: s1s2, RRR, no murmur noted  Pulmonary: CTA b/l, no w/r/r, no use of accessory muscles  Gastrointestinal: soft abdomen, nontender, nondistended, + bowel sounds throughout  Extremities: 1+ pitting edema, severe discoloration of both feet   Neuro: A&Ox3, nonfocal                           10.2   10.71 )-----------( 195      ( 23 Feb 2021 06:58 )             32.3   02-23    133<L>  |  95<L>  |  108<H>  ----------------------------<  132<H>  4.6   |  22  |  3.81<H>    Ca    9.8      23 Feb 2021 06:58  Phos  4.4     02-22  Mg     2.4     02-22    TPro  6.9  /  Alb  3.1<L>  /  TBili  1.8<H>  /  DBili  x   /  AST  47<H>  /  ALT  39  /  AlkPhos  233<H>  02-23    I&O's Summary    22 Feb 2021 07:01  -  23 Feb 2021 07:00  --------------------------------------------------------  IN: 360 mL / OUT: 300 mL / NET: 60 mL    23 Feb 2021 07:01  -  23 Feb 2021 16:17  --------------------------------------------------------  IN: 240 mL / OUT: 0 mL / NET: 240 mL              Assessment/Plan:  Mr Morelos is a 79 y/o male with DM, s/p CRT-D, CAD s/p CABG admitted with abdominal pain and acute on chronic kidney disease.  Found to have AS.  - calcium score was suggestive of moderate/severe AS  - TAVR workup without cardiac CTA would be a difficult process including GOLDY for valve sizing, and then possibly IVUS of the ileo-femoral arteries to assess the suitability for transfemoral approach  -- if hemodialysis is being considered, we would just proceed with cardiac CTA  - I spoke with his wife who explained that he was quite active and independent up until a few months ago, then he "slowed down"  - discussion needs to be had, also, regarding the fact that TAVR may not provide him with much symptomatic relief given his severely depressed EF and his extensive history of CAD    MARIZOL Sequeira  [phone]

## 2021-02-23 NOTE — PROGRESS NOTE ADULT - SUBJECTIVE AND OBJECTIVE BOX
covering for DR. Rosario      Patient is a 80y old  Male who presents with a chief complaint of acute renal failure (22 Feb 2021 10:50)      SUBJECTIVE / OVERNIGHT EVENTS:  no acute events o/n     MEDICATIONS  (STANDING):  aMIOdarone    Tablet 400 milliGRAM(s) Oral every 8 hours  aspirin  chewable 81 milliGRAM(s) Oral daily  atorvastatin 80 milliGRAM(s) Oral at bedtime  dextrose 40% Gel 15 Gram(s) Oral once  dextrose 5%. 1000 milliLiter(s) (50 mL/Hr) IV Continuous <Continuous>  dextrose 5%. 1000 milliLiter(s) (100 mL/Hr) IV Continuous <Continuous>  dextrose 50% Injectable 25 Gram(s) IV Push once  dextrose 50% Injectable 12.5 Gram(s) IV Push once  dextrose 50% Injectable 25 Gram(s) IV Push once  ferrous    sulfate 325 milliGRAM(s) Oral two times a day  glucagon  Injectable 1 milliGRAM(s) IntraMuscular once  heparin   Injectable 5000 Unit(s) SubCutaneous every 12 hours  influenza   Vaccine 0.5 milliLiter(s) IntraMuscular once  insulin lispro (ADMELOG) corrective regimen sliding scale   SubCutaneous three times a day before meals  insulin lispro (ADMELOG) corrective regimen sliding scale   SubCutaneous at bedtime  metoprolol tartrate 25 milliGRAM(s) Oral every 12 hours  pantoprazole    Tablet 40 milliGRAM(s) Oral before breakfast  piperacillin/tazobactam IVPB.. 3.375 Gram(s) IV Intermittent every 12 hours  prasugrel 10 milliGRAM(s) Oral daily  sodium chloride 0.9%. 1000 milliLiter(s) (50 mL/Hr) IV Continuous <Continuous>    MEDICATIONS  (PRN):      Vital Signs Last 24 Hrs  T(C): 36.2 (23 Feb 2021 12:18), Max: 36.6 (22 Feb 2021 19:54)  T(F): 97.2 (23 Feb 2021 12:18), Max: 97.9 (22 Feb 2021 19:54)  HR: 62 (23 Feb 2021 12:18) (62 - 71)  BP: 100/49 (23 Feb 2021 12:18) (100/49 - 111/69)  BP(mean): --  RR: 18 (23 Feb 2021 12:18) (18 - 18)  SpO2: 99% (23 Feb 2021 12:18) (95% - 99%)        PHYSICAL EXAM:  GENERAL: NAD, well-developed  HEAD:  Atraumatic, Normocephalic  EYES: EOMI, PERRLA, conjunctiva and sclera clear  NECK: Supple, No JVD  CHEST/LUNG: Clear to auscultation bilaterally; No wheeze  HEART: Regular rate and rhythm;    ABDOMEN: Soft, Nontender, Nondistended; Bowel sounds present  EXTREMITIES:  2+ Peripheral Pulses, No clubbing, cyanosis, or edema  PSYCH: AAOx3  NEUROLOGY: non-focal  SKIN: No rashes or lesions    LABS:                            10.2   10.71 )-----------( 195      ( 23 Feb 2021 06:58 )             32.3   02-23    133<L>  |  95<L>  |  108<H>  ----------------------------<  132<H>  4.6   |  22  |  3.81<H>    Ca    9.8      23 Feb 2021 06:58  Phos  4.4     02-22  Mg     2.4     02-22    TPro  6.9  /  Alb  3.1<L>  /  TBili  1.8<H>  /  DBili  x   /  AST  47<H>  /  ALT  39  /  AlkPhos  233<H>  02-23      < from: TTE with Doppler (w/Cont) (02.18.21 @ 08:26) >  -----------------------------  Conclusions:  1. Calcified trileaflet aortic valve with decreased  opening. Peak transaortic valve gradient equals 31 mm Hg,  mean transaortic valve gradient equals 18 mm Hg, estimated  aortic valve area equals 0.9 sqcm (by continuity equation),  aortic valve velocity time integral equals 68 cm,  consistent with severe aortic stenosis. Moderate-severe  aortic regurgitation.  2. Eccentric left ventricular hypertrophy (dilated left  ventricle with normal relative wall thickness).  3. Severe global left ventricular systolic dysfunction.  Endocardial visualization enhanced with intravenous  injection of Ultrasonic Enhancing Agent (Definity).  4. A device wire is noted in the right heart.  5. Right ventricular enlargement with decreased right  ventricular systolic function.  6. Normal tricuspid valve. Moderate tricuspid  regurgitation.  *** No previous Echo exam.  ----------------------------------------------------------    < end of copied text >

## 2021-02-24 LAB
ANION GAP SERPL CALC-SCNC: 17 MMOL/L — SIGNIFICANT CHANGE UP (ref 5–17)
BUN SERPL-MCNC: 115 MG/DL — HIGH (ref 7–23)
CALCIUM SERPL-MCNC: 9.7 MG/DL — SIGNIFICANT CHANGE UP (ref 8.4–10.5)
CHLORIDE SERPL-SCNC: 97 MMOL/L — SIGNIFICANT CHANGE UP (ref 96–108)
CO2 SERPL-SCNC: 21 MMOL/L — LOW (ref 22–31)
CREAT SERPL-MCNC: 4.32 MG/DL — HIGH (ref 0.5–1.3)
GLUCOSE BLDC GLUCOMTR-MCNC: 143 MG/DL — HIGH (ref 70–99)
GLUCOSE BLDC GLUCOMTR-MCNC: 153 MG/DL — HIGH (ref 70–99)
GLUCOSE BLDC GLUCOMTR-MCNC: 199 MG/DL — HIGH (ref 70–99)
GLUCOSE BLDC GLUCOMTR-MCNC: 217 MG/DL — HIGH (ref 70–99)
GLUCOSE SERPL-MCNC: 136 MG/DL — HIGH (ref 70–99)
HCT VFR BLD CALC: 32.1 % — LOW (ref 39–50)
HGB BLD-MCNC: 10.6 G/DL — LOW (ref 13–17)
MAGNESIUM SERPL-MCNC: 2.3 MG/DL — SIGNIFICANT CHANGE UP (ref 1.6–2.6)
MCHC RBC-ENTMCNC: 30.7 PG — SIGNIFICANT CHANGE UP (ref 27–34)
MCHC RBC-ENTMCNC: 33 GM/DL — SIGNIFICANT CHANGE UP (ref 32–36)
MCV RBC AUTO: 93 FL — SIGNIFICANT CHANGE UP (ref 80–100)
NRBC # BLD: 0 /100 WBCS — SIGNIFICANT CHANGE UP (ref 0–0)
PHOSPHATE SERPL-MCNC: 4.7 MG/DL — HIGH (ref 2.5–4.5)
PLATELET # BLD AUTO: 185 K/UL — SIGNIFICANT CHANGE UP (ref 150–400)
POTASSIUM SERPL-MCNC: 4.6 MMOL/L — SIGNIFICANT CHANGE UP (ref 3.5–5.3)
POTASSIUM SERPL-SCNC: 4.6 MMOL/L — SIGNIFICANT CHANGE UP (ref 3.5–5.3)
RBC # BLD: 3.45 M/UL — LOW (ref 4.2–5.8)
RBC # FLD: 19.9 % — HIGH (ref 10.3–14.5)
SODIUM SERPL-SCNC: 135 MMOL/L — SIGNIFICANT CHANGE UP (ref 135–145)
WBC # BLD: 9.99 K/UL — SIGNIFICANT CHANGE UP (ref 3.8–10.5)
WBC # FLD AUTO: 9.99 K/UL — SIGNIFICANT CHANGE UP (ref 3.8–10.5)

## 2021-02-24 RX ORDER — MIDODRINE HYDROCHLORIDE 2.5 MG/1
10 TABLET ORAL THREE TIMES A DAY
Refills: 0 | Status: DISCONTINUED | OUTPATIENT
Start: 2021-02-24 | End: 2021-02-25

## 2021-02-24 RX ORDER — SODIUM CHLORIDE 9 MG/ML
1000 INJECTION INTRAMUSCULAR; INTRAVENOUS; SUBCUTANEOUS
Refills: 0 | Status: DISCONTINUED | OUTPATIENT
Start: 2021-02-24 | End: 2021-02-25

## 2021-02-24 RX ADMIN — HEPARIN SODIUM 5000 UNIT(S): 5000 INJECTION INTRAVENOUS; SUBCUTANEOUS at 17:56

## 2021-02-24 RX ADMIN — MIDODRINE HYDROCHLORIDE 10 MILLIGRAM(S): 2.5 TABLET ORAL at 17:56

## 2021-02-24 RX ADMIN — Medication 1: at 16:27

## 2021-02-24 RX ADMIN — ATORVASTATIN CALCIUM 80 MILLIGRAM(S): 80 TABLET, FILM COATED ORAL at 21:00

## 2021-02-24 RX ADMIN — PANTOPRAZOLE SODIUM 40 MILLIGRAM(S): 20 TABLET, DELAYED RELEASE ORAL at 04:53

## 2021-02-24 RX ADMIN — PIPERACILLIN AND TAZOBACTAM 25 GRAM(S): 4; .5 INJECTION, POWDER, LYOPHILIZED, FOR SOLUTION INTRAVENOUS at 04:52

## 2021-02-24 RX ADMIN — AMIODARONE HYDROCHLORIDE 400 MILLIGRAM(S): 400 TABLET ORAL at 04:54

## 2021-02-24 RX ADMIN — SODIUM CHLORIDE 50 MILLILITER(S): 9 INJECTION INTRAMUSCULAR; INTRAVENOUS; SUBCUTANEOUS at 00:10

## 2021-02-24 RX ADMIN — AMIODARONE HYDROCHLORIDE 400 MILLIGRAM(S): 400 TABLET ORAL at 21:00

## 2021-02-24 RX ADMIN — Medication 325 MILLIGRAM(S): at 17:55

## 2021-02-24 RX ADMIN — PRASUGREL 10 MILLIGRAM(S): 5 TABLET, FILM COATED ORAL at 12:11

## 2021-02-24 RX ADMIN — PIPERACILLIN AND TAZOBACTAM 25 GRAM(S): 4; .5 INJECTION, POWDER, LYOPHILIZED, FOR SOLUTION INTRAVENOUS at 16:27

## 2021-02-24 RX ADMIN — HEPARIN SODIUM 5000 UNIT(S): 5000 INJECTION INTRAVENOUS; SUBCUTANEOUS at 04:52

## 2021-02-24 RX ADMIN — Medication 25 MILLIGRAM(S): at 04:53

## 2021-02-24 RX ADMIN — AMIODARONE HYDROCHLORIDE 400 MILLIGRAM(S): 400 TABLET ORAL at 14:05

## 2021-02-24 RX ADMIN — SODIUM CHLORIDE 100 MILLILITER(S): 9 INJECTION INTRAMUSCULAR; INTRAVENOUS; SUBCUTANEOUS at 16:14

## 2021-02-24 RX ADMIN — Medication 81 MILLIGRAM(S): at 12:11

## 2021-02-24 RX ADMIN — Medication 1: at 12:10

## 2021-02-24 RX ADMIN — Medication 325 MILLIGRAM(S): at 04:53

## 2021-02-24 NOTE — PROGRESS NOTE ADULT - SUBJECTIVE AND OBJECTIVE BOX
Subjective: Patient seen and examined. No new events except as noted.   Seen this am    no cp or sob \    REVIEW OF SYSTEMS:    CONSTITUTIONAL: +weakness, fevers or chills  EYES/ENT: No visual changes;  No vertigo or throat pain   NECK: No pain or stiffness  RESPIRATORY: No cough, wheezing, hemoptysis; No shortness of breath  CARDIOVASCULAR: No chest pain or palpitations  GASTROINTESTINAL: No abdominal or epigastric pain. No nausea, vomiting, or hematemesis; No diarrhea or constipation. No melena or hematochezia.  GENITOURINARY: No dysuria, frequency or hematuria  NEUROLOGICAL: No numbness or weakness  SKIN: No itching, burning, rashes, or lesions   All other review of systems is negative unless indicated above.    MEDICATIONS:  MEDICATIONS  (STANDING):  aMIOdarone    Tablet 400 milliGRAM(s) Oral every 8 hours  aspirin  chewable 81 milliGRAM(s) Oral daily  atorvastatin 80 milliGRAM(s) Oral at bedtime  dextrose 40% Gel 15 Gram(s) Oral once  dextrose 5%. 1000 milliLiter(s) (50 mL/Hr) IV Continuous <Continuous>  dextrose 5%. 1000 milliLiter(s) (100 mL/Hr) IV Continuous <Continuous>  dextrose 50% Injectable 25 Gram(s) IV Push once  dextrose 50% Injectable 12.5 Gram(s) IV Push once  dextrose 50% Injectable 25 Gram(s) IV Push once  ferrous    sulfate 325 milliGRAM(s) Oral two times a day  glucagon  Injectable 1 milliGRAM(s) IntraMuscular once  heparin   Injectable 5000 Unit(s) SubCutaneous every 12 hours  influenza   Vaccine 0.5 milliLiter(s) IntraMuscular once  insulin lispro (ADMELOG) corrective regimen sliding scale   SubCutaneous three times a day before meals  insulin lispro (ADMELOG) corrective regimen sliding scale   SubCutaneous at bedtime  metoprolol tartrate 25 milliGRAM(s) Oral every 12 hours  midodrine. 10 milliGRAM(s) Oral three times a day  pantoprazole    Tablet 40 milliGRAM(s) Oral before breakfast  piperacillin/tazobactam IVPB.. 3.375 Gram(s) IV Intermittent every 12 hours  prasugrel 10 milliGRAM(s) Oral daily  sodium chloride 0.9%. 1000 milliLiter(s) (100 mL/Hr) IV Continuous <Continuous>      PHYSICAL EXAM:  T(C): 36.5 (02-24-21 @ 12:09), Max: 36.7 (02-24-21 @ 04:50)  HR: 53 (02-24-21 @ 17:54) (53 - 65)  BP: 91/53 (02-24-21 @ 17:54) (91/53 - 99/64)  RR: 18 (02-24-21 @ 12:09) (18 - 18)  SpO2: 98% (02-24-21 @ 12:09) (95% - 98%)  Wt(kg): --  I&O's Summary    23 Feb 2021 07:01  -  24 Feb 2021 07:00  --------------------------------------------------------  IN: 360 mL / OUT: 300 mL / NET: 60 mL    24 Feb 2021 07:01  -  24 Feb 2021 19:37  --------------------------------------------------------  IN: 480 mL / OUT: 0 mL / NET: 480 mL        Appearance: NAD	  HEENT:  Dry oral mucosa, PERRL, EOMI	  Lymphatic: No lymphadenopathy , no edema  Cardiovascular: Normal S1 S2, No JVD, + murmurs , Peripheral pulses palpable 2+ bilaterally  Respiratory: decreased bs   Gastrointestinal:  Soft, Non-tender, + BS	  Skin: No rashes, No ecchymoses, No cyanosis, warm to touch  Musculoskeletal: Normal range of motion, normal strength  Psychiatry: more awake and alert   Ext: No edema          LABS:    CARDIAC MARKERS:                                10.6   9.99  )-----------( 185      ( 24 Feb 2021 06:55 )             32.1     02-24    135  |  97  |  115<H>  ----------------------------<  136<H>  4.6   |  21<L>  |  4.32<H>    Ca    9.7      24 Feb 2021 06:55  Phos  4.7     02-24  Mg     2.3     02-24    TPro  6.9  /  Alb  3.1<L>  /  TBili  1.8<H>  /  DBili  x   /  AST  47<H>  /  ALT  39  /  AlkPhos  233<H>  02-23    proBNP:   Lipid Profile:   HgA1c:   TSH:             TELEMETRY: 	AV paced     ECG:  	  RADIOLOGY: < from: CT Heart Calcium Scoring (02.22.21 @ 09:56) >    EXAM:  CT HEART CALCIUM SCORE                            PROCEDURE DATE:  02/22/2021            INTERPRETATION:  Indication:  Aortic stenosis    History:  Mr. Morelos is a 80-year-old man with possible low-flow low-gradient aortic stenosis.    Scanner:  [facility] [employer]    Acquisition:  Non-contrast prospectively-gated 320-multidetector volumetric computed tomography heart    Quality:  Good    Post-processing:  Three-dimensional volume-rendered and maximal intensity projection images reconstructed with GeoMea software.    FINDINGS:    Cardiovascular:  A biventricular implantable cardioverter-defibrillator is present in the left chest wall.  Device wires terminate in the right atrial appendage, right ventricle and left marginal coronary vein.    Median sternotomy wires as surgical clips noted.    Aortic valve  Moderately to severely calcified (Agatston calcium score 1826).    Mitral valve  There is mild mitral annular calcification.    Aorta  Ascending aorta measures approximately 40 mm at the level of the right pulmonary artery (double oblique short-axis plane in mid diastole).  No significant change in the caliber of the ascending aorta compared to computed tomography 2.17.21.    Moderate calcified plaque is present in the visualized thoracic aorta.    Pulmonary artery  The main pulmonary artery measures approximately 33 mm (axial plane in mid diastole).    Coronary arteries  Coronary artery calcifications are noted in the coronary arteries.  The patient is status-post coronary artery bypass graft surgery.  Stent noted in a graft originating from the ascending aorta.    Pericardium  There is no pericardial effusion.    Chambers  The left ventricle and left atrium are qualitatively dilated.    Non-cardiac:  No hilar and/or mediastinal adenopathy is noted. Minimal groundglass opacities are noted within both lungs. Small bilateral pleural effusions are noted. Pleural effusions have decreased when compared to prior CT scan of 2/17/2021. Ascites is noted. Degenerative changes of the spine are noted.    IMPRESSION:  Moderately to severely calcified (Agatston calcium score 1826) aortic valve.                GIOVANA BURGESS M.D., ATTENDING CARDIOLOGIST  This document has been electronically signed.  MINERVA ALMARAZ MD; Attending Radiologist  This document has been electronically signed. Feb 22 2021  1:46PM    < end of copied text >    DIAGNOSTIC TESTING:  [ ] Echocardiogram:  [ ]  Catheterization:  [ ] Stress Test:    OTHER:

## 2021-02-24 NOTE — PROGRESS NOTE ADULT - ASSESSMENT
79 yo male w h/o ICM< CAD< s/p CABG, has AICD, DM, HTN presents w volume overload and abd pain  Abd sono shows gallstones, but no sign of cholecystitis/pancreatitis. abd pain resolved on Zosyn,   will treat empirically for 7 days .  ID following    ascites 2/2 systolic chf, probably worse 2/2 acute on chronic systolic chf.  responded well to lasix. now held due to rise creat.   renal following    on interrogation of ICD, has a lof of arrhythmia burden,   NSVT and sustained VT, seen by EPS, cont  [id number] mg  bid. has inoperable ischemic HD,   ICM, EF 15%, severe AS, seen by structural heart for TAVR eval. prob a high risk and poor candidate 2/2 low EF, advanced CKD and overall  frail general  medical condition. structural heart following.  had heart CT.  f/u plan with structural heart team and renal team.   given worsening renal function will likely need dialysis with further TAVR w/u      dvt ppx w [facility]SC

## 2021-02-24 NOTE — PROGRESS NOTE ADULT - SUBJECTIVE AND OBJECTIVE BOX
covering for DR. Rosario      Patient is a 80y old  Male who presents with a chief complaint of acute renal failure (22 Feb 2021 10:50)      SUBJECTIVE / OVERNIGHT EVENTS:  no acute events o/n     MEDICATIONS  (STANDING):  aMIOdarone    Tablet 400 milliGRAM(s) Oral every 8 hours  aspirin  chewable 81 milliGRAM(s) Oral daily  atorvastatin 80 milliGRAM(s) Oral at bedtime  dextrose 40% Gel 15 Gram(s) Oral once  dextrose 5%. 1000 milliLiter(s) (50 mL/Hr) IV Continuous <Continuous>  dextrose 5%. 1000 milliLiter(s) (100 mL/Hr) IV Continuous <Continuous>  dextrose 50% Injectable 25 Gram(s) IV Push once  dextrose 50% Injectable 12.5 Gram(s) IV Push once  dextrose 50% Injectable 25 Gram(s) IV Push once  ferrous    sulfate 325 milliGRAM(s) Oral two times a day  glucagon  Injectable 1 milliGRAM(s) IntraMuscular once  heparin   Injectable 5000 Unit(s) SubCutaneous every 12 hours  influenza   Vaccine 0.5 milliLiter(s) IntraMuscular once  insulin lispro (ADMELOG) corrective regimen sliding scale   SubCutaneous three times a day before meals  insulin lispro (ADMELOG) corrective regimen sliding scale   SubCutaneous at bedtime  metoprolol tartrate 25 milliGRAM(s) Oral every 12 hours  pantoprazole    Tablet 40 milliGRAM(s) Oral before breakfast  piperacillin/tazobactam IVPB.. 3.375 Gram(s) IV Intermittent every 12 hours  prasugrel 10 milliGRAM(s) Oral daily    MEDICATIONS  (PRN):     Vital Signs Last 24 Hrs  T(C): 36.7 (24 Feb 2021 04:50), Max: 36.7 (24 Feb 2021 04:50)  T(F): 98 (24 Feb 2021 04:50), Max: 98 (24 Feb 2021 04:50)  HR: 65 (24 Feb 2021 04:50) (60 - 66)  BP: 95/49 (24 Feb 2021 04:50) (95/49 - 100/49)  BP(mean): --  RR: 18 (24 Feb 2021 04:50) (18 - 18)  SpO2: 98% (24 Feb 2021 04:50) (95% - 99%)        PHYSICAL EXAM:  GENERAL: NAD, well-developed  HEAD:  Atraumatic, Normocephalic  EYES: EOMI, PERRLA, conjunctiva and sclera clear  NECK: Supple, No JVD  CHEST/LUNG: Clear to auscultation bilaterally; No wheeze  HEART: Regular rate and rhythm;    ABDOMEN: Soft, Nontender, Nondistended; Bowel sounds present  EXTREMITIES:  2+ Peripheral Pulses, No clubbing, cyanosis, or edema  PSYCH: AAOx3  NEUROLOGY: non-focal  SKIN: No rashes or lesions    LABS:                                                 10.6   9.99  )-----------( 185      ( 24 Feb 2021 06:55 )             32.1   02-24    135  |  97  |  115<H>  ----------------------------<  136<H>  4.6   |  21<L>  |  4.32<H>    Ca    9.7      24 Feb 2021 06:55  Phos  4.7     02-24  Mg     2.3     02-24    TPro  6.9  /  Alb  3.1<L>  /  TBili  1.8<H>  /  DBili  x   /  AST  47<H>  /  ALT  39  /  AlkPhos  233<H>  02-23        < from: TTE with Doppler (w/Cont) (02.18.21 @ 08:26) >  -----------------------------  Conclusions:  1. Calcified trileaflet aortic valve with decreased  opening. Peak transaortic valve gradient equals 31 mm Hg,  mean transaortic valve gradient equals 18 mm Hg, estimated  aortic valve area equals 0.9 sqcm (by continuity equation),  aortic valve velocity time integral equals 68 cm,  consistent with severe aortic stenosis. Moderate-severe  aortic regurgitation.  2. Eccentric left ventricular hypertrophy (dilated left  ventricle with normal relative wall thickness).  3. Severe global left ventricular systolic dysfunction.  Endocardial visualization enhanced with intravenous  injection of Ultrasonic Enhancing Agent (Definity).  4. A device wire is noted in the right heart.  5. Right ventricular enlargement with decreased right  ventricular systolic function.  6. Normal tricuspid valve. Moderate tricuspid  regurgitation.  *** No previous Echo exam.  ----------------------------------------------------------    < end of copied text >

## 2021-02-24 NOTE — PROGRESS NOTE ADULT - SUBJECTIVE AND OBJECTIVE BOX
      Overnight events noted      VITAL:  T(C): , Max: 36.7 (02-24-21 @ 04:50)  T(F): , Max: 98 (02-24-21 @ 04:50)  HR: 60 (02-24-21 @ 12:09)  BP: 94/54 (02-24-21 @ 12:09)  BP(mean): --  RR: 18 (02-24-21 @ 12:09)  SpO2: 98% (02-24-21 @ 12:09)      PHYSICAL EXAM:  Constitutional: NAD, Alert  HEENT: NCAT, DMM  Neck: Supple, no JVD  Respiratory: CTA-b/l  Cardiovascular: RRR s1s2, no m/r/g  Gastrointestinal: BS+, soft, NT; (+)mild distension  Extremities: 1+ b/l LE edema  Neurological: no focal deficits; strength grossly intact  Back: no CVAT b/l  Skin: No rashes, no nevi      LABS:                        10.6   9.99  )-----------( 185      ( 24 Feb 2021 06:55 )             32.1     Na(135)/K(4.6)/Cl(97)/HCO3(21)/BUN(115)/Cr(4.32)Glu(136)/Ca(9.7)/Mg(2.3)/PO4(4.7)    02-24 @ 06:55  Na(133)/K(4.6)/Cl(95)/HCO3(22)/BUN(108)/Cr(3.81)Glu(132)/Ca(9.8)/Mg(--)/PO4(--)    02-23 @ 06:58  Na(136)/K(4.2)/Cl(99)/HCO3(21)/BUN(104)/Cr(3.55)Glu(98)/Ca(10.0)/Mg(2.4)/PO4(4.4)    02-22 @ 03:41      IMPRESSION: 80M w/ DM2, CAD-CABG, and CKD, 2/16/21 a/w azotemia/lyte derangements/weakness    (1)Renal - CALVIN on CKD4 - worsening     (2)Lytes - acceptable    (3)CV - tenuous hemodynamics/hypervolemia - biventricular systolic dysfunction, severe AS, mod-sev AR. Structural Heart input appreciated; being evaluated for TAVR - Structural input appreciated - given the need for multiple contrast boluses, we cannot move forward with TAVR workup if patient/family are not agreeable to dialysis...as things stand right now, he may very well require dialysis even if we do not move forward with TAVR...      RECOMMEND:  (1)NS 100cc/h x 10h  (2)Midodrine 10mg po tid (hold for SBP>120)  (3)Will d/w family regarding potential HD            Suraj Garcia MD  [facility]  Office: [phone]  Cell: [phone]             No pain, no sob      VITAL:  T(C): , Max: 36.7 (02-24-21 @ 04:50)  T(F): , Max: 98 (02-24-21 @ 04:50)  HR: 60 (02-24-21 @ 12:09)  BP: 94/54 (02-24-21 @ 12:09)  BP(mean): --  RR: 18 (02-24-21 @ 12:09)  SpO2: 98% (02-24-21 @ 12:09)      PHYSICAL EXAM:  Constitutional: NAD, Alert  HEENT: NCAT, DMM  Neck: Supple, no JVD  Respiratory: CTA-b/l  Cardiovascular: RRR s1s2, no m/r/g  Gastrointestinal: BS+, soft, NT; (+)mild distension  Extremities: 1+ b/l LE edema  Neurological: no focal deficits; strength grossly intact  Back: no CVAT b/l  Skin: No rashes, no nevi      LABS:                        10.6   9.99  )-----------( 185      ( 24 Feb 2021 06:55 )             32.1     Na(135)/K(4.6)/Cl(97)/HCO3(21)/BUN(115)/Cr(4.32)Glu(136)/Ca(9.7)/Mg(2.3)/PO4(4.7)    02-24 @ 06:55  Na(133)/K(4.6)/Cl(95)/HCO3(22)/BUN(108)/Cr(3.81)Glu(132)/Ca(9.8)/Mg(--)/PO4(--)    02-23 @ 06:58  Na(136)/K(4.2)/Cl(99)/HCO3(21)/BUN(104)/Cr(3.55)Glu(98)/Ca(10.0)/Mg(2.4)/PO4(4.4)    02-22 @ 03:41      IMPRESSION: 80M w/ DM2, CAD-CABG, and CKD, 2/16/21 a/w azotemia/lyte derangements/weakness    (1)Renal - CALVIN on CKD4 - worsening     (2)Lytes - acceptable    (3)CV - tenuous hemodynamics/hypervolemia - biventricular systolic dysfunction, severe AS, mod-sev AR. Structural Heart input appreciated; being evaluated for TAVR - Structural input appreciated - given the need for multiple contrast boluses, we cannot move forward with TAVR workup if patient/family are not agreeable to dialysis...as things stand right now, he may very well require dialysis even if we do not move forward with TAVR...      RECOMMEND:  (1)NS 100cc/h x 10h  (2)Midodrine 10mg po tid (hold for SBP>120)  (3)D/w'd wife in depth regarding likely need for HD initiation later this week. Answered all questions to her satisfaction. Discussed briefly with patient as well and advised him to discuss further with his wife (given language barrier).          Suraj Garcia MD  [facility]  Office: [phone]  Cell: [phone]

## 2021-02-24 NOTE — PROGRESS NOTE ADULT - SUBJECTIVE AND OBJECTIVE BOX
INTERVAL HPI/OVERNIGHT EVENTS:    patient seen and examined  no acute GI complaints     MEDICATIONS  (STANDING):  aMIOdarone    Tablet 400 milliGRAM(s) Oral every 8 hours  aspirin  chewable 81 milliGRAM(s) Oral daily  atorvastatin 80 milliGRAM(s) Oral at bedtime  dextrose 40% Gel 15 Gram(s) Oral once  dextrose 5%. 1000 milliLiter(s) (50 mL/Hr) IV Continuous <Continuous>  dextrose 5%. 1000 milliLiter(s) (100 mL/Hr) IV Continuous <Continuous>  dextrose 50% Injectable 12.5 Gram(s) IV Push once  dextrose 50% Injectable 25 Gram(s) IV Push once  dextrose 50% Injectable 25 Gram(s) IV Push once  ferrous    sulfate 325 milliGRAM(s) Oral two times a day  glucagon  Injectable 1 milliGRAM(s) IntraMuscular once  heparin   Injectable 5000 Unit(s) SubCutaneous every 12 hours  influenza   Vaccine 0.5 milliLiter(s) IntraMuscular once  insulin lispro (ADMELOG) corrective regimen sliding scale   SubCutaneous three times a day before meals  insulin lispro (ADMELOG) corrective regimen sliding scale   SubCutaneous at bedtime  metoprolol tartrate 25 milliGRAM(s) Oral every 12 hours  midodrine. 10 milliGRAM(s) Oral three times a day  pantoprazole    Tablet 40 milliGRAM(s) Oral before breakfast  piperacillin/tazobactam IVPB.. 3.375 Gram(s) IV Intermittent every 12 hours  prasugrel 10 milliGRAM(s) Oral daily  sodium chloride 0.9%. 1000 milliLiter(s) (100 mL/Hr) IV Continuous <Continuous>    MEDICATIONS  (PRN):      Allergies    No Known Allergies    Intolerances        Review of Systems:    General:  No wt loss, fevers, chills, night sweats,fatigue,   Eyes:  Good vision, no reported pain  ENT:  No sore throat, pain, runny nose, dysphagia  CV:  No pain, palpitatioins, hypo/hypertension  Resp:  No dyspnea, cough, tachypnea, wheezing  GI:  No pain, No nausea, No vomiting, No diarrhea, No constipatiion, No weight loss, No fever, No pruritis, No rectal bleeding, No tarry stools, No dysphagia,  :  No pain, bleeding, incontinence, nocturia  Muscle:  No pain, weakness  Neuro:  No weakness, tingling, memory problems  Psych:  No fatigue, insomnia, mood problems, depression  Endocrine:  No polyuria, polydypsia, cold/heat intolerance  Heme:  No petechiae, ecchymosis, easy bruisability  Skin:  No rash, tattoos, scars, edema      Vital Signs Last 24 Hrs  T(C): 36.5 (24 Feb 2021 12:09), Max: 36.7 (24 Feb 2021 04:50)  T(F): 97.7 (24 Feb 2021 12:09), Max: 98 (24 Feb 2021 04:50)  HR: 60 (24 Feb 2021 12:09) (60 - 66)  BP: 94/54 (24 Feb 2021 12:09) (94/54 - 99/64)  BP(mean): --  RR: 18 (24 Feb 2021 12:09) (18 - 18)  SpO2: 98% (24 Feb 2021 12:09) (95% - 98%)    PHYSICAL EXAM:      GENERAL:  no distress  HEENT:  NC/AT  ABDOMEN:  Soft, non-tender, non-distended  EXTEREMITIES:  +bilateral LE edema   NEURO:  Alert, oriented, no asterixis, no tremor, no encephalopathy    LABS:                        10.6   9.99  )-----------( 185      ( 24 Feb 2021 06:55 )             32.1     02-24    135  |  97  |  115<H>  ----------------------------<  136<H>  4.6   |  21<L>  |  4.32<H>    Ca    9.7      24 Feb 2021 06:55  Phos  4.7     02-24  Mg     2.3     02-24    TPro  6.9  /  Alb  3.1<L>  /  TBili  1.8<H>  /  DBili  x   /  AST  47<H>  /  ALT  39  /  AlkPhos  233<H>  02-23          RADIOLOGY & ADDITIONAL TESTS:

## 2021-02-25 LAB
ALBUMIN SERPL ELPH-MCNC: 3.1 G/DL — LOW (ref 3.3–5)
ALP SERPL-CCNC: 193 U/L — HIGH (ref 40–120)
ALT FLD-CCNC: 81 U/L — HIGH (ref 10–45)
ANION GAP SERPL CALC-SCNC: 19 MMOL/L — HIGH (ref 5–17)
APTT BLD: 33.7 SEC — SIGNIFICANT CHANGE UP (ref 27.5–35.5)
AST SERPL-CCNC: 103 U/L — HIGH (ref 10–40)
BILIRUB SERPL-MCNC: 1.8 MG/DL — HIGH (ref 0.2–1.2)
BUN SERPL-MCNC: 122 MG/DL — HIGH (ref 7–23)
CALCIUM SERPL-MCNC: 9.6 MG/DL — SIGNIFICANT CHANGE UP (ref 8.4–10.5)
CHLORIDE SERPL-SCNC: 99 MMOL/L — SIGNIFICANT CHANGE UP (ref 96–108)
CO2 SERPL-SCNC: 17 MMOL/L — LOW (ref 22–31)
CREAT SERPL-MCNC: 4.72 MG/DL — HIGH (ref 0.5–1.3)
GLUCOSE BLDC GLUCOMTR-MCNC: 143 MG/DL — HIGH (ref 70–99)
GLUCOSE BLDC GLUCOMTR-MCNC: 175 MG/DL — HIGH (ref 70–99)
GLUCOSE BLDC GLUCOMTR-MCNC: 186 MG/DL — HIGH (ref 70–99)
GLUCOSE BLDC GLUCOMTR-MCNC: 290 MG/DL — HIGH (ref 70–99)
GLUCOSE SERPL-MCNC: 130 MG/DL — HIGH (ref 70–99)
HCT VFR BLD CALC: 31.1 % — LOW (ref 39–50)
HGB BLD-MCNC: 10.2 G/DL — LOW (ref 13–17)
INR BLD: 1.38 RATIO — HIGH (ref 0.88–1.16)
MCHC RBC-ENTMCNC: 30.8 PG — SIGNIFICANT CHANGE UP (ref 27–34)
MCHC RBC-ENTMCNC: 32.8 GM/DL — SIGNIFICANT CHANGE UP (ref 32–36)
MCV RBC AUTO: 94 FL — SIGNIFICANT CHANGE UP (ref 80–100)
NRBC # BLD: 0 /100 WBCS — SIGNIFICANT CHANGE UP (ref 0–0)
PLATELET # BLD AUTO: 181 K/UL — SIGNIFICANT CHANGE UP (ref 150–400)
POTASSIUM SERPL-MCNC: 4.4 MMOL/L — SIGNIFICANT CHANGE UP (ref 3.5–5.3)
POTASSIUM SERPL-SCNC: 4.4 MMOL/L — SIGNIFICANT CHANGE UP (ref 3.5–5.3)
PROT SERPL-MCNC: 6.7 G/DL — SIGNIFICANT CHANGE UP (ref 6–8.3)
PROTHROM AB SERPL-ACNC: 16.3 SEC — HIGH (ref 10.6–13.6)
RBC # BLD: 3.31 M/UL — LOW (ref 4.2–5.8)
RBC # FLD: 20.1 % — HIGH (ref 10.3–14.5)
SARS-COV-2 RNA SPEC QL NAA+PROBE: DETECTED
SODIUM SERPL-SCNC: 135 MMOL/L — SIGNIFICANT CHANGE UP (ref 135–145)
WBC # BLD: 10.63 K/UL — HIGH (ref 3.8–10.5)
WBC # FLD AUTO: 10.63 K/UL — HIGH (ref 3.8–10.5)

## 2021-02-25 RX ORDER — MIDODRINE HYDROCHLORIDE 2.5 MG/1
10 TABLET ORAL
Refills: 0 | Status: COMPLETED | OUTPATIENT
Start: 2021-02-25 | End: 2021-03-05

## 2021-02-25 RX ADMIN — PRASUGREL 10 MILLIGRAM(S): 5 TABLET, FILM COATED ORAL at 11:41

## 2021-02-25 RX ADMIN — Medication 25 MILLIGRAM(S): at 04:57

## 2021-02-25 RX ADMIN — MIDODRINE HYDROCHLORIDE 10 MILLIGRAM(S): 2.5 TABLET ORAL at 04:57

## 2021-02-25 RX ADMIN — AMIODARONE HYDROCHLORIDE 400 MILLIGRAM(S): 400 TABLET ORAL at 04:57

## 2021-02-25 RX ADMIN — ATORVASTATIN CALCIUM 80 MILLIGRAM(S): 80 TABLET, FILM COATED ORAL at 21:46

## 2021-02-25 RX ADMIN — Medication 1: at 11:40

## 2021-02-25 RX ADMIN — PIPERACILLIN AND TAZOBACTAM 25 GRAM(S): 4; .5 INJECTION, POWDER, LYOPHILIZED, FOR SOLUTION INTRAVENOUS at 04:57

## 2021-02-25 RX ADMIN — Medication 1: at 17:10

## 2021-02-25 RX ADMIN — HEPARIN SODIUM 5000 UNIT(S): 5000 INJECTION INTRAVENOUS; SUBCUTANEOUS at 04:57

## 2021-02-25 RX ADMIN — HEPARIN SODIUM 5000 UNIT(S): 5000 INJECTION INTRAVENOUS; SUBCUTANEOUS at 17:56

## 2021-02-25 RX ADMIN — PANTOPRAZOLE SODIUM 40 MILLIGRAM(S): 20 TABLET, DELAYED RELEASE ORAL at 04:57

## 2021-02-25 RX ADMIN — Medication 325 MILLIGRAM(S): at 04:57

## 2021-02-25 RX ADMIN — Medication 81 MILLIGRAM(S): at 11:40

## 2021-02-25 RX ADMIN — Medication 325 MILLIGRAM(S): at 17:55

## 2021-02-25 RX ADMIN — Medication 1: at 21:46

## 2021-02-25 RX ADMIN — PIPERACILLIN AND TAZOBACTAM 25 GRAM(S): 4; .5 INJECTION, POWDER, LYOPHILIZED, FOR SOLUTION INTRAVENOUS at 17:56

## 2021-02-25 RX ADMIN — AMIODARONE HYDROCHLORIDE 400 MILLIGRAM(S): 400 TABLET ORAL at 13:43

## 2021-02-25 RX ADMIN — AMIODARONE HYDROCHLORIDE 400 MILLIGRAM(S): 400 TABLET ORAL at 21:47

## 2021-02-25 NOTE — PROGRESS NOTE ADULT - ASSESSMENT
81 yo male w h/o DM, CAD, s/p CABG, CKD 3-4.   Recently evaluated by Nephrology for elevated creatinine. Bloodwork  returned notable for  and Na 126; in light of these findings, Dr. Luz (Renal) sent the patient to the ER. Lately ptn has been having abdominal pain with elevated amylase and lipase.   As per ptn's wife: on and off R abd pain w generalized weakness and poor po intake. PCP DCed po Lasix in past few days 2/2 abnormal blood test results.  Ptn denies recent NSAID use.  He was diagnosed with diabetes 18 years ago, and denies retinopathy (last saw Optho around 2 years ago). Cardiologist has alluded to the patient having mild renal impairment in the past, as per wife.    Patient's PCP is Dr. Shyann De León; GI Dr. Wilman Villagran; Cardiology Dr. Boyce.  (16 Feb 2021 21:05)    ER vs: T 98.1, P 85, /64. WBC 8.2.  Bun/Cr - 100/2.0.  LFTs mildly elevated, Roxana 217, Lip 185.  Pct 0.2.  UA (-).  Ct c/a/p shows pulmonary edema, sm B/L pleural effusions, sm volume ascites.  VQ scan with very low probability for PE.      Pt started on zosyn to cover for intra-abd source.  ID consulted.     r/o Gallstone pancreatitis:    - Pt p/w abdominal pain, epigastric and RUQ pain, noted to have elevated LFTs and amylase/lipase.  Possible gallstone pancreatitis?    - U/S abdomen shows cholelithiasis without acute cholecystitis.  Borderline to mild gallbladder thickening is likely due to ascites.   Moderate ascites noted.  Suggestion of mild renal parenchymal disease and bilateral pleural effusions.    - CTap w/o acute intra-abd pathology    - monitor LFTs, Roxana, Lipase.  As per GI, ascites likely secondary to CHF, no evidence for pancreatitis.     - Monitor temp curve and WBC.      - bcx neg.  On empiric abx coverage with zosyn x 7 days, completing on 2/23.  Abd pain resolving.       {[id number]}Renal failure:    - Renal following for CKD.  Dose abx for GFR 15-20.  On q12 hr dosing of zosyn.     - Cr rising.  CALVIN on CKD does not appear to be antibiotic mediated, renal f/u noted.      - Pt requiring HD, planned for Shiley placement by IR    Aortic stenosis:    - Followed by Structural health team for TAVR evaluation      * s/p course of empiric abx.  Monitor off zosyn.       Rupali Schwarz  [phone]

## 2021-02-25 NOTE — PROVIDER CONTACT NOTE (CRITICAL VALUE NOTIFICATION) - ACTION/TREATMENT ORDERED:
Provider aware, will transfer patient to COVID unit.
PA aware. EKG and STAT lab work sent. Will continue to monitor.

## 2021-02-25 NOTE — PROGRESS NOTE ADULT - SUBJECTIVE AND OBJECTIVE BOX
Infectious Diseases progress note:    Subjective: Afebrile, no leukocytosis.   No acute o/n events.      ROS:  CONSTITUTIONAL:  No fever, chills, rigors  CARDIOVASCULAR:  No chest pain or palpitations  RESPIRATORY:   No SOB, cough, dyspnea on exertion.  No wheezing  GASTROINTESTINAL:  No abd pain, N/V, diarrhea/constipation  EXTREMITIES:  No swelling or joint pain  GENITOURINARY:  No burning on urination, increased frequency or urgency.  No flank pain  NEUROLOGIC:  No HA, visual disturbances  SKIN: No rashes    Allergies    No Known Allergies    Intolerances        ANTIBIOTICS/RELEVANT:  antimicrobials  piperacillin/tazobactam IVPB.. 3.375 Gram(s) IV Intermittent every 12 hours    immunologic:  influenza   Vaccine 0.5 milliLiter(s) IntraMuscular once    OTHER:  aMIOdarone    Tablet 400 milliGRAM(s) Oral every 8 hours  aspirin  chewable 81 milliGRAM(s) Oral daily  atorvastatin 80 milliGRAM(s) Oral at bedtime  dextrose 40% Gel 15 Gram(s) Oral once  dextrose 5%. 1000 milliLiter(s) IV Continuous <Continuous>  dextrose 5%. 1000 milliLiter(s) IV Continuous <Continuous>  dextrose 50% Injectable 25 Gram(s) IV Push once  dextrose 50% Injectable 12.5 Gram(s) IV Push once  dextrose 50% Injectable 25 Gram(s) IV Push once  ferrous    sulfate 325 milliGRAM(s) Oral two times a day  glucagon  Injectable 1 milliGRAM(s) IntraMuscular once  heparin   Injectable 5000 Unit(s) SubCutaneous every 12 hours  insulin lispro (ADMELOG) corrective regimen sliding scale   SubCutaneous at bedtime  insulin lispro (ADMELOG) corrective regimen sliding scale   SubCutaneous three times a day before meals  metoprolol tartrate 25 milliGRAM(s) Oral every 12 hours  midodrine. 10 milliGRAM(s) Oral <User Schedule>  pantoprazole    Tablet 40 milliGRAM(s) Oral before breakfast  prasugrel 10 milliGRAM(s) Oral daily      Objective:  Vital Signs Last 24 Hrs  T(C): 36.4 (26 Feb 2021 07:40), Max: 36.9 (25 Feb 2021 20:35)  T(F): 97.5 (26 Feb 2021 07:40), Max: 98.4 (25 Feb 2021 20:35)  HR: 60 (26 Feb 2021 07:40) (54 - 61)  BP: 95/55 (26 Feb 2021 07:40) (90/59 - 121/55)  BP(mean): --  RR: 18 (26 Feb 2021 07:40) (18 - 19)  SpO2: 94% (26 Feb 2021 07:40) (94% - 99%)    PHYSICAL EXAM:  Constitutional:NAD  Eyes:CHERI, EOMI  Ear/Nose/Throat: no thrush, mucositis.  Moist mucous membranes	  Neck:no JVD, no lymphadenopathy, supple  Respiratory: CTA nadiya  Cardiovascular: S1S2 RRR, no murmurs  Gastrointestinal:soft, nontender,  nondistended (+) BS  Extremities:no e/e/c  Skin:  no rashes, open wounds or ulcerations        LABS:                        10.2   10.63 )-----------( 181      ( 25 Feb 2021 07:17 )             31.1     02-25    135  |  99  |  122<H>  ----------------------------<  130<H>  4.4   |  17<L>  |  4.72<H>    Ca    9.6      25 Feb 2021 07:17    TPro  6.7  /  Alb  3.1<L>  /  TBili  1.8<H>  /  DBili  x   /  AST  103<H>  /  ALT  81<H>  /  AlkPhos  193<H>  02-25    PT/INR - ( 25 Feb 2021 21:01 )   PT: 16.3 sec;   INR: 1.38 ratio         PTT - ( 25 Feb 2021 21:01 )  PTT:33.7 sec      Procalcitonin, Serum: 0.20 (02-16 @ 23:54)                    MICROBIOLOGY:      Culture - Urine (02.17.21 @ 09:06)   Specimen Source: .Urine Clean Catch (Midstream)   Culture Results:   No growth       Culture - Blood (02.17.21 @ 05:49)   Specimen Source: .Blood Blood-Peripheral   Culture Results:   No Growth Final   RADIOLOGY & ADDITIONAL STUDIES:    < from: VA Duplex Lower Ext Vein Scan, Bilat (02.17.21 @ 11:51) >  IMPRESSION:  No evidence of deep venous thrombosis in either lower extremity.    < end of copied text >      < from: US Abdomen Complete (US Abdomen Complete .) (02.17.21 @ 11:18) >  IMPRESSION:  *  Cholelithiasis without acute cholecystitis. Patient's borderline to mild gallbladder thickening is likely due to ascites.  *  Moderate ascites.  *  Suggestion of mild renal parenchymal disease.  *  Bilateral pleural effusions.  *  Mildly prominent inferior vena cava is likely on the basis of patient's heart disease.    < end of copied text >      < from: CT Abdomen and Pelvis w/ Oral Cont (02.17.21 @ 01:24) >    EXAM:  CT CHEST                          EXAM:  CT ABDOMEN AND PELVIS OC                            PROCEDURE DATE:  02/17/2021            INTERPRETATION:  CLINICAL INFORMATION: Dyspnea. Abdominal pain, diabetes, CALVIN.    COMPARISON: None.    PROCEDURE:  CT of the Chest, Abdomen and Pelvis was performed without intravenous contrast.  Intravenous contrast: None.  Oral contrast: Positive contrast was administered.  Sagittal and coronal reformats were performed.    FINDINGS:    CHEST:  LUNGS AND LARGE AIRWAYS: Patent central airways. Interlobular septal thickening and bilateral groundglass opacities suggestive of pulmonary edema.  PLEURA: Small bilateral pleural effusions, right greater than left.  VESSELS: Atherosclerotic changes of the aorta and coronary arteries.  HEART: Cardiomegaly. No pericardial effusion. Status post CABG.  MEDIASTINUM AND EVELYN: No lymphadenopathy.  CHEST WALL AND LOWER NECK: Status post median sternotomy. Left chest wall cardiac device.    ABDOMEN AND PELVIS:  LIVER: Within normal limits.  BILE DUCTS: Normal caliber.  GALLBLADDER: Within normal limits.  SPLEEN: Within normal limits.  PANCREAS: Within normal limits.  ADRENALS: Within normal limits.  KIDNEYS/URETERS: No hydronephrosis. Punctate nonobstructing right renal calculus. Bilateral renal cysts and hypodensities too small to characterize.    BLADDER: Within normal limits.  REPRODUCTIVE ORGANS: Prostate is enlarged.    BOWEL: No bowel obstruction. Appendix is not visualized.  PERITONEUM: Small volume ascites.  VESSELS: Atherosclerotic changes.  RETROPERITONEUM/LYMPH NODES: No lymphadenopathy.  ABDOMINAL WALL: Fluid and fat-containing right inguinal hernia and fat-containing left inguinal hernia.  BONES: Degenerative changes.    IMPRESSION:    Pulmonary edema with small bilateral pleural effusions.    Small volume ascites.    < end of copied text >

## 2021-02-25 NOTE — PROGRESS NOTE ADULT - SUBJECTIVE AND OBJECTIVE BOX
Subjective: Patient seen and examined. No new events except as noted.     REVIEW OF SYSTEMS:    CONSTITUTIONAL:+ weakness, fevers or chills  EYES/ENT: No visual changes;  No vertigo or throat pain   NECK: No pain or stiffness  RESPIRATORY: No cough, wheezing, hemoptysis; No shortness of breath  CARDIOVASCULAR: No chest pain or palpitations  GASTROINTESTINAL: No abdominal or epigastric pain. No nausea, vomiting, or hematemesis; No diarrhea or constipation. No melena or hematochezia.  GENITOURINARY: No dysuria, frequency or hematuria  NEUROLOGICAL: No numbness or weakness  SKIN: No itching, burning, rashes, or lesions   All other review of systems is negative unless indicated above.    MEDICATIONS:  MEDICATIONS  (STANDING):  aMIOdarone    Tablet 400 milliGRAM(s) Oral every 8 hours  aspirin  chewable 81 milliGRAM(s) Oral daily  atorvastatin 80 milliGRAM(s) Oral at bedtime  dextrose 40% Gel 15 Gram(s) Oral once  dextrose 5%. 1000 milliLiter(s) (50 mL/Hr) IV Continuous <Continuous>  dextrose 5%. 1000 milliLiter(s) (100 mL/Hr) IV Continuous <Continuous>  dextrose 50% Injectable 25 Gram(s) IV Push once  dextrose 50% Injectable 12.5 Gram(s) IV Push once  dextrose 50% Injectable 25 Gram(s) IV Push once  ferrous    sulfate 325 milliGRAM(s) Oral two times a day  glucagon  Injectable 1 milliGRAM(s) IntraMuscular once  heparin   Injectable 5000 Unit(s) SubCutaneous every 12 hours  influenza   Vaccine 0.5 milliLiter(s) IntraMuscular once  insulin lispro (ADMELOG) corrective regimen sliding scale   SubCutaneous three times a day before meals  insulin lispro (ADMELOG) corrective regimen sliding scale   SubCutaneous at bedtime  metoprolol tartrate 25 milliGRAM(s) Oral every 12 hours  midodrine. 10 milliGRAM(s) Oral <User Schedule>  pantoprazole    Tablet 40 milliGRAM(s) Oral before breakfast  piperacillin/tazobactam IVPB.. 3.375 Gram(s) IV Intermittent every 12 hours  prasugrel 10 milliGRAM(s) Oral daily      PHYSICAL EXAM:  T(C): 36.4 (02-25-21 @ 04:50), Max: 36.5 (02-24-21 @ 12:09)  HR: 68 (02-25-21 @ 04:50) (53 - 68)  BP: 100/61 (02-25-21 @ 04:50) (91/53 - 103/64)  RR: 18 (02-25-21 @ 04:50) (18 - 18)  SpO2: 92% (02-25-21 @ 04:50) (92% - 98%)  Wt(kg): --  I&O's Summary    24 Feb 2021 07:01  -  25 Feb 2021 07:00  --------------------------------------------------------  IN: 1180 mL / OUT: 0 mL / NET: 1180 mL          Appearance: NAD	  HEENT:  Dry oral mucosa, PERRL, EOMI	  Lymphatic: No lymphadenopathy , no edema  Cardiovascular: Normal S1 S2, No JVD, + murmurs , Peripheral pulses palpable 2+ bilaterally  Respiratory: decreased bs   Gastrointestinal:  Soft, Non-tender, + BS	  Skin: No rashes, No ecchymoses, No cyanosis, warm to touch  Musculoskeletal: Normal range of motion, normal strength  Psychiatry: more awake and alert   Ext: No edema    LABS:    CARDIAC MARKERS:                                10.2   10.63 )-----------( 181      ( 25 Feb 2021 07:17 )             31.1     02-25    135  |  99  |  122<H>  ----------------------------<  130<H>  4.4   |  17<L>  |  4.72<H>    Ca    9.6      25 Feb 2021 07:17  Phos  4.7     02-24  Mg     2.3     02-24    TPro  6.7  /  Alb  3.1<L>  /  TBili  1.8<H>  /  DBili  x   /  AST  103<H>  /  ALT  81<H>  /  AlkPhos  193<H>  02-25    proBNP:   Lipid Profile:   HgA1c:   TSH:             TELEMETRY: 	  AV paced, 3 beats WCT   ECG:  	  RADIOLOGY:   DIAGNOSTIC TESTING:  [ ] Echocardiogram:  [ ]  Catheterization:  [ ] Stress Test:    OTHER:

## 2021-02-25 NOTE — CHART NOTE - NSCHARTNOTEFT_GEN_A_CORE
Called by RN, pt with covid swab + today.   Pt stable, asymptomatic at this time.   RN and nurse manager aware pt to transfer to room with airborne & contact precautions.   Dr. Joaquin and Dr. Garcia notified.   IR fellow paged - pt for shiley catheter placement tomorrow - will be done at bedside.     Brisa Taylor ANP-BC  [id number] Called by RN, pt with covid swab + today.   Pt stable, asymptomatic at this time.   Pt notified of result by RN and that he will be transferred to 4Mon.  RN and nurse manager aware pt to transfer to room with airborne & contact precautions.   Dr. Joaquin and Dr. Garcia notified.   IR fellow paged - pt for shiley catheter placement tomorrow - will be done at bedside.   Spouse Nida notified at [phone]    Brisa Taylor [facility]  [id number]

## 2021-02-25 NOTE — PROGRESS NOTE ADULT - ASSESSMENT
81 yo male w h/o ICM< CAD< s/p CABG, has AICD, DM, HTN presents w volume overload and abd pain  Abd sono shows gallstones, but no sign of cholecystitis/pancreatitis. abd pain resolved on Zosyn,   will treat empirically for 7 days .  ID following    ascites 2/2 systolic chf, probably worse 2/2 acute on chronic systolic chf.  responded well to lasix. now held due to rise creat.   renal following  pt with worsening renal function. renal f/u apprec. plan to start HD.  for shiley placemnt by IR     on interrogation of ICD, has a lof of arrhythmia burden,   NSVT and sustained VT, seen by EPS, cont  Liirnhsxt76.5 mg  bid. has inoperable ischemic HD,   ICM, EF 15%, severe AS, seen by structural heart for TAVR eval. prob a high risk and poor candidate 2/2 low EF, advanced CKD and overall  frail general  medical condition. structural heart following.  had heart CT.  f/u plan with structural heart team  pt now to start HD. ok to have further w/u w/o worry for renal issues      dvt ppx w HSC

## 2021-02-25 NOTE — CONSULT NOTE ADULT - ASSESSMENT
Interventional Radiology  Evaluate for Procedure: Christo    HPI: 80y Male with CALVIN, needs HD    Allergies:   Medications (Abx/Cardiac/Anticoagulation/Blood Products)  aMIOdarone    Tablet: 400 milliGRAM(s) Oral (02-25 @ 13:43)  aspirin  chewable: 81 milliGRAM(s) Oral (02-25 @ 11:40)  heparin   Injectable: 5000 Unit(s) SubCutaneous (02-25 @ 04:57)  metoprolol tartrate: 25 milliGRAM(s) Oral (02-25 @ 04:57)  midodrine.: 10 milliGRAM(s) Oral (02-25 @ 04:57)  piperacillin/tazobactam IVPB..: 25 mL/Hr IV Intermittent (02-25 @ 04:57)  prasugrel: 10 milliGRAM(s) Oral (02-25 @ 11:41)    Data:    T(C): 36.6  HR: 60  BP: 99/61  RR: 18  SpO2: 98%    -WBC 10.63 / HgB 10.2 / Hct 31.1 / Plt 181  -Na 135 / Cl 99 /  / Glucose 130  -K 4.4 / CO2 17 / Cr 4.72  -ALT 81 / Alk Phos 193 / T.Bili 1.8  -INR 1.36 / PTT 32.7    Radiology: n/a    Assessment/Plan:   -80y Male with CALVIN needing HD, plan for shiley tomorrow. Place IR procedure order under my name, hold am anticoagulation, place IR procedure order under my name.

## 2021-02-25 NOTE — PROVIDER CONTACT NOTE (CRITICAL VALUE NOTIFICATION) - BACKGROUND
admitted for c/o weaknes, CHF, elevated creatinine, severe AS.
Admitting dx: weakness  PMH: HTN, CAD, renal failure

## 2021-02-25 NOTE — PROGRESS NOTE ADULT - SUBJECTIVE AND OBJECTIVE BOX
      Overnight events noted      VITAL:  T(C): , Max: 36.5 (02-24-21 @ 12:09)  T(F): , Max: 97.7 (02-24-21 @ 12:09)  HR: 68 (02-25-21 @ 04:50)  BP: 100/61 (02-25-21 @ 04:50)  BP(mean): --  RR: 18 (02-25-21 @ 04:50)  SpO2: 92% (02-25-21 @ 04:50)      PHYSICAL EXAM:  Constitutional: NAD, Alert  HEENT: NCAT, DMM  Neck: Supple, no JVD  Respiratory: CTA-b/l  Cardiovascular: RRR s1s2, no m/r/g  Gastrointestinal: BS+, soft, NT; (+)mild distension  Extremities: 1+ b/l LE edema  Neurological: no focal deficits; strength grossly intact  Back: no CVAT b/l  Skin: No rashes, no nevi    LABS:                        10.2   10.63 )-----------( 181      ( 25 Feb 2021 07:17 )             31.1     Na(135)/K(4.4)/Cl(99)/HCO3(17)/BUN(122)/Cr(4.72)Glu(130)/Ca(9.6)/Mg(--)/PO4(--)    02-25 @ 07:17  Na(135)/K(4.6)/Cl(97)/HCO3(21)/BUN(115)/Cr(4.32)Glu(136)/Ca(9.7)/Mg(2.3)/PO4(4.7)    02-24 @ 06:55  Na(133)/K(4.6)/Cl(95)/HCO3(22)/BUN(108)/Cr(3.81)Glu(132)/Ca(9.8)/Mg(--)/PO4(--)    02-23 @ 06:58      IMPRESSION: 80M w/ DM2, CAD-CABG, and CKD, 2/16/21 a/w azotemia/lyte derangements/weakness    (1)Renal - CALVIN on CKD4 - worsening - needs dialysis at this point    (2)Lytes - acceptable    (3)CV - tenuous hemodynamics/hypervolemia - biventricular systolic dysfunction, severe AS, mod-sev AR. Structural Heart input appreciated; being evaluated for TAVR - in placing him on chronic dialysis, we can perform the needed contrast studies without concern about the impact of the studies on the kidneys      RECOMMEND:  (1)Shiley placement today vs tomorrow  (2)HD initiation once shiley in place            Suraj Garcia MD  [facility]  Office: [phone]  Cell: [phone]               No pain, no sob      VITAL:  T(C): , Max: 36.5 (02-24-21 @ 12:09)  T(F): , Max: 97.7 (02-24-21 @ 12:09)  HR: 68 (02-25-21 @ 04:50)  BP: 100/61 (02-25-21 @ 04:50)  RR: 18 (02-25-21 @ 04:50)  SpO2: 92% (02-25-21 @ 04:50)      PHYSICAL EXAM:  Constitutional: NAD, Alert  HEENT: NCAT, DMM  Neck: Supple, no JVD  Respiratory: CTA-b/l  Cardiovascular: RRR s1s2, no m/r/g  Gastrointestinal: BS+, soft, NT; (+)mild distension  Extremities: 1+ b/l LE edema  Neurological: no focal deficits; strength grossly intact  Back: no CVAT b/l  Skin: No rashes, no nevi    LABS:                        10.2   10.63 )-----------( 181      ( 25 Feb 2021 07:17 )             31.1     Na(135)/K(4.4)/Cl(99)/HCO3(17)/BUN(122)/Cr(4.72)Glu(130)/Ca(9.6)/Mg(--)/PO4(--)    02-25 @ 07:17  Na(135)/K(4.6)/Cl(97)/HCO3(21)/BUN(115)/Cr(4.32)Glu(136)/Ca(9.7)/Mg(2.3)/PO4(4.7)    02-24 @ 06:55  Na(133)/K(4.6)/Cl(95)/HCO3(22)/BUN(108)/Cr(3.81)Glu(132)/Ca(9.8)/Mg(--)/PO4(--)    02-23 @ 06:58      IMPRESSION: 80M w/ DM2, CAD-CABG, and CKD, 2/16/21 a/w azotemia/lyte derangements/weakness    (1)Renal - CALVIN on CKD4 - worsening - needs dialysis at this point    (2)Lytes - acceptable    (3)CV - tenuous hemodynamics/hypervolemia - biventricular systolic dysfunction, severe AS, mod-sev AR. Structural Heart input appreciated; being evaluated for TAVR - in placing him on chronic dialysis, we can perform the needed contrast studies without concern about the impact of the studies on the kidneys      RECOMMEND:  (1)Shiley placement today vs tomorrow  (2)HD initiation once shiley in place; midodrine 10mg po pre-hd  (3)Can d/c the q8h midodrine  (4)No objection to IV contrast procedures in workup for TAVR          Suraj Garcia MD  [facility]  Office: [phone]  Cell: [phone]

## 2021-02-25 NOTE — PROGRESS NOTE ADULT - SUBJECTIVE AND OBJECTIVE BOX
covering for DR. Rosario      Patient is a 80y old  Male who presents with a chief complaint of acute renal failure (22 Feb 2021 10:50)      SUBJECTIVE / OVERNIGHT EVENTS:  no acute events o/n     MEDICATIONS  (STANDING):  aMIOdarone    Tablet 400 milliGRAM(s) Oral every 8 hours  aspirin  chewable 81 milliGRAM(s) Oral daily  atorvastatin 80 milliGRAM(s) Oral at bedtime  dextrose 40% Gel 15 Gram(s) Oral once  dextrose 5%. 1000 milliLiter(s) (50 mL/Hr) IV Continuous <Continuous>  dextrose 5%. 1000 milliLiter(s) (100 mL/Hr) IV Continuous <Continuous>  dextrose 50% Injectable 25 Gram(s) IV Push once  dextrose 50% Injectable 12.5 Gram(s) IV Push once  dextrose 50% Injectable 25 Gram(s) IV Push once  ferrous    sulfate 325 milliGRAM(s) Oral two times a day  glucagon  Injectable 1 milliGRAM(s) IntraMuscular once  heparin   Injectable 5000 Unit(s) SubCutaneous every 12 hours  influenza   Vaccine 0.5 milliLiter(s) IntraMuscular once  insulin lispro (ADMELOG) corrective regimen sliding scale   SubCutaneous three times a day before meals  insulin lispro (ADMELOG) corrective regimen sliding scale   SubCutaneous at bedtime  metoprolol tartrate 25 milliGRAM(s) Oral every 12 hours  midodrine. 10 milliGRAM(s) Oral <User Schedule>  pantoprazole    Tablet 40 milliGRAM(s) Oral before breakfast  piperacillin/tazobactam IVPB.. 3.375 Gram(s) IV Intermittent every 12 hours  prasugrel 10 milliGRAM(s) Oral daily    MEDICATIONS  (PRN):       Vital Signs Last 24 Hrs  T(C): 36.6 (25 Feb 2021 11:35), Max: 36.6 (25 Feb 2021 11:35)  T(F): 97.8 (25 Feb 2021 11:35), Max: 97.8 (25 Feb 2021 11:35)  HR: 60 (25 Feb 2021 11:35) (53 - 68)  BP: 99/61 (25 Feb 2021 11:35) (91/53 - 103/64)  BP(mean): --  RR: 18 (25 Feb 2021 11:35) (18 - 18)  SpO2: 98% (25 Feb 2021 11:35) (92% - 98%)      PHYSICAL EXAM:  GENERAL: NAD, well-developed  HEAD:  Atraumatic, Normocephalic  EYES: EOMI, PERRLA, conjunctiva and sclera clear  NECK: Supple, No JVD  CHEST/LUNG: Clear to auscultation bilaterally; No wheeze  HEART: Regular rate and rhythm;    ABDOMEN: Soft, Nontender, Nondistended; Bowel sounds present  EXTREMITIES:  2+ Peripheral Pulses, No clubbing, cyanosis, or edema  PSYCH: AAOx3  NEUROLOGY: non-focal  SKIN: No rashes or lesions    LABS:                                        10.2   10.63 )-----------( 181      ( 25 Feb 2021 07:17 )             31.1   02-25    135  |  99  |  122<H>  ----------------------------<  130<H>  4.4   |  17<L>  |  4.72<H>    Ca    9.6      25 Feb 2021 07:17  Phos  4.7     02-24  Mg     2.3     02-24    TPro  6.7  /  Alb  3.1<L>  /  TBili  1.8<H>  /  DBili  x   /  AST  103<H>  /  ALT  81<H>  /  AlkPhos  193<H>  02-25          < from: TTE with Doppler (w/Cont) (02.18.21 @ 08:26) >  -----------------------------  Conclusions:  1. Calcified trileaflet aortic valve with decreased  opening. Peak transaortic valve gradient equals 31 mm Hg,  mean transaortic valve gradient equals 18 mm Hg, estimated  aortic valve area equals 0.9 sqcm (by continuity equation),  aortic valve velocity time integral equals 68 cm,  consistent with severe aortic stenosis. Moderate-severe  aortic regurgitation.  2. Eccentric left ventricular hypertrophy (dilated left  ventricle with normal relative wall thickness).  3. Severe global left ventricular systolic dysfunction.  Endocardial visualization enhanced with intravenous  injection of Ultrasonic Enhancing Agent (Definity).  4. A device wire is noted in the right heart.  5. Right ventricular enlargement with decreased right  ventricular systolic function.  6. Normal tricuspid valve. Moderate tricuspid  regurgitation.  *** No previous Echo exam.  ----------------------------------------------------------    < end of copied text >

## 2021-02-25 NOTE — PROVIDER CONTACT NOTE (CRITICAL VALUE NOTIFICATION) - ASSESSMENT
Patient is A&Ox4, VSS. No complaints at this time.
Patient A&O x4. VS as per flowsheet. SR/A-Paced on telemetry with intermittent and frequent runs of VT/bursts of WCT. Asymptomatic, denies chest pain, palpitations or discomfort.

## 2021-02-26 LAB
ANION GAP SERPL CALC-SCNC: 19 MMOL/L — HIGH (ref 5–17)
BUN SERPL-MCNC: 127 MG/DL — HIGH (ref 7–23)
CALCIUM SERPL-MCNC: 9 MG/DL — SIGNIFICANT CHANGE UP (ref 8.4–10.5)
CHLORIDE SERPL-SCNC: 98 MMOL/L — SIGNIFICANT CHANGE UP (ref 96–108)
CO2 SERPL-SCNC: 15 MMOL/L — LOW (ref 22–31)
CREAT SERPL-MCNC: 5.35 MG/DL — HIGH (ref 0.5–1.3)
GLUCOSE BLDC GLUCOMTR-MCNC: 131 MG/DL — HIGH (ref 70–99)
GLUCOSE BLDC GLUCOMTR-MCNC: 135 MG/DL — HIGH (ref 70–99)
GLUCOSE BLDC GLUCOMTR-MCNC: 137 MG/DL — HIGH (ref 70–99)
GLUCOSE BLDC GLUCOMTR-MCNC: 155 MG/DL — HIGH (ref 70–99)
GLUCOSE SERPL-MCNC: 169 MG/DL — HIGH (ref 70–99)
HBV CORE AB SER-ACNC: SIGNIFICANT CHANGE UP
HBV SURFACE AB SER-ACNC: SIGNIFICANT CHANGE UP
HBV SURFACE AG SER-ACNC: SIGNIFICANT CHANGE UP
HCT VFR BLD CALC: 32 % — LOW (ref 39–50)
HCV AB S/CO SERPL IA: 0.1 S/CO — SIGNIFICANT CHANGE UP (ref 0–0.99)
HCV AB SERPL-IMP: SIGNIFICANT CHANGE UP
HGB BLD-MCNC: 9.7 G/DL — LOW (ref 13–17)
MCHC RBC-ENTMCNC: 30.3 GM/DL — LOW (ref 32–36)
MCHC RBC-ENTMCNC: 30.4 PG — SIGNIFICANT CHANGE UP (ref 27–34)
MCV RBC AUTO: 100.3 FL — HIGH (ref 80–100)
NRBC # BLD: 0 /100 WBCS — SIGNIFICANT CHANGE UP (ref 0–0)
PLATELET # BLD AUTO: 151 K/UL — SIGNIFICANT CHANGE UP (ref 150–400)
POTASSIUM SERPL-MCNC: 4.9 MMOL/L — SIGNIFICANT CHANGE UP (ref 3.5–5.3)
POTASSIUM SERPL-SCNC: 4.9 MMOL/L — SIGNIFICANT CHANGE UP (ref 3.5–5.3)
RBC # BLD: 3.19 M/UL — LOW (ref 4.2–5.8)
RBC # FLD: 20.5 % — HIGH (ref 10.3–14.5)
SARS-COV-2 RNA SPEC QL NAA+PROBE: DETECTED
SODIUM SERPL-SCNC: 132 MMOL/L — LOW (ref 135–145)
WBC # BLD: 10.08 K/UL — SIGNIFICANT CHANGE UP (ref 3.8–10.5)
WBC # FLD AUTO: 10.08 K/UL — SIGNIFICANT CHANGE UP (ref 3.8–10.5)

## 2021-02-26 PROCEDURE — 76937 US GUIDE VASCULAR ACCESS: CPT | Mod: 26

## 2021-02-26 PROCEDURE — 36556 INSERT NON-TUNNEL CV CATH: CPT

## 2021-02-26 PROCEDURE — 74018 RADEX ABDOMEN 1 VIEW: CPT | Mod: 26

## 2021-02-26 RX ORDER — CHLORHEXIDINE GLUCONATE 213 G/1000ML
1 SOLUTION TOPICAL
Refills: 0 | Status: DISCONTINUED | OUTPATIENT
Start: 2021-02-26 | End: 2021-03-24

## 2021-02-26 RX ORDER — MIDODRINE HYDROCHLORIDE 2.5 MG/1
10 TABLET ORAL ONCE
Refills: 0 | Status: COMPLETED | OUTPATIENT
Start: 2021-02-26 | End: 2021-02-26

## 2021-02-26 RX ORDER — SODIUM CHLORIDE 9 MG/ML
10 INJECTION INTRAMUSCULAR; INTRAVENOUS; SUBCUTANEOUS
Refills: 0 | Status: DISCONTINUED | OUTPATIENT
Start: 2021-02-26 | End: 2021-03-23

## 2021-02-26 RX ADMIN — PRASUGREL 10 MILLIGRAM(S): 5 TABLET, FILM COATED ORAL at 12:29

## 2021-02-26 RX ADMIN — AMIODARONE HYDROCHLORIDE 400 MILLIGRAM(S): 400 TABLET ORAL at 21:45

## 2021-02-26 RX ADMIN — Medication 325 MILLIGRAM(S): at 21:45

## 2021-02-26 RX ADMIN — Medication 25 MILLIGRAM(S): at 06:18

## 2021-02-26 RX ADMIN — PANTOPRAZOLE SODIUM 40 MILLIGRAM(S): 20 TABLET, DELAYED RELEASE ORAL at 06:19

## 2021-02-26 RX ADMIN — Medication 25 MILLIGRAM(S): at 21:45

## 2021-02-26 RX ADMIN — MIDODRINE HYDROCHLORIDE 10 MILLIGRAM(S): 2.5 TABLET ORAL at 15:37

## 2021-02-26 RX ADMIN — PIPERACILLIN AND TAZOBACTAM 25 GRAM(S): 4; .5 INJECTION, POWDER, LYOPHILIZED, FOR SOLUTION INTRAVENOUS at 06:19

## 2021-02-26 RX ADMIN — MIDODRINE HYDROCHLORIDE 10 MILLIGRAM(S): 2.5 TABLET ORAL at 07:43

## 2021-02-26 RX ADMIN — AMIODARONE HYDROCHLORIDE 400 MILLIGRAM(S): 400 TABLET ORAL at 12:28

## 2021-02-26 RX ADMIN — Medication 325 MILLIGRAM(S): at 06:18

## 2021-02-26 RX ADMIN — ATORVASTATIN CALCIUM 80 MILLIGRAM(S): 80 TABLET, FILM COATED ORAL at 21:45

## 2021-02-26 RX ADMIN — Medication 81 MILLIGRAM(S): at 12:28

## 2021-02-26 RX ADMIN — AMIODARONE HYDROCHLORIDE 400 MILLIGRAM(S): 400 TABLET ORAL at 06:18

## 2021-02-26 RX ADMIN — CHLORHEXIDINE GLUCONATE 1 APPLICATION(S): 213 SOLUTION TOPICAL at 12:29

## 2021-02-26 RX ADMIN — Medication 1: at 12:29

## 2021-02-26 NOTE — PROGRESS NOTE ADULT - ASSESSMENT
Abdominal pain.  - now resolved  - lfts downtrending   - Tolerating diet     Renal failure.   - nephrology input noted.   -  lasix on hold in setting of rising SCr   -  s/p shiley  -  HD per renal     COVID-19  - management as per ID     *Going forward I will be off the case. Please contact the [facility] office for further follow up or if there are any questions regarding the case.* - SARAH RossC

## 2021-02-26 NOTE — PROGRESS NOTE ADULT - SUBJECTIVE AND OBJECTIVE BOX
INTERVAL HPI/OVERNIGHT EVENTS:    overnight events noted  COVID PCR +  s/p ilan   no new GI events     MEDICATIONS  (STANDING):  aMIOdarone    Tablet 400 milliGRAM(s) Oral every 8 hours  aspirin  chewable 81 milliGRAM(s) Oral daily  atorvastatin 80 milliGRAM(s) Oral at bedtime  chlorhexidine 4% Liquid 1 Application(s) Topical <User Schedule>  dextrose 40% Gel 15 Gram(s) Oral once  dextrose 5%. 1000 milliLiter(s) (50 mL/Hr) IV Continuous <Continuous>  dextrose 5%. 1000 milliLiter(s) (100 mL/Hr) IV Continuous <Continuous>  dextrose 50% Injectable 25 Gram(s) IV Push once  dextrose 50% Injectable 12.5 Gram(s) IV Push once  dextrose 50% Injectable 25 Gram(s) IV Push once  ferrous    sulfate 325 milliGRAM(s) Oral two times a day  glucagon  Injectable 1 milliGRAM(s) IntraMuscular once  heparin   Injectable 5000 Unit(s) SubCutaneous every 12 hours  influenza   Vaccine 0.5 milliLiter(s) IntraMuscular once  insulin lispro (ADMELOG) corrective regimen sliding scale   SubCutaneous at bedtime  insulin lispro (ADMELOG) corrective regimen sliding scale   SubCutaneous three times a day before meals  metoprolol tartrate 25 milliGRAM(s) Oral every 12 hours  midodrine. 10 milliGRAM(s) Oral <User Schedule>  pantoprazole    Tablet 40 milliGRAM(s) Oral before breakfast  prasugrel 10 milliGRAM(s) Oral daily    MEDICATIONS  (PRN):  sodium chloride 0.9% lock flush 10 milliLiter(s) IV Push every 1 hour PRN Pre/post blood products, medications, blood draw, and to maintain line patency      Allergies    No Known Allergies    Intolerances        Review of Systems:    General:  No wt loss, fevers, chills, night sweats,fatigue,   Eyes:  Good vision, no reported pain  ENT:  No sore throat, pain, runny nose, dysphagia  CV:  No pain, palpitatioins, hypo/hypertension  Resp:  No dyspnea, cough, tachypnea, wheezing  GI:  No pain, No nausea, No vomiting, No diarrhea, No constipatiion, No weight loss, No fever, No pruritis, No rectal bleeding, No tarry stools, No dysphagia,  :  No pain, bleeding, incontinence, nocturia  Muscle:  No pain, weakness  Neuro:  No weakness, tingling, memory problems  Psych:  No fatigue, insomnia, mood problems, depression  Endocrine:  No polyuria, polydypsia, cold/heat intolerance  Heme:  No petechiae, ecchymosis, easy bruisability  Skin:  No rash, tattoos, scars, edema      Vital Signs Last 24 Hrs  T(C): 36.3 (26 Feb 2021 15:55), Max: 36.9 (25 Feb 2021 20:35)  T(F): 97.4 (26 Feb 2021 15:55), Max: 98.4 (25 Feb 2021 20:35)  HR: 60 (26 Feb 2021 15:55) (54 - 61)  BP: 92/52 (26 Feb 2021 15:55) (90/59 - 121/55)  BP(mean): --  RR: 18 (26 Feb 2021 15:55) (18 - 19)  SpO2: 97% (26 Feb 2021 15:55) (94% - 99%)    PHYSICAL EXAM:    GENERAL:  no distress  HEENT:  NC/AT  ABDOMEN:  Soft, non-tender, non-distended  EXTEREMITIES:  +bilateral LE edema   NEURO:  Alert, oriented, no asterixis, no tremor, no encephalopathy    LABS:                        9.7    10.08 )-----------( 151      ( 26 Feb 2021 11:23 )             32.0     02-26    132<L>  |  98  |  127<H>  ----------------------------<  169<H>  4.9   |  15<L>  |  5.35<H>    Ca    9.0      26 Feb 2021 11:23    TPro  6.7  /  Alb  3.1<L>  /  TBili  1.8<H>  /  DBili  x   /  AST  103<H>  /  ALT  81<H>  /  AlkPhos  193<H>  02-25    PT/INR - ( 25 Feb 2021 21:01 )   PT: 16.3 sec;   INR: 1.38 ratio         PTT - ( 25 Feb 2021 21:01 )  PTT:33.7 sec      RADIOLOGY & ADDITIONAL TESTS:

## 2021-02-26 NOTE — PROGRESS NOTE ADULT - SUBJECTIVE AND OBJECTIVE BOX
Infectious Diseases progress note:    Subjective: Events noted.  Pt tested (+) covid yesterday night.  Pt currently denies cough, sob, cp.  No fever.  Pt satting adequately on RA.      ROS:  CONSTITUTIONAL:  No fever, chills, rigors  CARDIOVASCULAR:  No chest pain or palpitations  RESPIRATORY:   No SOB, cough, dyspnea on exertion.  No wheezing  GASTROINTESTINAL:  No abd pain, N/V, diarrhea/constipation  EXTREMITIES:  No swelling or joint pain  GENITOURINARY:  No burning on urination, increased frequency or urgency.  No flank pain  NEUROLOGIC:  No HA, visual disturbances  SKIN: No rashes    Allergies    No Known Allergies    Intolerances        ANTIBIOTICS/RELEVANT:  antimicrobials    immunologic:  influenza   Vaccine 0.5 milliLiter(s) IntraMuscular once    OTHER:  aMIOdarone    Tablet 400 milliGRAM(s) Oral every 8 hours  aspirin  chewable 81 milliGRAM(s) Oral daily  atorvastatin 80 milliGRAM(s) Oral at bedtime  chlorhexidine 4% Liquid 1 Application(s) Topical <User Schedule>  dextrose 40% Gel 15 Gram(s) Oral once  dextrose 5%. 1000 milliLiter(s) IV Continuous <Continuous>  dextrose 5%. 1000 milliLiter(s) IV Continuous <Continuous>  dextrose 50% Injectable 25 Gram(s) IV Push once  dextrose 50% Injectable 12.5 Gram(s) IV Push once  dextrose 50% Injectable 25 Gram(s) IV Push once  ferrous    sulfate 325 milliGRAM(s) Oral two times a day  glucagon  Injectable 1 milliGRAM(s) IntraMuscular once  heparin   Injectable 5000 Unit(s) SubCutaneous every 12 hours  insulin lispro (ADMELOG) corrective regimen sliding scale   SubCutaneous at bedtime  insulin lispro (ADMELOG) corrective regimen sliding scale   SubCutaneous three times a day before meals  metoprolol tartrate 25 milliGRAM(s) Oral every 12 hours  midodrine. 10 milliGRAM(s) Oral <User Schedule>  pantoprazole    Tablet 40 milliGRAM(s) Oral before breakfast  prasugrel 10 milliGRAM(s) Oral daily  sodium chloride 0.9% lock flush 10 milliLiter(s) IV Push every 1 hour PRN      Objective:  Vital Signs Last 24 Hrs  T(C): 36.3 (26 Feb 2021 15:55), Max: 36.9 (25 Feb 2021 20:35)  T(F): 97.4 (26 Feb 2021 15:55), Max: 98.4 (25 Feb 2021 20:35)  HR: 60 (26 Feb 2021 15:55) (54 - 61)  BP: 92/52 (26 Feb 2021 15:55) (90/59 - 121/55)  BP(mean): --  RR: 18 (26 Feb 2021 15:55) (18 - 19)  SpO2: 97% (26 Feb 2021 15:55) (94% - 99%)    PHYSICAL EXAM:  Constitutional:NAD  Eyes:CHERI, EOMI  Ear/Nose/Throat: no thrush, mucositis.  Moist mucous membranes	  Neck:no JVD, no lymphadenopathy, supple  Respiratory: CTA nadiya  Cardiovascular: S1S2 RRR, no murmurs  Gastrointestinal:soft, nontender,  nondistended (+) BS  Extremities:no e/e/c  Skin:  no rashes, rt femoral shiley        LABS:                        9.7    10.08 )-----------( 151      ( 26 Feb 2021 11:23 )             32.0     02-26    132<L>  |  98  |  127<H>  ----------------------------<  169<H>  4.9   |  15<L>  |  5.35<H>    Ca    9.0      26 Feb 2021 11:23    TPro  6.7  /  Alb  3.1<L>  /  TBili  1.8<H>  /  DBili  x   /  AST  103<H>  /  ALT  81<H>  /  AlkPhos  193<H>  02-25    PT/INR - ( 25 Feb 2021 21:01 )   PT: 16.3 sec;   INR: 1.38 ratio         PTT - ( 25 Feb 2021 21:01 )  PTT:33.7 sec      Procalcitonin, Serum: 0.20 (02-16 @ 23:54)                    MICROBIOLOGY:      Culture - Urine (02.17.21 @ 09:06)   Specimen Source: .Urine Clean Catch (Midstream)   Culture Results:   No growth         RADIOLOGY & ADDITIONAL STUDIES:    < from: Xray Abdomen 1 View PORTABLE -Urgent (Xray Abdomen 1 View PORTABLE -Urgent .) (02.26.21 @ 11:51) >  FINDINGS:  There has been insertion of right femoral venous dialysis-type catheter - tip of catheter in IVC at L2-L3 interspace.  There is a nonspecific, nonobstructive bowel gas pattern.  No gross free intraperitoneal air.  Degenerative changes of the visualized spine.    IMPRESSION:  Right femoral venous dialysis-type catheter in situ.  Nonspecific, nonobstructive bowel gas pattern.    < end of copied text >

## 2021-02-26 NOTE — PROCEDURE NOTE - NSINFORMCONSENT_GEN_A_CORE
Benefits, risks, and possible complications of procedure explained to patient/caregiver who verbalized understanding and gave written consent. Benefits, risks, and possible complications of procedure explained to patient/caregiver who verbalized understanding and gave verbal consent.

## 2021-02-26 NOTE — PROGRESS NOTE ADULT - SUBJECTIVE AND OBJECTIVE BOX
covering for DR. Rosario      Patient is a 80y old  Male who presents with a chief complaint of acute renal failure (22 Feb 2021 10:50)      SUBJECTIVE / OVERNIGHT EVENTS:  found to be covid +    MEDICATIONS  (STANDING):  aMIOdarone    Tablet 400 milliGRAM(s) Oral every 8 hours  aspirin  chewable 81 milliGRAM(s) Oral daily  atorvastatin 80 milliGRAM(s) Oral at bedtime  chlorhexidine 4% Liquid 1 Application(s) Topical <User Schedule>  dextrose 40% Gel 15 Gram(s) Oral once  dextrose 5%. 1000 milliLiter(s) (50 mL/Hr) IV Continuous <Continuous>  dextrose 5%. 1000 milliLiter(s) (100 mL/Hr) IV Continuous <Continuous>  dextrose 50% Injectable 25 Gram(s) IV Push once  dextrose 50% Injectable 12.5 Gram(s) IV Push once  dextrose 50% Injectable 25 Gram(s) IV Push once  ferrous    sulfate 325 milliGRAM(s) Oral two times a day  glucagon  Injectable 1 milliGRAM(s) IntraMuscular once  heparin   Injectable 5000 Unit(s) SubCutaneous every 12 hours  influenza   Vaccine 0.5 milliLiter(s) IntraMuscular once  insulin lispro (ADMELOG) corrective regimen sliding scale   SubCutaneous three times a day before meals  insulin lispro (ADMELOG) corrective regimen sliding scale   SubCutaneous at bedtime  metoprolol tartrate 25 milliGRAM(s) Oral every 12 hours  midodrine. 10 milliGRAM(s) Oral <User Schedule>  pantoprazole    Tablet 40 milliGRAM(s) Oral before breakfast  prasugrel 10 milliGRAM(s) Oral daily    MEDICATIONS  (PRN):  sodium chloride 0.9% lock flush 10 milliLiter(s) IV Push every 1 hour PRN Pre/post blood products, medications, blood draw, and to maintain line patency      Vital Signs Last 24 Hrs  T(C): 36.4 (26 Feb 2021 12:47), Max: 36.9 (25 Feb 2021 20:35)  T(F): 97.5 (26 Feb 2021 12:47), Max: 98.4 (25 Feb 2021 20:35)  HR: 59 (26 Feb 2021 12:47) (54 - 61)  BP: 96/49 (26 Feb 2021 12:47) (90/59 - 121/55)  BP(mean): --  RR: 18 (26 Feb 2021 12:47) (18 - 19)  SpO2: 98% (26 Feb 2021 12:47) (94% - 99%)      PHYSICAL EXAM:  GENERAL: NAD, well-developed  HEAD:  Atraumatic, Normocephalic  EYES: EOMI, PERRLA, conjunctiva and sclera clear  NECK: Supple, No JVD  CHEST/LUNG: Clear to auscultation bilaterally; No wheeze  HEART: Regular rate and rhythm;    ABDOMEN: Soft, Nontender, Nondistended; Bowel sounds present  EXTREMITIES:  2+ Peripheral Pulses, No clubbing, cyanosis, or edema  PSYCH: AAOx3  NEUROLOGY: non-focal  SKIN: No rashes or lesions    LABS:                                                  9.7    10.08 )-----------( 151      ( 26 Feb 2021 11:23 )             32.0   02-26    132<L>  |  98  |  127<H>  ----------------------------<  169<H>  4.9   |  15<L>  |  5.35<H>    Ca    9.0      26 Feb 2021 11:23    TPro  6.7  /  Alb  3.1<L>  /  TBili  1.8<H>  /  DBili  x   /  AST  103<H>  /  ALT  81<H>  /  AlkPhos  193<H>  02-25          < from: TTE with Doppler (w/Cont) (02.18.21 @ 08:26) >  -----------------------------  Conclusions:  1. Calcified trileaflet aortic valve with decreased  opening. Peak transaortic valve gradient equals 31 mm Hg,  mean transaortic valve gradient equals 18 mm Hg, estimated  aortic valve area equals 0.9 sqcm (by continuity equation),  aortic valve velocity time integral equals 68 cm,  consistent with severe aortic stenosis. Moderate-severe  aortic regurgitation.  2. Eccentric left ventricular hypertrophy (dilated left  ventricle with normal relative wall thickness).  3. Severe global left ventricular systolic dysfunction.  Endocardial visualization enhanced with intravenous  injection of Ultrasonic Enhancing Agent (Definity).  4. A device wire is noted in the right heart.  5. Right ventricular enlargement with decreased right  ventricular systolic function.  6. Normal tricuspid valve. Moderate tricuspid  regurgitation.  *** No previous Echo exam.  ----------------------------------------------------------    < end of copied text >

## 2021-02-26 NOTE — PRE PROCEDURE NOTE - PRE PROCEDURE EVALUATION
Interventional Radiology Pre-Procedure Note    This is a 80y M with CALVIN requiring HD. IR consulted for a non-tunneled HD catheter insertion.      Procedure: Non-tunneled HD catheter insertion    Diagnosis/Indication: Patient is a 80y old  Male who presents with a chief complaint of acute renal failure (25 Feb 2021 16:08)      PAST MEDICAL & SURGICAL HISTORY:  Renal failure    CAD (coronary artery disease)    Hypertension    Diabetes mellitus         Male    Allergies: No Known Allergies      LABS:  CBC Full  -  ( 25 Feb 2021 07:17 )  WBC Count : 10.63 K/uL  RBC Count : 3.31 M/uL  Hemoglobin : 10.2 g/dL  Hematocrit : 31.1 %  Platelet Count - Automated : 181 K/uL    02-25    135  |  99  |  122<H>  ----------------------------<  130<H>  4.4   |  17<L>  |  4.72<H>    Ca    9.6      25 Feb 2021 07:17    TPro  6.7  /  Alb  3.1<L>  /  TBili  1.8<H>  /  DBili  x   /  AST  103<H>  /  ALT  81<H>  /  AlkPhos  193<H>  02-25    PT/INR - ( 25 Feb 2021 21:01 )   PT: 16.3 sec;   INR: 1.38 ratio         PTT - ( 25 Feb 2021 21:01 )  PTT:33.7 sec    Plan:  -Non-tunneled HD catheter insertion.  -Procedure/ risks/ benefits were explained, informed consent obtained from patient.  Interventional Radiology Pre-Procedure Note    This is a 80y M with CALVIN requiring HD. IR consulted for a non-tunneled HD catheter insertion.      Procedure: Non-tunneled HD catheter insertion    Diagnosis/Indication: Patient is a 80y old  Male who presents with a chief complaint of acute renal failure (25 Feb 2021 16:08)      PAST MEDICAL & SURGICAL HISTORY:  Renal failure    CAD (coronary artery disease)    Hypertension    Diabetes mellitus         Male    Allergies: No Known Allergies      LABS:  CBC Full  -  ( 25 Feb 2021 07:17 )  WBC Count : 10.63 K/uL  RBC Count : 3.31 M/uL  Hemoglobin : 10.2 g/dL  Hematocrit : 31.1 %  Platelet Count - Automated : 181 K/uL    02-25    135  |  99  |  122<H>  ----------------------------<  130<H>  4.4   |  17<L>  |  4.72<H>    Ca    9.6      25 Feb 2021 07:17    TPro  6.7  /  Alb  3.1<L>  /  TBili  1.8<H>  /  DBili  x   /  AST  103<H>  /  ALT  81<H>  /  AlkPhos  193<H>  02-25    PT/INR - ( 25 Feb 2021 21:01 )   PT: 16.3 sec;   INR: 1.38 ratio         PTT - ( 25 Feb 2021 21:01 )  PTT:33.7 sec    Plan:  -Non-tunneled HD catheter insertion.  -Procedure/ risks/ benefits were explained, informed consent obtained.

## 2021-02-26 NOTE — PROGRESS NOTE ADULT - SUBJECTIVE AND OBJECTIVE BOX
      Overnight events noted      VITAL:  T(C): , Max: 36.9 (02-25-21 @ 20:35)  T(F): , Max: 98.4 (02-25-21 @ 20:35)  HR: 60 (02-26-21 @ 07:40)  BP: 95/55 (02-26-21 @ 07:40)  BP(mean): --  RR: 18 (02-26-21 @ 07:40)  SpO2: 94% (02-26-21 @ 07:40)      PHYSICAL EXAM:  Constitutional: NAD, Alert  HEENT: NCAT, DMM  Neck: Supple, no JVD  Respiratory: CTA-b/l  Cardiovascular: RRR s1s2, no m/r/g  Gastrointestinal: BS+, soft, NT; (+)mild distension  Extremities: 1+ b/l LE edema  Neurological: no focal deficits; strength grossly intact  Back: no CVAT b/l  Skin: No rashes, no nevi    LABS:                        10.2   10.63 )-----------( 181      ( 25 Feb 2021 07:17 )             31.1     Na(135)/K(4.4)/Cl(99)/HCO3(17)/BUN(122)/Cr(4.72)Glu(130)/Ca(9.6)/Mg(--)/PO4(--)    02-25 @ 07:17  Na(135)/K(4.6)/Cl(97)/HCO3(21)/BUN(115)/Cr(4.32)Glu(136)/Ca(9.7)/Mg(2.3)/PO4(4.7)    02-24 @ 06:55      IMPRESSION: 80M w/ DM2, CAD-CABG, and CKD, 2/16/21 a/w azotemia/lyte derangements/weakness    (1)Renal - CALVIN on CKD4 - worsening - needs dialysis at this point    (2)Lytes - acceptable    (3)CV - tenuous hemodynamics/hypervolemia - biventricular systolic dysfunction, severe AS, mod-sev AR. Structural Heart input appreciated; being evaluated for TAVR - in placing him on chronic dialysis, we can perform the needed contrast studies without concern about the impact of the studies on the kidneys    (4)ID - now COVID-positive      RECOMMEND:  (1)Christo placement today   (2)HD today, tomorrow, and Monday 3/1; midodrine 10mg po pre-hd  (3)No objection to IV contrast procedures in workup for TAVR  (4)No objection to use of Remdesivir for COVID here          Suraj Garcia MD  [facility]  Office: [phone]  Cell: [phone]               No pain, no sob      VITAL:  T(C): , Max: 36.9 (02-25-21 @ 20:35)  T(F): , Max: 98.4 (02-25-21 @ 20:35)  HR: 60 (02-26-21 @ 07:40)  BP: 95/55 (02-26-21 @ 07:40)  BP(mean): --  RR: 18 (02-26-21 @ 07:40)  SpO2: 94% (02-26-21 @ 07:40)      PHYSICAL EXAM:  Constitutional: NAD, Alert  HEENT: NCAT, DMM  Neck: Supple, no JVD  Respiratory: CTA-b/l  Cardiovascular: RRR s1s2, no m/r/g  Gastrointestinal: BS+, soft, NT; (+)mild distension  Extremities: 1+ b/l LE edema  Neurological: no focal deficits; strength grossly intact  Back: no CVAT b/l  Skin: No rashes, no nevi  Access: femoral shiley    LABS:                        10.2   10.63 )-----------( 181      ( 25 Feb 2021 07:17 )             31.1     Na(135)/K(4.4)/Cl(99)/HCO3(17)/BUN(122)/Cr(4.72)Glu(130)/Ca(9.6)/Mg(--)/PO4(--)    02-25 @ 07:17  Na(135)/K(4.6)/Cl(97)/HCO3(21)/BUN(115)/Cr(4.32)Glu(136)/Ca(9.7)/Mg(2.3)/PO4(4.7)    02-24 @ 06:55      IMPRESSION: 80M w/ DM2, CAD-CABG, and CKD, 2/16/21 a/w azotemia/lyte derangements/weakness    (1)Renal - CALVIN on CKD4 - worsening - needs dialysis at this point - s/p shiley placement by IR this a.m.    (2)Lytes - acceptable    (3)CV - tenuous hemodynamics/hypervolemia - biventricular systolic dysfunction, severe AS, mod-sev AR. Structural Heart input appreciated; being evaluated for TAVR - in placing him on chronic dialysis, we can perform the needed contrast studies without concern about the impact of the studies on the kidneys    (4)ID - now COVID-positive      RECOMMEND:  (1)HD today, tomorrow, and Monday 3/1; midodrine 10mg po pre-hd  (2)Eventual conversion of shiley to tunneled cath  (3)No objection to IV contrast procedures in workup for TAVR  (4)No objection to use of Remdesivir for COVID here          Suraj Garcia MD  [facility]  Office: [phone]  Cell: [phone]

## 2021-02-26 NOTE — PROGRESS NOTE ADULT - SUBJECTIVE AND OBJECTIVE BOX
Subjective: Patient seen and examined. No new events except as noted.   found to be COVID +   no cp or sob     REVIEW OF SYSTEMS:    CONSTITUTIONAL: + weakness, fevers or chills  EYES/ENT: No visual changes;  No vertigo or throat pain   NECK: No pain or stiffness  RESPIRATORY: No cough, wheezing, hemoptysis; No shortness of breath  CARDIOVASCULAR: No chest pain or palpitations  GASTROINTESTINAL: No abdominal or epigastric pain. No nausea, vomiting, or hematemesis; No diarrhea or constipation. No melena or hematochezia.  GENITOURINARY: No dysuria, frequency or hematuria  NEUROLOGICAL: No numbness or weakness  SKIN: No itching, burning, rashes, or lesions   All other review of systems is negative unless indicated above.    MEDICATIONS:  MEDICATIONS  (STANDING):  aMIOdarone    Tablet 400 milliGRAM(s) Oral every 8 hours  aspirin  chewable 81 milliGRAM(s) Oral daily  atorvastatin 80 milliGRAM(s) Oral at bedtime  dextrose 40% Gel 15 Gram(s) Oral once  dextrose 5%. 1000 milliLiter(s) (50 mL/Hr) IV Continuous <Continuous>  dextrose 5%. 1000 milliLiter(s) (100 mL/Hr) IV Continuous <Continuous>  dextrose 50% Injectable 25 Gram(s) IV Push once  dextrose 50% Injectable 12.5 Gram(s) IV Push once  dextrose 50% Injectable 25 Gram(s) IV Push once  ferrous    sulfate 325 milliGRAM(s) Oral two times a day  glucagon  Injectable 1 milliGRAM(s) IntraMuscular once  heparin   Injectable 5000 Unit(s) SubCutaneous every 12 hours  influenza   Vaccine 0.5 milliLiter(s) IntraMuscular once  insulin lispro (ADMELOG) corrective regimen sliding scale   SubCutaneous at bedtime  insulin lispro (ADMELOG) corrective regimen sliding scale   SubCutaneous three times a day before meals  metoprolol tartrate 25 milliGRAM(s) Oral every 12 hours  midodrine. 10 milliGRAM(s) Oral <User Schedule>  pantoprazole    Tablet 40 milliGRAM(s) Oral before breakfast  prasugrel 10 milliGRAM(s) Oral daily      PHYSICAL EXAM:  T(C): 36.4 (02-26-21 @ 07:40), Max: 36.9 (02-25-21 @ 20:35)  HR: 60 (02-26-21 @ 07:40) (54 - 61)  BP: 95/55 (02-26-21 @ 07:40) (90/59 - 121/55)  RR: 18 (02-26-21 @ 07:40) (18 - 19)  SpO2: 94% (02-26-21 @ 07:40) (94% - 99%)  Wt(kg): --  I&O's Summary    25 Feb 2021 07:01  -  26 Feb 2021 07:00  --------------------------------------------------------  IN: 610 mL / OUT: 250 mL / NET: 360 mL          Appearance: NAD	  HEENT:  Dry oral mucosa, PERRL, EOMI	  Lymphatic: No lymphadenopathy , no edema  Cardiovascular: Normal S1 S2, No JVD, + murmurs , Peripheral pulses palpable 2+ bilaterally  Respiratory: decreased bs   Gastrointestinal:  Soft, Non-tender, + BS	  Skin: No rashes, No ecchymoses, No cyanosis, warm to touch  Musculoskeletal: Normal range of motion, normal strength  Psychiatry: more awake and alert   Ext: No edema        LABS:    CARDIAC MARKERS:      COVID-19 PCR: Detected: You can help in the fight against COVID-19. [facility] may contact                           10.2   10.63 )-----------( 181      ( 25 Feb 2021 07:17 )             31.1     02-25    135  |  99  |  122<H>  ----------------------------<  130<H>  4.4   |  17<L>  |  4.72<H>    Ca    9.6      25 Feb 2021 07:17    TPro  6.7  /  Alb  3.1<L>  /  TBili  1.8<H>  /  DBili  x   /  AST  103<H>  /  ALT  81<H>  /  AlkPhos  193<H>  02-25    proBNP:   Lipid Profile:   HgA1c:   TSH:             TELEMETRY: 	  AV paced   ECG:  	  RADIOLOGY:   DIAGNOSTIC TESTING:  [ ] Echocardiogram:  [ ]  Catheterization:  [ ] Stress Test:    OTHER:

## 2021-02-26 NOTE — PROGRESS NOTE ADULT - ASSESSMENT
79 yo male w h/o ICM< CAD< s/p CABG, has AICD, DM, HTN presents w volume overload and abd pain  Abd sono shows gallstones, but no sign of cholecystitis/pancreatitis. abd pain resolved on Zosyn,   will treat empirically for 7 days .  ID following    ascites 2/2 systolic chf, probably worse 2/2 acute on chronic systolic chf.  responded well to lasix. now held due to rise creat.   renal following  pt with worsening renal function. renal f/u apprec. plan to start HD.   shiley placemnt by IR today.  to start HD tonight. d/w renal    Covid positive.  ID following. isolation. trend infl markers.   to attempt monoclonal infusion. awaiting approval     on interrogation of ICD, has a lof of arrhythmia burden,   NSVT and sustained VT, seen by EPS, cont  [id number] mg  bid. has inoperable ischemic HD,   ICM, EF 15%, severe AS, seen by structural heart for TAVR eval. prob a high risk and poor candidate 2/2 low EF, advanced CKD and overall  frail general  medical condition. structural heart following.  had heart CT.  f/u plan with structural heart team  pt now to start HD. ok to have further w/u w/o worry for renal issues      dvt ppx w [facility]

## 2021-02-26 NOTE — PROGRESS NOTE ADULT - ASSESSMENT
81 yo male w h/o DM, CAD, s/p CABG, CKD 3-4.   Recently evaluated by Nephrology for elevated creatinine. Bloodwork  returned notable for  and Na 126; in light of these findings, Dr. Luz (Renal) sent the patient to the ER. Lately ptn has been having abdominal pain with elevated amylase and lipase.   As per ptn's wife: on and off R abd pain w generalized weakness and poor po intake. PCP DCed po Lasix in past few days 2/2 abnormal blood test results.  Ptn denies recent NSAID use.  He was diagnosed with diabetes 18 years ago, and denies retinopathy (last saw Optho around 2 years ago). Cardiologist has alluded to the patient having mild renal impairment in the past, as per wife.    Patient's PCP is Dr. Shyann De eLón; GI Dr. Wilman Villagran; Cardiology Dr. Boyce.  (16 Feb 2021 21:05)    ER vs: T 98.1, P 85, /64. WBC 8.2.  Bun/Cr - 100/2.0.  LFTs mildly elevated, Roxana 217, Lip 185.  Pct 0.2.  UA (-).  Ct c/a/p shows pulmonary edema, sm B/L pleural effusions, sm volume ascites.  VQ scan with very low probability for PE.      Pt started on zosyn to cover for intra-abd source.  ID consulted.     r/o Gallstone pancreatitis:    - Pt p/w abdominal pain, epigastric and RUQ pain, noted to have elevated LFTs and amylase/lipase.  Possible gallstone pancreatitis?    - U/S abdomen shows cholelithiasis without acute cholecystitis.  Borderline to mild gallbladder thickening is likely due to ascites.   Moderate ascites noted.  Suggestion of mild renal parenchymal disease and bilateral pleural effusions.    - CTap w/o acute intra-abd pathology    - monitor LFTs, Roxana, Lipase.  As per GI, ascites likely secondary to CHF, no evidence for pancreatitis.     - Monitor temp curve and WBC.      - bcx neg.  On empiric abx coverage with zosyn x 7 days, completing on 2/23.  Abd pain resolving.       {[id number]}Renal failure:    - Renal following for CKD.  Dose abx for GFR 15-20.  On q12 hr dosing of zosyn.     - Cr rising.  CALVIN on CKD does not appear to be antibiotic mediated, renal f/u noted.      - Pt requiring HD, planned for Shiley placement by IR    Aortic stenosis:    - Followed by Structural health team for TAVR evaluation    Covid (+):    - Repeat Covid pcr on 2/25 tested positive.  Pt currently w/o cough/sob or fever.  Satting adequately on RA.      - No indication for Covid therapeutics at this time.  Cont supportive care, incentive spirometery.  Monitor pulse ox    - Repeat covid test sent.    - Monitor inflammatory markers.         Dr. Catarina Armendariz covering on 2/27 and 2/28.  [phone]

## 2021-02-27 LAB
ALBUMIN SERPL ELPH-MCNC: 2.6 G/DL — LOW (ref 3.3–5)
ALP SERPL-CCNC: 166 U/L — HIGH (ref 40–120)
ALT FLD-CCNC: 61 U/L — HIGH (ref 10–45)
ANION GAP SERPL CALC-SCNC: 17 MMOL/L — SIGNIFICANT CHANGE UP (ref 5–17)
AST SERPL-CCNC: 75 U/L — HIGH (ref 10–40)
BASOPHILS # BLD AUTO: 0.02 K/UL — SIGNIFICANT CHANGE UP (ref 0–0.2)
BASOPHILS NFR BLD AUTO: 0.3 % — SIGNIFICANT CHANGE UP (ref 0–2)
BILIRUB SERPL-MCNC: 1.5 MG/DL — HIGH (ref 0.2–1.2)
BUN SERPL-MCNC: 86 MG/DL — HIGH (ref 7–23)
CALCIUM SERPL-MCNC: 8.8 MG/DL — SIGNIFICANT CHANGE UP (ref 8.4–10.5)
CHLORIDE SERPL-SCNC: 100 MMOL/L — SIGNIFICANT CHANGE UP (ref 96–108)
CO2 SERPL-SCNC: 19 MMOL/L — LOW (ref 22–31)
CREAT SERPL-MCNC: 4.27 MG/DL — HIGH (ref 0.5–1.3)
CRP SERPL-MCNC: 1.91 MG/DL — HIGH (ref 0–0.4)
D DIMER BLD IA.RAPID-MCNC: 490 NG/ML DDU — HIGH
EOSINOPHIL # BLD AUTO: 0.09 K/UL — SIGNIFICANT CHANGE UP (ref 0–0.5)
EOSINOPHIL NFR BLD AUTO: 1.2 % — SIGNIFICANT CHANGE UP (ref 0–6)
FERRITIN SERPL-MCNC: 339 NG/ML — SIGNIFICANT CHANGE UP (ref 30–400)
GLUCOSE BLDC GLUCOMTR-MCNC: 110 MG/DL — HIGH (ref 70–99)
GLUCOSE BLDC GLUCOMTR-MCNC: 153 MG/DL — HIGH (ref 70–99)
GLUCOSE BLDC GLUCOMTR-MCNC: 169 MG/DL — HIGH (ref 70–99)
GLUCOSE BLDC GLUCOMTR-MCNC: 208 MG/DL — HIGH (ref 70–99)
GLUCOSE SERPL-MCNC: 107 MG/DL — HIGH (ref 70–99)
HCT VFR BLD CALC: 28.6 % — LOW (ref 39–50)
HGB BLD-MCNC: 9.3 G/DL — LOW (ref 13–17)
IMM GRANULOCYTES NFR BLD AUTO: 0.9 % — SIGNIFICANT CHANGE UP (ref 0–1.5)
LYMPHOCYTES # BLD AUTO: 0.52 K/UL — LOW (ref 1–3.3)
LYMPHOCYTES # BLD AUTO: 6.6 % — LOW (ref 13–44)
MCHC RBC-ENTMCNC: 30.2 PG — SIGNIFICANT CHANGE UP (ref 27–34)
MCHC RBC-ENTMCNC: 32.5 GM/DL — SIGNIFICANT CHANGE UP (ref 32–36)
MCV RBC AUTO: 92.9 FL — SIGNIFICANT CHANGE UP (ref 80–100)
MONOCYTES # BLD AUTO: 0.44 K/UL — SIGNIFICANT CHANGE UP (ref 0–0.9)
MONOCYTES NFR BLD AUTO: 5.6 % — SIGNIFICANT CHANGE UP (ref 2–14)
NEUTROPHILS # BLD AUTO: 6.68 K/UL — SIGNIFICANT CHANGE UP (ref 1.8–7.4)
NEUTROPHILS NFR BLD AUTO: 85.4 % — HIGH (ref 43–77)
NRBC # BLD: 0 /100 WBCS — SIGNIFICANT CHANGE UP (ref 0–0)
PLATELET # BLD AUTO: 126 K/UL — LOW (ref 150–400)
POTASSIUM SERPL-MCNC: 4 MMOL/L — SIGNIFICANT CHANGE UP (ref 3.5–5.3)
POTASSIUM SERPL-SCNC: 4 MMOL/L — SIGNIFICANT CHANGE UP (ref 3.5–5.3)
PROT SERPL-MCNC: 6 G/DL — SIGNIFICANT CHANGE UP (ref 6–8.3)
RBC # BLD: 3.08 M/UL — LOW (ref 4.2–5.8)
RBC # FLD: 20.4 % — HIGH (ref 10.3–14.5)
SODIUM SERPL-SCNC: 136 MMOL/L — SIGNIFICANT CHANGE UP (ref 135–145)
WBC # BLD: 7.82 K/UL — SIGNIFICANT CHANGE UP (ref 3.8–10.5)
WBC # FLD AUTO: 7.82 K/UL — SIGNIFICANT CHANGE UP (ref 3.8–10.5)

## 2021-02-27 RX ORDER — POLYETHYLENE GLYCOL 3350 17 G/17G
17 POWDER, FOR SOLUTION ORAL DAILY
Refills: 0 | Status: DISCONTINUED | OUTPATIENT
Start: 2021-02-27 | End: 2021-03-24

## 2021-02-27 RX ADMIN — POLYETHYLENE GLYCOL 3350 17 GRAM(S): 17 POWDER, FOR SOLUTION ORAL at 13:22

## 2021-02-27 RX ADMIN — Medication 81 MILLIGRAM(S): at 13:22

## 2021-02-27 RX ADMIN — HEPARIN SODIUM 5000 UNIT(S): 5000 INJECTION INTRAVENOUS; SUBCUTANEOUS at 06:31

## 2021-02-27 RX ADMIN — HEPARIN SODIUM 5000 UNIT(S): 5000 INJECTION INTRAVENOUS; SUBCUTANEOUS at 17:13

## 2021-02-27 RX ADMIN — ATORVASTATIN CALCIUM 80 MILLIGRAM(S): 80 TABLET, FILM COATED ORAL at 21:33

## 2021-02-27 RX ADMIN — PANTOPRAZOLE SODIUM 40 MILLIGRAM(S): 20 TABLET, DELAYED RELEASE ORAL at 06:31

## 2021-02-27 RX ADMIN — Medication 325 MILLIGRAM(S): at 06:31

## 2021-02-27 RX ADMIN — PRASUGREL 10 MILLIGRAM(S): 5 TABLET, FILM COATED ORAL at 13:22

## 2021-02-27 RX ADMIN — MIDODRINE HYDROCHLORIDE 10 MILLIGRAM(S): 2.5 TABLET ORAL at 17:15

## 2021-02-27 RX ADMIN — Medication 25 MILLIGRAM(S): at 06:31

## 2021-02-27 RX ADMIN — CHLORHEXIDINE GLUCONATE 1 APPLICATION(S): 213 SOLUTION TOPICAL at 09:00

## 2021-02-27 RX ADMIN — Medication 25 MILLIGRAM(S): at 21:33

## 2021-02-27 RX ADMIN — Medication 325 MILLIGRAM(S): at 17:13

## 2021-02-27 RX ADMIN — Medication 1: at 12:15

## 2021-02-27 RX ADMIN — Medication 2: at 16:46

## 2021-02-27 NOTE — PROGRESS NOTE ADULT - ASSESSMENT
79 yo male w h/o DM, CAD, s/p CABG, CKD 3-4.   Recently evaluated by Nephrology for elevated creatinine. Bloodwork  returned notable for  and Na 126; in light of these findings, Dr. Luz (Renal) sent the patient to the ER. Lately ptn has been having abdominal pain with elevated amylase and lipase.   As per ptn's wife: on and off R abd pain w generalized weakness and poor po intake. PCP DCed po Lasix in past few days 2/2 abnormal blood test results.  Ptn denies recent NSAID use.  He was diagnosed with diabetes 18 years ago, and denies retinopathy (last saw Optho around 2 years ago). Cardiologist has alluded to the patient having mild renal impairment in the past, as per wife.    Patient's PCP is Dr. Shyann De León; GI Dr. Wilman Villagran; Cardiology Dr. Boyce.  (16 Feb 2021 21:05)    ER vs: T 98.1, P 85, /64. WBC 8.2.  Bun/Cr - 100/2.0.  LFTs mildly elevated, Roxana 217, Lip 185.  Pct 0.2.  UA (-).  Ct c/a/p shows pulmonary edema, sm B/L pleural effusions, sm volume ascites.  VQ scan with very low probability for PE.      Pt started on zosyn to cover for intra-abd source.  ID consulted.     r/o Gallstone pancreatitis:    - Pt p/w abdominal pain, epigastric and RUQ pain, noted to have elevated LFTs and amylase/lipase.  Possible gallstone pancreatitis?    - U/S abdomen shows cholelithiasis without acute cholecystitis.  Borderline to mild gallbladder thickening is likely due to ascites.   Moderate ascites noted.  Suggestion of mild renal parenchymal disease and bilateral pleural effusions.    - CTap w/o acute intra-abd pathology    - monitor LFTs, Roxana, Lipase.  As per GI, ascites likely secondary to CHF, no evidence for pancreatitis.     - Monitor temp curve and WBC.      - bcx neg. completed  coverage with zosyn x 7 days, 2/23.  Abd pain resolving.       {[id number]}Renal failure:    - Renal following for CKD.  Dose abx for GFR 15-20.  On q12 hr dosing of zosyn.     - Cr rising.  CALVIN on CKD does not appear to be antibiotic mediated, renal f/u noted.      - Pt requiring HD,   - shiley catheter placed 2/26    Aortic stenosis:    - Followed by Structural health team for TAVR evaluation    Covid (+):    - Repeat Covid pcr on 2/25 tested positive.   Satting adequately on RA.      - No indication for Covid therapeutics at this time.  Cont supportive care, incentive spirometery.  Monitor pulse ox    - Repeat covid test remains positive    - Monitor inflammatory markers.

## 2021-02-27 NOTE — PROGRESS NOTE ADULT - ASSESSMENT
81 yo male w h/o ICM< CAD< s/p CABG, has AICD, DM, HTN presents w volume overload and abd pain  Abd sono shows gallstones, but no sign of cholecystitis/pancreatitis. abd pain resolved on Zosyn,   will treat empirically for 7 days .  ID following    ascites 2/2 systolic chf, probably worse 2/2 acute on chronic systolic chf.  responded well to lasix. now held due to rise creat.   renal following  pt with worsening renal function. renal f/u apprec. plan to start HD.   started HD 2/26. HD as per renal    Covid positive.  ID following. isolation. trend infl markers.   s/p monoclonal infusion.  on interrogation of ICD, has a lof of arrhythmia burden,   NSVT and sustained VT, seen by EPS, cont  [id number] mg  bid. has inoperable ischemic HD,   ICM, EF 15%, severe AS, seen by structural heart for TAVR , proceed with TAVR work up  dvt ppx w HSC

## 2021-02-27 NOTE — CHART NOTE - NSCHARTNOTEFT_GEN_A_CORE
Patient completed amiodarone load last night, does not have a maintenance amiodarone dose currently ordered.   No intervention at this time per cardiologist, Dr. Velásquez  Will continue to monitors vitals closely   Will endorse/sign-out to day team       Sherly Euceda PA-C   Dept of Medicine   [id number]

## 2021-02-27 NOTE — PROGRESS NOTE ADULT - SUBJECTIVE AND OBJECTIVE BOX
Patient is a 80y old  Male who presents with a chief complaint of acute renal failure (27 Feb 2021 19:52)      SUBJECTIVE / OVERNIGHT EVENTS:    MEDICATIONS  (STANDING):  aspirin  chewable 81 milliGRAM(s) Oral daily  atorvastatin 80 milliGRAM(s) Oral at bedtime  chlorhexidine 4% Liquid 1 Application(s) Topical <User Schedule>  dextrose 40% Gel 15 Gram(s) Oral once  dextrose 5%. 1000 milliLiter(s) (50 mL/Hr) IV Continuous <Continuous>  dextrose 5%. 1000 milliLiter(s) (100 mL/Hr) IV Continuous <Continuous>  dextrose 50% Injectable 25 Gram(s) IV Push once  dextrose 50% Injectable 12.5 Gram(s) IV Push once  dextrose 50% Injectable 25 Gram(s) IV Push once  ferrous    sulfate 325 milliGRAM(s) Oral two times a day  glucagon  Injectable 1 milliGRAM(s) IntraMuscular once  heparin   Injectable 5000 Unit(s) SubCutaneous every 12 hours  influenza   Vaccine 0.5 milliLiter(s) IntraMuscular once  insulin lispro (ADMELOG) corrective regimen sliding scale   SubCutaneous three times a day before meals  insulin lispro (ADMELOG) corrective regimen sliding scale   SubCutaneous at bedtime  metoprolol tartrate 25 milliGRAM(s) Oral every 12 hours  midodrine. 10 milliGRAM(s) Oral <User Schedule>  pantoprazole    Tablet 40 milliGRAM(s) Oral before breakfast  polyethylene glycol 3350 17 Gram(s) Oral daily  prasugrel 10 milliGRAM(s) Oral daily    MEDICATIONS  (PRN):  sodium chloride 0.9% lock flush 10 milliLiter(s) IV Push every 1 hour PRN Pre/post blood products, medications, blood draw, and to maintain line patency      Vital Signs Last 24 Hrs  T(F): 98.5 (02-27-21 @ 20:45), Max: 98.5 (02-27-21 @ 20:45)  HR: 60 (02-27-21 @ 20:45) (54 - 60)  BP: 101/50 (02-27-21 @ 20:45) (85/46 - 101/50)  RR: 18 (02-27-21 @ 20:45) (17 - 18)  SpO2: 99% (02-27-21 @ 20:45) (93% - 99%)  Telemetry:   CAPILLARY BLOOD GLUCOSE      POCT Blood Glucose.: 169 mg/dL (27 Feb 2021 21:41)  POCT Blood Glucose.: 208 mg/dL (27 Feb 2021 16:33)  POCT Blood Glucose.: 153 mg/dL (27 Feb 2021 11:55)  POCT Blood Glucose.: 110 mg/dL (27 Feb 2021 07:39)    I&O's Summary    26 Feb 2021 07:01  -  27 Feb 2021 07:00  --------------------------------------------------------  IN: 240 mL / OUT: 350 mL / NET: -110 mL    27 Feb 2021 07:01  -  27 Feb 2021 22:23  --------------------------------------------------------  IN: 1515 mL / OUT: 1900 mL / NET: -385 mL        PHYSICAL EXAM:  GENERAL: NAD, well-developed  HEAD:  Atraumatic, Normocephalic  EYES: EOMI, PERRLA, conjunctiva and sclera clear  NECK: Supple, No JVD  CHEST/LUNG: Clear to auscultation bilaterally; No wheeze  HEART: Regular rate and rhythm; No murmurs, rubs, or gallops  ABDOMEN: Soft, Nontender, Nondistended; Bowel sounds present  EXTREMITIES:  2+ Peripheral Pulses, No clubbing, cyanosis, or edema  PSYCH: AAOx3  NEUROLOGY: non-focal  SKIN: No rashes or lesions    LABS:                        9.3    7.82  )-----------( 126      ( 27 Feb 2021 06:14 )             28.6     02-27    136  |  100  |  86<H>  ----------------------------<  107<H>  4.0   |  19<L>  |  4.27<H>    Ca    8.8      27 Feb 2021 06:14    TPro  6.0  /  Alb  2.6<L>  /  TBili  1.5<H>  /  DBili  x   /  AST  75<H>  /  ALT  61<H>  /  AlkPhos  166<H>  02-27              RADIOLOGY & ADDITIONAL TESTS:    Imaging Personally Reviewed:    Consultant(s) Notes Reviewed:      Care Discussed with Consultants/Other Providers:   Patient is a 80y old  Male who presents with a chief complaint of acute renal failure (27 Feb 2021 19:52)      SUBJECTIVE / OVERNIGHT EVENTS: started HD    MEDICATIONS  (STANDING):  aspirin  chewable 81 milliGRAM(s) Oral daily  atorvastatin 80 milliGRAM(s) Oral at bedtime  chlorhexidine 4% Liquid 1 Application(s) Topical <User Schedule>  dextrose 40% Gel 15 Gram(s) Oral once  dextrose 5%. 1000 milliLiter(s) (50 mL/Hr) IV Continuous <Continuous>  dextrose 5%. 1000 milliLiter(s) (100 mL/Hr) IV Continuous <Continuous>  dextrose 50% Injectable 25 Gram(s) IV Push once  dextrose 50% Injectable 12.5 Gram(s) IV Push once  dextrose 50% Injectable 25 Gram(s) IV Push once  ferrous    sulfate 325 milliGRAM(s) Oral two times a day  glucagon  Injectable 1 milliGRAM(s) IntraMuscular once  heparin   Injectable 5000 Unit(s) SubCutaneous every 12 hours  influenza   Vaccine 0.5 milliLiter(s) IntraMuscular once  insulin lispro (ADMELOG) corrective regimen sliding scale   SubCutaneous three times a day before meals  insulin lispro (ADMELOG) corrective regimen sliding scale   SubCutaneous at bedtime  metoprolol tartrate 25 milliGRAM(s) Oral every 12 hours  midodrine. 10 milliGRAM(s) Oral <User Schedule>  pantoprazole    Tablet 40 milliGRAM(s) Oral before breakfast  polyethylene glycol 3350 17 Gram(s) Oral daily  prasugrel 10 milliGRAM(s) Oral daily    MEDICATIONS  (PRN):  sodium chloride 0.9% lock flush 10 milliLiter(s) IV Push every 1 hour PRN Pre/post blood products, medications, blood draw, and to maintain line patency      Vital Signs Last 24 Hrs  T(F): 98.5 (02-27-21 @ 20:45), Max: 98.5 (02-27-21 @ 20:45)  HR: 60 (02-27-21 @ 20:45) (54 - 60)  BP: 101/50 (02-27-21 @ 20:45) (85/46 - 101/50)  RR: 18 (02-27-21 @ 20:45) (17 - 18)  SpO2: 99% (02-27-21 @ 20:45) (93% - 99%)  Telemetry:   CAPILLARY BLOOD GLUCOSE      POCT Blood Glucose.: 169 mg/dL (27 Feb 2021 21:41)  POCT Blood Glucose.: 208 mg/dL (27 Feb 2021 16:33)  POCT Blood Glucose.: 153 mg/dL (27 Feb 2021 11:55)  POCT Blood Glucose.: 110 mg/dL (27 Feb 2021 07:39)    I&O's Summary    26 Feb 2021 07:01  -  27 Feb 2021 07:00  --------------------------------------------------------  IN: 240 mL / OUT: 350 mL / NET: -110 mL    27 Feb 2021 07:01  -  27 Feb 2021 22:23  --------------------------------------------------------  IN: 1515 mL / OUT: 1900 mL / NET: -385 mL        PHYSICAL EXAM:  GENERAL: NAD, well-developed  HEAD:  Atraumatic, Normocephalic  EYES: EOMI, PERRLA, conjunctiva and sclera clear  NECK: Supple, No JVD  CHEST/LUNG: Clear to auscultation bilaterally; No wheeze  HEART: Regular rate and rhythm; No murmurs, rubs, or gallops  ABDOMEN: Soft, Nontender, Nondistended; Bowel sounds present  EXTREMITIES:  2+ Peripheral Pulses, No clubbing, cyanosis, or edema  PSYCH: AAOx3  NEUROLOGY: non-focal  SKIN: No rashes or lesions    LABS:                        9.3    7.82  )-----------( 126      ( 27 Feb 2021 06:14 )             28.6     02-27    136  |  100  |  86<H>  ----------------------------<  107<H>  4.0   |  19<L>  |  4.27<H>    Ca    8.8      27 Feb 2021 06:14    TPro  6.0  /  Alb  2.6<L>  /  TBili  1.5<H>  /  DBili  x   /  AST  75<H>  /  ALT  61<H>  /  AlkPhos  166<H>  02-27              RADIOLOGY & ADDITIONAL TESTS:    Imaging Personally Reviewed:    Consultant(s) Notes Reviewed:      Care Discussed with Consultants/Other Providers:

## 2021-02-27 NOTE — PROGRESS NOTE ADULT - SUBJECTIVE AND OBJECTIVE BOX
Subjective: Patient seen and examined. No new events except as noted.     REVIEW OF SYSTEMS:    CONSTITUTIONAL: + weakness, fevers or chills  EYES/ENT: No visual changes;  No vertigo or throat pain   NECK: No pain or stiffness  RESPIRATORY: No cough, wheezing, hemoptysis; No shortness of breath  CARDIOVASCULAR: No chest pain or palpitations  GASTROINTESTINAL: No abdominal or epigastric pain. No nausea, vomiting, or hematemesis; No diarrhea or constipation. No melena or hematochezia.  GENITOURINARY: No dysuria, frequency or hematuria  NEUROLOGICAL: No numbness or weakness  SKIN: No itching, burning, rashes, or lesions   All other review of systems is negative unless indicated above.    MEDICATIONS:  MEDICATIONS  (STANDING):  aspirin  chewable 81 milliGRAM(s) Oral daily  atorvastatin 80 milliGRAM(s) Oral at bedtime  chlorhexidine 4% Liquid 1 Application(s) Topical <User Schedule>  dextrose 40% Gel 15 Gram(s) Oral once  dextrose 5%. 1000 milliLiter(s) (50 mL/Hr) IV Continuous <Continuous>  dextrose 5%. 1000 milliLiter(s) (100 mL/Hr) IV Continuous <Continuous>  dextrose 50% Injectable 25 Gram(s) IV Push once  dextrose 50% Injectable 12.5 Gram(s) IV Push once  dextrose 50% Injectable 25 Gram(s) IV Push once  ferrous    sulfate 325 milliGRAM(s) Oral two times a day  glucagon  Injectable 1 milliGRAM(s) IntraMuscular once  heparin   Injectable 5000 Unit(s) SubCutaneous every 12 hours  influenza   Vaccine 0.5 milliLiter(s) IntraMuscular once  insulin lispro (ADMELOG) corrective regimen sliding scale   SubCutaneous three times a day before meals  insulin lispro (ADMELOG) corrective regimen sliding scale   SubCutaneous at bedtime  metoprolol tartrate 25 milliGRAM(s) Oral every 12 hours  midodrine. 10 milliGRAM(s) Oral <User Schedule>  pantoprazole    Tablet 40 milliGRAM(s) Oral before breakfast  polyethylene glycol 3350 17 Gram(s) Oral daily  prasugrel 10 milliGRAM(s) Oral daily      PHYSICAL EXAM:  T(C): 36.3 (02-27-21 @ 17:37), Max: 36.6 (02-27-21 @ 05:30)  HR: 54 (02-27-21 @ 17:37) (54 - 60)  BP: 85/46 (02-27-21 @ 17:37) (85/46 - 99/56)  RR: 18 (02-27-21 @ 17:37) (17 - 18)  SpO2: 93% (02-27-21 @ 17:37) (93% - 98%)  Wt(kg): --  I&O's Summary    26 Feb 2021 07:01  -  27 Feb 2021 07:00  --------------------------------------------------------  IN: 240 mL / OUT: 350 mL / NET: -110 mL    27 Feb 2021 07:01  -  27 Feb 2021 19:52  --------------------------------------------------------  IN: 475 mL / OUT: 100 mL / NET: 375 mL          Appearance: NAD	  HEENT:  Dry oral mucosa, PERRL, EOMI	  Lymphatic: No lymphadenopathy , no edema  Cardiovascular: Normal S1 S2, No JVD, + murmurs , Peripheral pulses palpable 2+ bilaterally  Respiratory: decreased bs   Gastrointestinal:  Soft, Non-tender, + BS	  Skin: No rashes, No ecchymoses, No cyanosis, warm to touch  Musculoskeletal: Normal range of motion, normal strength  Psychiatry: more awake and alert   Ext: No edema      LABS:    CARDIAC MARKERS:                                9.3    7.82  )-----------( 126      ( 27 Feb 2021 06:14 )             28.6     02-27    136  |  100  |  86<H>  ----------------------------<  107<H>  4.0   |  19<L>  |  4.27<H>    Ca    8.8      27 Feb 2021 06:14    TPro  6.0  /  Alb  2.6<L>  /  TBili  1.5<H>  /  DBili  x   /  AST  75<H>  /  ALT  61<H>  /  AlkPhos  166<H>  02-27    proBNP:   Lipid Profile:   HgA1c:   TSH:             TELEMETRY: 	AV paced     ECG:  	  RADIOLOGY:     DIAGNOSTIC TESTING:  [ ] Echocardiogram:  [ ]  Catheterization:  [ ] Stress Test:    OTHER:

## 2021-02-27 NOTE — PROGRESS NOTE ADULT - SUBJECTIVE AND OBJECTIVE BOX
[facility], Division of Infectious Diseases  VIRGILIO Yuen, ELLEN Choudhary  [phone]  after hours and weekends [phone]  coverage for Dr Schwarz    Name: CHAPIN RAMOS  Age: 80y  Gender: Male  MRN: [id number]    Interval History--  Notes reviewed  no overnight events        Allergies    No Known Allergies    Intolerances        Medications--  Antibiotics:    Immunologic:  influenza   Vaccine 0.5 milliLiter(s) IntraMuscular once    Other:  aspirin  chewable  atorvastatin  chlorhexidine 4% Liquid  dextrose 40% Gel  dextrose 5%.  dextrose 5%.  dextrose 50% Injectable  dextrose 50% Injectable  dextrose 50% Injectable  ferrous    sulfate  glucagon  Injectable  heparin   Injectable  insulin lispro (ADMELOG) corrective regimen sliding scale  insulin lispro (ADMELOG) corrective regimen sliding scale  metoprolol tartrate  midodrine.  pantoprazole    Tablet  polyethylene glycol 3350  prasugrel  sodium chloride 0.9% lock flush PRN      Review of Systems--  A 10-point review of systems was obtained.     Pertinent positives and negatives--  Constitutional: No fevers. No Chills. No Rigors.   Cardiovascular: No chest pain. No palpitations.  Respiratory: No shortness of breath. No cough.  Gastrointestinal: No nausea or vomiting. No diarrhea or constipation.   Psychiatric: no anxiety     Review of systems otherwise negative except as previously noted.    Physical Examination--  Vital Signs: T(F): 97.8 (02-27-21 @ 11:10), Max: 97.8 (02-27-21 @ 05:30)  HR: 59 (02-27-21 @ 11:10)  BP: 94/52 (02-27-21 @ 11:10)  RR: 18 (02-27-21 @ 11:10)  SpO2: 96% (02-27-21 @ 11:10)  Wt(kg): --  General: Nontoxic-appearing Male in no acute distress.  HEENT: AT/NC. Anicteric.  Neck: Not rigid. No sense of mass.  Nodes: None palpable.  Lungs: Clear bilaterally without rales, wheezing or rhonchi  Heart: Regular rate and rhythm.   Abdomen: Bowel sounds present and normoactive. Soft. Nondistended. Nontender.   Extremities: No cyanosis or clubbing. No edema.   Skin: Warm. Dry. Good turgor. No rash. No vasculitic stigmata.  Psychiatric: Appropriate affect and mood for situation.         Laboratory Studies--  CBC                        9.3    7.82  )-----------( 126      ( 27 Feb 2021 06:14 )             28.6       Chemistries  02-27    136  |  100  |  86<H>  ----------------------------<  107<H>  4.0   |  19<L>  |  4.27<H>    Ca    8.8      27 Feb 2021 06:14    TPro  6.0  /  Alb  2.6<L>  /  TBili  1.5<H>  /  DBili  x   /  AST  75<H>  /  ALT  61<H>  /  AlkPhos  166<H>  02-27      Culture Data      < from: Xray Abdomen 1 View PORTABLE -Urgent (Xray Abdomen 1 View PORTABLE -Urgent .) (02.26.21 @ 11:51) >    FINDINGS:  There has been insertion of right femoral venous dialysis-type catheter - tip of catheter in IVC at L2-L3 interspace.  There is a nonspecific, nonobstructive bowel gas pattern.  No gross free intraperitoneal air.  Degenerative changes of the visualized spine.    IMPRESSION:  Right femoral venous dialysis-type catheter in situ.  Nonspecific, nonobstructive bowel gas pattern.    < end of copied text >

## 2021-02-28 LAB
ALBUMIN SERPL ELPH-MCNC: 2.6 G/DL — LOW (ref 3.3–5)
ALP SERPL-CCNC: 184 U/L — HIGH (ref 40–120)
ALT FLD-CCNC: 57 U/L — HIGH (ref 10–45)
ANION GAP SERPL CALC-SCNC: 10 MMOL/L — SIGNIFICANT CHANGE UP (ref 5–17)
AST SERPL-CCNC: 69 U/L — HIGH (ref 10–40)
BASOPHILS # BLD AUTO: 0.03 K/UL — SIGNIFICANT CHANGE UP (ref 0–0.2)
BASOPHILS NFR BLD AUTO: 0.4 % — SIGNIFICANT CHANGE UP (ref 0–2)
BILIRUB SERPL-MCNC: 1.5 MG/DL — HIGH (ref 0.2–1.2)
BUN SERPL-MCNC: 52 MG/DL — HIGH (ref 7–23)
CALCIUM SERPL-MCNC: 8.7 MG/DL — SIGNIFICANT CHANGE UP (ref 8.4–10.5)
CHLORIDE SERPL-SCNC: 103 MMOL/L — SIGNIFICANT CHANGE UP (ref 96–108)
CO2 SERPL-SCNC: 26 MMOL/L — SIGNIFICANT CHANGE UP (ref 22–31)
CREAT SERPL-MCNC: 3.45 MG/DL — HIGH (ref 0.5–1.3)
EOSINOPHIL # BLD AUTO: 0.1 K/UL — SIGNIFICANT CHANGE UP (ref 0–0.5)
EOSINOPHIL NFR BLD AUTO: 1.3 % — SIGNIFICANT CHANGE UP (ref 0–6)
GLUCOSE BLDC GLUCOMTR-MCNC: 121 MG/DL — HIGH (ref 70–99)
GLUCOSE BLDC GLUCOMTR-MCNC: 198 MG/DL — HIGH (ref 70–99)
GLUCOSE BLDC GLUCOMTR-MCNC: 203 MG/DL — HIGH (ref 70–99)
GLUCOSE BLDC GLUCOMTR-MCNC: 217 MG/DL — HIGH (ref 70–99)
GLUCOSE SERPL-MCNC: 100 MG/DL — HIGH (ref 70–99)
HCT VFR BLD CALC: 29.5 % — LOW (ref 39–50)
HGB BLD-MCNC: 9.6 G/DL — LOW (ref 13–17)
IMM GRANULOCYTES NFR BLD AUTO: 0.6 % — SIGNIFICANT CHANGE UP (ref 0–1.5)
LYMPHOCYTES # BLD AUTO: 0.63 K/UL — LOW (ref 1–3.3)
LYMPHOCYTES # BLD AUTO: 7.9 % — LOW (ref 13–44)
MCHC RBC-ENTMCNC: 30.4 PG — SIGNIFICANT CHANGE UP (ref 27–34)
MCHC RBC-ENTMCNC: 32.5 GM/DL — SIGNIFICANT CHANGE UP (ref 32–36)
MCV RBC AUTO: 93.4 FL — SIGNIFICANT CHANGE UP (ref 80–100)
MONOCYTES # BLD AUTO: 0.62 K/UL — SIGNIFICANT CHANGE UP (ref 0–0.9)
MONOCYTES NFR BLD AUTO: 7.8 % — SIGNIFICANT CHANGE UP (ref 2–14)
NEUTROPHILS # BLD AUTO: 6.57 K/UL — SIGNIFICANT CHANGE UP (ref 1.8–7.4)
NEUTROPHILS NFR BLD AUTO: 82 % — HIGH (ref 43–77)
NRBC # BLD: 0 /100 WBCS — SIGNIFICANT CHANGE UP (ref 0–0)
PLATELET # BLD AUTO: 97 K/UL — LOW (ref 150–400)
POTASSIUM SERPL-MCNC: 3.8 MMOL/L — SIGNIFICANT CHANGE UP (ref 3.5–5.3)
POTASSIUM SERPL-SCNC: 3.8 MMOL/L — SIGNIFICANT CHANGE UP (ref 3.5–5.3)
PROT SERPL-MCNC: 6 G/DL — SIGNIFICANT CHANGE UP (ref 6–8.3)
RBC # BLD: 3.16 M/UL — LOW (ref 4.2–5.8)
RBC # FLD: 20.7 % — HIGH (ref 10.3–14.5)
SODIUM SERPL-SCNC: 139 MMOL/L — SIGNIFICANT CHANGE UP (ref 135–145)
WBC # BLD: 8 K/UL — SIGNIFICANT CHANGE UP (ref 3.8–10.5)
WBC # FLD AUTO: 8 K/UL — SIGNIFICANT CHANGE UP (ref 3.8–10.5)

## 2021-02-28 RX ADMIN — Medication 81 MILLIGRAM(S): at 12:04

## 2021-02-28 RX ADMIN — Medication 0: at 22:11

## 2021-02-28 RX ADMIN — CHLORHEXIDINE GLUCONATE 1 APPLICATION(S): 213 SOLUTION TOPICAL at 10:11

## 2021-02-28 RX ADMIN — Medication 325 MILLIGRAM(S): at 05:04

## 2021-02-28 RX ADMIN — Medication 2: at 12:20

## 2021-02-28 RX ADMIN — POLYETHYLENE GLYCOL 3350 17 GRAM(S): 17 POWDER, FOR SOLUTION ORAL at 12:05

## 2021-02-28 RX ADMIN — HEPARIN SODIUM 5000 UNIT(S): 5000 INJECTION INTRAVENOUS; SUBCUTANEOUS at 17:08

## 2021-02-28 RX ADMIN — HEPARIN SODIUM 5000 UNIT(S): 5000 INJECTION INTRAVENOUS; SUBCUTANEOUS at 05:04

## 2021-02-28 RX ADMIN — Medication 25 MILLIGRAM(S): at 17:08

## 2021-02-28 RX ADMIN — Medication 325 MILLIGRAM(S): at 17:08

## 2021-02-28 RX ADMIN — PRASUGREL 10 MILLIGRAM(S): 5 TABLET, FILM COATED ORAL at 12:05

## 2021-02-28 RX ADMIN — Medication 1: at 16:44

## 2021-02-28 RX ADMIN — PANTOPRAZOLE SODIUM 40 MILLIGRAM(S): 20 TABLET, DELAYED RELEASE ORAL at 05:05

## 2021-02-28 RX ADMIN — ATORVASTATIN CALCIUM 80 MILLIGRAM(S): 80 TABLET, FILM COATED ORAL at 20:56

## 2021-02-28 NOTE — PROGRESS NOTE ADULT - SUBJECTIVE AND OBJECTIVE BOX
Subjective: Patient seen and examined. No new events except as noted.     REVIEW OF SYSTEMS:    CONSTITUTIONAL: + weakness, fevers or chills  EYES/ENT: No visual changes;  No vertigo or throat pain   NECK: No pain or stiffness  RESPIRATORY: No cough, wheezing, hemoptysis; No shortness of breath  CARDIOVASCULAR: No chest pain or palpitations  GASTROINTESTINAL: No abdominal or epigastric pain. No nausea, vomiting, or hematemesis; No diarrhea or constipation. No melena or hematochezia.  GENITOURINARY: No dysuria, frequency or hematuria  NEUROLOGICAL: No numbness or weakness  SKIN: No itching, burning, rashes, or lesions   All other review of systems is negative unless indicated above.    MEDICATIONS:  MEDICATIONS  (STANDING):  aspirin  chewable 81 milliGRAM(s) Oral daily  atorvastatin 80 milliGRAM(s) Oral at bedtime  chlorhexidine 4% Liquid 1 Application(s) Topical <User Schedule>  dextrose 40% Gel 15 Gram(s) Oral once  dextrose 5%. 1000 milliLiter(s) (50 mL/Hr) IV Continuous <Continuous>  dextrose 5%. 1000 milliLiter(s) (100 mL/Hr) IV Continuous <Continuous>  dextrose 50% Injectable 25 Gram(s) IV Push once  dextrose 50% Injectable 12.5 Gram(s) IV Push once  dextrose 50% Injectable 25 Gram(s) IV Push once  ferrous    sulfate 325 milliGRAM(s) Oral two times a day  glucagon  Injectable 1 milliGRAM(s) IntraMuscular once  heparin   Injectable 5000 Unit(s) SubCutaneous every 12 hours  influenza   Vaccine 0.5 milliLiter(s) IntraMuscular once  insulin lispro (ADMELOG) corrective regimen sliding scale   SubCutaneous three times a day before meals  insulin lispro (ADMELOG) corrective regimen sliding scale   SubCutaneous at bedtime  metoprolol tartrate 25 milliGRAM(s) Oral every 12 hours  midodrine. 10 milliGRAM(s) Oral <User Schedule>  pantoprazole    Tablet 40 milliGRAM(s) Oral before breakfast  polyethylene glycol 3350 17 Gram(s) Oral daily  prasugrel 10 milliGRAM(s) Oral daily      PHYSICAL EXAM:  T(C): 36.5 (02-28-21 @ 04:36), Max: 36.9 (02-27-21 @ 20:45)  HR: 68 (02-28-21 @ 10:18) (54 - 71)  BP: 90/55 (02-28-21 @ 10:18) (85/46 - 111/54)  RR: 18 (02-28-21 @ 08:28) (18 - 18)  SpO2: 96% (02-28-21 @ 08:28) (93% - 99%)  Wt(kg): --  I&O's Summary    27 Feb 2021 07:01  -  28 Feb 2021 07:00  --------------------------------------------------------  IN: 1515 mL / OUT: 1900 mL / NET: -385 mL          Appearance: NAD	  HEENT:  Dry oral mucosa, PERRL, EOMI	  Lymphatic: No lymphadenopathy , no edema  Cardiovascular: Normal S1 S2, No JVD, + murmurs , Peripheral pulses palpable 2+ bilaterally  Respiratory: decreased bs   Gastrointestinal:  Soft, Non-tender, + BS	  Skin: No rashes, No ecchymoses, No cyanosis, warm to touch  Musculoskeletal: Normal range of motion, normal strength  Psychiatry: more awake and alert   Ext: No edema  LABS:    CARDIAC MARKERS:                                9.6    8.00  )-----------( 97       ( 28 Feb 2021 05:10 )             29.5     02-28    139  |  103  |  52<H>  ----------------------------<  100<H>  3.8   |  26  |  3.45<H>    Ca    8.7      28 Feb 2021 05:09    TPro  6.0  /  Alb  2.6<L>  /  TBili  1.5<H>  /  DBili  x   /  AST  69<H>  /  ALT  57<H>  /  AlkPhos  184<H>  02-28    proBNP:   Lipid Profile:   HgA1c:   TSH:             TELEMETRY: 	 A paced, AV paced    ECG:  	  RADIOLOGY:   DIAGNOSTIC TESTING:  [ ] Echocardiogram:  [ ]  Catheterization:  [ ] Stress Test:    OTHER:

## 2021-02-28 NOTE — PROGRESS NOTE ADULT - ASSESSMENT
81 yo male w h/o ICM< CAD< s/p CABG, has AICD, DM, HTN presents w volume overload and abd pain  Abd sono shows gallstones, but no sign of cholecystitis/pancreatitis. abd pain resolved on Zosyn,   will treat empirically for 7 days .  ID following    ascites 2/2 systolic chf, probably worse 2/2 acute on chronic systolic chf.  responded well to lasix. now held due to rise creat.   renal following  pt with worsening renal function. renal f/u apprec. plan to start HD.   started HD 2/26. HD as per renal    Covid positive.  ID following. isolation. trend infl markers.   s/p monoclonal infusion.  on interrogation of ICD, has a lof of arrhythmia burden,   NSVT and sustained VT, seen by EPS, cont  Loscwyfnm91.5 mg  bid. has inoperable ischemic HD,   ICM, EF 15%, severe AS, seen by structural heart for TAVR , proceed with TAVR work up  dvt ppx w HSC

## 2021-02-28 NOTE — PROGRESS NOTE ADULT - SUBJECTIVE AND OBJECTIVE BOX
Patient seen and examined.  no SOB. No overnight events noted.  Last HD done 2/27 (#2).  Next #3 HD on [place] as ordered  /54        REVIEW OF SYSTEMS:  As per HPI, otherwise 8 full 10 ROS were unremarkable    MEDICATIONS  (STANDING):  aspirin  chewable 81 milliGRAM(s) Oral daily  atorvastatin 80 milliGRAM(s) Oral at bedtime  chlorhexidine 4% Liquid 1 Application(s) Topical <User Schedule>  dextrose 40% Gel 15 Gram(s) Oral once  dextrose 5%. 1000 milliLiter(s) (50 mL/Hr) IV Continuous <Continuous>  dextrose 5%. 1000 milliLiter(s) (100 mL/Hr) IV Continuous <Continuous>  dextrose 50% Injectable 25 Gram(s) IV Push once  dextrose 50% Injectable 12.5 Gram(s) IV Push once  dextrose 50% Injectable 25 Gram(s) IV Push once  ferrous    sulfate 325 milliGRAM(s) Oral two times a day  glucagon  Injectable 1 milliGRAM(s) IntraMuscular once  heparin   Injectable 5000 Unit(s) SubCutaneous every 12 hours  influenza   Vaccine 0.5 milliLiter(s) IntraMuscular once  insulin lispro (ADMELOG) corrective regimen sliding scale   SubCutaneous three times a day before meals  insulin lispro (ADMELOG) corrective regimen sliding scale   SubCutaneous at bedtime  metoprolol tartrate 25 milliGRAM(s) Oral every 12 hours  midodrine. 10 milliGRAM(s) Oral <User Schedule>  pantoprazole    Tablet 40 milliGRAM(s) Oral before breakfast  polyethylene glycol 3350 17 Gram(s) Oral daily  prasugrel 10 milliGRAM(s) Oral daily      VITAL:  T(C): , Max: 36.9 (02-27-21 @ 20:45)  T(F): , Max: 98.5 (02-27-21 @ 20:45)  HR: 60 (02-28-21 @ 11:35)  BP: 90/48 (02-28-21 @ 11:35)  BP(mean): --  RR: 18 (02-28-21 @ 11:35)  SpO2: 93% (02-28-21 @ 11:35)  Wt(kg): --    I and O's:    02-27 @ 07:01  -  02-28 @ 07:00  --------------------------------------------------------  IN: 1515 mL / OUT: 1900 mL / NET: -385 mL          PHYSICAL EXAM:    Constitutional: NAD  Neck: No JVD  Respiratory: CTAB  Cardiovascular: S1 and S2  Gastrointestinal: BS+, soft, NT/ND  Extremities: No peripheral edema  Neurological: A/O x 3, no focal deficits  Psychiatric: Normal mood, normal affect  : No Estrada  Access: femoral shiley    LABS:                        9.6    8.00  )-----------( 97       ( 28 Feb 2021 05:10 )             29.5     02-28    139  |  103  |  52<H>  ----------------------------<  100<H>  3.8   |  26  |  3.45<H>    Ca    8.7      28 Feb 2021 05:09    TPro  6.0  /  Alb  2.6<L>  /  TBili  1.5<H>  /  DBili  x   /  AST  69<H>  /  ALT  57<H>  /  AlkPhos  184<H>  02-28

## 2021-02-28 NOTE — PROGRESS NOTE ADULT - SUBJECTIVE AND OBJECTIVE BOX
Patient is a 80y old  Male who presents with a chief complaint of acute renal failure (28 Feb 2021 15:57)      SUBJECTIVE / OVERNIGHT EVENTS: started HD, feels better    MEDICATIONS  (STANDING):  aspirin  chewable 81 milliGRAM(s) Oral daily  atorvastatin 80 milliGRAM(s) Oral at bedtime  chlorhexidine 4% Liquid 1 Application(s) Topical <User Schedule>  dextrose 40% Gel 15 Gram(s) Oral once  dextrose 5%. 1000 milliLiter(s) (50 mL/Hr) IV Continuous <Continuous>  dextrose 5%. 1000 milliLiter(s) (100 mL/Hr) IV Continuous <Continuous>  dextrose 50% Injectable 25 Gram(s) IV Push once  dextrose 50% Injectable 12.5 Gram(s) IV Push once  dextrose 50% Injectable 25 Gram(s) IV Push once  ferrous    sulfate 325 milliGRAM(s) Oral two times a day  glucagon  Injectable 1 milliGRAM(s) IntraMuscular once  heparin   Injectable 5000 Unit(s) SubCutaneous every 12 hours  influenza   Vaccine 0.5 milliLiter(s) IntraMuscular once  insulin lispro (ADMELOG) corrective regimen sliding scale   SubCutaneous three times a day before meals  insulin lispro (ADMELOG) corrective regimen sliding scale   SubCutaneous at bedtime  metoprolol tartrate 25 milliGRAM(s) Oral every 12 hours  midodrine. 10 milliGRAM(s) Oral <User Schedule>  pantoprazole    Tablet 40 milliGRAM(s) Oral before breakfast  polyethylene glycol 3350 17 Gram(s) Oral daily  prasugrel 10 milliGRAM(s) Oral daily    MEDICATIONS  (PRN):  sodium chloride 0.9% lock flush 10 milliLiter(s) IV Push every 1 hour PRN Pre/post blood products, medications, blood draw, and to maintain line patency      Vital Signs Last 24 Hrs  T(F): 97.8 (02-28-21 @ 20:33), Max: 97.8 (02-28-21 @ 20:33)  HR: 60 (02-28-21 @ 20:33) (60 - 71)  BP: 90/54 (02-28-21 @ 20:33) (89/43 - 111/54)  RR: 18 (02-28-21 @ 20:33) (18 - 18)  SpO2: 96% (02-28-21 @ 20:33) (93% - 96%)  Telemetry:   CAPILLARY BLOOD GLUCOSE      POCT Blood Glucose.: 217 mg/dL (28 Feb 2021 21:28)  POCT Blood Glucose.: 198 mg/dL (28 Feb 2021 16:36)  POCT Blood Glucose.: 203 mg/dL (28 Feb 2021 11:59)  POCT Blood Glucose.: 121 mg/dL (28 Feb 2021 07:37)    I&O's Summary    27 Feb 2021 07:01  -  28 Feb 2021 07:00  --------------------------------------------------------  IN: 1515 mL / OUT: 1900 mL / NET: -385 mL    28 Feb 2021 07:01  -  28 Feb 2021 23:27  --------------------------------------------------------  IN: 420 mL / OUT: 450 mL / NET: -30 mL        PHYSICAL EXAM:  GENERAL: NAD, well-developed  HEAD:  Atraumatic, Normocephalic  EYES: EOMI, PERRLA, conjunctiva and sclera clear  NECK: Supple, No JVD  CHEST/LUNG: Clear to auscultation bilaterally; No wheeze  HEART: Regular rate and rhythm; No murmurs, rubs, or gallops  ABDOMEN: Soft, Nontender, Nondistended; Bowel sounds present  EXTREMITIES:  2+ Peripheral Pulses, No clubbing, cyanosis, or edema  PSYCH: AAOx3  NEUROLOGY: non-focal  SKIN: No rashes or lesions    LABS:                        9.6    8.00  )-----------( 97       ( 28 Feb 2021 05:10 )             29.5     02-28    139  |  103  |  52<H>  ----------------------------<  100<H>  3.8   |  26  |  3.45<H>    Ca    8.7      28 Feb 2021 05:09    TPro  6.0  /  Alb  2.6<L>  /  TBili  1.5<H>  /  DBili  x   /  AST  69<H>  /  ALT  57<H>  /  AlkPhos  184<H>  02-28              RADIOLOGY & ADDITIONAL TESTS:    Imaging Personally Reviewed:    Consultant(s) Notes Reviewed:      Care Discussed with Consultants/Other Providers:

## 2021-02-28 NOTE — PROGRESS NOTE ADULT - ASSESSMENT
80M w/ DM2, CAD-CABG, and CKD, 2/16/21 a/w azotemia/lyte derangements/weakness    (1)Renal - CALVIN on CKD4 -Hemodialysis dependent for now.  s/p shiley placement 2/26/21.         #2 HD yesterday, and #3 HD will be on Monday    (2) Hypotensive- on Midodrine on dialysis days prior HD     (3) Structural heart team follow up for TAVR.    (4) ID f/u for COVID positive- No indication for Covid therapeutics at this time    RECOMMEND:  (1) Next HD  tomorrow as ordered; midodrine 10mg po pre-hd  (2) Eventual conversion of shiley to tunneled cath  (3) No objection to IV contrast procedures in workup for TAVR  (4) supportive care       None known

## 2021-03-01 LAB
ALBUMIN SERPL ELPH-MCNC: 2.8 G/DL — LOW (ref 3.3–5)
ALP SERPL-CCNC: 212 U/L — HIGH (ref 40–120)
ALT FLD-CCNC: 54 U/L — HIGH (ref 10–45)
ANION GAP SERPL CALC-SCNC: 16 MMOL/L — SIGNIFICANT CHANGE UP (ref 5–17)
AST SERPL-CCNC: 63 U/L — HIGH (ref 10–40)
BILIRUB SERPL-MCNC: 1.5 MG/DL — HIGH (ref 0.2–1.2)
BUN SERPL-MCNC: 65 MG/DL — HIGH (ref 7–23)
CALCIUM SERPL-MCNC: 9.1 MG/DL — SIGNIFICANT CHANGE UP (ref 8.4–10.5)
CHLORIDE SERPL-SCNC: 100 MMOL/L — SIGNIFICANT CHANGE UP (ref 96–108)
CO2 SERPL-SCNC: 23 MMOL/L — SIGNIFICANT CHANGE UP (ref 22–31)
CREAT SERPL-MCNC: 4.54 MG/DL — HIGH (ref 0.5–1.3)
GLUCOSE BLDC GLUCOMTR-MCNC: 111 MG/DL — HIGH (ref 70–99)
GLUCOSE BLDC GLUCOMTR-MCNC: 112 MG/DL — HIGH (ref 70–99)
GLUCOSE BLDC GLUCOMTR-MCNC: 120 MG/DL — HIGH (ref 70–99)
GLUCOSE BLDC GLUCOMTR-MCNC: 168 MG/DL — HIGH (ref 70–99)
GLUCOSE SERPL-MCNC: 122 MG/DL — HIGH (ref 70–99)
HCT VFR BLD CALC: 32.3 % — LOW (ref 39–50)
HGB BLD-MCNC: 10.3 G/DL — LOW (ref 13–17)
MCHC RBC-ENTMCNC: 30.4 PG — SIGNIFICANT CHANGE UP (ref 27–34)
MCHC RBC-ENTMCNC: 31.9 GM/DL — LOW (ref 32–36)
MCV RBC AUTO: 95.3 FL — SIGNIFICANT CHANGE UP (ref 80–100)
NRBC # BLD: 0 /100 WBCS — SIGNIFICANT CHANGE UP (ref 0–0)
PLATELET # BLD AUTO: 106 K/UL — LOW (ref 150–400)
POTASSIUM SERPL-MCNC: 4 MMOL/L — SIGNIFICANT CHANGE UP (ref 3.5–5.3)
POTASSIUM SERPL-SCNC: 4 MMOL/L — SIGNIFICANT CHANGE UP (ref 3.5–5.3)
PROT SERPL-MCNC: 6.6 G/DL — SIGNIFICANT CHANGE UP (ref 6–8.3)
RBC # BLD: 3.39 M/UL — LOW (ref 4.2–5.8)
RBC # FLD: 21.5 % — HIGH (ref 10.3–14.5)
SODIUM SERPL-SCNC: 139 MMOL/L — SIGNIFICANT CHANGE UP (ref 135–145)
WBC # BLD: 9.44 K/UL — SIGNIFICANT CHANGE UP (ref 3.8–10.5)
WBC # FLD AUTO: 9.44 K/UL — SIGNIFICANT CHANGE UP (ref 3.8–10.5)

## 2021-03-01 RX ORDER — MIDODRINE HYDROCHLORIDE 2.5 MG/1
5 TABLET ORAL THREE TIMES A DAY
Refills: 0 | Status: DISCONTINUED | OUTPATIENT
Start: 2021-03-01 | End: 2021-03-19

## 2021-03-01 RX ADMIN — Medication 0: at 21:22

## 2021-03-01 RX ADMIN — POLYETHYLENE GLYCOL 3350 17 GRAM(S): 17 POWDER, FOR SOLUTION ORAL at 14:53

## 2021-03-01 RX ADMIN — Medication 325 MILLIGRAM(S): at 05:52

## 2021-03-01 RX ADMIN — PANTOPRAZOLE SODIUM 40 MILLIGRAM(S): 20 TABLET, DELAYED RELEASE ORAL at 05:53

## 2021-03-01 RX ADMIN — Medication 81 MILLIGRAM(S): at 14:53

## 2021-03-01 RX ADMIN — HEPARIN SODIUM 5000 UNIT(S): 5000 INJECTION INTRAVENOUS; SUBCUTANEOUS at 05:52

## 2021-03-01 RX ADMIN — Medication 325 MILLIGRAM(S): at 19:23

## 2021-03-01 RX ADMIN — ATORVASTATIN CALCIUM 80 MILLIGRAM(S): 80 TABLET, FILM COATED ORAL at 20:44

## 2021-03-01 RX ADMIN — MIDODRINE HYDROCHLORIDE 5 MILLIGRAM(S): 2.5 TABLET ORAL at 19:23

## 2021-03-01 RX ADMIN — MIDODRINE HYDROCHLORIDE 5 MILLIGRAM(S): 2.5 TABLET ORAL at 12:23

## 2021-03-01 RX ADMIN — MIDODRINE HYDROCHLORIDE 10 MILLIGRAM(S): 2.5 TABLET ORAL at 10:15

## 2021-03-01 RX ADMIN — PRASUGREL 10 MILLIGRAM(S): 5 TABLET, FILM COATED ORAL at 14:53

## 2021-03-01 RX ADMIN — HEPARIN SODIUM 5000 UNIT(S): 5000 INJECTION INTRAVENOUS; SUBCUTANEOUS at 19:23

## 2021-03-01 RX ADMIN — Medication 1: at 11:44

## 2021-03-01 RX ADMIN — CHLORHEXIDINE GLUCONATE 1 APPLICATION(S): 213 SOLUTION TOPICAL at 05:52

## 2021-03-01 RX ADMIN — Medication 25 MILLIGRAM(S): at 19:23

## 2021-03-01 NOTE — CONSULT NOTE ADULT - ASSESSMENT
Interventional Radiology    Evaluate for Procedure: Tunneled HD catheter placement    HPI: 80y Male with CAD s/p CABG and AICD, DM, HTN, CKD, presents with acute HF and CALVIN on CKD now requiring HD. S/p non tunneled femoral HD catheter placement 2/26. IR consulted for tunneled catheter placement.     Allergies:   Medications (Abx/Cardiac/Anticoagulation/Blood Products)    aspirin  chewable: 81 milliGRAM(s) Oral (03-01 @ 14:53)  heparin   Injectable: 5000 Unit(s) SubCutaneous (03-01 @ 05:52)  metoprolol tartrate: 25 milliGRAM(s) Oral (02-28 @ 17:08)  midodrine.: 10 milliGRAM(s) Oral (03-01 @ 10:15)  midodrine.: 5 milliGRAM(s) Oral (03-01 @ 12:23)  prasugrel: 10 milliGRAM(s) Oral (03-01 @ 14:53)    Data:    T(C): 36.5  HR: 69  BP: 96/59  RR: 18  SpO2: 94%    -WBC 9.44 / HgB 10.3 / Hct 32.3 / Plt 106  -Na 139 / Cl 100 / BUN 65 / Glucose 122  -K 4.0 / CO2 23 / Cr 4.54  -ALT 54 / Alk Phos 212 / T.Bili 1.5  -INR 1.38 / PTT 33.7    Radiology:   Imaging findings reviewed.    Assessment/Plan:     Clinical data and imaging findings reviewed with attending. Patient recently COVID positive with unclear volume status. Discussed with primary team to confirm patient's volume status and will discuss again tomorrow regarding optimal time to place catheter.

## 2021-03-01 NOTE — PROGRESS NOTE ADULT - SUBJECTIVE AND OBJECTIVE BOX
      Overnight events noted      VITAL:  T(C): , Max: 36.6 (02-28-21 @ 20:33)  T(F): , Max: 97.9 (03-01-21 @ 04:42)  HR: 66 (03-01-21 @ 04:42)  BP: 101/57 (03-01-21 @ 04:42)  BP(mean): --  RR: 18 (03-01-21 @ 04:42)  SpO2: 96% (03-01-21 @ 04:42)      PHYSICAL EXAM:  Constitutional: NAD, Alert  HEENT: NCAT, DMM  Neck: Supple, no JVD  Respiratory: CTA-b/l  Cardiovascular: RRR s1s2, no m/r/g  Gastrointestinal: BS+, soft, NT; (+)mild distension  Extremities: 1+ b/l LE edema  Neurological: no focal deficits; strength grossly intact  Back: no CVAT b/l  Skin: No rashes, no nevi  Access: femoral shiley    LABS:                        10.3   9.44  )-----------( 106      ( 01 Mar 2021 06:33 )             32.3     Na(139)/K(4.0)/Cl(100)/HCO3(23)/BUN(65)/Cr(4.54)Glu(122)/Ca(9.1)/Mg(--)/PO4(--)    03-01 @ 06:33  Na(139)/K(3.8)/Cl(103)/HCO3(26)/BUN(52)/Cr(3.45)Glu(100)/Ca(8.7)/Mg(--)/PO4(--)    02-28 @ 05:09  Na(136)/K(4.0)/Cl(100)/HCO3(19)/BUN(86)/Cr(4.27)Glu(107)/Ca(8.8)/Mg(--)/PO4(--)    02-27 @ 06:14  Na(132)/K(4.9)/Cl(98)/HCO3(15)/BUN(127)/Cr(5.35)Glu(169)/Ca(9.0)/Mg(--)/PO4(--)    02-26 @ 11:23      IMPRESSION: 80M w/ DM2, CAD-CABG, and CKD, 2/16/21 a/w azotemia/lyte derangements/weakness    (1)Renal - newly ESRD - s/p 1st HD 2/26 and 2nd HD 2/27; planned for 3rd HD today    (2)Lytes - acceptable    (3)CV - tenuous hemodynamics/hypervolemia - biventricular systolic dysfunction, severe AS, mod-sev AR. Being evaluated for TAVR    (4)ID - now COVID-positive      RECOMMEND:  (1)HD today; midodrine 10mg po pre-hd  (2)Eventual conversion of shiley to tunneled cath  (3)No objection to IV contrast procedures in workup for TAVR  (4)No objection to use of Remdesivir for COVID here              Suraj Garcia MD  [facility]  Office: [phone]  Cell: [phone]                     No pain, no sob      VITAL:  T(C): , Max: 36.6 (02-28-21 @ 20:33)  T(F): , Max: 97.9 (03-01-21 @ 04:42)  HR: 66 (03-01-21 @ 04:42)  BP: 101/57 (03-01-21 @ 04:42)  BP(mean): --  RR: 18 (03-01-21 @ 04:42)  SpO2: 96% (03-01-21 @ 04:42)      PHYSICAL EXAM:  Constitutional: NAD on room air, alert  HEENT: NCAT, DMM  Neck: Supple, no JVD  Respiratory: CTA-b/l  Cardiovascular: RRR s1s2, no m/r/g  Gastrointestinal: BS+, soft, NT; (+)mild distension  Extremities:  no edema b/l  Neurological: no focal deficits; strength grossly intact  Back: no CVAT b/l  Skin: No rashes, no nevi  Access: R femoral shiley    LABS:                        10.3   9.44  )-----------( 106      ( 01 Mar 2021 06:33 )             32.3     Na(139)/K(4.0)/Cl(100)/HCO3(23)/BUN(65)/Cr(4.54)Glu(122)/Ca(9.1)/Mg(--)/PO4(--)    03-01 @ 06:33  Na(139)/K(3.8)/Cl(103)/HCO3(26)/BUN(52)/Cr(3.45)Glu(100)/Ca(8.7)/Mg(--)/PO4(--)    02-28 @ 05:09  Na(136)/K(4.0)/Cl(100)/HCO3(19)/BUN(86)/Cr(4.27)Glu(107)/Ca(8.8)/Mg(--)/PO4(--)    02-27 @ 06:14  Na(132)/K(4.9)/Cl(98)/HCO3(15)/BUN(127)/Cr(5.35)Glu(169)/Ca(9.0)/Mg(--)/PO4(--)    02-26 @ 11:23      IMPRESSION: 80M w/ DM2, CAD-CABG, and CKD, 2/16/21 a/w azotemia/lyte derangements/weakness    (1)Renal - newly ESRD - s/p 1st HD 2/26 and 2nd HD 2/27; planned for 3rd HD today    (2)Lytes - acceptable    (3)CV - tenuous hemodynamics/hypervolemia - biventricular systolic dysfunction, severe AS, mod-sev AR. Being evaluated for TAVR    (4)ID - now COVID-positive      RECOMMEND:  (1)HD today; midodrine 10mg po pre-hd (in addition to the standing Midodrine)  (2)Midodrine 5mg po q8h as a standing order  (3)Eventual conversion of shiley to tunneled cath  (4)No objection to IV contrast procedures in workup for TAVR  (5)No objection to use of Remdesivir for COVID here  (6)Setup of outside HD by [facility]?            Suraj Garcia MD  [facility]  Office: [phone]  Cell: [phone]

## 2021-03-01 NOTE — PROGRESS NOTE ADULT - ASSESSMENT
Abdominal pain.  - now resolved  - lfts downtrending   - Tolerating diet     Renal failure.   - nephrology input noted.   -  lasix on hold in setting of rising SCr   -  s/p shiley  -  HD per renal     COVID-19  - management as per ID

## 2021-03-01 NOTE — PROGRESS NOTE ADULT - SUBJECTIVE AND OBJECTIVE BOX
Infectious Diseases progress note:    Subjective: Afebrile, satting adequately on RA    ROS:  CONSTITUTIONAL:  No fever, chills, rigors  CARDIOVASCULAR:  No chest pain or palpitations  RESPIRATORY:   No SOB, cough, dyspnea on exertion.  No wheezing  GASTROINTESTINAL:  No abd pain, N/V, diarrhea/constipation  EXTREMITIES:  No swelling or joint pain  GENITOURINARY:  No burning on urination, increased frequency or urgency.  No flank pain  NEUROLOGIC:  No HA, visual disturbances  SKIN: No rashes    Allergies    No Known Allergies    Intolerances        ANTIBIOTICS/RELEVANT:  antimicrobials    immunologic:  influenza   Vaccine 0.5 milliLiter(s) IntraMuscular once    OTHER:  aspirin  chewable 81 milliGRAM(s) Oral daily  atorvastatin 80 milliGRAM(s) Oral at bedtime  chlorhexidine 4% Liquid 1 Application(s) Topical <User Schedule>  dextrose 40% Gel 15 Gram(s) Oral once  dextrose 5%. 1000 milliLiter(s) IV Continuous <Continuous>  dextrose 5%. 1000 milliLiter(s) IV Continuous <Continuous>  dextrose 50% Injectable 25 Gram(s) IV Push once  dextrose 50% Injectable 12.5 Gram(s) IV Push once  dextrose 50% Injectable 25 Gram(s) IV Push once  ferrous    sulfate 325 milliGRAM(s) Oral two times a day  glucagon  Injectable 1 milliGRAM(s) IntraMuscular once  heparin   Injectable 5000 Unit(s) SubCutaneous every 12 hours  insulin lispro (ADMELOG) corrective regimen sliding scale   SubCutaneous three times a day before meals  insulin lispro (ADMELOG) corrective regimen sliding scale   SubCutaneous at bedtime  metoprolol tartrate 25 milliGRAM(s) Oral every 12 hours  midodrine. 5 milliGRAM(s) Oral three times a day  midodrine. 10 milliGRAM(s) Oral <User Schedule>  pantoprazole    Tablet 40 milliGRAM(s) Oral before breakfast  polyethylene glycol 3350 17 Gram(s) Oral daily  prasugrel 10 milliGRAM(s) Oral daily  sodium chloride 0.9% lock flush 10 milliLiter(s) IV Push every 1 hour PRN      Objective:  Vital Signs Last 24 Hrs  T(C): 36.6 (01 Mar 2021 04:42), Max: 36.6 (28 Feb 2021 20:33)  T(F): 97.9 (01 Mar 2021 04:42), Max: 97.9 (01 Mar 2021 04:42)  HR: 70 (01 Mar 2021 10:05) (60 - 70)  BP: 88/45 (01 Mar 2021 10:05) (88/45 - 101/57)  BP(mean): --  RR: 18 (01 Mar 2021 04:42) (18 - 18)  SpO2: 96% (01 Mar 2021 04:42) (93% - 96%)    PHYSICAL EXAM:  Constitutional:NAD  Eyes:CHERI, EOMI  Ear/Nose/Throat: no thrush, mucositis.  Moist mucous membranes	  Neck:no JVD, no lymphadenopathy, supple  Respiratory: CTA nadiya  Cardiovascular: S1S2 RRR, no murmurs  Gastrointestinal:soft, nontender,  nondistended (+) BS  Extremities:no e/e/c  Skin:  no rashes, open wounds or ulcerations, Rt femoral shiley        LABS:                        10.3   9.44  )-----------( 106      ( 01 Mar 2021 06:33 )             32.3     03-01    139  |  100  |  65<H>  ----------------------------<  122<H>  4.0   |  23  |  4.54<H>    Ca    9.1      01 Mar 2021 06:33    TPro  6.6  /  Alb  2.8<L>  /  TBili  1.5<H>  /  DBili  x   /  AST  63<H>  /  ALT  54<H>  /  AlkPhos  212<H>  03-01          Procalcitonin, Serum: 0.20 (02-16 @ 23:54)            MICROBIOLOGY:      Culture - Urine (02.17.21 @ 09:06)   Specimen Source: .Urine Clean Catch (Midstream)   Culture Results:   No growth       Culture - Blood (02.17.21 @ 05:49)   Specimen Source: .Blood Blood-Peripheral   Culture Results:   No Growth Final   RADIOLOGY & ADDITIONAL STUDIES:    < from: Xray Abdomen 1 View PORTABLE -Urgent (Xray Abdomen 1 View PORTABLE -Urgent .) (02.26.21 @ 11:51) >  FINDINGS:  There has been insertion of right femoral venous dialysis-type catheter - tip of catheter in IVC at L2-L3 interspace.  There is a nonspecific, nonobstructive bowel gas pattern.  No gross free intraperitoneal air.  Degenerative changes of the visualized spine.    IMPRESSION:  Right femoral venous dialysis-type catheter in situ.  Nonspecific, nonobstructive bowel gas pattern.    < end of copied text >

## 2021-03-01 NOTE — PROGRESS NOTE ADULT - ASSESSMENT
79 yo male w h/o ICM< CAD< s/p CABG, has AICD, DM, HTN presents w volume overload and abd pain  Abd sono shows gallstones, but no sign of cholecystitis/pancreatitis. abd pain resolved on Zosyn,   will treat empirically for 7 days .  ID following    ascites 2/2 systolic chf, probably worse 2/2 acute on chronic systolic chf.  responded well to lasix. now held due to rise creat.   renal following  pt with worsening renal function. renal f/u apprec.   started HD 2/26. HD as per renal    Covid positive.  ID following. isolation. trend infl markers.   s/p monoclonal infusion.  on interrogation of ICD, has a lof of arrhythmia burden,   NSVT and sustained VT, seen by EPS, cont  Zbqywhafn42.5 mg  bid. has inoperable ischemic HD,   ICM, EF 15%, severe AS, seen by structural heart for TAVR , proceed with TAVR work up  dvt ppx w HSC

## 2021-03-01 NOTE — PROGRESS NOTE ADULT - SUBJECTIVE AND OBJECTIVE BOX
Patient is a 80y old  Male who presents with a chief complaint of acute renal failure (01 Mar 2021 19:43)      SUBJECTIVE / OVERNIGHT EVENTS: HD initiated    MEDICATIONS  (STANDING):  aspirin  chewable 81 milliGRAM(s) Oral daily  atorvastatin 80 milliGRAM(s) Oral at bedtime  chlorhexidine 4% Liquid 1 Application(s) Topical <User Schedule>  dextrose 40% Gel 15 Gram(s) Oral once  dextrose 5%. 1000 milliLiter(s) (50 mL/Hr) IV Continuous <Continuous>  dextrose 5%. 1000 milliLiter(s) (100 mL/Hr) IV Continuous <Continuous>  dextrose 50% Injectable 25 Gram(s) IV Push once  dextrose 50% Injectable 12.5 Gram(s) IV Push once  dextrose 50% Injectable 25 Gram(s) IV Push once  ferrous    sulfate 325 milliGRAM(s) Oral two times a day  glucagon  Injectable 1 milliGRAM(s) IntraMuscular once  heparin   Injectable 5000 Unit(s) SubCutaneous every 12 hours  influenza   Vaccine 0.5 milliLiter(s) IntraMuscular once  insulin lispro (ADMELOG) corrective regimen sliding scale   SubCutaneous three times a day before meals  insulin lispro (ADMELOG) corrective regimen sliding scale   SubCutaneous at bedtime  metoprolol tartrate 25 milliGRAM(s) Oral every 12 hours  midodrine. 5 milliGRAM(s) Oral three times a day  midodrine. 10 milliGRAM(s) Oral <User Schedule>  pantoprazole    Tablet 40 milliGRAM(s) Oral before breakfast  polyethylene glycol 3350 17 Gram(s) Oral daily  prasugrel 10 milliGRAM(s) Oral daily    MEDICATIONS  (PRN):  sodium chloride 0.9% lock flush 10 milliLiter(s) IV Push every 1 hour PRN Pre/post blood products, medications, blood draw, and to maintain line patency      Vital Signs Last 24 Hrs  T(F): 97.8 (03-01-21 @ 20:37), Max: 97.9 (03-01-21 @ 04:42)  HR: 60 (03-01-21 @ 20:37) (60 - 70)  BP: 98/58 (03-01-21 @ 20:37) (88/45 - 101/57)  RR: 18 (03-01-21 @ 20:37) (18 - 18)  SpO2: 94% (03-01-21 @ 20:37) (94% - 96%)  Telemetry:   CAPILLARY BLOOD GLUCOSE      POCT Blood Glucose.: 120 mg/dL (01 Mar 2021 16:42)  POCT Blood Glucose.: 168 mg/dL (01 Mar 2021 11:40)  POCT Blood Glucose.: 112 mg/dL (01 Mar 2021 07:39)  POCT Blood Glucose.: 217 mg/dL (28 Feb 2021 21:28)    I&O's Summary    28 Feb 2021 07:01  -  01 Mar 2021 07:00  --------------------------------------------------------  IN: 420 mL / OUT: 450 mL / NET: -30 mL    01 Mar 2021 07:01  -  01 Mar 2021 20:48  --------------------------------------------------------  IN: 520 mL / OUT: 1500 mL / NET: -980 mL        PHYSICAL EXAM:  GENERAL: NAD, well-developed  HEAD:  Atraumatic, Normocephalic  EYES: EOMI, PERRLA, conjunctiva and sclera clear  NECK: Supple, No JVD  CHEST/LUNG: Clear to auscultation bilaterally; No wheeze  HEART: Regular rate and rhythm; No murmurs, rubs, or gallops  ABDOMEN: Soft, Nontender, Nondistended; Bowel sounds present  EXTREMITIES:  2+ Peripheral Pulses, No clubbing, cyanosis, or edema  PSYCH: AAOx3  NEUROLOGY: non-focal  SKIN: No rashes or lesions    LABS:                        10.3   9.44  )-----------( 106      ( 01 Mar 2021 06:33 )             32.3     03-01    139  |  100  |  65<H>  ----------------------------<  122<H>  4.0   |  23  |  4.54<H>    Ca    9.1      01 Mar 2021 06:33    TPro  6.6  /  Alb  2.8<L>  /  TBili  1.5<H>  /  DBili  x   /  AST  63<H>  /  ALT  54<H>  /  AlkPhos  212<H>  03-01              RADIOLOGY & ADDITIONAL TESTS:    Imaging Personally Reviewed:    Consultant(s) Notes Reviewed:      Care Discussed with Consultants/Other Providers:

## 2021-03-01 NOTE — PROGRESS NOTE ADULT - SUBJECTIVE AND OBJECTIVE BOX
Subjective: Patient seen and examined. No new events except as noted.     REVIEW OF SYSTEMS:    CONSTITUTIONAL:+ weakness, fevers or chills  EYES/ENT: No visual changes;  No vertigo or throat pain   NECK: No pain or stiffness  RESPIRATORY: No cough, wheezing, hemoptysis; No shortness of breath  CARDIOVASCULAR: No chest pain or palpitations  GASTROINTESTINAL: No abdominal or epigastric pain. No nausea, vomiting, or hematemesis; No diarrhea or constipation. No melena or hematochezia.  GENITOURINARY: No dysuria, frequency or hematuria  NEUROLOGICAL: No numbness or weakness  SKIN: No itching, burning, rashes, or lesions   All other review of systems is negative unless indicated above.    MEDICATIONS:  MEDICATIONS  (STANDING):  aspirin  chewable 81 milliGRAM(s) Oral daily  atorvastatin 80 milliGRAM(s) Oral at bedtime  chlorhexidine 4% Liquid 1 Application(s) Topical <User Schedule>  dextrose 40% Gel 15 Gram(s) Oral once  dextrose 5%. 1000 milliLiter(s) (50 mL/Hr) IV Continuous <Continuous>  dextrose 5%. 1000 milliLiter(s) (100 mL/Hr) IV Continuous <Continuous>  dextrose 50% Injectable 25 Gram(s) IV Push once  dextrose 50% Injectable 12.5 Gram(s) IV Push once  dextrose 50% Injectable 25 Gram(s) IV Push once  ferrous    sulfate 325 milliGRAM(s) Oral two times a day  glucagon  Injectable 1 milliGRAM(s) IntraMuscular once  heparin   Injectable 5000 Unit(s) SubCutaneous every 12 hours  influenza   Vaccine 0.5 milliLiter(s) IntraMuscular once  insulin lispro (ADMELOG) corrective regimen sliding scale   SubCutaneous three times a day before meals  insulin lispro (ADMELOG) corrective regimen sliding scale   SubCutaneous at bedtime  metoprolol tartrate 25 milliGRAM(s) Oral every 12 hours  midodrine. 5 milliGRAM(s) Oral three times a day  midodrine. 10 milliGRAM(s) Oral <User Schedule>  pantoprazole    Tablet 40 milliGRAM(s) Oral before breakfast  polyethylene glycol 3350 17 Gram(s) Oral daily  prasugrel 10 milliGRAM(s) Oral daily      PHYSICAL EXAM:  T(C): 36.5 (03-01-21 @ 14:15), Max: 36.6 (02-28-21 @ 20:33)  HR: 68 (03-01-21 @ 19:22) (60 - 70)  BP: 101/54 (03-01-21 @ 19:22) (88/45 - 101/57)  RR: 18 (03-01-21 @ 19:22) (18 - 18)  SpO2: 94% (03-01-21 @ 19:22) (94% - 96%)  Wt(kg): --  I&O's Summary    28 Feb 2021 07:01  -  01 Mar 2021 07:00  --------------------------------------------------------  IN: 420 mL / OUT: 450 mL / NET: -30 mL    01 Mar 2021 07:01  -  01 Mar 2021 19:44  --------------------------------------------------------  IN: 520 mL / OUT: 1500 mL / NET: -980 mL          Appearance: NAD	  HEENT:   Dry oral mucosa, PERRL, EOMI	  Lymphatic: No lymphadenopathy , no edema  Cardiovascular: Irregular  S1 S2, No JVD, + murmurs , Peripheral pulses palpable 2+ bilaterally  Respiratory: Lungs clear to auscultation, normal effort 	  Gastrointestinal:  Soft, Non-tender, + BS	  Skin: No rashes, No ecchymoses, No cyanosis, warm to touch  Musculoskeletal: Normal range of motion, normal strength  Psychiatry:  Mood & affect appropriate  Ext: No edema      LABS:    CARDIAC MARKERS:                                10.3   9.44  )-----------( 106      ( 01 Mar 2021 06:33 )             32.3     03-01    139  |  100  |  65<H>  ----------------------------<  122<H>  4.0   |  23  |  4.54<H>    Ca    9.1      01 Mar 2021 06:33    TPro  6.6  /  Alb  2.8<L>  /  TBili  1.5<H>  /  DBili  x   /  AST  63<H>  /  ALT  54<H>  /  AlkPhos  212<H>  03-01    proBNP:   Lipid Profile:   HgA1c:   TSH:             TELEMETRY: 	    ECG:  	  RADIOLOGY:   DIAGNOSTIC TESTING:  [ ] Echocardiogram:  [ ]  Catheterization:  [ ] Stress Test:    OTHER:

## 2021-03-01 NOTE — PROGRESS NOTE ADULT - ASSESSMENT
79 yo male w h/o DM, CAD, s/p CABG, CKD 3-4.   Recently evaluated by Nephrology for elevated creatinine. Bloodwork  returned notable for  and Na 126; in light of these findings, Dr. Luz (Renal) sent the patient to the ER. Lately ptn has been having abdominal pain with elevated amylase and lipase.   As per ptn's wife: on and off R abd pain w generalized weakness and poor po intake. PCP DCed po Lasix in past few days 2/2 abnormal blood test results.  Ptn denies recent NSAID use.  He was diagnosed with diabetes 18 years ago, and denies retinopathy (last saw Optho around 2 years ago). Cardiologist has alluded to the patient having mild renal impairment in the past, as per wife.    Patient's PCP is Dr. Shyann De León; GI Dr. Wilman Villagran; Cardiology Dr. Boyce.  (16 Feb 2021 21:05)    ER vs: T 98.1, P 85, /64. WBC 8.2.  Bun/Cr - 100/2.0.  LFTs mildly elevated, Roxana 217, Lip 185.  Pct 0.2.  UA (-).  Ct c/a/p shows pulmonary edema, sm B/L pleural effusions, sm volume ascites.  VQ scan with very low probability for PE.      Pt started on zosyn to cover for intra-abd source.  ID consulted.     r/o Gallstone pancreatitis:    - Pt p/w abdominal pain, epigastric and RUQ pain, noted to have elevated LFTs and amylase/lipase.  Possible gallstone pancreatitis?    - U/S abdomen shows cholelithiasis without acute cholecystitis.  Borderline to mild gallbladder thickening is likely due to ascites.   Moderate ascites noted.  Suggestion of mild renal parenchymal disease and bilateral pleural effusions.    - CTap w/o acute intra-abd pathology    - monitor LFTs, Roxana, Lipase.  As per GI, ascites likely secondary to CHF, no evidence for pancreatitis.     - Monitor temp curve and WBC.      - bcx neg. completed  coverage with zosyn x 7 days, 2/23.  Abd pain resolving.       {[id number]}Renal failure:    - Renal following for CKD.  Dose abx for GFR 15-20.  On q12 hr dosing of zosyn.     - Cr rising.  CALVIN on CKD does not appear to be antibiotic mediated, renal f/u noted.      - Pt requiring HD,   - shiley catheter placed 2/26, pt awaiting conversion to tunnelled catheter once off isolation for covid`    Aortic stenosis:    - Followed by Structural health team for TAVR evaluation    Covid (+):    - Repeat Covid pcr on 2/25 tested positive.   Satting adequately on RA.      - No indication for Covid therapeutics at this time.  Cont supportive care, incentive spirometery.  Monitor pulse ox    - Repeat covid test remains positive on 2/26    - Monitor inflammatory markers.       Rupali Schwarz  [phone]

## 2021-03-01 NOTE — PROGRESS NOTE ADULT - SUBJECTIVE AND OBJECTIVE BOX
INTERVAL HPI/OVERNIGHT EVENTS:    overnight events noted  no new GI events   now on HD    MEDICATIONS  (STANDING):  aMIOdarone    Tablet 400 milliGRAM(s) Oral every 8 hours  aspirin  chewable 81 milliGRAM(s) Oral daily  atorvastatin 80 milliGRAM(s) Oral at bedtime  chlorhexidine 4% Liquid 1 Application(s) Topical <User Schedule>  dextrose 40% Gel 15 Gram(s) Oral once  dextrose 5%. 1000 milliLiter(s) (50 mL/Hr) IV Continuous <Continuous>  dextrose 5%. 1000 milliLiter(s) (100 mL/Hr) IV Continuous <Continuous>  dextrose 50% Injectable 25 Gram(s) IV Push once  dextrose 50% Injectable 12.5 Gram(s) IV Push once  dextrose 50% Injectable 25 Gram(s) IV Push once  ferrous    sulfate 325 milliGRAM(s) Oral two times a day  glucagon  Injectable 1 milliGRAM(s) IntraMuscular once  heparin   Injectable 5000 Unit(s) SubCutaneous every 12 hours  influenza   Vaccine 0.5 milliLiter(s) IntraMuscular once  insulin lispro (ADMELOG) corrective regimen sliding scale   SubCutaneous at bedtime  insulin lispro (ADMELOG) corrective regimen sliding scale   SubCutaneous three times a day before meals  metoprolol tartrate 25 milliGRAM(s) Oral every 12 hours  midodrine. 10 milliGRAM(s) Oral <User Schedule>  pantoprazole    Tablet 40 milliGRAM(s) Oral before breakfast  prasugrel 10 milliGRAM(s) Oral daily    MEDICATIONS  (PRN):  sodium chloride 0.9% lock flush 10 milliLiter(s) IV Push every 1 hour PRN Pre/post blood products, medications, blood draw, and to maintain line patency      Allergies    No Known Allergies    Intolerances        Review of Systems:    General:  No wt loss, fevers, chills, night sweats,fatigue,   Eyes:  Good vision, no reported pain  ENT:  No sore throat, pain, runny nose, dysphagia  CV:  No pain, palpitatioins, hypo/hypertension  Resp:  No dyspnea, cough, tachypnea, wheezing  GI:  No pain, No nausea, No vomiting, No diarrhea, No constipatiion, No weight loss, No fever, No pruritis, No rectal bleeding, No tarry stools, No dysphagia,  :  No pain, bleeding, incontinence, nocturia  Muscle:  No pain, weakness  Neuro:  No weakness, tingling, memory problems  Psych:  No fatigue, insomnia, mood problems, depression  Endocrine:  No polyuria, polydypsia, cold/heat intolerance  Heme:  No petechiae, ecchymosis, easy bruisability  Skin:  No rash, tattoos, scars, edema      Vital Signs Last 24 Hrs  T(C): 36.3 (26 Feb 2021 15:55), Max: 36.9 (25 Feb 2021 20:35)  T(F): 97.4 (26 Feb 2021 15:55), Max: 98.4 (25 Feb 2021 20:35)  HR: 60 (26 Feb 2021 15:55) (54 - 61)  BP: 92/52 (26 Feb 2021 15:55) (90/59 - 121/55)  BP(mean): --  RR: 18 (26 Feb 2021 15:55) (18 - 19)  SpO2: 97% (26 Feb 2021 15:55) (94% - 99%)    PHYSICAL EXAM:    GENERAL:  no distress  HEENT:  NC/AT  ABDOMEN:  Soft, non-tender, non-distended  EXTEREMITIES:  +bilateral LE edema   NEURO:  Alert, oriented, no asterixis, no tremor, no encephalopathy    LABS:                        9.7    10.08 )-----------( 151      ( 26 Feb 2021 11:23 )             32.0     02-26    132<L>  |  98  |  127<H>  ----------------------------<  169<H>  4.9   |  15<L>  |  5.35<H>    Ca    9.0      26 Feb 2021 11:23    TPro  6.7  /  Alb  3.1<L>  /  TBili  1.8<H>  /  DBili  x   /  AST  103<H>  /  ALT  81<H>  /  AlkPhos  193<H>  02-25    PT/INR - ( 25 Feb 2021 21:01 )   PT: 16.3 sec;   INR: 1.38 ratio         PTT - ( 25 Feb 2021 21:01 )  PTT:33.7 sec      RADIOLOGY & ADDITIONAL TESTS:

## 2021-03-02 LAB
ALBUMIN SERPL ELPH-MCNC: 2.7 G/DL — LOW (ref 3.3–5)
ALP SERPL-CCNC: 210 U/L — HIGH (ref 40–120)
ALT FLD-CCNC: 44 U/L — SIGNIFICANT CHANGE UP (ref 10–45)
ANION GAP SERPL CALC-SCNC: 11 MMOL/L — SIGNIFICANT CHANGE UP (ref 5–17)
APTT BLD: 32.8 SEC — SIGNIFICANT CHANGE UP (ref 27.5–35.5)
AST SERPL-CCNC: 53 U/L — HIGH (ref 10–40)
BILIRUB DIRECT SERPL-MCNC: 0.9 MG/DL — HIGH (ref 0–0.2)
BILIRUB INDIRECT FLD-MCNC: 0.7 MG/DL — SIGNIFICANT CHANGE UP (ref 0.2–1)
BILIRUB SERPL-MCNC: 1.6 MG/DL — HIGH (ref 0.2–1.2)
BUN SERPL-MCNC: 47 MG/DL — HIGH (ref 7–23)
CALCIUM SERPL-MCNC: 9.1 MG/DL — SIGNIFICANT CHANGE UP (ref 8.4–10.5)
CHLORIDE SERPL-SCNC: 100 MMOL/L — SIGNIFICANT CHANGE UP (ref 96–108)
CO2 SERPL-SCNC: 25 MMOL/L — SIGNIFICANT CHANGE UP (ref 22–31)
CREAT SERPL-MCNC: 3.81 MG/DL — HIGH (ref 0.5–1.3)
GLUCOSE BLDC GLUCOMTR-MCNC: 180 MG/DL — HIGH (ref 70–99)
GLUCOSE BLDC GLUCOMTR-MCNC: 231 MG/DL — HIGH (ref 70–99)
GLUCOSE BLDC GLUCOMTR-MCNC: 259 MG/DL — HIGH (ref 70–99)
GLUCOSE BLDC GLUCOMTR-MCNC: 74 MG/DL — SIGNIFICANT CHANGE UP (ref 70–99)
GLUCOSE BLDC GLUCOMTR-MCNC: 77 MG/DL — SIGNIFICANT CHANGE UP (ref 70–99)
GLUCOSE BLDC GLUCOMTR-MCNC: 83 MG/DL — SIGNIFICANT CHANGE UP (ref 70–99)
GLUCOSE BLDC GLUCOMTR-MCNC: 90 MG/DL — SIGNIFICANT CHANGE UP (ref 70–99)
GLUCOSE SERPL-MCNC: 88 MG/DL — SIGNIFICANT CHANGE UP (ref 70–99)
HCT VFR BLD CALC: 29.8 % — LOW (ref 39–50)
HGB BLD-MCNC: 9.5 G/DL — LOW (ref 13–17)
INR BLD: 1.26 RATIO — HIGH (ref 0.88–1.16)
MCHC RBC-ENTMCNC: 30.2 PG — SIGNIFICANT CHANGE UP (ref 27–34)
MCHC RBC-ENTMCNC: 31.9 GM/DL — LOW (ref 32–36)
MCV RBC AUTO: 94.6 FL — SIGNIFICANT CHANGE UP (ref 80–100)
NRBC # BLD: 0 /100 WBCS — SIGNIFICANT CHANGE UP (ref 0–0)
PLATELET # BLD AUTO: 86 K/UL — LOW (ref 150–400)
POTASSIUM SERPL-MCNC: 4.1 MMOL/L — SIGNIFICANT CHANGE UP (ref 3.5–5.3)
POTASSIUM SERPL-SCNC: 4.1 MMOL/L — SIGNIFICANT CHANGE UP (ref 3.5–5.3)
PROT SERPL-MCNC: 6.4 G/DL — SIGNIFICANT CHANGE UP (ref 6–8.3)
PROTHROM AB SERPL-ACNC: 14.7 SEC — HIGH (ref 10.6–13.6)
RBC # BLD: 3.15 M/UL — LOW (ref 4.2–5.8)
RBC # FLD: 21.1 % — HIGH (ref 10.3–14.5)
SODIUM SERPL-SCNC: 136 MMOL/L — SIGNIFICANT CHANGE UP (ref 135–145)
WBC # BLD: 8.8 K/UL — SIGNIFICANT CHANGE UP (ref 3.8–10.5)
WBC # FLD AUTO: 8.8 K/UL — SIGNIFICANT CHANGE UP (ref 3.8–10.5)

## 2021-03-02 RX ADMIN — ATORVASTATIN CALCIUM 80 MILLIGRAM(S): 80 TABLET, FILM COATED ORAL at 22:03

## 2021-03-02 RX ADMIN — PANTOPRAZOLE SODIUM 40 MILLIGRAM(S): 20 TABLET, DELAYED RELEASE ORAL at 07:34

## 2021-03-02 RX ADMIN — Medication 25 MILLIGRAM(S): at 17:30

## 2021-03-02 RX ADMIN — PRASUGREL 10 MILLIGRAM(S): 5 TABLET, FILM COATED ORAL at 13:10

## 2021-03-02 RX ADMIN — Medication 3: at 17:19

## 2021-03-02 RX ADMIN — MIDODRINE HYDROCHLORIDE 5 MILLIGRAM(S): 2.5 TABLET ORAL at 07:34

## 2021-03-02 RX ADMIN — HEPARIN SODIUM 5000 UNIT(S): 5000 INJECTION INTRAVENOUS; SUBCUTANEOUS at 17:30

## 2021-03-02 RX ADMIN — MIDODRINE HYDROCHLORIDE 5 MILLIGRAM(S): 2.5 TABLET ORAL at 13:10

## 2021-03-02 RX ADMIN — Medication 325 MILLIGRAM(S): at 07:33

## 2021-03-02 RX ADMIN — CHLORHEXIDINE GLUCONATE 1 APPLICATION(S): 213 SOLUTION TOPICAL at 07:24

## 2021-03-02 RX ADMIN — Medication 81 MILLIGRAM(S): at 13:10

## 2021-03-02 RX ADMIN — MIDODRINE HYDROCHLORIDE 5 MILLIGRAM(S): 2.5 TABLET ORAL at 17:30

## 2021-03-02 RX ADMIN — POLYETHYLENE GLYCOL 3350 17 GRAM(S): 17 POWDER, FOR SOLUTION ORAL at 13:10

## 2021-03-02 RX ADMIN — Medication 325 MILLIGRAM(S): at 17:30

## 2021-03-02 RX ADMIN — Medication 1: at 12:15

## 2021-03-02 RX ADMIN — HEPARIN SODIUM 5000 UNIT(S): 5000 INJECTION INTRAVENOUS; SUBCUTANEOUS at 07:33

## 2021-03-02 NOTE — PROGRESS NOTE ADULT - SUBJECTIVE AND OBJECTIVE BOX
      Overnight events noted      VITAL:  T(C): , Max: 36.6 (03-01-21 @ 20:37)  T(F): , Max: 97.8 (03-01-21 @ 20:37)  HR: 71 (03-02-21 @ 05:39)  BP: 91/47 (03-02-21 @ 05:39)  BP(mean): --  RR: 20 (03-02-21 @ 05:39)  SpO2: 94% (03-02-21 @ 05:39)      PHYSICAL EXAM:  Constitutional: NAD on room air, alert  HEENT: NCAT, DMM  Neck: Supple, no JVD  Respiratory: CTA-b/l  Cardiovascular: RRR s1s2, no m/r/g  Gastrointestinal: BS+, soft, NT; (+)mild distension  Extremities:  no edema b/l  Neurological: no focal deficits; strength grossly intact  Back: no CVAT b/l  Skin: No rashes, no nevi  Access: R femoral shiley    LABS:                        9.5    8.80  )-----------( 86       ( 02 Mar 2021 06:02 )             29.8     Na(136)/K(4.1)/Cl(100)/HCO3(25)/BUN(47)/Cr(3.81)Glu(88)/Ca(9.1)/Mg(--)/PO4(--)    03-02 @ 06:02  Na(139)/K(4.0)/Cl(100)/HCO3(23)/BUN(65)/Cr(4.54)Glu(122)/Ca(9.1)/Mg(--)/PO4(--)    03-01 @ 06:33  Na(139)/K(3.8)/Cl(103)/HCO3(26)/BUN(52)/Cr(3.45)Glu(100)/Ca(8.7)/Mg(--)/PO4(--)    02-28 @ 05:09      IMPRESSION: 80M w/ DM2, CAD-CABG, and CKD, 2/16/21 a/w azotemia/lyte derangements/weakness    (1)Renal - newly ESRD - s/p HD yesterday; due for next HD tomorrow    (2)CV - tenuous hemodynamics/hypervolemia - biventricular systolic dysfunction, severe AS, mod-sev AR. Being evaluated for TAVR. On standing Midodrine 5tid, as of yesterday    (3)ID - now COVID-positive      RECOMMEND:  (1)Next HD tomorrow; midodrine 10mg po pre-hd (in addition to the standing Midodrine)  (2)Eventual conversion of shiley to tunneled cath  (3)No objection to IV contrast procedures in workup for TAVR  (4)No objection to use of Remdesivir for COVID here  (5)[facility] also be a viable [facility]            Suraj Garcia MD  [facility]  Office: [phone]  Cell: [phone]               No complaints      VITAL:  T(C): , Max: 36.6 (03-01-21 @ 20:37)  T(F): , Max: 97.8 (03-01-21 @ 20:37)  HR: 71 (03-02-21 @ 05:39)  BP: 91/47 (03-02-21 @ 05:39)  BP(mean): --  RR: 20 (03-02-21 @ 05:39)  SpO2: 94% (03-02-21 @ 05:39)      PHYSICAL EXAM:  Constitutional: NAD on room air, alert  HEENT: NCAT, DMM  Neck: Supple, no JVD  Respiratory: CTA-b/l  Cardiovascular: RRR s1s2, no m/r/g  Gastrointestinal: BS+, soft, NT; (+)mild distension  Extremities:  no edema b/l  Neurological: no focal deficits; strength grossly intact  Back: no CVAT b/l  Skin: No rashes, no nevi  Access: R femoral shiley    LABS:                        9.5    8.80  )-----------( 86       ( 02 Mar 2021 06:02 )             29.8     Na(136)/K(4.1)/Cl(100)/HCO3(25)/BUN(47)/Cr(3.81)Glu(88)/Ca(9.1)/Mg(--)/PO4(--)    03-02 @ 06:02  Na(139)/K(4.0)/Cl(100)/HCO3(23)/BUN(65)/Cr(4.54)Glu(122)/Ca(9.1)/Mg(--)/PO4(--)    03-01 @ 06:33  Na(139)/K(3.8)/Cl(103)/HCO3(26)/BUN(52)/Cr(3.45)Glu(100)/Ca(8.7)/Mg(--)/PO4(--)    02-28 @ 05:09      IMPRESSION: 80M w/ DM2, CAD-CABG, and CKD, 2/16/21 a/w azotemia/lyte derangements/weakness    (1)Renal - newly ESRD - s/p HD yesterday; due for next HD tomorrow    (2)CV - tenuous hemodynamics/hypervolemia - biventricular systolic dysfunction, severe AS, mod-sev AR. Being evaluated for TAVR. On standing Midodrine 5tid, as of yesterday    (3)ID - now COVID-positive      RECOMMEND:  (1)Next HD tomorrow; midodrine 10mg po pre-hd (in addition to the standing Midodrine)  (2)Eventual conversion of shiley to tunneled cath  (3)No objection to IV contrast procedures in workup for TAVR  (4)No objection to use of Remdesivir for COVID here  (5)Setup of outside HD by  - [facility]? [facility] would also be a viable option        Suraj Garcia MD  [facility]  Office: [phone]  Cell: [phone]

## 2021-03-02 NOTE — PROGRESS NOTE ADULT - SUBJECTIVE AND OBJECTIVE BOX
Subjective: Patient seen and examined. No new events except as noted.     REVIEW OF SYSTEMS:    CONSTITUTIONAL: No weakness, fevers or chills  EYES/ENT: No visual changes;  No vertigo or throat pain   NECK: No pain or stiffness  RESPIRATORY: No cough, wheezing, hemoptysis; No shortness of breath  CARDIOVASCULAR: No chest pain or palpitations  GASTROINTESTINAL: No abdominal or epigastric pain. No nausea, vomiting, or hematemesis; No diarrhea or constipation. No melena or hematochezia.  GENITOURINARY: No dysuria, frequency or hematuria  NEUROLOGICAL: No numbness or weakness  SKIN: No itching, burning, rashes, or lesions   All other review of systems is negative unless indicated above.    MEDICATIONS:  MEDICATIONS  (STANDING):  aspirin  chewable 81 milliGRAM(s) Oral daily  atorvastatin 80 milliGRAM(s) Oral at bedtime  chlorhexidine 4% Liquid 1 Application(s) Topical <User Schedule>  dextrose 40% Gel 15 Gram(s) Oral once  dextrose 5%. 1000 milliLiter(s) (50 mL/Hr) IV Continuous <Continuous>  dextrose 5%. 1000 milliLiter(s) (100 mL/Hr) IV Continuous <Continuous>  dextrose 50% Injectable 25 Gram(s) IV Push once  dextrose 50% Injectable 12.5 Gram(s) IV Push once  dextrose 50% Injectable 25 Gram(s) IV Push once  ferrous    sulfate 325 milliGRAM(s) Oral two times a day  glucagon  Injectable 1 milliGRAM(s) IntraMuscular once  heparin   Injectable 5000 Unit(s) SubCutaneous every 12 hours  influenza   Vaccine 0.5 milliLiter(s) IntraMuscular once  insulin lispro (ADMELOG) corrective regimen sliding scale   SubCutaneous three times a day before meals  insulin lispro (ADMELOG) corrective regimen sliding scale   SubCutaneous at bedtime  metoprolol tartrate 25 milliGRAM(s) Oral every 12 hours  midodrine. 5 milliGRAM(s) Oral three times a day  midodrine. 10 milliGRAM(s) Oral <User Schedule>  pantoprazole    Tablet 40 milliGRAM(s) Oral before breakfast  polyethylene glycol 3350 17 Gram(s) Oral daily  prasugrel 10 milliGRAM(s) Oral daily      PHYSICAL EXAM:  T(C): 36.6 (03-02-21 @ 05:39), Max: 36.6 (03-01-21 @ 20:37)  HR: 71 (03-02-21 @ 05:39) (60 - 71)  BP: 91/47 (03-02-21 @ 05:39) (91/47 - 101/54)  RR: 20 (03-02-21 @ 05:39) (18 - 20)  SpO2: 94% (03-02-21 @ 05:39) (94% - 95%)  Wt(kg): --  I&O's Summary    01 Mar 2021 07:01  -  02 Mar 2021 07:00  --------------------------------------------------------  IN: 520 mL / OUT: 1500 mL / NET: -980 mL            Appearance: NAD	  HEENT:   Dry oral mucosa, PERRL, EOMI	  Lymphatic: No lymphadenopathy , no edema  Cardiovascular: Irregular  S1 S2, No JVD, + murmurs , Peripheral pulses palpable 2+ bilaterally  Respiratory: Lungs clear to auscultation, normal effort 	  Gastrointestinal:  Soft, Non-tender, + BS	  Skin: No rashes, No ecchymoses, No cyanosis, warm to touch  Musculoskeletal: Normal range of motion, normal strength  Psychiatry:  Mood & affect appropriate  Ext: No edema    LABS:    CARDIAC MARKERS:                                9.5    8.80  )-----------( 86       ( 02 Mar 2021 06:02 )             29.8     03-02    136  |  100  |  47<H>  ----------------------------<  88  4.1   |  25  |  3.81<H>    Ca    9.1      02 Mar 2021 06:02    TPro  6.4  /  Alb  2.7<L>  /  TBili  1.6<H>  /  DBili  0.9<H>  /  AST  53<H>  /  ALT  44  /  AlkPhos  210<H>  03-02    proBNP:   Lipid Profile:   HgA1c:   TSH:             TELEMETRY: 	 V paced     ECG:  	  RADIOLOGY:   DIAGNOSTIC TESTING:  [ ] Echocardiogram:  [ ]  Catheterization:  [ ] Stress Test:    OTHER:

## 2021-03-02 NOTE — CONSULT NOTE ADULT - ASSESSMENT
Interventional Radiology    Evaluate for Procedure: Tunneled dialysis catheter    HPI: 80y Male with CAD s/p CABG and AICD, DM, HTN, CKD, presents with acute HF and CALVIN on CKD now requiring HD. S/p non tunneled femoral HD catheter placement 2/26. IR consulted for tunneled catheter placement.     Allergies:   Medications (Abx/Cardiac/Anticoagulation/Blood Products)    aspirin  chewable: 81 milliGRAM(s) Oral (03-02 @ 13:10)  heparin   Injectable: 5000 Unit(s) SubCutaneous (03-02 @ 07:33)  metoprolol tartrate: 25 milliGRAM(s) Oral (03-01 @ 19:23)  midodrine.: 10 milliGRAM(s) Oral (03-01 @ 10:15)  midodrine.: 5 milliGRAM(s) Oral (03-02 @ 13:10)  prasugrel: 10 milliGRAM(s) Oral (03-02 @ 13:10)    Data:    T(C): 37  HR: 77  BP: 104/64  RR: 19  SpO2: 96%    -WBC 8.80 / HgB 9.5 / Hct 29.8 / Plt 86  -Na 136 / Cl 100 / BUN 47 / Glucose 88  -K 4.1 / CO2 25 / Cr 3.81  -ALT 44 / Alk Phos 210 / T.Bili 1.6  -INR 1.26 / PTT 32.8    Radiology:   Imaging findings reviewed.    Assessment/Plan:   80y Male with CAD s/p CABG and AICD, DM, HTN, CKD, presents with acute HF and CALVIN on CKD now requiring HD. S/p non tunneled femoral HD catheter placement 2/26. IR consulted for tunneled catheter placement.     -- IR will plan to perform tunneled HD catheter placement 3/3/21.  -- NPO at midnight on 3/2.  -- hold a.m. anticoagulation on 3/3.  -- please obtain cbc, bmp, coags am 3/3.  -- please maintain COVID PCR within 72 hours of planned procedure   -- please place IR procedure request order under Dr. Nghia Parsons.  Interventional Radiology    Evaluate for Procedure: Tunneled dialysis catheter    HPI: 80y Male with CAD s/p CABG and AICD, DM, HTN, CKD, presents with acute HF and CALVIN on CKD now requiring HD. S/p non tunneled femoral HD catheter placement 2/26. IR consulted for tunneled catheter placement.     Allergies:   Medications (Abx/Cardiac/Anticoagulation/Blood Products)    aspirin  chewable: 81 milliGRAM(s) Oral (03-02 @ 13:10)  heparin   Injectable: 5000 Unit(s) SubCutaneous (03-02 @ 07:33)  metoprolol tartrate: 25 milliGRAM(s) Oral (03-01 @ 19:23)  midodrine.: 10 milliGRAM(s) Oral (03-01 @ 10:15)  midodrine.: 5 milliGRAM(s) Oral (03-02 @ 13:10)  prasugrel: 10 milliGRAM(s) Oral (03-02 @ 13:10)    Data:    T(C): 37  HR: 77  BP: 104/64  RR: 19  SpO2: 96%    -WBC 8.80 / HgB 9.5 / Hct 29.8 / Plt 86  -Na 136 / Cl 100 / BUN 47 / Glucose 88  -K 4.1 / CO2 25 / Cr 3.81  -ALT 44 / Alk Phos 210 / T.Bili 1.6  -INR 1.26 / PTT 32.8    Radiology:   Imaging findings reviewed.    Assessment/Plan:   80y Male with CAD s/p CABG and AICD, DM, HTN, CKD, presents with acute HF and CALVIN on CKD now requiring HD. S/p non tunneled femoral HD catheter placement 2/26. IR consulted for tunneled catheter placement.     -- IR will plan to perform tunneled HD catheter placement 3/3/21.  -- NPO at midnight on 3/2.  -- hold a.m. anticoagulation on 3/3.  -- please obtain cbc, bmp, coags am 3/3.  -- please maintain COVID PCR within 72 hours of planned procedure   -- please place IR procedure request order under Dr. Karel Parsons.

## 2021-03-02 NOTE — PROGRESS NOTE ADULT - ASSESSMENT
79 yo male w h/o ICM< CAD< s/p CABG, has AICD, DM, HTN presents w volume overload and abd pain  Abd sono shows gallstones, but no sign of cholecystitis/pancreatitis. abd pain resolved on Zosyn,   will treat empirically for 7 days .  ID following    ascites 2/2 systolic chf, probably worse 2/2 acute on chronic systolic chf.  responded well to lasix. now held due to rise creat.   renal following  pt with worsening renal function. renal f/u apprec.   started HD 2/26. HD as per renal    Covid positive.  ID following. isolation. trend infl markers.   s/p monoclonal infusion.  on interrogation of ICD, has a lof of arrhythmia burden,   NSVT and sustained VT, seen by EPS, cont  [id number] mg  bid. has inoperable ischemic HD,   ICM, EF 15%, severe AS, seen by structural heart for TAVR , proceed with TAVR work up  dvt ppx w HSC

## 2021-03-02 NOTE — PROGRESS NOTE ADULT - SUBJECTIVE AND OBJECTIVE BOX
Patient is a 80y old  Male who presents with a chief complaint of acute renal failure (02 Mar 2021 14:08)      SUBJECTIVE / OVERNIGHT EVENTS: tolerating HD    MEDICATIONS  (STANDING):  aspirin  chewable 81 milliGRAM(s) Oral daily  atorvastatin 80 milliGRAM(s) Oral at bedtime  chlorhexidine 4% Liquid 1 Application(s) Topical <User Schedule>  dextrose 40% Gel 15 Gram(s) Oral once  dextrose 5%. 1000 milliLiter(s) (50 mL/Hr) IV Continuous <Continuous>  dextrose 5%. 1000 milliLiter(s) (100 mL/Hr) IV Continuous <Continuous>  dextrose 50% Injectable 25 Gram(s) IV Push once  dextrose 50% Injectable 12.5 Gram(s) IV Push once  dextrose 50% Injectable 25 Gram(s) IV Push once  ferrous    sulfate 325 milliGRAM(s) Oral two times a day  glucagon  Injectable 1 milliGRAM(s) IntraMuscular once  heparin   Injectable 5000 Unit(s) SubCutaneous every 12 hours  influenza   Vaccine 0.5 milliLiter(s) IntraMuscular once  insulin lispro (ADMELOG) corrective regimen sliding scale   SubCutaneous three times a day before meals  insulin lispro (ADMELOG) corrective regimen sliding scale   SubCutaneous at bedtime  metoprolol tartrate 25 milliGRAM(s) Oral every 12 hours  midodrine. 5 milliGRAM(s) Oral three times a day  midodrine. 10 milliGRAM(s) Oral <User Schedule>  pantoprazole    Tablet 40 milliGRAM(s) Oral before breakfast  polyethylene glycol 3350 17 Gram(s) Oral daily  prasugrel 10 milliGRAM(s) Oral daily    MEDICATIONS  (PRN):  sodium chloride 0.9% lock flush 10 milliLiter(s) IV Push every 1 hour PRN Pre/post blood products, medications, blood draw, and to maintain line patency      Vital Signs Last 24 Hrs  T(F): 98.6 (03-02-21 @ 11:13), Max: 98.6 (03-02-21 @ 11:13)  HR: 70 (03-02-21 @ 17:30) (60 - 77)  BP: 100/55 (03-02-21 @ 17:30) (91/47 - 104/64)  RR: 19 (03-02-21 @ 11:13) (18 - 20)  SpO2: 96% (03-02-21 @ 11:13) (94% - 96%)  Telemetry:   CAPILLARY BLOOD GLUCOSE      POCT Blood Glucose.: 259 mg/dL (02 Mar 2021 16:32)  POCT Blood Glucose.: 180 mg/dL (02 Mar 2021 11:39)  POCT Blood Glucose.: 90 mg/dL (02 Mar 2021 08:42)  POCT Blood Glucose.: 74 mg/dL (02 Mar 2021 08:41)  POCT Blood Glucose.: 83 mg/dL (02 Mar 2021 08:19)  POCT Blood Glucose.: 77 mg/dL (02 Mar 2021 07:39)  POCT Blood Glucose.: 111 mg/dL (01 Mar 2021 21:20)    I&O's Summary    01 Mar 2021 07:01  -  02 Mar 2021 07:00  --------------------------------------------------------  IN: 520 mL / OUT: 1500 mL / NET: -980 mL    02 Mar 2021 07:01  -  02 Mar 2021 19:29  --------------------------------------------------------  IN: 540 mL / OUT: 0 mL / NET: 540 mL        PHYSICAL EXAM:  GENERAL: NAD, well-developed  HEAD:  Atraumatic, Normocephalic  EYES: EOMI, PERRLA, conjunctiva and sclera clear  NECK: Supple, No JVD  CHEST/LUNG: Clear to auscultation bilaterally; No wheeze  HEART: Regular rate and rhythm; No murmurs, rubs, or gallops  ABDOMEN: Soft, Nontender, Nondistended; Bowel sounds present  EXTREMITIES:  2+ Peripheral Pulses, No clubbing, cyanosis, or edema  PSYCH: AAOx3  NEUROLOGY: non-focal  SKIN: No rashes or lesions    LABS:                        9.5    8.80  )-----------( 86       ( 02 Mar 2021 06:02 )             29.8     03-02    136  |  100  |  47<H>  ----------------------------<  88  4.1   |  25  |  3.81<H>    Ca    9.1      02 Mar 2021 06:02    TPro  6.4  /  Alb  2.7<L>  /  TBili  1.6<H>  /  DBili  0.9<H>  /  AST  53<H>  /  ALT  44  /  AlkPhos  210<H>  03-02    PT/INR - ( 02 Mar 2021 08:30 )   PT: 14.7 sec;   INR: 1.26 ratio         PTT - ( 02 Mar 2021 08:30 )  PTT:32.8 sec          RADIOLOGY & ADDITIONAL TESTS:    Imaging Personally Reviewed:    Consultant(s) Notes Reviewed:      Care Discussed with Consultants/Other Providers:

## 2021-03-02 NOTE — PROGRESS NOTE ADULT - SUBJECTIVE AND OBJECTIVE BOX
INTERVAL HPI/OVERNIGHT EVENTS:    overnight events noted  no new GI events       MEDICATIONS  (STANDING):  aMIOdarone    Tablet 400 milliGRAM(s) Oral every 8 hours  aspirin  chewable 81 milliGRAM(s) Oral daily  atorvastatin 80 milliGRAM(s) Oral at bedtime  chlorhexidine 4% Liquid 1 Application(s) Topical <User Schedule>  dextrose 40% Gel 15 Gram(s) Oral once  dextrose 5%. 1000 milliLiter(s) (50 mL/Hr) IV Continuous <Continuous>  dextrose 5%. 1000 milliLiter(s) (100 mL/Hr) IV Continuous <Continuous>  dextrose 50% Injectable 25 Gram(s) IV Push once  dextrose 50% Injectable 12.5 Gram(s) IV Push once  dextrose 50% Injectable 25 Gram(s) IV Push once  ferrous    sulfate 325 milliGRAM(s) Oral two times a day  glucagon  Injectable 1 milliGRAM(s) IntraMuscular once  heparin   Injectable 5000 Unit(s) SubCutaneous every 12 hours  influenza   Vaccine 0.5 milliLiter(s) IntraMuscular once  insulin lispro (ADMELOG) corrective regimen sliding scale   SubCutaneous at bedtime  insulin lispro (ADMELOG) corrective regimen sliding scale   SubCutaneous three times a day before meals  metoprolol tartrate 25 milliGRAM(s) Oral every 12 hours  midodrine. 10 milliGRAM(s) Oral <User Schedule>  pantoprazole    Tablet 40 milliGRAM(s) Oral before breakfast  prasugrel 10 milliGRAM(s) Oral daily    MEDICATIONS  (PRN):  sodium chloride 0.9% lock flush 10 milliLiter(s) IV Push every 1 hour PRN Pre/post blood products, medications, blood draw, and to maintain line patency      Allergies    No Known Allergies    Intolerances        Review of Systems:    General:  No wt loss, fevers, chills, night sweats,fatigue,   Eyes:  Good vision, no reported pain  ENT:  No sore throat, pain, runny nose, dysphagia  CV:  No pain, palpitatioins, hypo/hypertension  Resp:  No dyspnea, cough, tachypnea, wheezing  GI:  No pain, No nausea, No vomiting, No diarrhea, No constipatiion, No weight loss, No fever, No pruritis, No rectal bleeding, No tarry stools, No dysphagia,  :  No pain, bleeding, incontinence, nocturia  Muscle:  No pain, weakness  Neuro:  No weakness, tingling, memory problems  Psych:  No fatigue, insomnia, mood problems, depression  Endocrine:  No polyuria, polydypsia, cold/heat intolerance  Heme:  No petechiae, ecchymosis, easy bruisability  Skin:  No rash, tattoos, scars, edema      Vital Signs Last 24 Hrs  T(C): 36.3 (26 Feb 2021 15:55), Max: 36.9 (25 Feb 2021 20:35)  T(F): 97.4 (26 Feb 2021 15:55), Max: 98.4 (25 Feb 2021 20:35)  HR: 60 (26 Feb 2021 15:55) (54 - 61)  BP: 92/52 (26 Feb 2021 15:55) (90/59 - 121/55)  BP(mean): --  RR: 18 (26 Feb 2021 15:55) (18 - 19)  SpO2: 97% (26 Feb 2021 15:55) (94% - 99%)    PHYSICAL EXAM:    GENERAL:  no distress  HEENT:  NC/AT  ABDOMEN:  Soft, non-tender, non-distended  EXTEREMITIES:  +bilateral LE edema   NEURO:  Alert, oriented, no asterixis, no tremor, no encephalopathy    LABS:                        9.7    10.08 )-----------( 151      ( 26 Feb 2021 11:23 )             32.0     02-26    132<L>  |  98  |  127<H>  ----------------------------<  169<H>  4.9   |  15<L>  |  5.35<H>    Ca    9.0      26 Feb 2021 11:23    TPro  6.7  /  Alb  3.1<L>  /  TBili  1.8<H>  /  DBili  x   /  AST  103<H>  /  ALT  81<H>  /  AlkPhos  193<H>  02-25    PT/INR - ( 25 Feb 2021 21:01 )   PT: 16.3 sec;   INR: 1.38 ratio         PTT - ( 25 Feb 2021 21:01 )  PTT:33.7 sec      RADIOLOGY & ADDITIONAL TESTS:

## 2021-03-03 LAB
ALBUMIN SERPL ELPH-MCNC: 2.6 G/DL — LOW (ref 3.3–5)
ALP SERPL-CCNC: 225 U/L — HIGH (ref 40–120)
ALT FLD-CCNC: 41 U/L — SIGNIFICANT CHANGE UP (ref 10–45)
ANION GAP SERPL CALC-SCNC: 13 MMOL/L — SIGNIFICANT CHANGE UP (ref 5–17)
APTT BLD: 33.6 SEC — SIGNIFICANT CHANGE UP (ref 27.5–35.5)
AST SERPL-CCNC: 50 U/L — HIGH (ref 10–40)
BILIRUB DIRECT SERPL-MCNC: 0.9 MG/DL — HIGH (ref 0–0.2)
BILIRUB INDIRECT FLD-MCNC: 0.7 MG/DL — SIGNIFICANT CHANGE UP (ref 0.2–1)
BILIRUB SERPL-MCNC: 1.6 MG/DL — HIGH (ref 0.2–1.2)
BLD GP AB SCN SERPL QL: NEGATIVE — SIGNIFICANT CHANGE UP
BUN SERPL-MCNC: 64 MG/DL — HIGH (ref 7–23)
CALCIUM SERPL-MCNC: 9.4 MG/DL — SIGNIFICANT CHANGE UP (ref 8.4–10.5)
CHLORIDE SERPL-SCNC: 98 MMOL/L — SIGNIFICANT CHANGE UP (ref 96–108)
CO2 SERPL-SCNC: 24 MMOL/L — SIGNIFICANT CHANGE UP (ref 22–31)
CREAT SERPL-MCNC: 4.89 MG/DL — HIGH (ref 0.5–1.3)
GLUCOSE BLDC GLUCOMTR-MCNC: 120 MG/DL — HIGH (ref 70–99)
GLUCOSE BLDC GLUCOMTR-MCNC: 190 MG/DL — HIGH (ref 70–99)
GLUCOSE BLDC GLUCOMTR-MCNC: 83 MG/DL — SIGNIFICANT CHANGE UP (ref 70–99)
GLUCOSE BLDC GLUCOMTR-MCNC: 88 MG/DL — SIGNIFICANT CHANGE UP (ref 70–99)
GLUCOSE SERPL-MCNC: 127 MG/DL — HIGH (ref 70–99)
HCT VFR BLD CALC: 29.9 % — LOW (ref 39–50)
HGB BLD-MCNC: 9.6 G/DL — LOW (ref 13–17)
INR BLD: 1.27 RATIO — HIGH (ref 0.88–1.16)
MAGNESIUM SERPL-MCNC: 2.3 MG/DL — SIGNIFICANT CHANGE UP (ref 1.6–2.6)
MCHC RBC-ENTMCNC: 30.4 PG — SIGNIFICANT CHANGE UP (ref 27–34)
MCHC RBC-ENTMCNC: 32.1 GM/DL — SIGNIFICANT CHANGE UP (ref 32–36)
MCV RBC AUTO: 94.6 FL — SIGNIFICANT CHANGE UP (ref 80–100)
NRBC # BLD: 0 /100 WBCS — SIGNIFICANT CHANGE UP (ref 0–0)
PHOSPHATE SERPL-MCNC: 3.8 MG/DL — SIGNIFICANT CHANGE UP (ref 2.5–4.5)
PLATELET # BLD AUTO: 93 K/UL — LOW (ref 150–400)
POTASSIUM SERPL-MCNC: 4.6 MMOL/L — SIGNIFICANT CHANGE UP (ref 3.5–5.3)
POTASSIUM SERPL-SCNC: 4.6 MMOL/L — SIGNIFICANT CHANGE UP (ref 3.5–5.3)
PROT SERPL-MCNC: 6.5 G/DL — SIGNIFICANT CHANGE UP (ref 6–8.3)
PROTHROM AB SERPL-ACNC: 14.8 SEC — HIGH (ref 10.6–13.6)
RBC # BLD: 3.16 M/UL — LOW (ref 4.2–5.8)
RBC # FLD: 20.9 % — HIGH (ref 10.3–14.5)
RH IG SCN BLD-IMP: POSITIVE — SIGNIFICANT CHANGE UP
SODIUM SERPL-SCNC: 135 MMOL/L — SIGNIFICANT CHANGE UP (ref 135–145)
WBC # BLD: 8.95 K/UL — SIGNIFICANT CHANGE UP (ref 3.8–10.5)
WBC # FLD AUTO: 8.95 K/UL — SIGNIFICANT CHANGE UP (ref 3.8–10.5)

## 2021-03-03 RX ORDER — TUBERCULIN PURIFIED PROTEIN DERIVATIVE 5 [IU]/.1ML
5 INJECTION, SOLUTION INTRADERMAL ONCE
Refills: 0 | Status: COMPLETED | OUTPATIENT
Start: 2021-03-03 | End: 2021-03-03

## 2021-03-03 RX ADMIN — MIDODRINE HYDROCHLORIDE 5 MILLIGRAM(S): 2.5 TABLET ORAL at 13:31

## 2021-03-03 RX ADMIN — HEPARIN SODIUM 5000 UNIT(S): 5000 INJECTION INTRAVENOUS; SUBCUTANEOUS at 17:24

## 2021-03-03 RX ADMIN — Medication 25 MILLIGRAM(S): at 04:52

## 2021-03-03 RX ADMIN — MIDODRINE HYDROCHLORIDE 5 MILLIGRAM(S): 2.5 TABLET ORAL at 04:52

## 2021-03-03 RX ADMIN — CHLORHEXIDINE GLUCONATE 1 APPLICATION(S): 213 SOLUTION TOPICAL at 04:51

## 2021-03-03 RX ADMIN — MIDODRINE HYDROCHLORIDE 10 MILLIGRAM(S): 2.5 TABLET ORAL at 09:00

## 2021-03-03 RX ADMIN — PRASUGREL 10 MILLIGRAM(S): 5 TABLET, FILM COATED ORAL at 13:31

## 2021-03-03 RX ADMIN — Medication 81 MILLIGRAM(S): at 13:30

## 2021-03-03 RX ADMIN — Medication 325 MILLIGRAM(S): at 04:51

## 2021-03-03 RX ADMIN — ATORVASTATIN CALCIUM 80 MILLIGRAM(S): 80 TABLET, FILM COATED ORAL at 21:31

## 2021-03-03 RX ADMIN — TUBERCULIN PURIFIED PROTEIN DERIVATIVE 5 UNIT(S): 5 INJECTION, SOLUTION INTRADERMAL at 13:39

## 2021-03-03 RX ADMIN — PANTOPRAZOLE SODIUM 40 MILLIGRAM(S): 20 TABLET, DELAYED RELEASE ORAL at 04:52

## 2021-03-03 RX ADMIN — MIDODRINE HYDROCHLORIDE 5 MILLIGRAM(S): 2.5 TABLET ORAL at 17:25

## 2021-03-03 NOTE — PROGRESS NOTE ADULT - SUBJECTIVE AND OBJECTIVE BOX
Patient is a 80y old  Male who presents with a chief complaint of acute renal failure (03 Mar 2021 12:16)      SUBJECTIVE / OVERNIGHT EVENTS: awaiting PAC    MEDICATIONS  (STANDING):  aspirin  chewable 81 milliGRAM(s) Oral daily  atorvastatin 80 milliGRAM(s) Oral at bedtime  chlorhexidine 4% Liquid 1 Application(s) Topical <User Schedule>  dextrose 40% Gel 15 Gram(s) Oral once  dextrose 5%. 1000 milliLiter(s) (50 mL/Hr) IV Continuous <Continuous>  dextrose 5%. 1000 milliLiter(s) (100 mL/Hr) IV Continuous <Continuous>  dextrose 50% Injectable 25 Gram(s) IV Push once  dextrose 50% Injectable 12.5 Gram(s) IV Push once  dextrose 50% Injectable 25 Gram(s) IV Push once  glucagon  Injectable 1 milliGRAM(s) IntraMuscular once  heparin   Injectable 5000 Unit(s) SubCutaneous every 12 hours  influenza   Vaccine 0.5 milliLiter(s) IntraMuscular once  insulin lispro (ADMELOG) corrective regimen sliding scale   SubCutaneous three times a day before meals  insulin lispro (ADMELOG) corrective regimen sliding scale   SubCutaneous at bedtime  metoprolol tartrate 25 milliGRAM(s) Oral every 12 hours  midodrine. 5 milliGRAM(s) Oral three times a day  midodrine. 10 milliGRAM(s) Oral <User Schedule>  pantoprazole    Tablet 40 milliGRAM(s) Oral before breakfast  polyethylene glycol 3350 17 Gram(s) Oral daily  prasugrel 10 milliGRAM(s) Oral daily    MEDICATIONS  (PRN):  sodium chloride 0.9% lock flush 10 milliLiter(s) IV Push every 1 hour PRN Pre/post blood products, medications, blood draw, and to maintain line patency      Vital Signs Last 24 Hrs  T(F): 97.8 (03-03-21 @ 20:10), Max: 98.8 (03-03-21 @ 04:50)  HR: 70 (03-03-21 @ 20:10) (61 - 71)  BP: 92/54 (03-03-21 @ 20:10) (91/48 - 107/48)  RR: 20 (03-03-21 @ 20:10) (18 - 20)  SpO2: 93% (03-03-21 @ 20:10) (93% - 99%)  Telemetry:   CAPILLARY BLOOD GLUCOSE      POCT Blood Glucose.: 190 mg/dL (03 Mar 2021 21:16)  POCT Blood Glucose.: 88 mg/dL (03 Mar 2021 17:00)  POCT Blood Glucose.: 83 mg/dL (03 Mar 2021 13:28)  POCT Blood Glucose.: 120 mg/dL (03 Mar 2021 07:50)    I&O's Summary    02 Mar 2021 07:01  -  03 Mar 2021 07:00  --------------------------------------------------------  IN: 780 mL / OUT: 0 mL / NET: 780 mL    03 Mar 2021 07:01  -  03 Mar 2021 22:26  --------------------------------------------------------  IN: 480 mL / OUT: 800 mL / NET: -320 mL        PHYSICAL EXAM:  GENERAL: NAD, well-developed  HEAD:  Atraumatic, Normocephalic  EYES: EOMI, PERRLA, conjunctiva and sclera clear  NECK: Supple, No JVD  CHEST/LUNG: Clear to auscultation bilaterally; No wheeze  HEART: Regular rate and rhythm; No murmurs, rubs, or gallops  ABDOMEN: Soft, Nontender, Nondistended; Bowel sounds present  EXTREMITIES:  2+ Peripheral Pulses, No clubbing, cyanosis, or edema  PSYCH: AAOx3  NEUROLOGY: non-focal  SKIN: No rashes or lesions    LABS:                        9.6    8.95  )-----------( 93       ( 03 Mar 2021 06:03 )             29.9     03-03    135  |  98  |  64<H>  ----------------------------<  127<H>  4.6   |  24  |  4.89<H>    Ca    9.4      03 Mar 2021 06:03  Phos  3.8     03-03  Mg     2.3     03-03    TPro  6.5  /  Alb  2.6<L>  /  TBili  1.6<H>  /  DBili  0.9<H>  /  AST  50<H>  /  ALT  41  /  AlkPhos  225<H>  03-03    PT/INR - ( 03 Mar 2021 08:26 )   PT: 14.8 sec;   INR: 1.27 ratio         PTT - ( 03 Mar 2021 08:26 )  PTT:33.6 sec          RADIOLOGY & ADDITIONAL TESTS:    Imaging Personally Reviewed:    Consultant(s) Notes Reviewed:      Care Discussed with Consultants/Other Providers:

## 2021-03-03 NOTE — PROGRESS NOTE ADULT - SUBJECTIVE AND OBJECTIVE BOX
      Overnight events noted      VITAL:  T(C): , Max: 37.1 (03-03-21 @ 04:50)  T(F): , Max: 98.8 (03-03-21 @ 04:50)  HR: 71 (03-03-21 @ 04:50)  BP: 100/51 (03-03-21 @ 04:50)  BP(mean): --  RR: 18 (03-03-21 @ 04:50)  SpO2: 94% (03-03-21 @ 04:50)      PHYSICAL EXAM:  Constitutional: NAD on room air, alert  HEENT: NCAT, DMM  Neck: Supple, no JVD  Respiratory: CTA-b/l  Cardiovascular: RRR s1s2, no m/r/g  Gastrointestinal: BS+, soft, NT; (+)mild distension  Extremities:  no edema b/l  Neurological: no focal deficits; strength grossly intact  Back: no CVAT b/l  Skin: No rashes, no nevi  Access: R femoral shiley    LABS:                        9.6    8.95  )-----------( 93       ( 03 Mar 2021 06:03 )             29.9     Na(135)/K(4.6)/Cl(98)/HCO3(24)/BUN(64)/Cr(4.89)Glu(127)/Ca(9.4)/Mg(2.3)/PO4(3.8)    03-03 @ 06:03  Na(136)/K(4.1)/Cl(100)/HCO3(25)/BUN(47)/Cr(3.81)Glu(88)/Ca(9.1)/Mg(--)/PO4(--)    03-02 @ 06:02  Na(139)/K(4.0)/Cl(100)/HCO3(23)/BUN(65)/Cr(4.54)Glu(122)/Ca(9.1)/Mg(--)/PO4(--)    03-01 @ 06:33      IMPRESSION: 80M w/ DM2, CAD-CABG, and CKD, 2/16/21 a/w azotemia/lyte derangements/weakness    (1)Renal - newly ESRD - due for next HD today    (2)CV - tenuous hemodynamics/hypervolemia - biventricular systolic dysfunction, severe AS, mod-sev AR. Potentially for eventual TAVR eval. Newly on standing Midodrine 5tid.    (3)Anemia - we can stop the PO FeSO4 - can administer IV iron with HD if needed    (4)ID - COVID-positive      RECOMMEND:  (1)HD today as ordered  (2)D/C PO FeSO4  (3)Eventual conversion of shiley to tunneled cath  (4)Setup of outside HD by DAMON Garcia MD  [facility]  Office: [phone]  Cell: [phone]               complains of LE weakness b/l      VITAL:  T(C): , Max: 37.1 (03-03-21 @ 04:50)  T(F): , Max: 98.8 (03-03-21 @ 04:50)  HR: 71 (03-03-21 @ 04:50)  BP: 100/51 (03-03-21 @ 04:50)  BP(mean): --  RR: 18 (03-03-21 @ 04:50)  SpO2: 94% (03-03-21 @ 04:50)      PHYSICAL EXAM:  Constitutional: NAD on room air, alert  HEENT: NCAT, DMM  Neck: Supple, no JVD  Respiratory: CTA-b/l  Cardiovascular: RRR s1s2, no m/r/g  Gastrointestinal: BS+, soft, NT; (+)mild distension  Extremities:  no edema b/l  Neurological: no focal deficits; strength grossly intact  Back: no CVAT b/l  Skin: No rashes, no nevi  Access: R femoral shiley    LABS:                        9.6    8.95  )-----------( 93       ( 03 Mar 2021 06:03 )             29.9     Na(135)/K(4.6)/Cl(98)/HCO3(24)/BUN(64)/Cr(4.89)Glu(127)/Ca(9.4)/Mg(2.3)/PO4(3.8)    03-03 @ 06:03  Na(136)/K(4.1)/Cl(100)/HCO3(25)/BUN(47)/Cr(3.81)Glu(88)/Ca(9.1)/Mg(--)/PO4(--)    03-02 @ 06:02  Na(139)/K(4.0)/Cl(100)/HCO3(23)/BUN(65)/Cr(4.54)Glu(122)/Ca(9.1)/Mg(--)/PO4(--)    03-01 @ 06:33      IMPRESSION: 80M w/ DM2, CAD-CABG, and CKD, 2/16/21 a/w azotemia/lyte derangements/weakness    (1)Renal - newly ESRD - due for next HD today    (2)CV - tenuous hemodynamics/hypervolemia - biventricular systolic dysfunction, severe AS, mod-sev AR. Potentially for eventual TAVR eval. Newly on standing Midodrine 5tid.    (3)Anemia - we can stop the PO FeSO4 - can administer IV iron with HD if needed    (4)ID - COVID-positive    (5)Vasc - still with a femoral shiley    (6)PMR - weakness; cannot get PT while the femoral access remains in place    RECOMMEND:  (1)HD today as ordered  (2)D/C PO FeSO4  (3)Change of catheter from femoral to IJ by IR - if we cannot change to a tunneled cath until the day prior to discharge, then at the very least we should switch catheter from femoral shiley to IJ shiley  (4)Setup of outside HD by DAMON Garcia MD  [facility]  Office: [phone]  Cell: [phone]

## 2021-03-03 NOTE — PROGRESS NOTE ADULT - SUBJECTIVE AND OBJECTIVE BOX
Subjective: Patient seen and examined. No new events except as noted.   no cp or sob    weakness   REVIEW OF SYSTEMS:    CONSTITUTIONAL: + weakness, fevers or chills  EYES/ENT: No visual changes;  No vertigo or throat pain   NECK: No pain or stiffness  RESPIRATORY: No cough, wheezing, hemoptysis; No shortness of breath  CARDIOVASCULAR: No chest pain or palpitations  GASTROINTESTINAL: No abdominal or epigastric pain. No nausea, vomiting, or hematemesis; No diarrhea or constipation. No melena or hematochezia.  GENITOURINARY: No dysuria, frequency or hematuria  NEUROLOGICAL: No numbness or weakness  SKIN: No itching, burning, rashes, or lesions   All other review of systems is negative unless indicated above.    MEDICATIONS:  MEDICATIONS  (STANDING):  aspirin  chewable 81 milliGRAM(s) Oral daily  atorvastatin 80 milliGRAM(s) Oral at bedtime  chlorhexidine 4% Liquid 1 Application(s) Topical <User Schedule>  dextrose 40% Gel 15 Gram(s) Oral once  dextrose 5%. 1000 milliLiter(s) (50 mL/Hr) IV Continuous <Continuous>  dextrose 5%. 1000 milliLiter(s) (100 mL/Hr) IV Continuous <Continuous>  dextrose 50% Injectable 25 Gram(s) IV Push once  dextrose 50% Injectable 12.5 Gram(s) IV Push once  dextrose 50% Injectable 25 Gram(s) IV Push once  glucagon  Injectable 1 milliGRAM(s) IntraMuscular once  heparin   Injectable 5000 Unit(s) SubCutaneous every 12 hours  influenza   Vaccine 0.5 milliLiter(s) IntraMuscular once  insulin lispro (ADMELOG) corrective regimen sliding scale   SubCutaneous three times a day before meals  insulin lispro (ADMELOG) corrective regimen sliding scale   SubCutaneous at bedtime  metoprolol tartrate 25 milliGRAM(s) Oral every 12 hours  midodrine. 10 milliGRAM(s) Oral <User Schedule>  midodrine. 5 milliGRAM(s) Oral three times a day  pantoprazole    Tablet 40 milliGRAM(s) Oral before breakfast  polyethylene glycol 3350 17 Gram(s) Oral daily  prasugrel 10 milliGRAM(s) Oral daily      PHYSICAL EXAM:  T(C): 37.1 (03-03-21 @ 04:50), Max: 37.1 (03-03-21 @ 04:50)  HR: 71 (03-03-21 @ 04:50) (68 - 77)  BP: 100/51 (03-03-21 @ 04:50) (94/48 - 104/64)  RR: 18 (03-03-21 @ 04:50) (18 - 20)  SpO2: 94% (03-03-21 @ 04:50) (94% - 99%)  Wt(kg): --  I&O's Summary    02 Mar 2021 07:01  -  03 Mar 2021 07:00  --------------------------------------------------------  IN: 780 mL / OUT: 0 mL / NET: 780 mL    03 Mar 2021 07:01  -  03 Mar 2021 10:31  --------------------------------------------------------  IN: 0 mL / OUT: 0 mL / NET: 0 mL          Appearance: Normal	  HEENT:   Normal oral mucosa, PERRL, EOMI	  Lymphatic: No lymphadenopathy , no edema  Cardiovascular: regular S1 S2, No JVD, + murmurs , Peripheral pulses palpable 2+ bilaterally  Respiratory: Lungs clear to auscultation, normal effort 	  Gastrointestinal:  Soft, Non-tender, + BS	  Skin: No rashes, No ecchymoses, No cyanosis, warm to touch  Musculoskeletal: Normal range of motion, normal strength  Psychiatry:  Mood & affect appropriate  Ext: No edema      LABS:    CARDIAC MARKERS:                                9.6    8.95  )-----------( 93       ( 03 Mar 2021 06:03 )             29.9     03-03    135  |  98  |  64<H>  ----------------------------<  127<H>  4.6   |  24  |  4.89<H>    Ca    9.4      03 Mar 2021 06:03  Phos  3.8     03-03  Mg     2.3     03-03    TPro  6.5  /  Alb  2.6<L>  /  TBili  1.6<H>  /  DBili  0.9<H>  /  AST  50<H>  /  ALT  41  /  AlkPhos  225<H>  03-03    proBNP:   Lipid Profile:   HgA1c:   TSH:             TELEMETRY: 	  V paced   ECG:  	  RADIOLOGY:   DIAGNOSTIC TESTING:  [ ] Echocardiogram:  [ ]  Catheterization:  [ ] Stress Test:    OTHER:

## 2021-03-03 NOTE — CHART NOTE - NSCHARTNOTEFT_GEN_A_CORE
Interventional Radiology Chart Note:     81 yo male w h/o DM, CAD, s/p CABG, CKD 3-4. He saw my partner Dr. Abel Luz yesterday as a new consult for elevated creatinine. Bloodwork today returned notable for  and Na 126; in light of these findings, Dr. Luz sent the patient to the ER. Lately ptn has been having abdominal pain with elevated amylase and lipase.   As per patients wife: on and off R abd pain w generalized weakness and poor po intake. PCP DCed po Lasix in past few days 2/2 abnormal blood test results.  Patient denies recent NSAID use.  He was diagnosed with diabetes 18 years ago, and denies retinopathy (last saw Optho around 2 years ago). Cardiologist has alluded to the patient having mild renal impairment in the past, as per wife.      IR consulted for placement of a tunneled hemodialysis catheter for CKD/ ESRD.   Called patients room using avolution , although the team and notes document the patient is A & O x3, the patient was unable to provide his date of birth and provided the incorrect date (he thought it was Feb 2010 repeatedly).     These findings were discussed with LEYLA Ramos at 3:35pm on 3/3, patient needs to be evaluated for change in neurological status.  Patient currently has groin non-tunneled hemodialysis catheter, therefore this is not a emergent procedure.     Please re-consult IR once patient is worked up/ Neurological issues resolved- worked up.       Allergies: No Known Allergies    PAST MEDICAL & SURGICAL HISTORY:  Renal failure    CAD (coronary artery disease)    Hypertension    Diabetes mellitus    Pertinent labs:                      9.6    8.95  )-----------( 93       ( 03 Mar 2021 06:03 )             29.9   03-03    135  |  98  |  64<H>  ----------------------------<  127<H>  4.6   |  24  |  4.89<H>    Ca    9.4      03 Mar 2021 06:03  Phos  3.8     03-03  Mg     2.3     03-03    TPro  6.5  /  Alb  2.6<L>  /  TBili  1.6<H>  /  DBili  0.9<H>  /  AST  50<H>  /  ALT  41  /  AlkPhos  225<H>  03-03  PT/INR - ( 03 Mar 2021 08:26 )   PT: 14.8 sec;   INR: 1.27 ratio         PTT - ( 03 Mar 2021 08:26 )  PTT:33.6 sec

## 2021-03-03 NOTE — PROGRESS NOTE ADULT - SUBJECTIVE AND OBJECTIVE BOX
Infectious Diseases progress note:    Subjective:  Afebrile, satting well on RA.  Pt planned for conversion to tunnelled dialysis cath.     ROS:  CONSTITUTIONAL:  No fever, chills, rigors  CARDIOVASCULAR:  No chest pain or palpitations  RESPIRATORY:   No SOB, cough, dyspnea on exertion.  No wheezing  GASTROINTESTINAL:  No abd pain, N/V, diarrhea/constipation  EXTREMITIES:  No swelling or joint pain  GENITOURINARY:  No burning on urination, increased frequency or urgency.  No flank pain  NEUROLOGIC:  No HA, visual disturbances  SKIN: No rashes    Allergies    No Known Allergies    Intolerances        ANTIBIOTICS/RELEVANT:  antimicrobials    immunologic:  influenza   Vaccine 0.5 milliLiter(s) IntraMuscular once  PPD  5 Tuberculin Unit(s) Injectable 5 Unit(s) IntraDermal once    OTHER:  aspirin  chewable 81 milliGRAM(s) Oral daily  atorvastatin 80 milliGRAM(s) Oral at bedtime  chlorhexidine 4% Liquid 1 Application(s) Topical <User Schedule>  dextrose 40% Gel 15 Gram(s) Oral once  dextrose 5%. 1000 milliLiter(s) IV Continuous <Continuous>  dextrose 5%. 1000 milliLiter(s) IV Continuous <Continuous>  dextrose 50% Injectable 25 Gram(s) IV Push once  dextrose 50% Injectable 12.5 Gram(s) IV Push once  dextrose 50% Injectable 25 Gram(s) IV Push once  glucagon  Injectable 1 milliGRAM(s) IntraMuscular once  heparin   Injectable 5000 Unit(s) SubCutaneous every 12 hours  insulin lispro (ADMELOG) corrective regimen sliding scale   SubCutaneous three times a day before meals  insulin lispro (ADMELOG) corrective regimen sliding scale   SubCutaneous at bedtime  metoprolol tartrate 25 milliGRAM(s) Oral every 12 hours  midodrine. 10 milliGRAM(s) Oral <User Schedule>  midodrine. 5 milliGRAM(s) Oral three times a day  pantoprazole    Tablet 40 milliGRAM(s) Oral before breakfast  polyethylene glycol 3350 17 Gram(s) Oral daily  prasugrel 10 milliGRAM(s) Oral daily  sodium chloride 0.9% lock flush 10 milliLiter(s) IV Push every 1 hour PRN      Objective:  Vital Signs Last 24 Hrs  T(C): 36.3 (03 Mar 2021 10:00), Max: 37.1 (03 Mar 2021 04:50)  T(F): 97.4 (03 Mar 2021 10:00), Max: 98.8 (03 Mar 2021 04:50)  HR: 61 (03 Mar 2021 10:00) (61 - 71)  BP: 107/48 (03 Mar 2021 10:00) (94/48 - 107/48)  BP(mean): --  RR: 18 (03 Mar 2021 10:00) (18 - 20)  SpO2: 95% (03 Mar 2021 10:00) (94% - 99%)    PHYSICAL EXAM:  Constitutional:NAD  Eyes:CHERI, EOMI  Ear/Nose/Throat: no thrush, mucositis.  Moist mucous membranes	  Neck:no JVD, no lymphadenopathy, supple  Respiratory: CTA nadiya  Cardiovascular: S1S2 RRR, no murmurs  Gastrointestinal:soft, nontender,  nondistended (+) BS  Extremities:no e/e/c  Skin:  rt fem [facility]        LABS:                        9.6    8.95  )-----------( 93       ( 03 Mar 2021 06:03 )             29.9     03-03    135  |  98  |  64<H>  ----------------------------<  127<H>  4.6   |  24  |  4.89<H>    Ca    9.4      03 Mar 2021 06:03  Phos  3.8     03-03  Mg     2.3     03-03    TPro  6.5  /  Alb  2.6<L>  /  TBili  1.6<H>  /  DBili  0.9<H>  /  AST  50<H>  /  ALT  41  /  AlkPhos  225<H>  03-03    PT/INR - ( 03 Mar 2021 08:26 )   PT: 14.8 sec;   INR: 1.27 ratio         PTT - ( 03 Mar 2021 08:26 )  PTT:33.6 sec      Procalcitonin, Serum: 0.20 (02-16 @ 23:54)                    MICROBIOLOGY:      COVID-19 PCR . (02.26.21 @ 12:53)   COVID-19 PCR: Detected: You can help in the fight against COVID-19. [facility] may contact   you to see if you are interested in voluntarily participating in one of   our clinical trials.   Testing is performed using polymerase chain reaction (PCR) or   transcription mediated amplification (TMA). This COVID-19 (SARS-CoV-2)   nucleic acid amplification test was validated by [employer] and is   in use under the FDA Emergency Use Authorization (EUA) for clinical labs   CLIA-certified to perform high complexity testing. Test results should be   correlated with clinical presentation, patient history, and epidemiology.       RADIOLOGY & ADDITIONAL STUDIES:

## 2021-03-03 NOTE — CHART NOTE - NSCHARTNOTEFT_GEN_A_CORE
Called by IR to report that attempt to receive consent for IR Procedure of tunnelled catheter was unsuccessful as patient was no longer AAOx3 and was not consentable.   Seen and assessed pt at bedside using language line Colombian  Ramila.   Pt remains AAOx3. Expressed frustration at multiple people asking similar questions.   Was aware of Name, , and that he was in the hospital. Pt could not recall which hospital he was in, stated "I came here with my wife for treatment"   Pt was aware of the plan to have a procedure today and could not eat.    Pt originally stated his  was the current date, however clarified his  was 1940 when asked again.   Pt then stated the current date was 2021, sought out his calendar on his phone and understood when I clarified that it was March 3, 2021, stating he has been in the hospital for a couple weeks.   As per Dr. Rosario, the patient also converses with her in Ecuadorean as well.   Do not believe the patient is having AMS, and that the barrier for consent may be a language issue.  Will reattempt and plan for possible add on tomorrow with IR as discussed with Dr. Andrews.     Notified Dr. Rosario, and Dr. Garcia.     SARAH [facility]   Dept of Medicine   [id number]

## 2021-03-03 NOTE — PROGRESS NOTE ADULT - SUBJECTIVE AND OBJECTIVE BOX
INTERVAL HPI/OVERNIGHT EVENTS:    overnight events noted  no new GI events   planned for tunneled cath      MEDICATIONS  (STANDING):  aMIOdarone    Tablet 400 milliGRAM(s) Oral every 8 hours  aspirin  chewable 81 milliGRAM(s) Oral daily  atorvastatin 80 milliGRAM(s) Oral at bedtime  chlorhexidine 4% Liquid 1 Application(s) Topical <User Schedule>  dextrose 40% Gel 15 Gram(s) Oral once  dextrose 5%. 1000 milliLiter(s) (50 mL/Hr) IV Continuous <Continuous>  dextrose 5%. 1000 milliLiter(s) (100 mL/Hr) IV Continuous <Continuous>  dextrose 50% Injectable 25 Gram(s) IV Push once  dextrose 50% Injectable 12.5 Gram(s) IV Push once  dextrose 50% Injectable 25 Gram(s) IV Push once  ferrous    sulfate 325 milliGRAM(s) Oral two times a day  glucagon  Injectable 1 milliGRAM(s) IntraMuscular once  heparin   Injectable 5000 Unit(s) SubCutaneous every 12 hours  influenza   Vaccine 0.5 milliLiter(s) IntraMuscular once  insulin lispro (ADMELOG) corrective regimen sliding scale   SubCutaneous at bedtime  insulin lispro (ADMELOG) corrective regimen sliding scale   SubCutaneous three times a day before meals  metoprolol tartrate 25 milliGRAM(s) Oral every 12 hours  midodrine. 10 milliGRAM(s) Oral <User Schedule>  pantoprazole    Tablet 40 milliGRAM(s) Oral before breakfast  prasugrel 10 milliGRAM(s) Oral daily    MEDICATIONS  (PRN):  sodium chloride 0.9% lock flush 10 milliLiter(s) IV Push every 1 hour PRN Pre/post blood products, medications, blood draw, and to maintain line patency      Allergies    No Known Allergies    Intolerances        Review of Systems:    General:  No wt loss, fevers, chills, night sweats,fatigue,   Eyes:  Good vision, no reported pain  ENT:  No sore throat, pain, runny nose, dysphagia  CV:  No pain, palpitatioins, hypo/hypertension  Resp:  No dyspnea, cough, tachypnea, wheezing  GI:  No pain, No nausea, No vomiting, No diarrhea, No constipatiion, No weight loss, No fever, No pruritis, No rectal bleeding, No tarry stools, No dysphagia,  :  No pain, bleeding, incontinence, nocturia  Muscle:  No pain, weakness  Neuro:  No weakness, tingling, memory problems  Psych:  No fatigue, insomnia, mood problems, depression  Endocrine:  No polyuria, polydypsia, cold/heat intolerance  Heme:  No petechiae, ecchymosis, easy bruisability  Skin:  No rash, tattoos, scars, edema      Vital Signs Last 24 Hrs  T(C): 36.3 (26 Feb 2021 15:55), Max: 36.9 (25 Feb 2021 20:35)  T(F): 97.4 (26 Feb 2021 15:55), Max: 98.4 (25 Feb 2021 20:35)  HR: 60 (26 Feb 2021 15:55) (54 - 61)  BP: 92/52 (26 Feb 2021 15:55) (90/59 - 121/55)  BP(mean): --  RR: 18 (26 Feb 2021 15:55) (18 - 19)  SpO2: 97% (26 Feb 2021 15:55) (94% - 99%)    PHYSICAL EXAM:    GENERAL:  no distress  HEENT:  NC/AT  ABDOMEN:  Soft, non-tender, non-distended  EXTEREMITIES:  +bilateral LE edema   NEURO:  Alert, oriented, no asterixis, no tremor, no encephalopathy    LABS:                        9.7    10.08 )-----------( 151      ( 26 Feb 2021 11:23 )             32.0     02-26    132<L>  |  98  |  127<H>  ----------------------------<  169<H>  4.9   |  15<L>  |  5.35<H>    Ca    9.0      26 Feb 2021 11:23    TPro  6.7  /  Alb  3.1<L>  /  TBili  1.8<H>  /  DBili  x   /  AST  103<H>  /  ALT  81<H>  /  AlkPhos  193<H>  02-25    PT/INR - ( 25 Feb 2021 21:01 )   PT: 16.3 sec;   INR: 1.38 ratio         PTT - ( 25 Feb 2021 21:01 )  PTT:33.7 sec      RADIOLOGY & ADDITIONAL TESTS:

## 2021-03-03 NOTE — PROGRESS NOTE ADULT - ASSESSMENT
81 yo male w h/o ICM< CAD< s/p CABG, has AICD, DM, HTN presents w volume overload and abd pain  Abd sono shows gallstones, but no sign of cholecystitis/pancreatitis. abd pain resolved on Zosyn,   will treat empirically for 7 days .  ID following    ascites 2/2 systolic chf, probably worse 2/2 acute on chronic systolic chf.  responded well to lasix. now on HD. has a femoral shiley, awaiting IJ tunneled cath. on schedule for 3/4    tarted HD 2/26. HD as per renal    Covid positive.  ID following. isolation. trend infl markers.   s/p monoclonal infusion.  on interrogation of ICD, has a lof of arrhythmia burden,   NSVT and sustained VT, seen by EPS, cont  Pnaawvoqz79.5 mg  bid. has inoperable ischemic HD,   ICM, EF 15%, severe AS, seen by structural heart for TAVR , proceed with TAVR work up  dvt ppx w HSC

## 2021-03-03 NOTE — PRE PROCEDURE NOTE - PRE PROCEDURE EVALUATION
HPI:  79 yo male w h/o DM, CAD, s/p CABG, CKD 3-4. He saw my partner Dr. Abel Luz yesterday as a new consult for elevated creatinine. Bloodwork today returned notable for  and Na 126; in light of these findings, Dr. Luz sent the patient to the ER. Lately ptn has been having abdominal pain with elevated amylase and lipase.   As per ptn's wife: on and off R abd pain w generalized weakness and poor po intake. PCP DCed po Lasix in past few days 2/2 abnormal blood test results.  Ptn denies recent NSAID use.  He was diagnosed with diabetes 18 years ago, and denies retinopathy (last saw Optho around 2 years ago). Cardiologist has alluded to the patient having mild renal impairment in the past, as per wife.    IR consulted for placement of a tunneled hemodialysis catheter for CKD/ ESRD.     NPO status: Since midnight  Anticoagulation: ASA 81 mg   Antibiotics: None     Allergies:No Known Allergies    PAST MEDICAL & SURGICAL HISTORY:  Renal failure    CAD (coronary artery disease)    Hypertension    Diabetes mellitus          Pertinent labs:                      9.6    8.95  )-----------( 93       ( 03 Mar 2021 06:03 )             29.9   03-03    135  |  98  |  64<H>  ----------------------------<  127<H>  4.6   |  24  |  4.89<H>    Ca    9.4      03 Mar 2021 06:03  Phos  3.8     03-03  Mg     2.3     03-03    TPro  6.5  /  Alb  2.6<L>  /  TBili  1.6<H>  /  DBili  0.9<H>  /  AST  50<H>  /  ALT  41  /  AlkPhos  225<H>  03-03  PT/INR - ( 03 Mar 2021 08:26 )   PT: 14.8 sec;   INR: 1.27 ratio      PTT - ( 03 Mar 2021 08:26 )  PTT:33.6 sec    Consent: Procedure/risks/ Benefits explained. Informed consent obtained. Pt verbalizes understanding.            HPI:  79 yo male w h/o DM, CAD, s/p CABG, CKD 3-4. He saw my partner Dr. Abel Luz yesterday as a new consult for elevated creatinine. Bloodwork today returned notable for  and Na 126; in light of these findings, Dr. Luz sent the patient to the ER. Lately ptn has been having abdominal pain with elevated amylase and lipase.   As per ptn's wife: on and off R abd pain w generalized weakness and poor po intake. PCP DCed po Lasix in past few days 2/2 abnormal blood test results.  Ptn denies recent NSAID use.  He was diagnosed with diabetes 18 years ago, and denies retinopathy (last saw Optho around 2 years ago). Cardiologist has alluded to the patient having mild renal impairment in the past, as per wife.    IR consulted for placement of a tunneled hemodialysis catheter for CKD/ ESRD.     NPO status: Since midnight  Anticoagulation: ASA 81 mg   Antibiotics: None     Allergies:No Known Allergies    PAST MEDICAL & SURGICAL HISTORY:  Renal failure    CAD (coronary artery disease)    Hypertension    Diabetes mellitus          Pertinent labs:                      9.6    8.95  )-----------( 93       ( 03 Mar 2021 06:03 )             29.9   03-03    135  |  98  |  64<H>  ----------------------------<  127<H>  4.6   |  24  |  4.89<H>    Ca    9.4      03 Mar 2021 06:03  Phos  3.8     03-03  Mg     2.3     03-03    TPro  6.5  /  Alb  2.6<L>  /  TBili  1.6<H>  /  DBili  0.9<H>  /  AST  50<H>  /  ALT  41  /  AlkPhos  225<H>  03-03  PT/INR - ( 03 Mar 2021 08:26 )   PT: 14.8 sec;   INR: 1.27 ratio      PTT - ( 03 Mar 2021 08:26 )  PTT:33.6 sec    Consent: Will call for consent for procedure with aid of .

## 2021-03-03 NOTE — PROGRESS NOTE ADULT - ASSESSMENT
Abdominal pain.  - now resolved  - lfts downtrending   - Tolerating diet   - suspect ascites 2/2 chf    Renal failure.   - nephrology input noted.   -  HD per renal     COVID-19  - management as per ID

## 2021-03-03 NOTE — PROGRESS NOTE ADULT - ASSESSMENT
79 yo male w h/o DM, CAD, s/p CABG, CKD 3-4.   Recently evaluated by Nephrology for elevated creatinine. Bloodwork  returned notable for  and Na 126; in light of these findings, Dr. Luz (Renal) sent the patient to the ER. Lately ptn has been having abdominal pain with elevated amylase and lipase.   As per ptn's wife: on and off R abd pain w generalized weakness and poor po intake. PCP DCed po Lasix in past few days 2/2 abnormal blood test results.  Ptn denies recent NSAID use.  He was diagnosed with diabetes 18 years ago, and denies retinopathy (last saw Optho around 2 years ago). Cardiologist has alluded to the patient having mild renal impairment in the past, as per wife.    Patient's PCP is Dr. Shyann De León; GI Dr. Wilman Villagran; Cardiology Dr. Boyce.  (16 Feb 2021 21:05)    ER vs: T 98.1, P 85, /64. WBC 8.2.  Bun/Cr - 100/2.0.  LFTs mildly elevated, Roxana 217, Lip 185.  Pct 0.2.  UA (-).  Ct c/a/p shows pulmonary edema, sm B/L pleural effusions, sm volume ascites.  VQ scan with very low probability for PE.      Pt started on zosyn to cover for intra-abd source.  ID consulted.     r/o Gallstone pancreatitis:    - Pt p/w abdominal pain, epigastric and RUQ pain, noted to have elevated LFTs and amylase/lipase.  Possible gallstone pancreatitis?    - U/S abdomen shows cholelithiasis without acute cholecystitis.  Borderline to mild gallbladder thickening is likely due to ascites.   Moderate ascites noted.  Suggestion of mild renal parenchymal disease and bilateral pleural effusions.    - CTap w/o acute intra-abd pathology    - monitor LFTs, Roxana, Lipase.  As per GI, ascites likely secondary to CHF, no evidence for pancreatitis.     - Monitor temp curve and WBC.      - bcx neg. completed  coverage with zosyn x 7 days, 2/23.  Abd pain resolving.       {[id number]}Renal failure:    - Renal following for CKD.  Dose abx for GFR 15-20.  On q12 hr dosing of zosyn.     - Cr rising.  CALVIN on CKD does not appear to be antibiotic mediated, renal f/u noted.      - Pt requiring HD,   - shiley catheter placed 2/26, pt awaiting conversion to tunnelled catheter - planned for 3/3    Aortic stenosis:    - Followed by [facility] health team for TAVR evaluation    Covid (+):    - Repeat Covid pcr on 2/25 tested positive.   Satting adequately on RA.      - No indication for Covid therapeutics at this time.  Cont supportive care, incentive spirometery.  Monitor pulse ox    - Repeat covid test remains positive on 2/26    - Monitor inflammatory markers.       [facility]  [phone]

## 2021-03-04 LAB
GLUCOSE BLDC GLUCOMTR-MCNC: 108 MG/DL — HIGH (ref 70–99)
GLUCOSE BLDC GLUCOMTR-MCNC: 110 MG/DL — HIGH (ref 70–99)
GLUCOSE BLDC GLUCOMTR-MCNC: 198 MG/DL — HIGH (ref 70–99)
GLUCOSE BLDC GLUCOMTR-MCNC: 206 MG/DL — HIGH (ref 70–99)

## 2021-03-04 PROCEDURE — 71045 X-RAY EXAM CHEST 1 VIEW: CPT | Mod: 26

## 2021-03-04 PROCEDURE — 77001 FLUOROGUIDE FOR VEIN DEVICE: CPT | Mod: 26

## 2021-03-04 PROCEDURE — 36558 INSERT TUNNELED CV CATH: CPT

## 2021-03-04 PROCEDURE — 76937 US GUIDE VASCULAR ACCESS: CPT | Mod: 26

## 2021-03-04 RX ADMIN — HEPARIN SODIUM 5000 UNIT(S): 5000 INJECTION INTRAVENOUS; SUBCUTANEOUS at 06:18

## 2021-03-04 RX ADMIN — ATORVASTATIN CALCIUM 80 MILLIGRAM(S): 80 TABLET, FILM COATED ORAL at 22:09

## 2021-03-04 RX ADMIN — Medication 81 MILLIGRAM(S): at 15:39

## 2021-03-04 RX ADMIN — CHLORHEXIDINE GLUCONATE 1 APPLICATION(S): 213 SOLUTION TOPICAL at 06:18

## 2021-03-04 RX ADMIN — Medication 2: at 16:31

## 2021-03-04 RX ADMIN — MIDODRINE HYDROCHLORIDE 5 MILLIGRAM(S): 2.5 TABLET ORAL at 17:30

## 2021-03-04 RX ADMIN — POLYETHYLENE GLYCOL 3350 17 GRAM(S): 17 POWDER, FOR SOLUTION ORAL at 15:39

## 2021-03-04 RX ADMIN — PRASUGREL 10 MILLIGRAM(S): 5 TABLET, FILM COATED ORAL at 15:39

## 2021-03-04 RX ADMIN — MIDODRINE HYDROCHLORIDE 5 MILLIGRAM(S): 2.5 TABLET ORAL at 11:07

## 2021-03-04 RX ADMIN — MIDODRINE HYDROCHLORIDE 5 MILLIGRAM(S): 2.5 TABLET ORAL at 06:18

## 2021-03-04 RX ADMIN — PANTOPRAZOLE SODIUM 40 MILLIGRAM(S): 20 TABLET, DELAYED RELEASE ORAL at 06:18

## 2021-03-04 NOTE — PROGRESS NOTE ADULT - SUBJECTIVE AND OBJECTIVE BOX
Patient is a 80y old  Male who presents with a chief complaint of acute renal failure (04 Mar 2021 16:37)      SUBJECTIVE / OVERNIGHT EVENTS: dyspnea overnight CXR w CHF, placed on IV Lasix, HD planned for tomorrow. presently is comfortable, awaiting tunneled catheter placement.     MEDICATIONS  (STANDING):  aspirin  chewable 81 milliGRAM(s) Oral daily  atorvastatin 80 milliGRAM(s) Oral at bedtime  chlorhexidine 4% Liquid 1 Application(s) Topical <User Schedule>  dextrose 40% Gel 15 Gram(s) Oral once  dextrose 5%. 1000 milliLiter(s) (50 mL/Hr) IV Continuous <Continuous>  dextrose 5%. 1000 milliLiter(s) (100 mL/Hr) IV Continuous <Continuous>  dextrose 50% Injectable 25 Gram(s) IV Push once  dextrose 50% Injectable 12.5 Gram(s) IV Push once  dextrose 50% Injectable 25 Gram(s) IV Push once  glucagon  Injectable 1 milliGRAM(s) IntraMuscular once  heparin   Injectable 5000 Unit(s) SubCutaneous every 12 hours  influenza   Vaccine 0.5 milliLiter(s) IntraMuscular once  insulin lispro (ADMELOG) corrective regimen sliding scale   SubCutaneous three times a day before meals  insulin lispro (ADMELOG) corrective regimen sliding scale   SubCutaneous at bedtime  metoprolol tartrate 25 milliGRAM(s) Oral every 12 hours  midodrine. 10 milliGRAM(s) Oral <User Schedule>  midodrine. 5 milliGRAM(s) Oral three times a day  pantoprazole    Tablet 40 milliGRAM(s) Oral before breakfast  polyethylene glycol 3350 17 Gram(s) Oral daily  prasugrel 10 milliGRAM(s) Oral daily    MEDICATIONS  (PRN):  sodium chloride 0.9% lock flush 10 milliLiter(s) IV Push every 1 hour PRN Pre/post blood products, medications, blood draw, and to maintain line patency      Vital Signs Last 24 Hrs  T(F): 98.9 (03-04-21 @ 20:13), Max: 98.9 (03-04-21 @ 20:13)  HR: 80 (03-04-21 @ 20:13) (68 - 80)  BP: 94/53 (03-04-21 @ 20:13) (90/50 - 100/41)  RR: 18 (03-04-21 @ 20:13) (17 - 22)  SpO2: 93% (03-04-21 @ 20:13) (91% - 100%)  Telemetry:   CAPILLARY BLOOD GLUCOSE      POCT Blood Glucose.: 206 mg/dL (04 Mar 2021 16:30)  POCT Blood Glucose.: 108 mg/dL (04 Mar 2021 12:47)  POCT Blood Glucose.: 110 mg/dL (04 Mar 2021 07:33)  POCT Blood Glucose.: 190 mg/dL (03 Mar 2021 21:16)    I&O's Summary    03 Mar 2021 07:01  -  04 Mar 2021 07:00  --------------------------------------------------------  IN: 480 mL / OUT: 800 mL / NET: -320 mL    04 Mar 2021 07:01  -  04 Mar 2021 21:07  --------------------------------------------------------  IN: 240 mL / OUT: 0 mL / NET: 240 mL        PHYSICAL EXAM:  GENERAL: NAD, well-developed  HEAD:  Atraumatic, Normocephalic  EYES: EOMI, PERRLA, conjunctiva and sclera clear  NECK: Supple, No JVD  CHEST/LUNG: Clear to auscultation bilaterally; No wheeze  HEART: Regular rate and rhythm; No murmurs, rubs, or gallops  ABDOMEN: Soft, Nontender, Nondistended; Bowel sounds present  EXTREMITIES:  2+ Peripheral Pulses, No clubbing, cyanosis, or edema  PSYCH: AAOx3  NEUROLOGY: non-focal  SKIN: No rashes or lesions    LABS:                        9.6    8.95  )-----------( 93       ( 03 Mar 2021 06:03 )             29.9     03-03    135  |  98  |  64<H>  ----------------------------<  127<H>  4.6   |  24  |  4.89<H>    Ca    9.4      03 Mar 2021 06:03  Phos  3.8     03-03  Mg     2.3     03-03    TPro  6.5  /  Alb  2.6<L>  /  TBili  1.6<H>  /  DBili  0.9<H>  /  AST  50<H>  /  ALT  41  /  AlkPhos  225<H>  03-03    PT/INR - ( 03 Mar 2021 08:26 )   PT: 14.8 sec;   INR: 1.27 ratio         PTT - ( 03 Mar 2021 08:26 )  PTT:33.6 sec          RADIOLOGY & ADDITIONAL TESTS:    Imaging Personally Reviewed:    Consultant(s) Notes Reviewed:      Care Discussed with Consultants/Other Providers:

## 2021-03-04 NOTE — PRE PROCEDURE NOTE - PRE PROCEDURE EVALUATION
------------------------------------------------------------  Interventional Radiology Pre-Procedure Note  ------------------------------------------------------------    Indication: 80y Male with COVID-19 pneumonia and ESRD requiring long term hemodialysis is referred for tunneled hemodialysis catheter placement.    Past Medical History:  Renal failure    CAD (coronary artery disease)    Hypertension    Diabetes mellitus        Allergies: No Known Allergies      Medications:    aspirin  chewable: 81 milliGRAM(s) Oral (03-03-21 @ 13:30)  heparin   Injectable: 5000 Unit(s) SubCutaneous (03-04-21 @ 06:18)  metoprolol tartrate: 25 milliGRAM(s) Oral (03-03-21 @ 04:52)  midodrine.: 5 milliGRAM(s) Oral (03-04-21 @ 06:18)  midodrine.: 10 milliGRAM(s) Oral (03-03-21 @ 09:00)  prasugrel: 10 milliGRAM(s) Oral (03-03-21 @ 13:31)      Vital Signs:   T(F): 97.6 (05:30), Max: 98.7 (04:22)  HR: 77 (05:30)  BP: 90/50 (05:30)  RR: 22 (05:30)  SpO2: 91% (05:30)    Labs:           9.6  8.95)-----(93     (03-03-21 @ 06:03)         29.9     135 | 98 | 64  --------------------< 127     (03-03-21 @ 06:03)  4.6 | 24 | 4.89       PT: 14.8<H> 03-03-21 @ 08:26  aPTT: 33.6 03-03-21 @ 08:26   INR: 1.27<H> 03-03-21 @ 08:26    Consent: The risks, benefits and alternatives of the procedure were discussed with the patient, who verbalized understanding. Signed consent is available in the paper chart.    Procedure Plan: tunneled hemodialysis catheter placement    Lanre Templeton MD, RPVI  Chief Resident, Interventional Radiology  [facility]: (n) [phone] (p) [phone]  [facility]: (c) [phone] (o) [phone]

## 2021-03-04 NOTE — PROCEDURE NOTE - PROCEDURE FINDINGS AND DETAILS
Patent right common femoral vein. Successful insertion of a bedside 14Fr 30cm non-tunneled HD catheter. Pending abdominal X-ray.
-tip of right IJ tunneled hemodialysis catheter is in the SVC

## 2021-03-04 NOTE — PRE PROCEDURE NOTE - PROCEDURE SERVICE
Vascular and Interventional Radiology

## 2021-03-04 NOTE — CHART NOTE - NSCHARTNOTEFT_GEN_A_CORE
[facility] MEDICINE NOTE:    RN notified pt had a short episode of being tachypneic and feeling SOB, placed on 2L NC.  Pt was on RA w/ spO2 93%.  Pt seen at bedside, A+Ox3, not in distress w/ 2L NC. Pt is no longer tachypneic and mentioned the NC is helping him .  Denies chest pain, palpitation, no longer feels SOB.   Pt looks comfortable and does not use accessory muscles.   On lung exam - clear to auscultation.   2L NC removed and observed pulse ox, spO2 94% on RA.  Placed pt on continuous pulse ox and ordered chest xray since the last one was done on Feb 16.   Will endorse to AM team to follow up with chest xray results and attending to follow.       Tita Franks  Dept of Medicine   #[id number] [facility] MEDICINE NOTE:    RN notified pt had a short episode of being tachypneic and feeling SOB, placed on 2L NC.  Pt was on RA w/ spO2 93%.  Pt seen at bedside, A+Ox3, not in distress w/ 2L NC. Pt is no longer tachypneic and mentioned the NC is helping him .  Denies chest pain, palpitation, no longer feels SOB.   Pt looks comfortable and does not use accessory muscles.   On lung exam - clear to auscultation, no wheezing appreciated.   2L NC removed and observed pulse ox, spO2 94% on RA.  Placed pt on continuous pulse ox and ordered chest xray since the last one was done on Feb 16.   Will endorse to AM team to follow up with chest xray results and attending to follow.       Tita Franks  Dept of Medicine   #[id number] [facility] MEDICINE NOTE:    RN notified pt had a short episode of being tachypneic and feeling SOB, placed on 2L NC.  Pt was on RA w/ spO2 93%.  Pt seen at bedside, A+Ox3, not in distress w/ 2L NC. Pt is no longer tachypneic and mentioned the NC is helping him .  Denies chest pain, palpitation, no longer feels SOB.   Pt looks comfortable and does not use accessory muscles.   On lung exam - clear to auscultation, no wheezing appreciated.   Will monitor pt w/ 2L NC on continuous pulse ox and ordered chest xray since the last one was done on Feb 16.   Will endorse to AM team to follow up with chest xray results and attending to follow.       Tita Franks  Dept of Medicine   #[id number]

## 2021-03-04 NOTE — PROGRESS NOTE ADULT - SUBJECTIVE AND OBJECTIVE BOX
Subjective: Patient seen and examined. No new events except as noted.   Leg weakness   Shortness of breath last night      REVIEW OF SYSTEMS:    CONSTITUTIONAL: + weakness, fevers or chills  EYES/ENT: No visual changes;  No vertigo or throat pain   NECK: No pain or stiffness  RESPIRATORY: No cough, wheezing, hemoptysis; No shortness of breath  CARDIOVASCULAR: No chest pain or palpitations  GASTROINTESTINAL: No abdominal or epigastric pain. No nausea, vomiting, or hematemesis; No diarrhea or constipation. No melena or hematochezia.  GENITOURINARY: No dysuria, frequency or hematuria  NEUROLOGICAL: No numbness or weakness  SKIN: No itching, burning, rashes, or lesions   All other review of systems is negative unless indicated above.    MEDICATIONS:  MEDICATIONS  (STANDING):  aspirin  chewable 81 milliGRAM(s) Oral daily  atorvastatin 80 milliGRAM(s) Oral at bedtime  chlorhexidine 4% Liquid 1 Application(s) Topical <User Schedule>  dextrose 40% Gel 15 Gram(s) Oral once  dextrose 5%. 1000 milliLiter(s) (50 mL/Hr) IV Continuous <Continuous>  dextrose 5%. 1000 milliLiter(s) (100 mL/Hr) IV Continuous <Continuous>  dextrose 50% Injectable 25 Gram(s) IV Push once  dextrose 50% Injectable 12.5 Gram(s) IV Push once  dextrose 50% Injectable 25 Gram(s) IV Push once  glucagon  Injectable 1 milliGRAM(s) IntraMuscular once  heparin   Injectable 5000 Unit(s) SubCutaneous every 12 hours  influenza   Vaccine 0.5 milliLiter(s) IntraMuscular once  insulin lispro (ADMELOG) corrective regimen sliding scale   SubCutaneous three times a day before meals  insulin lispro (ADMELOG) corrective regimen sliding scale   SubCutaneous at bedtime  metoprolol tartrate 25 milliGRAM(s) Oral every 12 hours  midodrine. 10 milliGRAM(s) Oral <User Schedule>  midodrine. 5 milliGRAM(s) Oral three times a day  pantoprazole    Tablet 40 milliGRAM(s) Oral before breakfast  polyethylene glycol 3350 17 Gram(s) Oral daily  prasugrel 10 milliGRAM(s) Oral daily      PHYSICAL EXAM:  T(C): 36.4 (03-04-21 @ 05:30), Max: 37.1 (03-04-21 @ 04:22)  HR: 77 (03-04-21 @ 05:30) (63 - 77)  BP: 90/50 (03-04-21 @ 05:30) (90/50 - 96/43)  RR: 22 (03-04-21 @ 05:30) (18 - 22)  SpO2: 91% (03-04-21 @ 05:30) (91% - 96%)  Wt(kg): --  I&O's Summary    03 Mar 2021 07:01  -  04 Mar 2021 07:00  --------------------------------------------------------  IN: 480 mL / OUT: 800 mL / NET: -320 mL    04 Mar 2021 07:01  -  04 Mar 2021 11:14  --------------------------------------------------------  IN: 0 mL / OUT: 0 mL / NET: 0 mL      Height (cm): 172.7 (03-04 @ 10:32)  Weight (kg): 74.8 (03-04 @ 10:32)  BMI (kg/m2): 25.1 (03-04 @ 10:32)  BSA (m2): 1.88 (03-04 @ 10:32)    Appearance: NAD 3 liters NC  	  HEENT:   Normal oral mucosa, PERRL, EOMI	  Lymphatic: No lymphadenopathy  Cardiovascular: regular S1 S2, No JVD, + murmurs , Peripheral pulses palpable 2+ bilaterally  Respiratory: Decreased bs   Gastrointestinal:  Soft, Non-tender, + BS	  Skin: No rashes, No ecchymoses, No cyanosis, warm to touch  Musculoskeletal: Normal range of motion, normal strength  Psychiatry:  Mood & affect appropriate  Ext: + edema      LABS:    CARDIAC MARKERS:                                9.6    8.95  )-----------( 93       ( 03 Mar 2021 06:03 )             29.9     03-03    135  |  98  |  64<H>  ----------------------------<  127<H>  4.6   |  24  |  4.89<H>    Ca    9.4      03 Mar 2021 06:03  Phos  3.8     03-03  Mg     2.3     03-03    TPro  6.5  /  Alb  2.6<L>  /  TBili  1.6<H>  /  DBili  0.9<H>  /  AST  50<H>  /  ALT  41  /  AlkPhos  225<H>  03-03    proBNP:   Lipid Profile:   HgA1c:   TSH:             TELEMETRY: 	V paced      ECG:  	  RADIOLOGY: x< from: Xray Chest 1 View- PORTABLE-Urgent (Xray Chest 1 View- PORTABLE-Urgent .) (03.04.21 @ 07:10) >    EXAM:  XR CHEST PORTABLE URGENT 1V                            PROCEDURE DATE:  03/04/2021            INTERPRETATION:  EXAMINATION: XR CHEST URGENT    CLINICAL INDICATION: Shortness of breath    TECHNIQUE: Single frontal, portable view of the chest was obtained.    COMPARISON: Chest radiograph 2/16/2021, CT chest 2/17/2021.    FINDINGS:  The heart is enlarged. Status post median sternotomy and CABG. ICD in place..  Reticular opacities throughout both lungs.  Trace bilateral pleural effusions. Nopneumothorax.    IMPRESSION:  Moderate pulmonary congestion.          < end of copied text >    DIAGNOSTIC TESTING:  [ ] Echocardiogram:  [ ]  Catheterization:  [ ] Stress Test:    OTHER:

## 2021-03-04 NOTE — PROGRESS NOTE ADULT - SUBJECTIVE AND OBJECTIVE BOX
      Overnight events noted      VITAL:  T(C): , Max: 37.1 (03-04-21 @ 04:22)  T(F): , Max: 98.7 (03-04-21 @ 04:22)  HR: 77 (03-04-21 @ 05:30)  BP: 90/50 (03-04-21 @ 05:30)  BP(mean): --  RR: 22 (03-04-21 @ 05:30)  SpO2: 91% (03-04-21 @ 05:30)      PHYSICAL EXAM:  Constitutional: NAD on room air, alert  HEENT: NCAT, DMM  Neck: Supple, no JVD  Respiratory: CTA-b/l  Cardiovascular: RRR s1s2, no m/r/g  Gastrointestinal: BS+, soft, NT; (+)mild distension  Extremities:  no edema b/l  Neurological: no focal deficits; strength grossly intact  Back: no CVAT b/l  Skin: No rashes, no nevi  Access: R femoral shiley    LABS:                        9.6    8.95  )-----------( 93       ( 03 Mar 2021 06:03 )             29.9     Na(135)/K(4.6)/Cl(98)/HCO3(24)/BUN(64)/Cr(4.89)Glu(127)/Ca(9.4)/Mg(2.3)/PO4(3.8)    03-03 @ 06:03  Na(136)/K(4.1)/Cl(100)/HCO3(25)/BUN(47)/Cr(3.81)Glu(88)/Ca(9.1)/Mg(--)/PO4(--)    03-02 @ 06:02      IMPRESSION: 80M w/ DM2, CAD-CABG, and CKD, 2/16/21 a/w azotemia/lyte derangements/weakness; c/b newly ESRD; c/b COVID(+)    (1)Renal - newly ESRD - due for next HD tomorrow    (2)CV - tenuous hemodynamics/hypervolemia - biventricular systolic dysfunction, severe AS, mod-sev AR. Potentially for eventual TAVR eval. Newly on standing Midodrine 5tid.    (3)Vasc - femoral shiley remains in place; potentially for catheter change to tunneled IJ today by IR        RECOMMEND:  (1)tunneled cath per IR  (2)Setup of outside HD by SW  (3)Next HD tomorrow          Suraj Garcia MD  [facility]  Office: [phone]  Cell: [phone]               No pain, no sob      VITAL:  T(C): , Max: 37.1 (03-04-21 @ 04:22)  T(F): , Max: 98.7 (03-04-21 @ 04:22)  HR: 77 (03-04-21 @ 05:30)  BP: 90/50 (03-04-21 @ 05:30)  BP(mean): --  RR: 22 (03-04-21 @ 05:30)  SpO2: 91% (03-04-21 @ 05:30)      PHYSICAL EXAM:  Constitutional: NAD on room air, alert  HEENT: NCAT, DMM  Neck: Supple, no JVD  Respiratory: CTA-b/l  Cardiovascular: RRR s1s2, no m/r/g  Gastrointestinal: BS+, soft, NT; (+)mild distension  Extremities:  no edema b/l  Neurological: no focal deficits; strength grossly intact  Back: no CVAT b/l  Skin: No rashes, no nevi  Access: R femoral shiley    LABS:                        9.6    8.95  )-----------( 93       ( 03 Mar 2021 06:03 )             29.9     Na(135)/K(4.6)/Cl(98)/HCO3(24)/BUN(64)/Cr(4.89)Glu(127)/Ca(9.4)/Mg(2.3)/PO4(3.8)    03-03 @ 06:03  Na(136)/K(4.1)/Cl(100)/HCO3(25)/BUN(47)/Cr(3.81)Glu(88)/Ca(9.1)/Mg(--)/PO4(--)    03-02 @ 06:02      IMPRESSION: 80M w/ DM2, CAD-CABG, and CKD, 2/16/21 a/w azotemia/lyte derangements/weakness; c/b newly ESRD; c/b COVID(+)    (1)Renal - newly ESRD - due for next HD tomorrow    (2)CV - tenuous hemodynamics/hypervolemia - biventricular systolic dysfunction, severe AS, mod-sev AR. Potentially for eventual TAVR eval. Newly on standing Midodrine 5tid.    (3)Vasc - femoral shiley remains in place; potentially for catheter change to tunneled IJ today by IR        RECOMMEND:  (1)tunneled cath per IR  (2)Setup of outside HD by SW  (3)Next HD tomorrow          Suraj Garcia MD  [facility]  Office: [phone]  Cell: [phone]               No pain, no sob      VITAL:  T(C): , Max: 37.1 (03-04-21 @ 04:22)  T(F): , Max: 98.7 (03-04-21 @ 04:22)  HR: 77 (03-04-21 @ 05:30)  BP: 90/50 (03-04-21 @ 05:30)  BP(mean): --  RR: 22 (03-04-21 @ 05:30)  SpO2: 91% (03-04-21 @ 05:30)      PHYSICAL EXAM:  Constitutional: NAD on room air, alert  HEENT: NCAT, DMM  Neck: Supple, no JVD  Respiratory: CTA-b/l  Cardiovascular: RRR s1s2, no m/r/g  Gastrointestinal: BS+, soft, NT; (+)mild distension  Extremities:  no edema b/l  Neurological: no focal deficits; strength grossly intact  Back: no CVAT b/l  Skin: No rashes, no nevi  Access: R femoral shiley    LABS:                        9.6    8.95  )-----------( 93       ( 03 Mar 2021 06:03 )             29.9     Na(135)/K(4.6)/Cl(98)/HCO3(24)/BUN(64)/Cr(4.89)Glu(127)/Ca(9.4)/Mg(2.3)/PO4(3.8)    03-03 @ 06:03  Na(136)/K(4.1)/Cl(100)/HCO3(25)/BUN(47)/Cr(3.81)Glu(88)/Ca(9.1)/Mg(--)/PO4(--)    03-02 @ 06:02      IMPRESSION: 80M w/ DM2, CAD-CABG, and CKD, 2/16/21 a/w azotemia/lyte derangements/weakness; c/b newly ESRD; c/b COVID(+)    (1)Renal - newly ESRD - due for next HD tomorrow    (2)CV - tenuous hemodynamics/hypervolemia - biventricular systolic dysfunction, severe AS, mod-sev AR. Potentially for eventual TAVR eval. Newly on standing Midodrine 5tid. Can he tolerate increased UF with HD?    (3)Vasc - femoral shiley remains in place; potentially for catheter change to tunneled IJ today by IR        RECOMMEND:  (1)tunneled cath per IR  (2)Setup of outside HD by SW  (3)Next HD tomorrow - will attempt 3kg UF (up from 1.5UF on previous treatments)          Suraj Garcia MD  [facility]  Office: [phone]  Cell: [phone]

## 2021-03-04 NOTE — PROGRESS NOTE ADULT - ASSESSMENT
81 yo male w h/o ICM< CAD< s/p CABG, has AICD, DM, HTN presents w volume overload and abd pain  Abd sono shows gallstones, but no sign of cholecystitis/pancreatitis. abd pain resolved on Zosyn,   will treat empirically for 7 days .  ID following    ascites 2/2 systolic chf, probably worse 2/2 acute on chronic systolic chf.  responded well to lasix. now on HD. has a femoral shiley, awaiting IJ tunneled cath. on schedule for 3/4    started HD 2/26. HD as per renal  this am was dyspneic, CXR w CHF, started on OV Lasix 60 mg bid IV by cardiology    Covid positive.  ID following. isolation. trend infl markers.   s/p monoclonal infusion.  on interrogation of ICD, has a lof of arrhythmia burden,   NSVT and sustained VT, seen by EPS, cont  Zfwihdvye63.5 mg  bid. has inoperable ischemic HD,   ICM, EF 15%, severe AS, seen by structural heart for TAVR , proceed with TAVR work up  dvt ppx w HSC

## 2021-03-04 NOTE — PROVIDER CONTACT NOTE (OTHER) - ASSESSMENT
Pt a&ox4, appears tachypneic, c/o SOB via , BP: 90/50, HR: 77, temp: 97.6F, RR: 22, SpO2: 91%. Clear lungs sounds auscultated.

## 2021-03-04 NOTE — PACU DISCHARGE NOTE - PAIN:
Controlled with current regime
Eat healthy foods you enjoy. Apixaban/Eliquis DOES NOT have a special diet. Limit your alcohol intake.

## 2021-03-04 NOTE — CHART NOTE - NSCHARTNOTEFT_GEN_A_CORE
Called by RN to report pt with bleeding to [facility] site. Pt seen at bedside. Denies pain. No active bleeding noted. Dried blood on dsd. IR resident called by RN and will see pt. Will continue to monitor.     Beatrice Husain  [id number]

## 2021-03-04 NOTE — CHART NOTE - NSCHARTNOTEFT_GEN_A_CORE
PA MEDICINE NOTE:      Called by RN pt is bleeding from permacath site. Pt denies pain.   Spoke with IR and recommended to hold pressure at IJ site for about 10-15 min until bleeding stops and change dressing as needed.   Will continue monitoring, if continues bleeding, will notify IR.     Tita Franks  Dept of Medicine   #[id number]

## 2021-03-04 NOTE — PROVIDER CONTACT NOTE (OTHER) - ASSESSMENT
Pt denies pain , catheter dressing is soaked . Al vital signs stable Pt denies pain , catheter dressing is soaked . Al vital signs stable denies numbing or tingling, No edema or warmth

## 2021-03-04 NOTE — CHART NOTE - NSCHARTNOTEFT_GEN_A_CORE
Nutrition Follow Up Note  Patient seen for: nutrition follow up    Chart reviewed, events noted. Pt newly initiated on HD this admission. Course c/b COVID-19 positive as well.   Per chart, pt is 80yoM with PMHx significant for CAD s/p CABG, T2DM, HTN, AICD, ICM, CKD, presents with volume overload and abdominal pain. Found with ascites in setting of acute on chronic HF. Pt with severe AS, undergoing TAVR eval.    Unable to conduct a face to face interview or nutrition-focused physical exam due to limited contact restrictions related to pt's medical condition and isolation precautions.   Source: pt wife (Nida) via phone, electronic medical record; pt Lithuanian speaking, does not answer in-room phone at this time    Diet, DASH/TLC:   Sodium & Cholesterol Restricted  Consistent Carbohydrate {Evening Snack} (CSTCHOSN)  1200mL Fluid Restriction ([id number]) (02-18-21 @ 18:20) [Active]  Oral nutrition supplement: diet mighty health shakes TID    GI: no acute GI distress noted, last BMs 2/28 ? per chart    PO intake : fair/good, noted with % po intake at meals per nursing flow sheet, pt wife verifies the same. She does not know if pt is drinking mighty shakes being provided.  At time of initial RD assessment, pt wife (who does the cooking at home) was provided nutrition education for HF/CKD, she declines christopher for nutrition education for DM and defers nutrition education for new HD at this time as well. She is amenable to receiving HD nutrition education via email only and is made aware RD remains available.    Source for PO intake: pt wife, electronic medical record     Daily Weight in lbs: 155.6 (03-03 post HD); suspect wt fluctuations in setting of fluid shifts with HD and CHF, however overall weight largely stable  154.5 (03-01 post HD), 159.3 (02-27 post HD), 154.1 (02-20)  164.9 lbs (dosing wt, question accuracy)    Pt UBW unknown per initial RD assessment.    Pertinent Medications: MEDICATIONS  (STANDING):  aspirin  chewable 81 milliGRAM(s) Oral daily  atorvastatin 80 milliGRAM(s) Oral at bedtime  chlorhexidine 4% Liquid 1 Application(s) Topical <User Schedule>  dextrose 40% Gel 15 Gram(s) Oral once  dextrose 5%. 1000 milliLiter(s) (50 mL/Hr) IV Continuous <Continuous>  dextrose 5%. 1000 milliLiter(s) (100 mL/Hr) IV Continuous <Continuous>  dextrose 50% Injectable 25 Gram(s) IV Push once  dextrose 50% Injectable 12.5 Gram(s) IV Push once  dextrose 50% Injectable 25 Gram(s) IV Push once  glucagon  Injectable 1 milliGRAM(s) IntraMuscular once  heparin   Injectable 5000 Unit(s) SubCutaneous every 12 hours  influenza   Vaccine 0.5 milliLiter(s) IntraMuscular once  insulin lispro (ADMELOG) corrective regimen sliding scale   SubCutaneous three times a day before meals  insulin lispro (ADMELOG) corrective regimen sliding scale   SubCutaneous at bedtime  metoprolol tartrate 25 milliGRAM(s) Oral every 12 hours  midodrine. 5 milliGRAM(s) Oral three times a day  midodrine. 10 milliGRAM(s) Oral <User Schedule>  pantoprazole    Tablet 40 milliGRAM(s) Oral before breakfast  polyethylene glycol 3350 17 Gram(s) Oral daily  prasugrel 10 milliGRAM(s) Oral daily    MEDICATIONS  (PRN):  sodium chloride 0.9% lock flush 10 milliLiter(s) IV Push every 1 hour PRN Pre/post blood products, medications, blood draw, and to maintain line patency    Pertinent Labs:   Finger Sticks:  POCT Blood Glucose.: 108 mg/dL (03-04 @ 12:47)  POCT Blood Glucose.: 110 mg/dL (03-04 @ 07:33)  POCT Blood Glucose.: 190 mg/dL (03-03 @ 21:16)  POCT Blood Glucose.: 88 mg/dL (03-03 @ 17:00)      Skin per nursing documentation: no pressure injuries noted  Edema: 1+ edema to b/l ankles noted    Estimated Needs:   [ ] no change since previous assessment  [x] recalculated: with consideration for new HD, using IBW 70 kG  5989-1530 kcal/day (25-30 kcal/kG)  84-98 g pro/day (1.2-1.4 g pro/kG)    Previous Nutrition Diagnosis: Food & Nutrition Related Knowledge Deficit  Nutrition Diagnosis is: ongoing, being addressed with nutrition education as able    New Nutrition Diagnosis: Increased nutrient needs (protein)  Related to: increased physiological demand for nutrients with renal replacement therapy  As evidenced by: pt newly initiated on HD this admission    Interventions: oral nutrition supplementation in place    Recommend  1) Recommend Low Sodium and Consistent Carbohydrate therapeutic diet, fluids per team. Can continue to hold off on potassium/phosphorus restrictions given lytes WNL on recent labs. Continue to monitor renal indices/lytes for need to change diet.  2) RD to continue to provide 3 diet mighty health shakes daily and monitor for pt acceptance. Will continue to monitor for need to change supplement to Nepro or alternative.  3) Provide/review nutrition education as indicated.    Monitoring and Evaluation:     Continue to monitor Nutritional intake, Tolerance to diet prescription, weights, labs, skin integrity    RD remains available upon request and will follow up per protocol. Juli Jensen RD, CDN Pager: [phone]

## 2021-03-05 LAB
ANION GAP SERPL CALC-SCNC: 14 MMOL/L — SIGNIFICANT CHANGE UP (ref 5–17)
BUN SERPL-MCNC: 61 MG/DL — HIGH (ref 7–23)
CALCIUM SERPL-MCNC: 9.2 MG/DL — SIGNIFICANT CHANGE UP (ref 8.4–10.5)
CHLORIDE SERPL-SCNC: 98 MMOL/L — SIGNIFICANT CHANGE UP (ref 96–108)
CO2 SERPL-SCNC: 25 MMOL/L — SIGNIFICANT CHANGE UP (ref 22–31)
CREAT SERPL-MCNC: 4.69 MG/DL — HIGH (ref 0.5–1.3)
GLUCOSE BLDC GLUCOMTR-MCNC: 113 MG/DL — HIGH (ref 70–99)
GLUCOSE BLDC GLUCOMTR-MCNC: 148 MG/DL — HIGH (ref 70–99)
GLUCOSE BLDC GLUCOMTR-MCNC: 162 MG/DL — HIGH (ref 70–99)
GLUCOSE BLDC GLUCOMTR-MCNC: 166 MG/DL — HIGH (ref 70–99)
GLUCOSE SERPL-MCNC: 121 MG/DL — HIGH (ref 70–99)
HCT VFR BLD CALC: 30 % — LOW (ref 39–50)
HGB BLD-MCNC: 9.5 G/DL — LOW (ref 13–17)
MCHC RBC-ENTMCNC: 30.1 PG — SIGNIFICANT CHANGE UP (ref 27–34)
MCHC RBC-ENTMCNC: 31.7 GM/DL — LOW (ref 32–36)
MCV RBC AUTO: 94.9 FL — SIGNIFICANT CHANGE UP (ref 80–100)
NRBC # BLD: 0 /100 WBCS — SIGNIFICANT CHANGE UP (ref 0–0)
PLATELET # BLD AUTO: 108 K/UL — LOW (ref 150–400)
POTASSIUM SERPL-MCNC: 4.4 MMOL/L — SIGNIFICANT CHANGE UP (ref 3.5–5.3)
POTASSIUM SERPL-SCNC: 4.4 MMOL/L — SIGNIFICANT CHANGE UP (ref 3.5–5.3)
RBC # BLD: 3.16 M/UL — LOW (ref 4.2–5.8)
RBC # FLD: 20.9 % — HIGH (ref 10.3–14.5)
SARS-COV-2 RNA SPEC QL NAA+PROBE: DETECTED
SODIUM SERPL-SCNC: 137 MMOL/L — SIGNIFICANT CHANGE UP (ref 135–145)
WBC # BLD: 7.12 K/UL — SIGNIFICANT CHANGE UP (ref 3.8–10.5)
WBC # FLD AUTO: 7.12 K/UL — SIGNIFICANT CHANGE UP (ref 3.8–10.5)

## 2021-03-05 RX ORDER — MIDODRINE HYDROCHLORIDE 2.5 MG/1
10 TABLET ORAL
Refills: 0 | Status: DISCONTINUED | OUTPATIENT
Start: 2021-03-05 | End: 2021-03-08

## 2021-03-05 RX ADMIN — MIDODRINE HYDROCHLORIDE 10 MILLIGRAM(S): 2.5 TABLET ORAL at 08:35

## 2021-03-05 RX ADMIN — Medication 81 MILLIGRAM(S): at 15:53

## 2021-03-05 RX ADMIN — PANTOPRAZOLE SODIUM 40 MILLIGRAM(S): 20 TABLET, DELAYED RELEASE ORAL at 05:27

## 2021-03-05 RX ADMIN — MIDODRINE HYDROCHLORIDE 5 MILLIGRAM(S): 2.5 TABLET ORAL at 05:27

## 2021-03-05 RX ADMIN — MIDODRINE HYDROCHLORIDE 5 MILLIGRAM(S): 2.5 TABLET ORAL at 14:00

## 2021-03-05 RX ADMIN — CHLORHEXIDINE GLUCONATE 1 APPLICATION(S): 213 SOLUTION TOPICAL at 05:27

## 2021-03-05 RX ADMIN — ATORVASTATIN CALCIUM 80 MILLIGRAM(S): 80 TABLET, FILM COATED ORAL at 21:41

## 2021-03-05 RX ADMIN — POLYETHYLENE GLYCOL 3350 17 GRAM(S): 17 POWDER, FOR SOLUTION ORAL at 15:53

## 2021-03-05 RX ADMIN — PRASUGREL 10 MILLIGRAM(S): 5 TABLET, FILM COATED ORAL at 17:38

## 2021-03-05 RX ADMIN — Medication 1: at 16:56

## 2021-03-05 RX ADMIN — Medication 0: at 21:21

## 2021-03-05 RX ADMIN — Medication 25 MILLIGRAM(S): at 17:38

## 2021-03-05 RX ADMIN — HEPARIN SODIUM 5000 UNIT(S): 5000 INJECTION INTRAVENOUS; SUBCUTANEOUS at 18:43

## 2021-03-05 RX ADMIN — MIDODRINE HYDROCHLORIDE 5 MILLIGRAM(S): 2.5 TABLET ORAL at 17:38

## 2021-03-05 NOTE — PROGRESS NOTE ADULT - ASSESSMENT
79 yo male w h/o ICM< CAD< s/p CABG, has AICD, DM, HTN presents w volume overload and abd pain  Abd sono shows gallstones, but no sign of cholecystitis/pancreatitis. abd pain resolved on Zosyn,   will treat empirically for 7 days .  ID following    ascites 2/2 systolic chf, probably worse 2/2 acute on chronic systolic chf.  responded well to lasix. now on HD. has a femoral shiley, awaiting IJ tunneled cath. on schedule for 3/4    started HD 2/26. HD as per renal  on 3/4 was dyspneic, CXR w CHF, started on OV Lasix 60 mg bid IV by cardiology    Covid positive.  ID following. isolation. trend infl markers.   s/p monoclonal infusion.  on interrogation of ICD, has a lof of arrhythmia burden,   NSVT and sustained VT, seen by EPS, cont  Rqlwmbxut77.5 mg  bid. has inoperable ischemic HD,   ICM, EF 15%, severe AS, seen by structural heart for TAVR , proceed with TAVR work up  dvt ppx w HSC

## 2021-03-05 NOTE — CHART NOTE - NSCHARTNOTEFT_GEN_A_CORE
Called by RN to report Permacath site oozing blood. IR called and to see pt. Will endorse to night covering ACP.    Josefina Leija, LEYLA-c  [phone]

## 2021-03-05 NOTE — PROVIDER CONTACT NOTE (OTHER) - ASSESSMENT
Pt a&ox4, denies pain or discomfort, VSS. Moderate amount of blood on permacath dressing and gown. Site WDL no edema or warmth noted.

## 2021-03-05 NOTE — PROGRESS NOTE ADULT - SUBJECTIVE AND OBJECTIVE BOX
      Overnight events noted      VITAL:  T(C): , Max: 37.2 (03-04-21 @ 20:13)  T(F): , Max: 98.9 (03-04-21 @ 20:13)  HR: 85 (03-05-21 @ 09:39)  BP: 102/54 (03-05-21 @ 09:39)  BP(mean): 61 (03-04-21 @ 12:00)  RR: 18 (03-05-21 @ 08:30)  SpO2: 97% (03-05-21 @ 08:30)      PHYSICAL EXAM:  Constitutional: NAD on room air, alert  HEENT: NCAT, DMM  Neck: Supple, no JVD  Respiratory: CTA-b/l  Cardiovascular: RRR s1s2, no m/r/g  Gastrointestinal: BS+, soft, NT; (+)mild distension  Extremities:  no edema b/l  Neurological: no focal deficits; strength grossly intact  Back: no CVAT b/l  Skin: No rashes, no nevi  Access: RIJ tunneled cath  LABS:                        9.5    7.12  )-----------( 108      ( 05 Mar 2021 05:09 )             30.0     Na(137)/K(4.4)/Cl(98)/HCO3(25)/BUN(61)/Cr(4.69)Glu(121)/Ca(9.2)/Mg(--)/PO4(--)    03-05 @ 05:09  Na(135)/K(4.6)/Cl(98)/HCO3(24)/BUN(64)/Cr(4.89)Glu(127)/Ca(9.4)/Mg(2.3)/PO4(3.8)    03-03 @ 06:03      IMPRESSION: 80M w/ DM2, CAD-CABG, and CKD, 2/16/21 a/w azotemia/lyte derangements/weakness; c/b newly ESRD; c/b COVID(+)    (1)Renal - newly ESRD - due for next HD today    (2)CV - tenuous hemodynamics/hypervolemia - biventricular systolic dysfunction, severe AS, mod-sev AR. Potentially for eventual TAVR eval. Newly on standing Midodrine 5tid. Can he tolerate increased UF with HD?    (3)Vasc - s/p IJ tunneled cath placement yesterday by IR        RECOMMEND:  (1)tunneled cath per IR  (2)Setup of outside HD by SW  (3)Will attempt 3kg UF today (up from 1.5UF on previous treatments)            Suraj Garcia MD  [facility]  Office: [phone]  Cell: [phone]               Seen on HD - no complaints  BP dropping with attempted UF, despite predialytic midodrine    VITAL:  T(C): , Max: 37.2 (03-04-21 @ 20:13)  T(F): , Max: 98.9 (03-04-21 @ 20:13)  HR: 85 (03-05-21 @ 09:39)  BP: 102/54 (03-05-21 @ 09:39)  BP(mean): 61 (03-04-21 @ 12:00)  RR: 18 (03-05-21 @ 08:30)  SpO2: 97% (03-05-21 @ 08:30)      PHYSICAL EXAM:  Constitutional: NAD on room air, alert  HEENT: NCAT, DMM  Neck: Supple, no JVD  Respiratory: CTA-b/l  Cardiovascular: RRR s1s2, no m/r/g  Gastrointestinal: BS+, soft, NT; (+)mild distension  Extremities:  no edema b/l  Neurological: no focal deficits; strength grossly intact  Back: no CVAT b/l  Skin: No rashes, no nevi  Access: RIJ tunneled cath-accessed  LABS:                        9.5    7.12  )-----------( 108      ( 05 Mar 2021 05:09 )             30.0     Na(137)/K(4.4)/Cl(98)/HCO3(25)/BUN(61)/Cr(4.69)Glu(121)/Ca(9.2)/Mg(--)/PO4(--)    03-05 @ 05:09  Na(135)/K(4.6)/Cl(98)/HCO3(24)/BUN(64)/Cr(4.89)Glu(127)/Ca(9.4)/Mg(2.3)/PO4(3.8)    03-03 @ 06:03      IMPRESSION: 80M w/ DM2, CAD-CABG, and CKD, 2/16/21 a/w azotemia/lyte derangements/weakness; c/b newly ESRD; c/b COVID(+)    (1)Renal - newly ESRD - on HD now    (2)CV - tenuous hemodynamics/hypervolemia - biventricular systolic dysfunction, severe AS, mod-sev AR. Potentially for eventual TAVR eval. Newly on standing Midodrine 5tid. Not tolerating increased UF    (3)Vasc - s/p IJ tunneled cath placement yesterday by IR        RECOMMEND:  (1)Setup of outside HD by SW  (2)UF as able - 145==>140 sodium modelling; keep SBP>80          Suraj Garcia MD  [facility]  Office: [phone]  Cell: [phone]               Seen on HD - no complaints  BP dropping with attempted UF    VITAL:  T(C): , Max: 37.2 (03-04-21 @ 20:13)  T(F): , Max: 98.9 (03-04-21 @ 20:13)  HR: 85 (03-05-21 @ 09:39)  BP: 102/54 (03-05-21 @ 09:39)  BP(mean): 61 (03-04-21 @ 12:00)  RR: 18 (03-05-21 @ 08:30)  SpO2: 97% (03-05-21 @ 08:30)      PHYSICAL EXAM:  Constitutional: NAD on room air, alert  HEENT: NCAT, DMM  Neck: Supple, no JVD  Respiratory: CTA-b/l  Cardiovascular: RRR s1s2, no m/r/g  Gastrointestinal: BS+, soft, NT; (+)mild distension  Extremities:  no edema b/l  Neurological: no focal deficits; strength grossly intact  Back: no CVAT b/l  Skin: No rashes, no nevi  Access: RIJ tunneled cath-accessed  LABS:                        9.5    7.12  )-----------( 108      ( 05 Mar 2021 05:09 )             30.0     Na(137)/K(4.4)/Cl(98)/HCO3(25)/BUN(61)/Cr(4.69)Glu(121)/Ca(9.2)/Mg(--)/PO4(--)    03-05 @ 05:09  Na(135)/K(4.6)/Cl(98)/HCO3(24)/BUN(64)/Cr(4.89)Glu(127)/Ca(9.4)/Mg(2.3)/PO4(3.8)    03-03 @ 06:03      IMPRESSION: 80M w/ DM2, CAD-CABG, and CKD, 2/16/21 a/w azotemia/lyte derangements/weakness; c/b newly ESRD; c/b COVID(+)    (1)Renal - newly ESRD - on HD now    (2)CV - tenuous hemodynamics/hypervolemia - biventricular systolic dysfunction, severe AS, mod-sev AR. Potentially for eventual TAVR eval. Newly on standing Midodrine 5tid. Not tolerating increased UF    (3)Vasc - s/p IJ tunneled cath placement yesterday by IR        RECOMMEND:  (1)Setup of outside HD by SW  (2)UF as able - 145==>140 sodium modelling; keep SBP>80  (3)Midodrine 10mg po TIW pre-HD (in addition to the standing TID midodrine)        Suraj Garcia MD  [facility]  Office: [phone]  Cell: [phone]

## 2021-03-05 NOTE — CHART NOTE - NSCHARTNOTEFT_GEN_A_CORE
Discussed with pt via  ID# [id number] (Areli) regarding who to discuss plan of care. Step-son Stevo called the floor numerous times requesting info. Pt is A&Ox4 at this time and requesting wife Tanesha Morelos only. Pt will speak with step-kellie Whiteside regarding his medical care and plan of care. Pt requesting social work consult and spoke with  Tasneem at [phone].    Josefina Leija, NP-c  [phone] Discussed with pt via  ID# [id number] (Areli) regarding who to discuss plan of care. Step-son Stevo called the floor numerous times requesting info. Pt is A&Ox4 at this time and requesting wife Tanesha Morelos only to receive info. Pt will speak with step-kellie Whiteside regarding his medical care and plan of care. Pt requesting social work consult and spoke with  Tasneem at [phone].    Josefina Leija, NP-c  [phone]

## 2021-03-05 NOTE — PROGRESS NOTE ADULT - SUBJECTIVE AND OBJECTIVE BOX
Patient is a 80y old  Male who presents with a chief complaint of acute renal failure (04 Mar 2021 21:07)      SUBJECTIVE / OVERNIGHT EVENTS:     MEDICATIONS  (STANDING):  aspirin  chewable 81 milliGRAM(s) Oral daily  atorvastatin 80 milliGRAM(s) Oral at bedtime  chlorhexidine 4% Liquid 1 Application(s) Topical <User Schedule>  dextrose 40% Gel 15 Gram(s) Oral once  dextrose 5%. 1000 milliLiter(s) (50 mL/Hr) IV Continuous <Continuous>  dextrose 5%. 1000 milliLiter(s) (100 mL/Hr) IV Continuous <Continuous>  dextrose 50% Injectable 25 Gram(s) IV Push once  dextrose 50% Injectable 12.5 Gram(s) IV Push once  dextrose 50% Injectable 25 Gram(s) IV Push once  glucagon  Injectable 1 milliGRAM(s) IntraMuscular once  heparin   Injectable 5000 Unit(s) SubCutaneous every 12 hours  influenza   Vaccine 0.5 milliLiter(s) IntraMuscular once  insulin lispro (ADMELOG) corrective regimen sliding scale   SubCutaneous three times a day before meals  insulin lispro (ADMELOG) corrective regimen sliding scale   SubCutaneous at bedtime  metoprolol tartrate 25 milliGRAM(s) Oral every 12 hours  midodrine. 5 milliGRAM(s) Oral three times a day  pantoprazole    Tablet 40 milliGRAM(s) Oral before breakfast  polyethylene glycol 3350 17 Gram(s) Oral daily  prasugrel 10 milliGRAM(s) Oral daily    MEDICATIONS  (PRN):  sodium chloride 0.9% lock flush 10 milliLiter(s) IV Push every 1 hour PRN Pre/post blood products, medications, blood draw, and to maintain line patency      Vital Signs Last 24 Hrs  T(F): 97.8 (03-05-21 @ 04:44), Max: 98.9 (03-04-21 @ 20:13)  HR: 85 (03-05-21 @ 09:39) (67 - 85)  BP: 102/54 (03-05-21 @ 09:39) (92/54 - 102/54)  RR: 18 (03-05-21 @ 08:30) (17 - 18)  SpO2: 97% (03-05-21 @ 08:30) (93% - 100%)  Telemetry:   CAPILLARY BLOOD GLUCOSE      POCT Blood Glucose.: 113 mg/dL (05 Mar 2021 07:35)  POCT Blood Glucose.: 198 mg/dL (04 Mar 2021 21:28)  POCT Blood Glucose.: 206 mg/dL (04 Mar 2021 16:30)  POCT Blood Glucose.: 108 mg/dL (04 Mar 2021 12:47)    I&O's Summary    04 Mar 2021 07:01  -  05 Mar 2021 07:00  --------------------------------------------------------  IN: 360 mL / OUT: 0 mL / NET: 360 mL        PHYSICAL EXAM:  GENERAL: NAD, well-developed  HEAD:  Atraumatic, Normocephalic  EYES: EOMI, PERRLA, conjunctiva and sclera clear  NECK: Supple, No JVD  CHEST/LUNG: Clear to auscultation bilaterally; No wheeze  HEART: Regular rate and rhythm; No murmurs, rubs, or gallops  ABDOMEN: Soft, Nontender, Nondistended; Bowel sounds present  EXTREMITIES:  2+ Peripheral Pulses, No clubbing, cyanosis, or edema  PSYCH: AAOx3  NEUROLOGY: non-focal  SKIN: No rashes or lesions    LABS:                        9.5    7.12  )-----------( 108      ( 05 Mar 2021 05:09 )             30.0     03-05    137  |  98  |  61<H>  ----------------------------<  121<H>  4.4   |  25  |  4.69<H>    Ca    9.2      05 Mar 2021 05:09                RADIOLOGY & ADDITIONAL TESTS:    Imaging Personally Reviewed:    Consultant(s) Notes Reviewed:      Care Discussed with Consultants/Other Providers:

## 2021-03-05 NOTE — PROGRESS NOTE ADULT - SUBJECTIVE AND OBJECTIVE BOX
Subjective: Patient seen and examined. No new events except as noted.   s/p tunneled cathter placement    feels ok    BP dropping with attempted UF  REVIEW OF SYSTEMS:    CONSTITUTIONAL: + weakness, fevers or chills  EYES/ENT: No visual changes;  No vertigo or throat pain   NECK: No pain or stiffness  RESPIRATORY: No cough, wheezing, hemoptysis; No shortness of breath  CARDIOVASCULAR: No chest pain or palpitations  GASTROINTESTINAL: No abdominal or epigastric pain. No nausea, vomiting, or hematemesis; No diarrhea or constipation. No melena or hematochezia.  GENITOURINARY: No dysuria, frequency or hematuria  NEUROLOGICAL: No numbness or weakness  SKIN: No itching, burning, rashes, or lesions   All other review of systems is negative unless indicated above.    MEDICATIONS:  MEDICATIONS  (STANDING):  aspirin  chewable 81 milliGRAM(s) Oral daily  atorvastatin 80 milliGRAM(s) Oral at bedtime  chlorhexidine 4% Liquid 1 Application(s) Topical <User Schedule>  dextrose 40% Gel 15 Gram(s) Oral once  dextrose 5%. 1000 milliLiter(s) (50 mL/Hr) IV Continuous <Continuous>  dextrose 5%. 1000 milliLiter(s) (100 mL/Hr) IV Continuous <Continuous>  dextrose 50% Injectable 25 Gram(s) IV Push once  dextrose 50% Injectable 12.5 Gram(s) IV Push once  dextrose 50% Injectable 25 Gram(s) IV Push once  glucagon  Injectable 1 milliGRAM(s) IntraMuscular once  heparin   Injectable 5000 Unit(s) SubCutaneous every 12 hours  influenza   Vaccine 0.5 milliLiter(s) IntraMuscular once  insulin lispro (ADMELOG) corrective regimen sliding scale   SubCutaneous three times a day before meals  insulin lispro (ADMELOG) corrective regimen sliding scale   SubCutaneous at bedtime  metoprolol tartrate 25 milliGRAM(s) Oral every 12 hours  midodrine. 5 milliGRAM(s) Oral three times a day  midodrine. 10 milliGRAM(s) Oral <User Schedule>  pantoprazole    Tablet 40 milliGRAM(s) Oral before breakfast  polyethylene glycol 3350 17 Gram(s) Oral daily  prasugrel 10 milliGRAM(s) Oral daily      PHYSICAL EXAM:  T(C): 36.7 (03-05-21 @ 10:40), Max: 37.2 (03-04-21 @ 20:13)  HR: 65 (03-05-21 @ 10:40) (65 - 85)  BP: 90/44 (03-05-21 @ 10:40) (90/44 - 102/54)  RR: 18 (03-05-21 @ 10:40) (18 - 18)  SpO2: 99% (03-05-21 @ 10:40) (93% - 99%)  Wt(kg): --  I&O's Summary    04 Mar 2021 07:01  -  05 Mar 2021 07:00  --------------------------------------------------------  IN: 360 mL / OUT: 0 mL / NET: 360 mL        Appearance: NAD   HEENT:   Normal oral mucosa, PERRL, EOMI	  Lymphatic: No lymphadenopathy + tunneled catheter   Cardiovascular: regular S1 S2, No JVD, + murmurs , Peripheral pulses palpable 2+ bilaterally  Respiratory: Decreased bs   Gastrointestinal:  Soft, Non-tender, + BS	  Skin: No rashes, No ecchymoses, No cyanosis, warm to touch  Musculoskeletal: Normal range of motion, normal strength  Psychiatry:  Mood & affect appropriate  Ext: + edema  LABS:    CARDIAC MARKERS:                                9.5    7.12  )-----------( 108      ( 05 Mar 2021 05:09 )             30.0     03-05    137  |  98  |  61<H>  ----------------------------<  121<H>  4.4   |  25  |  4.69<H>    Ca    9.2      05 Mar 2021 05:09      proBNP:   Lipid Profile:   HgA1c:   TSH:             TELEMETRY: 	  V paced   ECG:  	  RADIOLOGY:   DIAGNOSTIC TESTING:  [ ] Echocardiogram:  [ ]  Catheterization:  [ ] Stress Test:    OTHER:

## 2021-03-06 LAB
ALBUMIN SERPL ELPH-MCNC: 2.6 G/DL — LOW (ref 3.3–5)
ALP SERPL-CCNC: 221 U/L — HIGH (ref 40–120)
ALT FLD-CCNC: 30 U/L — SIGNIFICANT CHANGE UP (ref 10–45)
ANION GAP SERPL CALC-SCNC: 11 MMOL/L — SIGNIFICANT CHANGE UP (ref 5–17)
AST SERPL-CCNC: 45 U/L — HIGH (ref 10–40)
BILIRUB SERPL-MCNC: 1.4 MG/DL — HIGH (ref 0.2–1.2)
BUN SERPL-MCNC: 41 MG/DL — HIGH (ref 7–23)
CALCIUM SERPL-MCNC: 8.9 MG/DL — SIGNIFICANT CHANGE UP (ref 8.4–10.5)
CHLORIDE SERPL-SCNC: 99 MMOL/L — SIGNIFICANT CHANGE UP (ref 96–108)
CO2 SERPL-SCNC: 25 MMOL/L — SIGNIFICANT CHANGE UP (ref 22–31)
CREAT SERPL-MCNC: 3.62 MG/DL — HIGH (ref 0.5–1.3)
GLUCOSE BLDC GLUCOMTR-MCNC: 200 MG/DL — HIGH (ref 70–99)
GLUCOSE BLDC GLUCOMTR-MCNC: 213 MG/DL — HIGH (ref 70–99)
GLUCOSE BLDC GLUCOMTR-MCNC: 271 MG/DL — HIGH (ref 70–99)
GLUCOSE BLDC GLUCOMTR-MCNC: 89 MG/DL — SIGNIFICANT CHANGE UP (ref 70–99)
GLUCOSE SERPL-MCNC: 102 MG/DL — HIGH (ref 70–99)
HCT VFR BLD CALC: 29.5 % — LOW (ref 39–50)
HGB BLD-MCNC: 9.2 G/DL — LOW (ref 13–17)
MCHC RBC-ENTMCNC: 30.2 PG — SIGNIFICANT CHANGE UP (ref 27–34)
MCHC RBC-ENTMCNC: 31.2 GM/DL — LOW (ref 32–36)
MCV RBC AUTO: 96.7 FL — SIGNIFICANT CHANGE UP (ref 80–100)
NRBC # BLD: 0 /100 WBCS — SIGNIFICANT CHANGE UP (ref 0–0)
PHOSPHATE SERPL-MCNC: 4.3 MG/DL — SIGNIFICANT CHANGE UP (ref 2.5–4.5)
PLATELET # BLD AUTO: 124 K/UL — LOW (ref 150–400)
POTASSIUM SERPL-MCNC: 4.3 MMOL/L — SIGNIFICANT CHANGE UP (ref 3.5–5.3)
POTASSIUM SERPL-SCNC: 4.3 MMOL/L — SIGNIFICANT CHANGE UP (ref 3.5–5.3)
PROT SERPL-MCNC: 6.3 G/DL — SIGNIFICANT CHANGE UP (ref 6–8.3)
RBC # BLD: 3.05 M/UL — LOW (ref 4.2–5.8)
RBC # FLD: 20.7 % — HIGH (ref 10.3–14.5)
SODIUM SERPL-SCNC: 135 MMOL/L — SIGNIFICANT CHANGE UP (ref 135–145)
WBC # BLD: 5.95 K/UL — SIGNIFICANT CHANGE UP (ref 3.8–10.5)
WBC # FLD AUTO: 5.95 K/UL — SIGNIFICANT CHANGE UP (ref 3.8–10.5)

## 2021-03-06 RX ADMIN — PRASUGREL 10 MILLIGRAM(S): 5 TABLET, FILM COATED ORAL at 13:37

## 2021-03-06 RX ADMIN — MIDODRINE HYDROCHLORIDE 5 MILLIGRAM(S): 2.5 TABLET ORAL at 05:00

## 2021-03-06 RX ADMIN — Medication 2: at 12:25

## 2021-03-06 RX ADMIN — PANTOPRAZOLE SODIUM 40 MILLIGRAM(S): 20 TABLET, DELAYED RELEASE ORAL at 05:01

## 2021-03-06 RX ADMIN — POLYETHYLENE GLYCOL 3350 17 GRAM(S): 17 POWDER, FOR SOLUTION ORAL at 13:37

## 2021-03-06 RX ADMIN — HEPARIN SODIUM 5000 UNIT(S): 5000 INJECTION INTRAVENOUS; SUBCUTANEOUS at 05:00

## 2021-03-06 RX ADMIN — MIDODRINE HYDROCHLORIDE 5 MILLIGRAM(S): 2.5 TABLET ORAL at 18:09

## 2021-03-06 RX ADMIN — Medication 1: at 16:57

## 2021-03-06 RX ADMIN — Medication 81 MILLIGRAM(S): at 13:36

## 2021-03-06 RX ADMIN — Medication 1: at 21:53

## 2021-03-06 RX ADMIN — HEPARIN SODIUM 5000 UNIT(S): 5000 INJECTION INTRAVENOUS; SUBCUTANEOUS at 17:29

## 2021-03-06 RX ADMIN — Medication 25 MILLIGRAM(S): at 05:00

## 2021-03-06 RX ADMIN — MIDODRINE HYDROCHLORIDE 5 MILLIGRAM(S): 2.5 TABLET ORAL at 13:37

## 2021-03-06 RX ADMIN — TUBERCULIN PURIFIED PROTEIN DERIVATIVE 5 UNIT(S): 5 INJECTION, SOLUTION INTRADERMAL at 05:26

## 2021-03-06 RX ADMIN — CHLORHEXIDINE GLUCONATE 1 APPLICATION(S): 213 SOLUTION TOPICAL at 05:01

## 2021-03-06 RX ADMIN — Medication 25 MILLIGRAM(S): at 18:08

## 2021-03-06 RX ADMIN — ATORVASTATIN CALCIUM 80 MILLIGRAM(S): 80 TABLET, FILM COATED ORAL at 21:10

## 2021-03-06 NOTE — PROGRESS NOTE ADULT - SUBJECTIVE AND OBJECTIVE BOX
Subjective: Patient seen and examined. No new events except as noted.     REVIEW OF SYSTEMS:    CONSTITUTIONAL: +weakness, fevers or chills  EYES/ENT: No visual changes;  No vertigo or throat pain   NECK: No pain or stiffness  RESPIRATORY: No cough, wheezing, hemoptysis; No shortness of breath  CARDIOVASCULAR: No chest pain or palpitations  GASTROINTESTINAL: No abdominal or epigastric pain. No nausea, vomiting, or hematemesis; No diarrhea or constipation. No melena or hematochezia.  GENITOURINARY: No dysuria, frequency or hematuria  NEUROLOGICAL: No numbness or weakness  SKIN: No itching, burning, rashes, or lesions   All other review of systems is negative unless indicated above.    MEDICATIONS:  MEDICATIONS  (STANDING):  aspirin  chewable 81 milliGRAM(s) Oral daily  atorvastatin 80 milliGRAM(s) Oral at bedtime  chlorhexidine 4% Liquid 1 Application(s) Topical <User Schedule>  dextrose 40% Gel 15 Gram(s) Oral once  dextrose 5%. 1000 milliLiter(s) (50 mL/Hr) IV Continuous <Continuous>  dextrose 5%. 1000 milliLiter(s) (100 mL/Hr) IV Continuous <Continuous>  dextrose 50% Injectable 25 Gram(s) IV Push once  dextrose 50% Injectable 12.5 Gram(s) IV Push once  dextrose 50% Injectable 25 Gram(s) IV Push once  glucagon  Injectable 1 milliGRAM(s) IntraMuscular once  heparin   Injectable 5000 Unit(s) SubCutaneous every 12 hours  influenza   Vaccine 0.5 milliLiter(s) IntraMuscular once  insulin lispro (ADMELOG) corrective regimen sliding scale   SubCutaneous three times a day before meals  insulin lispro (ADMELOG) corrective regimen sliding scale   SubCutaneous at bedtime  metoprolol tartrate 25 milliGRAM(s) Oral every 12 hours  midodrine. 5 milliGRAM(s) Oral three times a day  midodrine. 10 milliGRAM(s) Oral <User Schedule>  pantoprazole    Tablet 40 milliGRAM(s) Oral before breakfast  polyethylene glycol 3350 17 Gram(s) Oral daily  prasugrel 10 milliGRAM(s) Oral daily      PHYSICAL EXAM:  T(C): 36.7 (03-06-21 @ 12:53), Max: 36.7 (03-06-21 @ 12:53)  HR: 66 (03-06-21 @ 12:53) (63 - 71)  BP: 98/50 (03-06-21 @ 18:09) (93/63 - 100/54)  RR: 18 (03-06-21 @ 18:09) (16 - 18)  SpO2: 94% (03-06-21 @ 18:09) (86% - 100%)  Wt(kg): --  I&O's Summary    05 Mar 2021 07:01  -  06 Mar 2021 07:00  --------------------------------------------------------  IN: 240 mL / OUT: 2400 mL / NET: -2160 mL    06 Mar 2021 07:01  -  06 Mar 2021 20:20  --------------------------------------------------------  IN: 120 mL / OUT: 0 mL / NET: 120 mL          Appearance: NAD  HEENT:   Normal oral mucosa, PERRL, EOMI	  Lymphatic: No lymphadenopathy , R tunneled catheter   Cardiovascular: irregular S1 S2, No JVD, N+murmurs , Peripheral pulses palpable 2+ bilaterally  Respiratory: Lungs clear to auscultation, normal effort 	  Gastrointestinal:  Soft, Non-tender, + BS	  Skin: No rashes, No ecchymoses, No cyanosis, warm to touch  Musculoskeletal: Normal range of motion, normal strength  Psychiatry:  Mood & affect appropriate  Ext: No edema      LABS:    CARDIAC MARKERS:                                9.2    5.95  )-----------( 124      ( 06 Mar 2021 06:51 )             29.5     03-06    135  |  99  |  41<H>  ----------------------------<  102<H>  4.3   |  25  |  3.62<H>    Ca    8.9      06 Mar 2021 06:45  Phos  4.3     03-06    TPro  6.3  /  Alb  2.6<L>  /  TBili  1.4<H>  /  DBili  x   /  AST  45<H>  /  ALT  30  /  AlkPhos  221<H>  03-06    proBNP:   Lipid Profile:   HgA1c:   TSH:             TELEMETRY: 	  Paced  ECG:  	  RADIOLOGY:   DIAGNOSTIC TESTING:  [ ] Echocardiogram:  [ ]  Catheterization:  [ ] Stress Test:    OTHER:

## 2021-03-06 NOTE — PROGRESS NOTE ADULT - ASSESSMENT
79 yo male w h/o ICM< CAD< s/p CABG, has AICD, DM, HTN presents w volume overload and abd pain  Abd sono shows gallstones, but no sign of cholecystitis/pancreatitis. abd pain resolved on Zosyn,   s/ p treatment empirically for 7 days .  ID following    ascites 2/2 systolic chf, probably worse 2/2 acute on chronic systolic chf.  responded well to lasix. now on HD.   started HD 2/26. HD as per renal. MWF, PAC placed 3/4, unable to be DCed home 2/2 safety at home. nobody to assume responsibility, wife is ill    nosocomial Covid positive.  ID following. isolation. trend infl markers.   s/p monoclonal infusion.  on interrogation of ICD, has a lof of arrhythmia burden,   NSVT and sustained VT, seen by EPS, cont  [id number] mg  bid. has inoperable ischemic HD,   ICM, EF 15%, severe AS, seen by structural heart for TAVR , proceed with TAVR work up  dvt ppx w HSC

## 2021-03-07 LAB
ANION GAP SERPL CALC-SCNC: 12 MMOL/L — SIGNIFICANT CHANGE UP (ref 5–17)
BUN SERPL-MCNC: 59 MG/DL — HIGH (ref 7–23)
CALCIUM SERPL-MCNC: 9.5 MG/DL — SIGNIFICANT CHANGE UP (ref 8.4–10.5)
CHLORIDE SERPL-SCNC: 99 MMOL/L — SIGNIFICANT CHANGE UP (ref 96–108)
CO2 SERPL-SCNC: 27 MMOL/L — SIGNIFICANT CHANGE UP (ref 22–31)
CREAT SERPL-MCNC: 4.72 MG/DL — HIGH (ref 0.5–1.3)
GLUCOSE BLDC GLUCOMTR-MCNC: 165 MG/DL — HIGH (ref 70–99)
GLUCOSE BLDC GLUCOMTR-MCNC: 189 MG/DL — HIGH (ref 70–99)
GLUCOSE BLDC GLUCOMTR-MCNC: 204 MG/DL — HIGH (ref 70–99)
GLUCOSE BLDC GLUCOMTR-MCNC: 220 MG/DL — HIGH (ref 70–99)
GLUCOSE BLDC GLUCOMTR-MCNC: 234 MG/DL — HIGH (ref 70–99)
GLUCOSE SERPL-MCNC: 120 MG/DL — HIGH (ref 70–99)
HCT VFR BLD CALC: 30 % — LOW (ref 39–50)
HGB BLD-MCNC: 9.2 G/DL — LOW (ref 13–17)
MAGNESIUM SERPL-MCNC: 2.5 MG/DL — SIGNIFICANT CHANGE UP (ref 1.6–2.6)
MCHC RBC-ENTMCNC: 29.7 PG — SIGNIFICANT CHANGE UP (ref 27–34)
MCHC RBC-ENTMCNC: 30.7 GM/DL — LOW (ref 32–36)
MCV RBC AUTO: 96.8 FL — SIGNIFICANT CHANGE UP (ref 80–100)
NRBC # BLD: 0 /100 WBCS — SIGNIFICANT CHANGE UP (ref 0–0)
PHOSPHATE SERPL-MCNC: 4.4 MG/DL — SIGNIFICANT CHANGE UP (ref 2.5–4.5)
PLATELET # BLD AUTO: 141 K/UL — LOW (ref 150–400)
POTASSIUM SERPL-MCNC: 4.9 MMOL/L — SIGNIFICANT CHANGE UP (ref 3.5–5.3)
POTASSIUM SERPL-SCNC: 4.9 MMOL/L — SIGNIFICANT CHANGE UP (ref 3.5–5.3)
RBC # BLD: 3.1 M/UL — LOW (ref 4.2–5.8)
RBC # FLD: 20.6 % — HIGH (ref 10.3–14.5)
SODIUM SERPL-SCNC: 138 MMOL/L — SIGNIFICANT CHANGE UP (ref 135–145)
WBC # BLD: 7.59 K/UL — SIGNIFICANT CHANGE UP (ref 3.8–10.5)
WBC # FLD AUTO: 7.59 K/UL — SIGNIFICANT CHANGE UP (ref 3.8–10.5)

## 2021-03-07 RX ADMIN — Medication 2: at 12:16

## 2021-03-07 RX ADMIN — MIDODRINE HYDROCHLORIDE 5 MILLIGRAM(S): 2.5 TABLET ORAL at 05:39

## 2021-03-07 RX ADMIN — HEPARIN SODIUM 5000 UNIT(S): 5000 INJECTION INTRAVENOUS; SUBCUTANEOUS at 05:34

## 2021-03-07 RX ADMIN — HEPARIN SODIUM 5000 UNIT(S): 5000 INJECTION INTRAVENOUS; SUBCUTANEOUS at 17:33

## 2021-03-07 RX ADMIN — Medication 25 MILLIGRAM(S): at 05:34

## 2021-03-07 RX ADMIN — Medication 25 MILLIGRAM(S): at 18:47

## 2021-03-07 RX ADMIN — PRASUGREL 10 MILLIGRAM(S): 5 TABLET, FILM COATED ORAL at 13:02

## 2021-03-07 RX ADMIN — POLYETHYLENE GLYCOL 3350 17 GRAM(S): 17 POWDER, FOR SOLUTION ORAL at 13:02

## 2021-03-07 RX ADMIN — MIDODRINE HYDROCHLORIDE 5 MILLIGRAM(S): 2.5 TABLET ORAL at 13:02

## 2021-03-07 RX ADMIN — Medication 1: at 17:33

## 2021-03-07 RX ADMIN — CHLORHEXIDINE GLUCONATE 1 APPLICATION(S): 213 SOLUTION TOPICAL at 05:39

## 2021-03-07 RX ADMIN — MIDODRINE HYDROCHLORIDE 5 MILLIGRAM(S): 2.5 TABLET ORAL at 17:34

## 2021-03-07 RX ADMIN — Medication 2: at 07:35

## 2021-03-07 RX ADMIN — Medication 81 MILLIGRAM(S): at 13:02

## 2021-03-07 RX ADMIN — ATORVASTATIN CALCIUM 80 MILLIGRAM(S): 80 TABLET, FILM COATED ORAL at 21:36

## 2021-03-07 RX ADMIN — PANTOPRAZOLE SODIUM 40 MILLIGRAM(S): 20 TABLET, DELAYED RELEASE ORAL at 05:33

## 2021-03-07 NOTE — PROGRESS NOTE ADULT - ASSESSMENT
IMPRESSION: 80M w/ DM2, CAD-CABG, and CKD, 2/16/21 a/w azotemia/lyte derangements/weakness; c/b newly ESRD; c/b COVID(+)    (1)Renal - newly ESRD - on HD     (2)CV - tenuous hemodynamics/hypervolemia - biventricular systolic dysfunction, severe AS, mod-sev AR. Potentially for eventual TAVR eval. Newly on standing Midodrine 5tid, on 10 mg pre HD. Not tolerating increased UF    (3)Vasc - s/p IJ tunneled cath placement     RECOMMEND:  (1)Next HD tomorrow (Monday)  (2)UF as able - 145==>140 sodium modelling; keep SBP>80  (3)Midodrine 10mg po TIW pre-HD (in addition to the standing TID midodrine)  (4) Setup of outside HD by DAMON Nevarez NP-C  [facility]  [phone]

## 2021-03-07 NOTE — PROGRESS NOTE ADULT - SUBJECTIVE AND OBJECTIVE BOX
Patient is a 80y old  Male who presents with a chief complaint of acute renal failure (07 Mar 2021 15:09)      SUBJECTIVE / OVERNIGHT EVENTS: s/p HD 3/5, next HD 3/8    MEDICATIONS  (STANDING):  aspirin  chewable 81 milliGRAM(s) Oral daily  atorvastatin 80 milliGRAM(s) Oral at bedtime  chlorhexidine 4% Liquid 1 Application(s) Topical <User Schedule>  dextrose 40% Gel 15 Gram(s) Oral once  dextrose 5%. 1000 milliLiter(s) (50 mL/Hr) IV Continuous <Continuous>  dextrose 5%. 1000 milliLiter(s) (100 mL/Hr) IV Continuous <Continuous>  dextrose 50% Injectable 25 Gram(s) IV Push once  dextrose 50% Injectable 12.5 Gram(s) IV Push once  dextrose 50% Injectable 25 Gram(s) IV Push once  glucagon  Injectable 1 milliGRAM(s) IntraMuscular once  heparin   Injectable 5000 Unit(s) SubCutaneous every 12 hours  influenza   Vaccine 0.5 milliLiter(s) IntraMuscular once  insulin lispro (ADMELOG) corrective regimen sliding scale   SubCutaneous three times a day before meals  insulin lispro (ADMELOG) corrective regimen sliding scale   SubCutaneous at bedtime  metoprolol tartrate 25 milliGRAM(s) Oral every 12 hours  midodrine. 5 milliGRAM(s) Oral three times a day  midodrine. 10 milliGRAM(s) Oral <User Schedule>  pantoprazole    Tablet 40 milliGRAM(s) Oral before breakfast  polyethylene glycol 3350 17 Gram(s) Oral daily  prasugrel 10 milliGRAM(s) Oral daily    MEDICATIONS  (PRN):  sodium chloride 0.9% lock flush 10 milliLiter(s) IV Push every 1 hour PRN Pre/post blood products, medications, blood draw, and to maintain line patency      Vital Signs Last 24 Hrs  T(F): 97.3 (03-07-21 @ 11:33), Max: 98.6 (03-06-21 @ 21:32)  HR: 61 (03-07-21 @ 11:33) (61 - 66)  BP: 107/53 (03-07-21 @ 11:33) (92/51 - 107/53)  RR: 18 (03-07-21 @ 11:33) (18 - 18)  SpO2: 96% (03-07-21 @ 11:33) (94% - 98%)  Telemetry:   CAPILLARY BLOOD GLUCOSE      POCT Blood Glucose.: 165 mg/dL (07 Mar 2021 16:38)  POCT Blood Glucose.: 234 mg/dL (07 Mar 2021 11:54)  POCT Blood Glucose.: 189 mg/dL (07 Mar 2021 07:55)  POCT Blood Glucose.: 220 mg/dL (07 Mar 2021 07:32)  POCT Blood Glucose.: 271 mg/dL (06 Mar 2021 21:20)    I&O's Summary    06 Mar 2021 07:01  -  07 Mar 2021 07:00  --------------------------------------------------------  IN: 480 mL / OUT: 150 mL / NET: 330 mL        PHYSICAL EXAM:  GENERAL: NAD, well-developed  HEAD:  Atraumatic, Normocephalic  EYES: EOMI, PERRLA, conjunctiva and sclera clear  NECK: Supple, No JVD  CHEST/LUNG: Clear to auscultation bilaterally; No wheeze  HEART: Regular rate and rhythm; No murmurs, rubs, or gallops  ABDOMEN: Soft, Nontender, Nondistended; Bowel sounds present  EXTREMITIES:  2+ Peripheral Pulses, No clubbing, cyanosis, or edema  PSYCH: AAOx3  NEUROLOGY: non-focal  SKIN: No rashes or lesions    LABS:                        9.2    7.59  )-----------( 141      ( 07 Mar 2021 05:51 )             30.0     03-07    138  |  99  |  59<H>  ----------------------------<  120<H>  4.9   |  27  |  4.72<H>    Ca    9.5      07 Mar 2021 05:51  Phos  4.4     03-07  Mg     2.5     03-07    TPro  6.3  /  Alb  2.6<L>  /  TBili  1.4<H>  /  DBili  x   /  AST  45<H>  /  ALT  30  /  AlkPhos  221<H>  03-06              RADIOLOGY & ADDITIONAL TESTS:    Imaging Personally Reviewed:    Consultant(s) Notes Reviewed:      Care Discussed with Consultants/Other Providers:

## 2021-03-07 NOTE — PROGRESS NOTE ADULT - ASSESSMENT
79 yo male w h/o ICM< CAD< s/p CABG, has AICD, DM, HTN presents w volume overload and abd pain  Abd sono shows gallstones, but no sign of cholecystitis/pancreatitis. abd pain resolved on Zosyn,   s/ p treatment empirically for 7 days .  ID following    ascites 2/2 systolic chf, probably worse 2/2 acute on chronic systolic chf.  responded well to lasix. now on HD.   started HD 2/26. HD as per renal. MWF, PAC placed 3/4, unable to be DCed home 2/2 safety at home. nobody to assume responsibility, wife is ill    nosocomial Covid positive.  ID following. isolation. trend infl markers.   s/p monoclonal infusion.  on interrogation of ICD, has a lof of arrhythmia burden,   NSVT and sustained VT, seen by EPS, cont  [id number] mg  bid. has inoperable ischemic HD,   ICM, EF 15%, severe AS, seen by structural heart for TAVR , proceed with TAVR work up  dvt ppx w HSC

## 2021-03-07 NOTE — PROGRESS NOTE ADULT - SUBJECTIVE AND OBJECTIVE BOX
NEPHROLOGY         Patient seen and examined. Pt without complaints, in no acute distress. No overnight events noted. Last HD Friday (3/5) removed 2.4L.     MEDICATIONS  (STANDING):  aspirin  chewable 81 milliGRAM(s) Oral daily  atorvastatin 80 milliGRAM(s) Oral at bedtime  chlorhexidine 4% Liquid 1 Application(s) Topical <User Schedule>  dextrose 40% Gel 15 Gram(s) Oral once  dextrose 5%. 1000 milliLiter(s) (50 mL/Hr) IV Continuous <Continuous>  dextrose 5%. 1000 milliLiter(s) (100 mL/Hr) IV Continuous <Continuous>  dextrose 50% Injectable 25 Gram(s) IV Push once  dextrose 50% Injectable 12.5 Gram(s) IV Push once  dextrose 50% Injectable 25 Gram(s) IV Push once  glucagon  Injectable 1 milliGRAM(s) IntraMuscular once  heparin   Injectable 5000 Unit(s) SubCutaneous every 12 hours  influenza   Vaccine 0.5 milliLiter(s) IntraMuscular once  insulin lispro (ADMELOG) corrective regimen sliding scale   SubCutaneous three times a day before meals  insulin lispro (ADMELOG) corrective regimen sliding scale   SubCutaneous at bedtime  metoprolol tartrate 25 milliGRAM(s) Oral every 12 hours  midodrine. 5 milliGRAM(s) Oral three times a day  midodrine. 10 milliGRAM(s) Oral <User Schedule>  pantoprazole    Tablet 40 milliGRAM(s) Oral before breakfast  polyethylene glycol 3350 17 Gram(s) Oral daily  prasugrel 10 milliGRAM(s) Oral daily    VITALS:  T(C): , Max: 37 (03-06-21 @ 21:32)  T(F): , Max: 98.6 (03-06-21 @ 21:32)  HR: 61 (03-07-21 @ 11:33)  BP: 107/53 (03-07-21 @ 11:33)  RR: 18 (03-07-21 @ 11:33)  SpO2: 96% (03-07-21 @ 11:33)    I and O's:    03-06 @ 07:01  -  03-07 @ 07:00  --------------------------------------------------------  IN: 480 mL / OUT: 150 mL / NET: 330 mL    PHYSICAL EXAM:  Constitutional: NAD on room air, alert  HEENT: NCAT, DMM  Neck: Supple, no JVD  Respiratory: CTA-b/l  Cardiovascular: RRR s1s2, no m/r/g  Gastrointestinal: BS+, soft, NT; (+)mild distension  Extremities:  no edema b/l  Neurological: no focal deficits; strength grossly intact  Back: no CVAT b/l  Skin: No rashes, no nevi  Access: [facility] tunneled cath    LABS:                        9.2    7.59  )-----------( 141      ( 07 Mar 2021 05:51 )             30.0     03-07    138  |  99  |  59<H>  ----------------------------<  120<H>  4.9   |  27  |  4.72<H>    Ca    9.5      07 Mar 2021 05:51  Phos  4.4     03-07  Mg     2.5     03-07    TPro  6.3  /  Alb  2.6<L>  /  TBili  1.4<H>  /  DBili  x   /  AST  45<H>  /  ALT  30  /  AlkPhos  221<H>  03-06

## 2021-03-07 NOTE — PROGRESS NOTE ADULT - SUBJECTIVE AND OBJECTIVE BOX
Subjective: Patient seen and examined. No new events except as noted.     REVIEW OF SYSTEMS:    CONSTITUTIONAL: +weakness, fevers or chills  EYES/ENT: No visual changes;  No vertigo or throat pain   NECK: No pain or stiffness  RESPIRATORY: No cough, wheezing, hemoptysis; No shortness of breath  CARDIOVASCULAR: No chest pain or palpitations  GASTROINTESTINAL: No abdominal or epigastric pain. No nausea, vomiting, or hematemesis; No diarrhea or constipation. No melena or hematochezia.  GENITOURINARY: No dysuria, frequency or hematuria  NEUROLOGICAL: No numbness or weakness  SKIN: No itching, burning, rashes, or lesions   All other review of systems is negative unless indicated above.    MEDICATIONS:  MEDICATIONS  (STANDING):  aspirin  chewable 81 milliGRAM(s) Oral daily  atorvastatin 80 milliGRAM(s) Oral at bedtime  chlorhexidine 4% Liquid 1 Application(s) Topical <User Schedule>  dextrose 40% Gel 15 Gram(s) Oral once  dextrose 5%. 1000 milliLiter(s) (100 mL/Hr) IV Continuous <Continuous>  dextrose 5%. 1000 milliLiter(s) (50 mL/Hr) IV Continuous <Continuous>  dextrose 50% Injectable 25 Gram(s) IV Push once  dextrose 50% Injectable 12.5 Gram(s) IV Push once  dextrose 50% Injectable 25 Gram(s) IV Push once  glucagon  Injectable 1 milliGRAM(s) IntraMuscular once  heparin   Injectable 5000 Unit(s) SubCutaneous every 12 hours  influenza   Vaccine 0.5 milliLiter(s) IntraMuscular once  insulin lispro (ADMELOG) corrective regimen sliding scale   SubCutaneous three times a day before meals  insulin lispro (ADMELOG) corrective regimen sliding scale   SubCutaneous at bedtime  metoprolol tartrate 25 milliGRAM(s) Oral every 12 hours  midodrine. 5 milliGRAM(s) Oral three times a day  midodrine. 10 milliGRAM(s) Oral <User Schedule>  pantoprazole    Tablet 40 milliGRAM(s) Oral before breakfast  polyethylene glycol 3350 17 Gram(s) Oral daily  prasugrel 10 milliGRAM(s) Oral daily      PHYSICAL EXAM:  T(C): 36.4 (03-07-21 @ 05:27), Max: 37 (03-06-21 @ 21:32)  HR: 65 (03-07-21 @ 05:27) (65 - 66)  BP: 92/51 (03-07-21 @ 05:27) (92/51 - 100/42)  RR: 18 (03-07-21 @ 05:27) (18 - 18)  SpO2: 97% (03-07-21 @ 05:27) (86% - 98%)  Wt(kg): --  I&O's Summary    06 Mar 2021 07:01  -  07 Mar 2021 07:00  --------------------------------------------------------  IN: 480 mL / OUT: 150 mL / NET: 330 mL          Appearance: NAD  HEENT:Dry oral mucosa, PERRL, EOMI	  Lymphatic: No lymphadenopathy , R tunneled catheter   Cardiovascular: Irregular S1 S2, No JVD, + murmurs , Peripheral pulses palpable 2+ bilaterally  Respiratory: Lungs clear to auscultation, normal effort 	  Gastrointestinal:  Soft, Non-tender, + BS	  Skin: No rashes, No ecchymoses, No cyanosis, warm to touch  Musculoskeletal: Normal range of motion, normal strength  Psychiatry:  Mood & affect appropriate  Ext: No edema      LABS:    CARDIAC MARKERS:                                9.2    7.59  )-----------( 141      ( 07 Mar 2021 05:51 )             30.0     03-07    138  |  99  |  59<H>  ----------------------------<  120<H>  4.9   |  27  |  4.72<H>    Ca    9.5      07 Mar 2021 05:51  Phos  4.4     03-07  Mg     2.5     03-07    TPro  6.3  /  Alb  2.6<L>  /  TBili  1.4<H>  /  DBili  x   /  AST  45<H>  /  ALT  30  /  AlkPhos  221<H>  03-06    proBNP:   Lipid Profile:   HgA1c:   TSH:             TELEMETRY: 	 V paced    ECG:  	  RADIOLOGY:   DIAGNOSTIC TESTING:  [ ] Echocardiogram:  [ ]  Catheterization:  [ ] Stress Test:    OTHER:

## 2021-03-08 LAB
ANION GAP SERPL CALC-SCNC: 13 MMOL/L — SIGNIFICANT CHANGE UP (ref 5–17)
BUN SERPL-MCNC: 73 MG/DL — HIGH (ref 7–23)
CALCIUM SERPL-MCNC: 9.7 MG/DL — SIGNIFICANT CHANGE UP (ref 8.4–10.5)
CHLORIDE SERPL-SCNC: 97 MMOL/L — SIGNIFICANT CHANGE UP (ref 96–108)
CO2 SERPL-SCNC: 26 MMOL/L — SIGNIFICANT CHANGE UP (ref 22–31)
CREAT SERPL-MCNC: 5.41 MG/DL — HIGH (ref 0.5–1.3)
GLUCOSE BLDC GLUCOMTR-MCNC: 128 MG/DL — HIGH (ref 70–99)
GLUCOSE BLDC GLUCOMTR-MCNC: 128 MG/DL — HIGH (ref 70–99)
GLUCOSE BLDC GLUCOMTR-MCNC: 135 MG/DL — HIGH (ref 70–99)
GLUCOSE BLDC GLUCOMTR-MCNC: 227 MG/DL — HIGH (ref 70–99)
GLUCOSE SERPL-MCNC: 142 MG/DL — HIGH (ref 70–99)
HCT VFR BLD CALC: 31 % — LOW (ref 39–50)
HGB BLD-MCNC: 9.5 G/DL — LOW (ref 13–17)
MAGNESIUM SERPL-MCNC: 2.6 MG/DL — SIGNIFICANT CHANGE UP (ref 1.6–2.6)
MCHC RBC-ENTMCNC: 29.8 PG — SIGNIFICANT CHANGE UP (ref 27–34)
MCHC RBC-ENTMCNC: 30.6 GM/DL — LOW (ref 32–36)
MCV RBC AUTO: 97.2 FL — SIGNIFICANT CHANGE UP (ref 80–100)
NRBC # BLD: 0 /100 WBCS — SIGNIFICANT CHANGE UP (ref 0–0)
PHOSPHATE SERPL-MCNC: 5 MG/DL — HIGH (ref 2.5–4.5)
PLATELET # BLD AUTO: 154 K/UL — SIGNIFICANT CHANGE UP (ref 150–400)
POTASSIUM SERPL-MCNC: 5.4 MMOL/L — HIGH (ref 3.5–5.3)
POTASSIUM SERPL-SCNC: 5.4 MMOL/L — HIGH (ref 3.5–5.3)
RBC # BLD: 3.19 M/UL — LOW (ref 4.2–5.8)
RBC # FLD: 20.3 % — HIGH (ref 10.3–14.5)
SODIUM SERPL-SCNC: 136 MMOL/L — SIGNIFICANT CHANGE UP (ref 135–145)
WBC # BLD: 6.93 K/UL — SIGNIFICANT CHANGE UP (ref 3.8–10.5)
WBC # FLD AUTO: 6.93 K/UL — SIGNIFICANT CHANGE UP (ref 3.8–10.5)

## 2021-03-08 RX ORDER — MIDODRINE HYDROCHLORIDE 2.5 MG/1
10 TABLET ORAL
Refills: 0 | Status: DISCONTINUED | OUTPATIENT
Start: 2021-03-08 | End: 2021-03-10

## 2021-03-08 RX ADMIN — MIDODRINE HYDROCHLORIDE 5 MILLIGRAM(S): 2.5 TABLET ORAL at 12:05

## 2021-03-08 RX ADMIN — MIDODRINE HYDROCHLORIDE 10 MILLIGRAM(S): 2.5 TABLET ORAL at 08:48

## 2021-03-08 RX ADMIN — ATORVASTATIN CALCIUM 80 MILLIGRAM(S): 80 TABLET, FILM COATED ORAL at 21:58

## 2021-03-08 RX ADMIN — PANTOPRAZOLE SODIUM 40 MILLIGRAM(S): 20 TABLET, DELAYED RELEASE ORAL at 05:50

## 2021-03-08 RX ADMIN — CHLORHEXIDINE GLUCONATE 1 APPLICATION(S): 213 SOLUTION TOPICAL at 05:49

## 2021-03-08 RX ADMIN — HEPARIN SODIUM 5000 UNIT(S): 5000 INJECTION INTRAVENOUS; SUBCUTANEOUS at 21:58

## 2021-03-08 RX ADMIN — MIDODRINE HYDROCHLORIDE 5 MILLIGRAM(S): 2.5 TABLET ORAL at 05:50

## 2021-03-08 RX ADMIN — HEPARIN SODIUM 5000 UNIT(S): 5000 INJECTION INTRAVENOUS; SUBCUTANEOUS at 05:49

## 2021-03-08 RX ADMIN — Medication 25 MILLIGRAM(S): at 05:50

## 2021-03-08 RX ADMIN — Medication 81 MILLIGRAM(S): at 12:04

## 2021-03-08 RX ADMIN — PRASUGREL 10 MILLIGRAM(S): 5 TABLET, FILM COATED ORAL at 12:05

## 2021-03-08 RX ADMIN — Medication 2: at 11:39

## 2021-03-08 RX ADMIN — POLYETHYLENE GLYCOL 3350 17 GRAM(S): 17 POWDER, FOR SOLUTION ORAL at 12:05

## 2021-03-08 RX ADMIN — MIDODRINE HYDROCHLORIDE 5 MILLIGRAM(S): 2.5 TABLET ORAL at 17:02

## 2021-03-08 NOTE — PROGRESS NOTE ADULT - SUBJECTIVE AND OBJECTIVE BOX
Subjective: Patient seen and examined. No new events except as noted.   No cp or sob       REVIEW OF SYSTEMS:    CONSTITUTIONAL:+ weakness, fevers or chills  EYES/ENT: No visual changes;  No vertigo or throat pain   NECK: No pain or stiffness  RESPIRATORY: No cough, wheezing, hemoptysis; No shortness of breath  CARDIOVASCULAR: No chest pain or palpitations  GASTROINTESTINAL: No abdominal or epigastric pain. No nausea, vomiting, or hematemesis; No diarrhea or constipation. No melena or hematochezia.  GENITOURINARY: No dysuria, frequency or hematuria  NEUROLOGICAL: No numbness or weakness  SKIN: No itching, burning, rashes, or lesions   All other review of systems is negative unless indicated above.    MEDICATIONS:  MEDICATIONS  (STANDING):  aspirin  chewable 81 milliGRAM(s) Oral daily  atorvastatin 80 milliGRAM(s) Oral at bedtime  chlorhexidine 4% Liquid 1 Application(s) Topical <User Schedule>  dextrose 40% Gel 15 Gram(s) Oral once  dextrose 5%. 1000 milliLiter(s) (50 mL/Hr) IV Continuous <Continuous>  dextrose 5%. 1000 milliLiter(s) (100 mL/Hr) IV Continuous <Continuous>  dextrose 50% Injectable 25 Gram(s) IV Push once  dextrose 50% Injectable 12.5 Gram(s) IV Push once  dextrose 50% Injectable 25 Gram(s) IV Push once  glucagon  Injectable 1 milliGRAM(s) IntraMuscular once  heparin   Injectable 5000 Unit(s) SubCutaneous every 12 hours  influenza   Vaccine 0.5 milliLiter(s) IntraMuscular once  insulin lispro (ADMELOG) corrective regimen sliding scale   SubCutaneous at bedtime  insulin lispro (ADMELOG) corrective regimen sliding scale   SubCutaneous three times a day before meals  metoprolol tartrate 25 milliGRAM(s) Oral every 12 hours  midodrine. 5 milliGRAM(s) Oral three times a day  midodrine. 10 milliGRAM(s) Oral <User Schedule>  pantoprazole    Tablet 40 milliGRAM(s) Oral before breakfast  polyethylene glycol 3350 17 Gram(s) Oral daily  prasugrel 10 milliGRAM(s) Oral daily      PHYSICAL EXAM:  T(C): 36.4 (03-08-21 @ 04:20), Max: 36.4 (03-07-21 @ 20:31)  HR: 60 (03-08-21 @ 08:30) (60 - 72)  BP: 104/55 (03-08-21 @ 08:30) (94/56 - 108/55)  RR: 18 (03-08-21 @ 04:20) (18 - 18)  SpO2: 100% (03-08-21 @ 04:20) (96% - 100%)  Wt(kg): --  I&O's Summary        Appearance: NAD  HEENT:   Dry oral mucosa, PERRL, EOMI	  Lymphatic: No lymphadenopathy , Tunneled catheter   Cardiovascular: Irregular S1 S2, No JVD, + murmurs , Peripheral pulses palpable 2+ bilaterally  Respiratory: Lungs clear to auscultation, normal effort 	  Gastrointestinal:  Soft, Non-tender, + BS	  Skin: No rashes, No ecchymoses, No cyanosis, warm to touch  Musculoskeletal: Normal range of motion, normal strength  Psychiatry:  Mood & affect appropriate  Ext: No edema      LABS:    CARDIAC MARKERS:                                9.5    6.93  )-----------( 154      ( 08 Mar 2021 05:54 )             31.0     03-08    136  |  97  |  73<H>  ----------------------------<  142<H>  5.4<H>   |  26  |  5.41<H>    Ca    9.7      08 Mar 2021 05:52  Phos  5.0     03-08  Mg     2.6     03-08      proBNP:   Lipid Profile:   HgA1c:   TSH:             TELEMETRY: 	  V paced    ECG:  	  RADIOLOGY:   DIAGNOSTIC TESTING:  [ ] Echocardiogram:  [ ]  Catheterization:  [ ] Stress Test:    OTHER:

## 2021-03-08 NOTE — PROGRESS NOTE ADULT - SUBJECTIVE AND OBJECTIVE BOX
Patient is a 80y old  Male who presents with a chief complaint of acute renal failure (08 Mar 2021 13:08)      SUBJECTIVE / OVERNIGHT EVENTS: no new c/o    MEDICATIONS  (STANDING):  aspirin  chewable 81 milliGRAM(s) Oral daily  atorvastatin 80 milliGRAM(s) Oral at bedtime  chlorhexidine 4% Liquid 1 Application(s) Topical <User Schedule>  dextrose 40% Gel 15 Gram(s) Oral once  dextrose 5%. 1000 milliLiter(s) (50 mL/Hr) IV Continuous <Continuous>  dextrose 5%. 1000 milliLiter(s) (100 mL/Hr) IV Continuous <Continuous>  dextrose 50% Injectable 25 Gram(s) IV Push once  dextrose 50% Injectable 12.5 Gram(s) IV Push once  dextrose 50% Injectable 25 Gram(s) IV Push once  glucagon  Injectable 1 milliGRAM(s) IntraMuscular once  heparin   Injectable 5000 Unit(s) SubCutaneous every 12 hours  influenza   Vaccine 0.5 milliLiter(s) IntraMuscular once  insulin lispro (ADMELOG) corrective regimen sliding scale   SubCutaneous at bedtime  insulin lispro (ADMELOG) corrective regimen sliding scale   SubCutaneous three times a day before meals  metoprolol tartrate 25 milliGRAM(s) Oral every 12 hours  midodrine. 10 milliGRAM(s) Oral <User Schedule>  midodrine. 5 milliGRAM(s) Oral three times a day  pantoprazole    Tablet 40 milliGRAM(s) Oral before breakfast  polyethylene glycol 3350 17 Gram(s) Oral daily  prasugrel 10 milliGRAM(s) Oral daily    MEDICATIONS  (PRN):  sodium chloride 0.9% lock flush 10 milliLiter(s) IV Push every 1 hour PRN Pre/post blood products, medications, blood draw, and to maintain line patency      Vital Signs Last 24 Hrs  T(F): 97.6 (03-08-21 @ 20:59), Max: 97.9 (03-08-21 @ 19:08)  HR: 65 (03-08-21 @ 20:59) (60 - 65)  BP: 99/44 (03-08-21 @ 20:59) (92/52 - 109/49)  RR: 20 (03-08-21 @ 20:59) (18 - 20)  SpO2: 100% (03-08-21 @ 20:59) (95% - 100%)  Telemetry:   CAPILLARY BLOOD GLUCOSE      POCT Blood Glucose.: 128 mg/dL (08 Mar 2021 21:01)  POCT Blood Glucose.: 135 mg/dL (08 Mar 2021 16:43)  POCT Blood Glucose.: 227 mg/dL (08 Mar 2021 11:31)  POCT Blood Glucose.: 128 mg/dL (08 Mar 2021 07:40)    I&O's Summary    08 Mar 2021 07:01  -  08 Mar 2021 22:37  --------------------------------------------------------  IN: 1760 mL / OUT: 800 mL / NET: 960 mL        PHYSICAL EXAM:  GENERAL: NAD, well-developed  HEAD:  Atraumatic, Normocephalic  EYES: EOMI, PERRLA, conjunctiva and sclera clear  NECK: Supple, No JVD  CHEST/LUNG: Clear to auscultation bilaterally; No wheeze  HEART: Regular rate and rhythm; No murmurs, rubs, or gallops  ABDOMEN: Soft, Nontender, Nondistended; Bowel sounds present  EXTREMITIES:  2+ Peripheral Pulses, No clubbing, cyanosis, or edema  PSYCH: AAOx3  NEUROLOGY: non-focal  SKIN: No rashes or lesions    LABS:                        9.5    6.93  )-----------( 154      ( 08 Mar 2021 05:54 )             31.0     03-08    136  |  97  |  73<H>  ----------------------------<  142<H>  5.4<H>   |  26  |  5.41<H>    Ca    9.7      08 Mar 2021 05:52  Phos  5.0     03-08  Mg     2.6     03-08                RADIOLOGY & ADDITIONAL TESTS:    Imaging Personally Reviewed:    Consultant(s) Notes Reviewed:      Care Discussed with Consultants/Other Providers:

## 2021-03-08 NOTE — PROGRESS NOTE ADULT - ASSESSMENT
79 yo male w h/o ICM< CAD< s/p CABG, has AICD, DM, HTN presents w volume overload and abd pain  Abd sono shows gallstones, but no sign of cholecystitis/pancreatitis. abd pain resolved on Zosyn,   s/ p treatment empirically for 7 days .  ID following    ascites 2/2 systolic chf, probably worse 2/2 acute on chronic systolic chf.  responded well to lasix. now on HD.   started HD 2/26. HD as per renal. MWF, PAC placed 3/4, unable to be DCed home 2/2 safety at home. nobody to assume responsibility, wife is ill    nosocomial Covid positive.  ID following. isolation. trend infl markers.   s/p monoclonal infusion.  on interrogation of ICD, has a lof of arrhythmia burden,   NSVT and sustained VT, seen by EPS, cont  Soqxptmgr94.5 mg  bid. has inoperable ischemic HD,   ICM, EF 15%, severe AS, seen by structural heart for TAVR , proceed with TAVR work up  dvt ppx w HSC

## 2021-03-08 NOTE — PROGRESS NOTE ADULT - SUBJECTIVE AND OBJECTIVE BOX
      Overnight events noted      VITAL:  T(C): , Max: 36.4 (03-07-21 @ 20:31)  T(F): , Max: 97.5 (03-07-21 @ 20:31)  HR: 60 (03-08-21 @ 08:30)  BP: 104/55 (03-08-21 @ 08:30)  BP(mean): --  RR: 18 (03-08-21 @ 04:20)  SpO2: 100% (03-08-21 @ 04:20)      PHYSICAL EXAM:  Constitutional: NAD on room air, alert  HEENT: NCAT, DMM  Neck: Supple, no JVD  Respiratory: CTA-b/l  Cardiovascular: RRR s1s2, no m/r/g  Gastrointestinal: BS+, soft, NT; (+)mild distension  Extremities:  no edema b/l  Neurological: no focal deficits; strength grossly intact  Back: no CVAT b/l  Skin: No rashes, no nevi  Access: RIJ tunneled cath      LABS:                        9.5    6.93  )-----------( 154      ( 08 Mar 2021 05:54 )             31.0     Na(136)/K(5.4)/Cl(97)/HCO3(26)/BUN(73)/Cr(5.41)Glu(142)/Ca(9.7)/Mg(2.6)/PO4(5.0)    03-08 @ 05:52  Na(138)/K(4.9)/Cl(99)/HCO3(27)/BUN(59)/Cr(4.72)Glu(120)/Ca(9.5)/Mg(2.5)/PO4(4.4)    03-07 @ 05:51  Na(135)/K(4.3)/Cl(99)/HCO3(25)/BUN(41)/Cr(3.62)Glu(102)/Ca(8.9)/Mg(--)/PO4(4.3)    03-06 @ 06:45      IMPRESSION: 80M w/ DM2, CAD-CABG, and CKD, 2/16/21 a/w azotemia/lyte derangements/weakness; c/b newly ESRD; c/b COVID(+)    (1)Renal - newly ESRD - due for HD today    (2)CV - tenuous hemodynamics/hypervolemia - biventricular systolic dysfunction, severe AS, mod-sev AR. On Midodrine. Awaiting TAVR eval    (3)Vasc - now with tunneled cath for HD. Would forgo AV access for now, given his severe cardiac disease/risk of worsening cardiac hemodynamics with AV access        RECOMMEND:  (1)HD today - 1.2L Uf as able; 145==>140 sodium modelling; midodrine 10mg pre-hd  (2)TAVR workup per Cardiology/Structural Heart Team  (3)No AV access for now  (4)F/U with SW regarding outpatient HD              Suraj Garcia MD  [facility]  Office: [phone]  Cell: [phone]             No pain, no sob      VITAL:  T(C): , Max: 36.4 (03-07-21 @ 20:31)  T(F): , Max: 97.5 (03-07-21 @ 20:31)  HR: 60 (03-08-21 @ 08:30)  BP: 104/55 (03-08-21 @ 08:30)  BP(mean): --  RR: 18 (03-08-21 @ 04:20)  SpO2: 100% (03-08-21 @ 04:20)      PHYSICAL EXAM:  Constitutional: NAD on room air, alert  HEENT: NCAT, DMM  Neck: Supple, no JVD  Respiratory: CTA-b/l  Cardiovascular: RRR s1s2, no m/r/g  Gastrointestinal: BS+, soft, NT; (+)mild distension  Extremities:  no edema b/l  Neurological: no focal deficits; strength grossly intact  Back: no CVAT b/l  Skin: No rashes, no nevi  Access: RIJ tunneled cath      LABS:                        9.5    6.93  )-----------( 154      ( 08 Mar 2021 05:54 )             31.0     Na(136)/K(5.4)/Cl(97)/HCO3(26)/BUN(73)/Cr(5.41)Glu(142)/Ca(9.7)/Mg(2.6)/PO4(5.0)    03-08 @ 05:52  Na(138)/K(4.9)/Cl(99)/HCO3(27)/BUN(59)/Cr(4.72)Glu(120)/Ca(9.5)/Mg(2.5)/PO4(4.4)    03-07 @ 05:51  Na(135)/K(4.3)/Cl(99)/HCO3(25)/BUN(41)/Cr(3.62)Glu(102)/Ca(8.9)/Mg(--)/PO4(4.3)    03-06 @ 06:45      IMPRESSION: 80M w/ DM2, CAD-CABG, and CKD, 2/16/21 a/w azotemia/lyte derangements/weakness; c/b newly ESRD; c/b COVID(+)    (1)Renal - newly ESRD - due for HD today    (2)CV - tenuous hemodynamics/hypervolemia - biventricular systolic dysfunction, severe AS, mod-sev AR. On Midodrine. Awaiting TAVR eval    (3)Vasc - now with tunneled cath for HD. Would forgo AV access for now, given his severe cardiac disease/risk of worsening cardiac hemodynamics with AV access    (4)Dispo - awaiting acceptance to outside HD facility - if COVID negative at this point, it would certainly facilitate setup of outside HD      RECOMMEND:  (1)HD today - 1.2L Uf as able; 145==>140 sodium modelling; midodrine 10mg pre-hd  (2)TAVR workup per Cardiology/Structural Heart Team  (3)No AV access for now  (4)Repeat COVID test            Suraj Garcia MD  [facility]  Office: [phone]  Cell: [phone]

## 2021-03-09 LAB
GLUCOSE BLDC GLUCOMTR-MCNC: 117 MG/DL — HIGH (ref 70–99)
GLUCOSE BLDC GLUCOMTR-MCNC: 137 MG/DL — HIGH (ref 70–99)
GLUCOSE BLDC GLUCOMTR-MCNC: 170 MG/DL — HIGH (ref 70–99)
GLUCOSE BLDC GLUCOMTR-MCNC: 260 MG/DL — HIGH (ref 70–99)
SARS-COV-2 RNA SPEC QL NAA+PROBE: DETECTED

## 2021-03-09 RX ADMIN — MIDODRINE HYDROCHLORIDE 5 MILLIGRAM(S): 2.5 TABLET ORAL at 05:40

## 2021-03-09 RX ADMIN — HEPARIN SODIUM 5000 UNIT(S): 5000 INJECTION INTRAVENOUS; SUBCUTANEOUS at 05:40

## 2021-03-09 RX ADMIN — ATORVASTATIN CALCIUM 80 MILLIGRAM(S): 80 TABLET, FILM COATED ORAL at 22:21

## 2021-03-09 RX ADMIN — MIDODRINE HYDROCHLORIDE 5 MILLIGRAM(S): 2.5 TABLET ORAL at 12:33

## 2021-03-09 RX ADMIN — Medication 3: at 17:11

## 2021-03-09 RX ADMIN — CHLORHEXIDINE GLUCONATE 1 APPLICATION(S): 213 SOLUTION TOPICAL at 05:40

## 2021-03-09 RX ADMIN — PRASUGREL 10 MILLIGRAM(S): 5 TABLET, FILM COATED ORAL at 12:33

## 2021-03-09 RX ADMIN — Medication 81 MILLIGRAM(S): at 12:33

## 2021-03-09 RX ADMIN — POLYETHYLENE GLYCOL 3350 17 GRAM(S): 17 POWDER, FOR SOLUTION ORAL at 18:27

## 2021-03-09 RX ADMIN — PANTOPRAZOLE SODIUM 40 MILLIGRAM(S): 20 TABLET, DELAYED RELEASE ORAL at 05:41

## 2021-03-09 RX ADMIN — MIDODRINE HYDROCHLORIDE 10 MILLIGRAM(S): 2.5 TABLET ORAL at 11:50

## 2021-03-09 RX ADMIN — MIDODRINE HYDROCHLORIDE 5 MILLIGRAM(S): 2.5 TABLET ORAL at 18:30

## 2021-03-09 RX ADMIN — Medication 25 MILLIGRAM(S): at 05:40

## 2021-03-09 RX ADMIN — HEPARIN SODIUM 5000 UNIT(S): 5000 INJECTION INTRAVENOUS; SUBCUTANEOUS at 18:27

## 2021-03-09 NOTE — PROGRESS NOTE ADULT - SUBJECTIVE AND OBJECTIVE BOX
Subjective: Patient seen and examined. No new events except as noted.     REVIEW OF SYSTEMS:    CONSTITUTIONAL:+ weakness, fevers or chills  EYES/ENT: No visual changes;  No vertigo or throat pain   NECK: No pain or stiffness  RESPIRATORY: No cough, wheezing, hemoptysis; No shortness of breath  CARDIOVASCULAR: No chest pain or palpitations  GASTROINTESTINAL: No abdominal or epigastric pain. No nausea, vomiting, or hematemesis; No diarrhea or constipation. No melena or hematochezia.  GENITOURINARY: No dysuria, frequency or hematuria  NEUROLOGICAL: No numbness or weakness  SKIN: No itching, burning, rashes, or lesions   All other review of systems is negative unless indicated above.    MEDICATIONS:  MEDICATIONS  (STANDING):  aspirin  chewable 81 milliGRAM(s) Oral daily  atorvastatin 80 milliGRAM(s) Oral at bedtime  chlorhexidine 4% Liquid 1 Application(s) Topical <User Schedule>  dextrose 40% Gel 15 Gram(s) Oral once  dextrose 5%. 1000 milliLiter(s) (50 mL/Hr) IV Continuous <Continuous>  dextrose 5%. 1000 milliLiter(s) (100 mL/Hr) IV Continuous <Continuous>  dextrose 50% Injectable 25 Gram(s) IV Push once  dextrose 50% Injectable 12.5 Gram(s) IV Push once  dextrose 50% Injectable 25 Gram(s) IV Push once  glucagon  Injectable 1 milliGRAM(s) IntraMuscular once  heparin   Injectable 5000 Unit(s) SubCutaneous every 12 hours  influenza   Vaccine 0.5 milliLiter(s) IntraMuscular once  insulin lispro (ADMELOG) corrective regimen sliding scale   SubCutaneous at bedtime  insulin lispro (ADMELOG) corrective regimen sliding scale   SubCutaneous three times a day before meals  metoprolol tartrate 25 milliGRAM(s) Oral every 12 hours  midodrine. 5 milliGRAM(s) Oral three times a day  midodrine. 10 milliGRAM(s) Oral <User Schedule>  pantoprazole    Tablet 40 milliGRAM(s) Oral before breakfast  polyethylene glycol 3350 17 Gram(s) Oral daily  prasugrel 10 milliGRAM(s) Oral daily      PHYSICAL EXAM:  T(C): 36.6 (03-09-21 @ 10:39), Max: 36.9 (03-09-21 @ 05:43)  HR: 62 (03-09-21 @ 10:39) (60 - 66)  BP: 94/45 (03-09-21 @ 10:39) (92/52 - 109/49)  RR: 18 (03-09-21 @ 10:39) (18 - 20)  SpO2: 97% (03-09-21 @ 10:39) (95% - 100%)  Wt(kg): --  I&O's Summary    08 Mar 2021 07:01  -  09 Mar 2021 07:00  --------------------------------------------------------  IN: 2000 mL / OUT: 800 mL / NET: 1200 mL          Appearance: NAD	  HEENT:   Dry oral mucosa, PERRL, EOMI	  Lymphatic: No lymphadenopathy ,+ tunneled catheter   Cardiovascular: Irregular S1 S2, No JVD, + murmurs , Peripheral pulses palpable 2+ bilaterally  Respiratory: Decreased bs 	  Gastrointestinal:  Soft, Non-tender, + BS	  Skin: No rashes, No ecchymoses, No cyanosis, warm to touch  Musculoskeletal: Normal range of motion, normal strength  Psychiatry:  Mood & affect appropriate  Ext: No edema      LABS:    CARDIAC MARKERS:                                9.5    6.93  )-----------( 154      ( 08 Mar 2021 05:54 )             31.0     03-08    136  |  97  |  73<H>  ----------------------------<  142<H>  5.4<H>   |  26  |  5.41<H>    Ca    9.7      08 Mar 2021 05:52  Phos  5.0     03-08  Mg     2.6     03-08      proBNP:   Lipid Profile:   HgA1c:   TSH:             TELEMETRY: 	V paced      ECG:  	  RADIOLOGY:   DIAGNOSTIC TESTING:  [ ] Echocardiogram:  [ ]  Catheterization:  [ ] Stress Test:    OTHER:

## 2021-03-09 NOTE — PROGRESS NOTE ADULT - SUBJECTIVE AND OBJECTIVE BOX
Patient is a 80y old  Male who presents with a chief complaint of acute renal failure (09 Mar 2021 12:00)      SUBJECTIVE / OVERNIGHT EVENTS: HD days switched to TTSa, doesnt have a tunneled catheter, and[facility] a[facility] renal, awaiting transfer to  HD    MEDICATIONS  (STANDING):  aspirin  chewable 81 milliGRAM(s) Oral daily  atorvastatin 80 milliGRAM(s) Oral at bedtime  chlorhexidine 4% Liquid 1 Application(s) Topical <User Schedule>  dextrose 40% Gel 15 Gram(s) Oral once  dextrose 5%. 1000 milliLiter(s) (50 mL/Hr) IV Continuous <Continuous>  dextrose 5%. 1000 milliLiter(s) (100 mL/Hr) IV Continuous <Continuous>  dextrose 50% Injectable 25 Gram(s) IV Push once  dextrose 50% Injectable 12.5 Gram(s) IV Push once  dextrose 50% Injectable 25 Gram(s) IV Push once  glucagon  Injectable 1 milliGRAM(s) IntraMuscular once  heparin   Injectable 5000 Unit(s) SubCutaneous every 12 hours  influenza   Vaccine 0.5 milliLiter(s) IntraMuscular once  insulin lispro (ADMELOG) corrective regimen sliding scale   SubCutaneous three times a day before meals  insulin lispro (ADMELOG) corrective regimen sliding scale   SubCutaneous at bedtime  metoprolol tartrate 25 milliGRAM(s) Oral every 12 hours  midodrine. 5 milliGRAM(s) Oral three times a day  midodrine. 10 milliGRAM(s) Oral <User Schedule>  pantoprazole    Tablet 40 milliGRAM(s) Oral before breakfast  polyethylene glycol 3350 17 Gram(s) Oral daily  prasugrel 10 milliGRAM(s) Oral daily    MEDICATIONS  (PRN):  sodium chloride 0.9% lock flush 10 milliLiter(s) IV Push every 1 hour PRN Pre/post blood products, medications, blood draw, and to maintain line patency      Vital Signs Last 24 Hrs  T(F): 98.2 (03-09-21 @ 20:52), Max: 98.4 (03-09-21 @ 05:43)  HR: 73 (03-09-21 @ 20:52) (61 - 73)  BP: 96/37 (03-09-21 @ 20:52) (91/47 - 103/49)  RR: 18 (03-09-21 @ 20:52) (17 - 20)  SpO2: 96% (03-09-21 @ 20:52) (96% - 100%)  Telemetry:   CAPILLARY BLOOD GLUCOSE      POCT Blood Glucose.: 137 mg/dL (09 Mar 2021 21:44)  POCT Blood Glucose.: 260 mg/dL (09 Mar 2021 16:34)  POCT Blood Glucose.: 170 mg/dL (09 Mar 2021 11:44)  POCT Blood Glucose.: 117 mg/dL (09 Mar 2021 07:43)    I&O's Summary    08 Mar 2021 07:01  -  09 Mar 2021 07:00  --------------------------------------------------------  IN: 2000 mL / OUT: 800 mL / NET: 1200 mL    09 Mar 2021 07:01  -  09 Mar 2021 21:50  --------------------------------------------------------  IN: 360 mL / OUT: 0 mL / NET: 360 mL        PHYSICAL EXAM:  GENERAL: NAD, well-developed  HEAD:  Atraumatic, Normocephalic  EYES: EOMI, PERRLA, conjunctiva and sclera clear  NECK: Supple, No JVD  CHEST/LUNG: Clear to auscultation bilaterally; No wheeze  HEART: Regular rate and rhythm; No murmurs, rubs, or gallops  ABDOMEN: Soft, Nontender, Nondistended; Bowel sounds present  EXTREMITIES:  2+ Peripheral Pulses, No clubbing, cyanosis, or edema  PSYCH: AAOx3  NEUROLOGY: non-focal  SKIN: No rashes or lesions    LABS:                        9.5    6.93  )-----------( 154      ( 08 Mar 2021 05:54 )             31.0     03-08    136  |  97  |  73<H>  ----------------------------<  142<H>  5.4<H>   |  26  |  5.41<H>    Ca    9.7      08 Mar 2021 05:52  Phos  5.0     03-08  Mg     2.6     03-08                RADIOLOGY & ADDITIONAL TESTS:    Imaging Personally Reviewed:    Consultant(s) Notes Reviewed:      Care Discussed with Consultants/Other Providers:

## 2021-03-09 NOTE — PROGRESS NOTE ADULT - ASSESSMENT
81 yo male w h/o ICM< CAD< s/p CABG, has AICD, DM, HTN presents w volume overload and abd pain  Abd sono shows gallstones, but no sign of cholecystitis/pancreatitis. abd pain resolved on Zosyn,   s/ p treatment empirically for 7 days .  ID following    ascites 2/2 systolic chf, probably worse 2/2 acute on chronic systolic chf.  responded well to lasix. now on HD.   started HD 2/26. HD as per renal. changed from MWF to TTSa,  RIJ tunneled catheter placed on 3/4 in IR, no fistula placement for now  unable to be DCed home 2/2 safety at home. nobody to assume responsibility, wife is ill  await[facility]    nosocomial Covid positive. remains positive, clinically stable.  ID following. in isolation. trend infl markers.   s/p monoclonal infusion.  on interrogation of ICD, has a lof of arrhythmia burden,   NSVT and sustained VT, seen by EPS, cont  Evjsxcgwk08.5 mg  bid. has inoperable ischemic HD,   ICM, EF 15%, severe AS, seen by structural heart for TAVR , proceed with TAVR work up  dvt ppx w HSC

## 2021-03-09 NOTE — PROGRESS NOTE ADULT - SUBJECTIVE AND OBJECTIVE BOX
      Overnight events noted      VITAL:  T(C): , Max: 36.9 (03-09-21 @ 05:43)  T(F): , Max: 98.4 (03-09-21 @ 05:43)  HR: 61 (03-09-21 @ 11:00)  BP: 91/47 (03-09-21 @ 11:00)  BP(mean): --  RR: 18 (03-09-21 @ 11:00)  SpO2: 98% (03-09-21 @ 11:00)      PHYSICAL EXAM:  Constitutional: NAD on room air, alert  HEENT: NCAT, DMM  Neck: Supple, no JVD  Respiratory: CTA-b/l  Cardiovascular: RRR s1s2, no m/r/g  Gastrointestinal: BS+, soft, NT; (+)mild distension  Extremities:  no edema b/l  Neurological: no focal deficits; strength grossly intact  Back: no CVAT b/l  Skin: No rashes, no nevi  Access: RIJ tunneled cath-accessed    LABS:                        9.5    6.93  )-----------( 154      ( 08 Mar 2021 05:54 )             31.0     Na(136)/K(5.4)/Cl(97)/HCO3(26)/BUN(73)/Cr(5.41)Glu(142)/Ca(9.7)/Mg(2.6)/PO4(5.0)    03-08 @ 05:52  Na(138)/K(4.9)/Cl(99)/HCO3(27)/BUN(59)/Cr(4.72)Glu(120)/Ca(9.5)/Mg(2.5)/PO4(4.4)    03-07 @ 05:51      IMPRESSION: 80M w/ DM2, CAD-CABG, and CKD, 2/16/21 a/w azotemia/lyte derangements/weakness; c/b newly ESRD; c/b COVID(+)    (1)Renal - newly ESRD - on HD now (2nd consecutive day; switching from MWF to TTS for now in accordance with  request of HD staff)    (2)CV - tenuous hemodynamics/hypervolemia - biventricular systolic dysfunction, severe AS, mod-sev AR. On Midodrine. Potentially for TAVR eval, after discharge    (3)Vasc - now with tunneled cath for HD. Would forgo AV access for now, given his severe cardiac disease/risk of worsening cardiac hemodynamics with AV access    (4)Dispo - awaiting acceptance to outside HD facility - he remains COVID positive as of 3/8/21 - rendering it difficult to find a facility for him to get rehab+HD as outpatient      RECOMMEND:  (1)HD today as ordered  (2)No AV access for now  (3)Setup of outpatient HD per                 Suraj Garcia MD  [facility]  Office: [phone]  Cell: [phone]               Seen on HD - no pain, no sob      VITAL:  T(C): , Max: 36.9 (03-09-21 @ 05:43)  T(F): , Max: 98.4 (03-09-21 @ 05:43)  HR: 61 (03-09-21 @ 11:00)  BP: 91/47 (03-09-21 @ 11:00)  BP(mean): --  RR: 18 (03-09-21 @ 11:00)  SpO2: 98% (03-09-21 @ 11:00)      PHYSICAL EXAM:  Constitutional: NAD on room air, alert  HEENT: NCAT, DMM  Neck: Supple, no JVD  Respiratory: CTA-b/l  Cardiovascular: RRR s1s2, no m/r/g  Gastrointestinal: BS+, soft, NT; (+)mild distension  Extremities:  no edema b/l  Neurological: no focal deficits; strength grossly intact  Back: no CVAT b/l  Skin: No rashes, no nevi  Access: RIJ tunneled cath-accessed    LABS:                        9.5    6.93  )-----------( 154      ( 08 Mar 2021 05:54 )             31.0     Na(136)/K(5.4)/Cl(97)/HCO3(26)/BUN(73)/Cr(5.41)Glu(142)/Ca(9.7)/Mg(2.6)/PO4(5.0)    03-08 @ 05:52  Na(138)/K(4.9)/Cl(99)/HCO3(27)/BUN(59)/Cr(4.72)Glu(120)/Ca(9.5)/Mg(2.5)/PO4(4.4)    03-07 @ 05:51      IMPRESSION: 80M w/ DM2, CAD-CABG, and CKD, 2/16/21 a/w azotemia/lyte derangements/weakness; c/b newly ESRD; c/b COVID(+)    (1)Renal - newly ESRD - on HD now (2nd consecutive day; switching from MWF to TTS for now in accordance with  request of HD staff)    (2)CV - tenuous hemodynamics/hypervolemia - biventricular systolic dysfunction, severe AS, mod-sev AR. On Midodrine. Potentially for TAVR eval, after discharge    (3)Vasc - now with tunneled cath for HD. Would forgo AV access for now, given his severe cardiac disease/risk of worsening cardiac hemodynamics with AV access    (4)Dispo - awaiting acceptance to outside HD facility - he remains COVID positive as of 3/8/21 - rendering it difficult to find a facility for him to get rehab+HD as outpatient      RECOMMEND:  (1)HD today as ordered  (2)No AV access for now  (3)Setup of outpatient HD per DAMON Garcia MD  [facility]  Office: [phone]  Cell: [phone]

## 2021-03-09 NOTE — PROGRESS NOTE ADULT - ASSESSMENT
81 yo male w h/o DM, CAD, s/p CABG, CKD 3-4.   Recently evaluated by Nephrology for elevated creatinine. Bloodwork  returned notable for  and Na 126; in light of these findings, Dr. Luz (Renal) sent the patient to the ER. Lately ptn has been having abdominal pain with elevated amylase and lipase.   As per ptn's wife: on and off R abd pain w generalized weakness and poor po intake. PCP DCed po Lasix in past few days 2/2 abnormal blood test results.  Ptn denies recent NSAID use.  He was diagnosed with diabetes 18 years ago, and denies retinopathy (last saw Optho around 2 years ago). Cardiologist has alluded to the patient having mild renal impairment in the past, as per wife.    Patient's PCP is Dr. Shyann De León; GI Dr. Wilman Villagran; Cardiology Dr. Boyce.  (16 Feb 2021 21:05)    ER vs: T 98.1, P 85, /64. WBC 8.2.  Bun/Cr - 100/2.0.  LFTs mildly elevated, Roxana 217, Lip 185.  Pct 0.2.  UA (-).  Ct c/a/p shows pulmonary edema, sm B/L pleural effusions, sm volume ascites.  VQ scan with very low probability for PE.      Pt started on zosyn to cover for intra-abd source.  ID consulted.     r/o Gallstone pancreatitis:    - Pt p/w abdominal pain, epigastric and RUQ pain, noted to have elevated LFTs and amylase/lipase.  Possible gallstone pancreatitis?    - U/S abdomen shows cholelithiasis without acute cholecystitis.  Borderline to mild gallbladder thickening is likely due to ascites.   Moderate ascites noted.  Suggestion of mild renal parenchymal disease and bilateral pleural effusions.    - CTap w/o acute intra-abd pathology    - monitor LFTs, Roxana, Lipase.  As per GI, ascites likely secondary to CHF, no evidence for pancreatitis.     - Monitor temp curve and WBC.      - bcx neg. completed  coverage with zosyn x 7 days, 2/23.  Abd pain resolved      {[id number]}Renal failure:    - Renal following for CKD.  Dose abx for GFR 15-20.  On q12 hr dosing of zosyn.     - Cr rising.  CALVIN on CKD does not appear to be antibiotic mediated, renal f/u noted.      - Pt requiring HD,  shiley catheter placed 2/26, s/p conversion to tunnelled catheter - 3/4    Aortic stenosis:    - Followed by Structural health team for TAVR evaluation    Covid (+):    - Repeat Covid pcr on 2/25 tested positive.   On 1L nc, satting >94%    - No indication for Covid therapeutics at this time.  Cont supportive care, incentive spirometery.  Monitor pulse ox    - Repeat covid test remains positive on 3/8    - Monitor inflammatory markers.       Rupali [facility]  [phone]

## 2021-03-09 NOTE — PROGRESS NOTE ADULT - SUBJECTIVE AND OBJECTIVE BOX
Infectious Diseases progress note:    Subjective:  Pt resting.  No new fevers.  Pt on 1L nc    ROS:  minimally verbal, unable to assess    Allergies    No Known Allergies    Intolerances        ANTIBIOTICS/RELEVANT:  antimicrobials    immunologic:  influenza   Vaccine 0.5 milliLiter(s) IntraMuscular once    OTHER:  aspirin  chewable 81 milliGRAM(s) Oral daily  atorvastatin 80 milliGRAM(s) Oral at bedtime  chlorhexidine 4% Liquid 1 Application(s) Topical <User Schedule>  dextrose 40% Gel 15 Gram(s) Oral once  dextrose 5%. 1000 milliLiter(s) IV Continuous <Continuous>  dextrose 5%. 1000 milliLiter(s) IV Continuous <Continuous>  dextrose 50% Injectable 25 Gram(s) IV Push once  dextrose 50% Injectable 12.5 Gram(s) IV Push once  dextrose 50% Injectable 25 Gram(s) IV Push once  glucagon  Injectable 1 milliGRAM(s) IntraMuscular once  heparin   Injectable 5000 Unit(s) SubCutaneous every 12 hours  insulin lispro (ADMELOG) corrective regimen sliding scale   SubCutaneous at bedtime  insulin lispro (ADMELOG) corrective regimen sliding scale   SubCutaneous three times a day before meals  metoprolol tartrate 25 milliGRAM(s) Oral every 12 hours  midodrine. 5 milliGRAM(s) Oral three times a day  midodrine. 10 milliGRAM(s) Oral <User Schedule>  pantoprazole    Tablet 40 milliGRAM(s) Oral before breakfast  polyethylene glycol 3350 17 Gram(s) Oral daily  prasugrel 10 milliGRAM(s) Oral daily  sodium chloride 0.9% lock flush 10 milliLiter(s) IV Push every 1 hour PRN      Objective:  Vital Signs Last 24 Hrs  T(C): 36.4 (10 Mar 2021 04:32), Max: 36.8 (09 Mar 2021 20:52)  T(F): 97.5 (10 Mar 2021 04:32), Max: 98.2 (09 Mar 2021 20:52)  HR: 67 (10 Mar 2021 06:21) (61 - 73)  BP: 98/54 (10 Mar 2021 06:21) (91/47 - 103/49)  BP(mean): --  RR: 20 (10 Mar 2021 04:32) (17 - 20)  SpO2: 95% (10 Mar 2021 04:32) (95% - 100%)    PHYSICAL EXAM:  Constitutional:NAD  Eyes:CHERI, EOMI  Ear/Nose/Throat: no thrush, mucositis.  Moist mucous membranes	  Neck:no JVD, no lymphadenopathy, supple  Respiratory: CTA nadiya  Cardiovascular: S1S2 RRR, no murmurs  Gastrointestinal:soft, nontender,  nondistended (+) BS  Extremities:no e/e/c  Skin:  no rashes, open wounds or ulcerations        LABS:                        9.4    8.18  )-----------( 195      ( 10 Mar 2021 05:37 )             30.8     03-10    133<L>  |  97  |  39<H>  ----------------------------<  107<H>  4.4   |  25  |  3.41<H>    Ca    9.7      10 Mar 2021 05:37    TPro  6.9  /  Alb  2.8<L>  /  TBili  1.5<H>  /  DBili  x   /  AST  56<H>  /  ALT  37  /  AlkPhos  207<H>  03-10          Procalcitonin, Serum: 0.20 (02-16 @ 23:54)                    MICROBIOLOGY:    Culture - Urine (02.17.21 @ 09:06)   Specimen Source: .Urine Clean Catch (Midstream)   Culture Results:   No growth       RADIOLOGY & ADDITIONAL STUDIES:    < from: Xray Chest 1 View- PORTABLE-Urgent (Xray Chest 1 View- PORTABLE-Urgent .) (03.04.21 @ 07:10) >  FINDINGS:  The heart is enlarged. Status post median sternotomy and CABG. ICD in place..  Reticular opacities throughout both lungs.  Trace bilateral pleural effusions. Nopneumothorax.    IMPRESSION:  Moderate pulmonary congestion.    < end of copied text >

## 2021-03-10 LAB
ALBUMIN SERPL ELPH-MCNC: 2.8 G/DL — LOW (ref 3.3–5)
ALP SERPL-CCNC: 207 U/L — HIGH (ref 40–120)
ALT FLD-CCNC: 37 U/L — SIGNIFICANT CHANGE UP (ref 10–45)
ANION GAP SERPL CALC-SCNC: 11 MMOL/L — SIGNIFICANT CHANGE UP (ref 5–17)
AST SERPL-CCNC: 56 U/L — HIGH (ref 10–40)
BILIRUB SERPL-MCNC: 1.5 MG/DL — HIGH (ref 0.2–1.2)
BUN SERPL-MCNC: 39 MG/DL — HIGH (ref 7–23)
CALCIUM SERPL-MCNC: 9.7 MG/DL — SIGNIFICANT CHANGE UP (ref 8.4–10.5)
CHLORIDE SERPL-SCNC: 97 MMOL/L — SIGNIFICANT CHANGE UP (ref 96–108)
CO2 SERPL-SCNC: 25 MMOL/L — SIGNIFICANT CHANGE UP (ref 22–31)
CREAT SERPL-MCNC: 3.41 MG/DL — HIGH (ref 0.5–1.3)
GLUCOSE BLDC GLUCOMTR-MCNC: 136 MG/DL — HIGH (ref 70–99)
GLUCOSE BLDC GLUCOMTR-MCNC: 158 MG/DL — HIGH (ref 70–99)
GLUCOSE BLDC GLUCOMTR-MCNC: 177 MG/DL — HIGH (ref 70–99)
GLUCOSE BLDC GLUCOMTR-MCNC: 97 MG/DL — SIGNIFICANT CHANGE UP (ref 70–99)
GLUCOSE SERPL-MCNC: 107 MG/DL — HIGH (ref 70–99)
HCT VFR BLD CALC: 30.8 % — LOW (ref 39–50)
HGB BLD-MCNC: 9.4 G/DL — LOW (ref 13–17)
MCHC RBC-ENTMCNC: 29.9 PG — SIGNIFICANT CHANGE UP (ref 27–34)
MCHC RBC-ENTMCNC: 30.5 GM/DL — LOW (ref 32–36)
MCV RBC AUTO: 98.1 FL — SIGNIFICANT CHANGE UP (ref 80–100)
NRBC # BLD: 0 /100 WBCS — SIGNIFICANT CHANGE UP (ref 0–0)
PLATELET # BLD AUTO: 195 K/UL — SIGNIFICANT CHANGE UP (ref 150–400)
POTASSIUM SERPL-MCNC: 4.4 MMOL/L — SIGNIFICANT CHANGE UP (ref 3.5–5.3)
POTASSIUM SERPL-SCNC: 4.4 MMOL/L — SIGNIFICANT CHANGE UP (ref 3.5–5.3)
PROT SERPL-MCNC: 6.9 G/DL — SIGNIFICANT CHANGE UP (ref 6–8.3)
RBC # BLD: 3.14 M/UL — LOW (ref 4.2–5.8)
RBC # FLD: 20.9 % — HIGH (ref 10.3–14.5)
SODIUM SERPL-SCNC: 133 MMOL/L — LOW (ref 135–145)
WBC # BLD: 8.18 K/UL — SIGNIFICANT CHANGE UP (ref 3.8–10.5)
WBC # FLD AUTO: 8.18 K/UL — SIGNIFICANT CHANGE UP (ref 3.8–10.5)

## 2021-03-10 RX ORDER — MIDODRINE HYDROCHLORIDE 2.5 MG/1
10 TABLET ORAL
Refills: 0 | Status: DISCONTINUED | OUTPATIENT
Start: 2021-03-10 | End: 2021-03-23

## 2021-03-10 RX ADMIN — MIDODRINE HYDROCHLORIDE 5 MILLIGRAM(S): 2.5 TABLET ORAL at 06:04

## 2021-03-10 RX ADMIN — CHLORHEXIDINE GLUCONATE 1 APPLICATION(S): 213 SOLUTION TOPICAL at 06:03

## 2021-03-10 RX ADMIN — Medication 25 MILLIGRAM(S): at 18:35

## 2021-03-10 RX ADMIN — MIDODRINE HYDROCHLORIDE 5 MILLIGRAM(S): 2.5 TABLET ORAL at 18:35

## 2021-03-10 RX ADMIN — HEPARIN SODIUM 5000 UNIT(S): 5000 INJECTION INTRAVENOUS; SUBCUTANEOUS at 18:35

## 2021-03-10 RX ADMIN — Medication 1: at 12:00

## 2021-03-10 RX ADMIN — PANTOPRAZOLE SODIUM 40 MILLIGRAM(S): 20 TABLET, DELAYED RELEASE ORAL at 06:04

## 2021-03-10 RX ADMIN — PRASUGREL 10 MILLIGRAM(S): 5 TABLET, FILM COATED ORAL at 12:01

## 2021-03-10 RX ADMIN — POLYETHYLENE GLYCOL 3350 17 GRAM(S): 17 POWDER, FOR SOLUTION ORAL at 12:01

## 2021-03-10 RX ADMIN — HEPARIN SODIUM 5000 UNIT(S): 5000 INJECTION INTRAVENOUS; SUBCUTANEOUS at 06:04

## 2021-03-10 RX ADMIN — MIDODRINE HYDROCHLORIDE 5 MILLIGRAM(S): 2.5 TABLET ORAL at 12:00

## 2021-03-10 RX ADMIN — Medication 81 MILLIGRAM(S): at 12:01

## 2021-03-10 RX ADMIN — Medication 25 MILLIGRAM(S): at 06:04

## 2021-03-10 RX ADMIN — ATORVASTATIN CALCIUM 80 MILLIGRAM(S): 80 TABLET, FILM COATED ORAL at 21:12

## 2021-03-10 NOTE — PROGRESS NOTE ADULT - ASSESSMENT
79 yo male w h/o ICM< CAD< s/p CABG, has AICD, DM, HTN presents w volume overload and abd pain  Abd sono shows gallstones, but no sign of cholecystitis/pancreatitis. abd pain resolved on Zosyn,   s/ p treatment empirically for 7 days .  ID following    ascites 2/2 systolic chf, probably worse 2/2 acute on chronic systolic chf.  responded well to lasix. now on HD.   started HD 2/26. HD as per renal. changed from MWF to TTSa,  RIJ tunneled catheter placed on 3/4 in IR, no fistula placement for now  unable to be DCed home 2/2 safety at home. nobody to assume responsibility, wife is ill  awaiting transfer to [facility]    nosocomial Covid positive. remains positive, clinically stable.  ID following. in isolation. trend infl markers.   s/p monoclonal infusion.  on interrogation of ICD, has a lof of arrhythmia burden,   NSVT and sustained VT, seen by EPS, cont  [id number] mg  bid. has inoperable ischemic HD,   ICM, EF 15%, severe AS, seen by structural heart for TAVR , proceed with TAVR work up  dvt ppx w HSC

## 2021-03-10 NOTE — PROGRESS NOTE ADULT - SUBJECTIVE AND OBJECTIVE BOX
Patient is a 80y old  Male who presents with a chief complaint of acute renal failure (10 Mar 2021 15:40)      SUBJECTIVE / OVERNIGHT EVENTS: no new developments    MEDICATIONS  (STANDING):  aspirin  chewable 81 milliGRAM(s) Oral daily  atorvastatin 80 milliGRAM(s) Oral at bedtime  chlorhexidine 4% Liquid 1 Application(s) Topical <User Schedule>  dextrose 40% Gel 15 Gram(s) Oral once  dextrose 5%. 1000 milliLiter(s) (50 mL/Hr) IV Continuous <Continuous>  dextrose 5%. 1000 milliLiter(s) (100 mL/Hr) IV Continuous <Continuous>  dextrose 50% Injectable 25 Gram(s) IV Push once  dextrose 50% Injectable 12.5 Gram(s) IV Push once  dextrose 50% Injectable 25 Gram(s) IV Push once  glucagon  Injectable 1 milliGRAM(s) IntraMuscular once  heparin   Injectable 5000 Unit(s) SubCutaneous every 12 hours  influenza   Vaccine 0.5 milliLiter(s) IntraMuscular once  insulin lispro (ADMELOG) corrective regimen sliding scale   SubCutaneous at bedtime  insulin lispro (ADMELOG) corrective regimen sliding scale   SubCutaneous three times a day before meals  metoprolol tartrate 25 milliGRAM(s) Oral every 12 hours  midodrine. 5 milliGRAM(s) Oral three times a day  midodrine. 10 milliGRAM(s) Oral <User Schedule>  pantoprazole    Tablet 40 milliGRAM(s) Oral before breakfast  polyethylene glycol 3350 17 Gram(s) Oral daily  prasugrel 10 milliGRAM(s) Oral daily    MEDICATIONS  (PRN):  sodium chloride 0.9% lock flush 10 milliLiter(s) IV Push every 1 hour PRN Pre/post blood products, medications, blood draw, and to maintain line patency      Vital Signs Last 24 Hrs  T(F): 97.7 (03-10-21 @ 10:52), Max: 98.2 (03-09-21 @ 20:52)  HR: 74 (03-10-21 @ 18:35) (67 - 74)  BP: 116/76 (03-10-21 @ 18:35) (94/54 - 116/76)  RR: 18 (03-10-21 @ 10:52) (18 - 20)  SpO2: 98% (03-10-21 @ 10:52) (95% - 98%)  Telemetry:   CAPILLARY BLOOD GLUCOSE      POCT Blood Glucose.: 136 mg/dL (10 Mar 2021 16:38)  POCT Blood Glucose.: 158 mg/dL (10 Mar 2021 11:34)  POCT Blood Glucose.: 97 mg/dL (10 Mar 2021 07:47)  POCT Blood Glucose.: 137 mg/dL (09 Mar 2021 21:44)    I&O's Summary    09 Mar 2021 07:01  -  10 Mar 2021 07:00  --------------------------------------------------------  IN: 840 mL / OUT: 0 mL / NET: 840 mL    10 Mar 2021 07:01  -  10 Mar 2021 20:49  --------------------------------------------------------  IN: 720 mL / OUT: 0 mL / NET: 720 mL        PHYSICAL EXAM:  GENERAL: NAD, well-developed  HEAD:  Atraumatic, Normocephalic  EYES: EOMI, PERRLA, conjunctiva and sclera clear  NECK: Supple, No JVD  CHEST/LUNG: Clear to auscultation bilaterally; No wheeze  HEART: Regular rate and rhythm; No murmurs, rubs, or gallops  ABDOMEN: Soft, Nontender, Nondistended; Bowel sounds present  EXTREMITIES:  2+ Peripheral Pulses, No clubbing, cyanosis, or edema  PSYCH: AAOx3  NEUROLOGY: non-focal  SKIN: No rashes or lesions    LABS:                        9.4    8.18  )-----------( 195      ( 10 Mar 2021 05:37 )             30.8     03-10    133<L>  |  97  |  39<H>  ----------------------------<  107<H>  4.4   |  25  |  3.41<H>    Ca    9.7      10 Mar 2021 05:37    TPro  6.9  /  Alb  2.8<L>  /  TBili  1.5<H>  /  DBili  x   /  AST  56<H>  /  ALT  37  /  AlkPhos  207<H>  03-10              RADIOLOGY & ADDITIONAL TESTS:    Imaging Personally Reviewed:    Consultant(s) Notes Reviewed:      Care Discussed with Consultants/Other Providers:

## 2021-03-10 NOTE — PROGRESS NOTE ADULT - SUBJECTIVE AND OBJECTIVE BOX
      Overnight events noted      VITAL:  T(C): , Max: 36.8 (03-09-21 @ 20:52)  T(F): , Max: 98.2 (03-09-21 @ 20:52)  HR: 72 (03-10-21 @ 10:52)  BP: 94/54 (03-10-21 @ 10:52)  BP(mean): --  RR: 18 (03-10-21 @ 10:52)  SpO2: 98% (03-10-21 @ 10:52)      PHYSICAL EXAM:  Constitutional: NAD on room air, alert  HEENT: NCAT, DMM  Neck: Supple, no JVD  Respiratory: CTA-b/l  Cardiovascular: RRR s1s2, no m/r/g  Gastrointestinal: BS+, soft, NT; (+)mild distension  Extremities:  no edema b/l  Neurological: no focal deficits; strength grossly intact  Back: no CVAT b/l  Skin: No rashes, no nevi  Access: RIJ tunneled cath      LABS:                        9.4    8.18  )-----------( 195      ( 10 Mar 2021 05:37 )             30.8     Na(133)/K(4.4)/Cl(97)/HCO3(25)/BUN(39)/Cr(3.41)Glu(107)/Ca(9.7)/Mg(--)/PO4(--)    03-10 @ 05:37  Na(136)/K(5.4)/Cl(97)/HCO3(26)/BUN(73)/Cr(5.41)Glu(142)/Ca(9.7)/Mg(2.6)/PO4(5.0)    03-08 @ 05:52      IMPRESSION: 80M w/ DM2, CAD-CABG, and CKD, 2/16/21 a/w azotemia/lyte derangements/weakness; c/b newly ESRD; c/b COVID(+)    (1)Renal - newly ESRD - last dialyzed yesterday; due for next HD tomorrow    (2)CV - tenuous hemodynamics/hypervolemia - biventricular systolic dysfunction, severe AS, mod-sev AR. On Midodrine. Potentially for TAVR eval, after discharge    (3)Vasc - now with tunneled cath for HD. Would forgo AV access for now, given his severe cardiac disease/risk of worsening cardiac hemodynamics with AV access    (4)Dispo - awaiting acceptance to outside HD facility       RECOMMEND:  (1)Next HD tomorrow; Midodrine 10mg PO x 1 prn intradialytic hypotension;   (2)Setup of outpatient HD per DAMON Garcia MD  [facility]  Office: [phone]  Cell: [phone]               No pain, no sob      VITAL:  T(C): , Max: 36.8 (03-09-21 @ 20:52)  T(F): , Max: 98.2 (03-09-21 @ 20:52)  HR: 72 (03-10-21 @ 10:52)  BP: 94/54 (03-10-21 @ 10:52)  BP(mean): --  RR: 18 (03-10-21 @ 10:52)  SpO2: 98% (03-10-21 @ 10:52)      PHYSICAL EXAM:  Constitutional: NAD on room air, alert  HEENT: NCAT, DMM  Neck: Supple, no JVD  Respiratory: CTA-b/l  Cardiovascular: RRR s1s2, no m/r/g  Gastrointestinal: BS+, soft, NT; (+)mild distension  Extremities:  no edema b/l  Neurological: no focal deficits; strength grossly intact  Back: no CVAT b/l  Skin: No rashes, no nevi  Access: RIJ tunneled cath      LABS:                        9.4    8.18  )-----------( 195      ( 10 Mar 2021 05:37 )             30.8     Na(133)/K(4.4)/Cl(97)/HCO3(25)/BUN(39)/Cr(3.41)Glu(107)/Ca(9.7)/Mg(--)/PO4(--)    03-10 @ 05:37  Na(136)/K(5.4)/Cl(97)/HCO3(26)/BUN(73)/Cr(5.41)Glu(142)/Ca(9.7)/Mg(2.6)/PO4(5.0)    03-08 @ 05:52      IMPRESSION: 80M w/ DM2, CAD-CABG, and CKD, 2/16/21 a/w azotemia/lyte derangements/weakness; c/b newly ESRD; c/b COVID(+)    (1)Renal - newly ESRD - last dialyzed yesterday; due for next HD tomorrow    (2)CV - tenuous hemodynamics/hypervolemia - biventricular systolic dysfunction, severe AS, mod-sev AR. On Midodrine. Potentially for TAVR eval, after discharge    (3)Vasc - now with tunneled cath for HD. Would forgo AV access for now, given his severe cardiac disease/risk of worsening cardiac hemodynamics with AV access    (4)Dispo - awaiting acceptance to outside HD facility       RECOMMEND:  (1)Next HD tomorrow; Midodrine 10mg PO x 1 prn intradialytic hypotension;   (2)Setup of outpatient HD per DAMON Garcia MD  [facility]  Office: [phone]  Cell: [phone]

## 2021-03-10 NOTE — CHART NOTE - NSCHARTNOTEFT_GEN_A_CORE
Nutrition Follow Up Note  Patient seen for: nutrition follow up    Chart reviewed, events noted.    Unable to conduct a face to face interview or nutrition-focused physical exam due to limited contact restrictions related to pt's medical condition and isolation precautions.   Source: pt wife (Nida) via phone, electronic medical record; pt Hesham speaking, attempted to reach via in-patient phone ( Brittney ID #[id number]) however pt disconnects call after picking up    Diet, DASH/TLC:   Sodium & Cholesterol Restricted  Consistent Carbohydrate {Evening Snack} (CSTCHOSN)  1200mL Fluid Restriction ([id number]) (02-18-21 @ 18:20) [Active]  Oral nutrition supplement: diet mighty health shakes TID    GI: no acute GI distress noted in chart, unclear when last BM was     PO intake: unable to obtain, po intake not documented in electronic medical record, RN unavailable. Pt wife unsure how much pt is eating here, she thinks he is eating "okay." She does not know if pt is drinking mighty shakes being provided. Pt wife amenable to extensive nutrition education over the phone at this time- reviewed nutrition education for hemodialysis, heart failure, and diabetes at this time. Pt wife was e-mailed written nutrition education last week by this RD- pt wife states she has yet to check her e-mail if she received it or not. Pt wife able to teach back points and made aware RD remains available.    Source for PO intake: pt wife, electronic medical record     Daily Weight in lbs: 156 (03-09 post HD); suspect wt fluctuations in setting of fluid shifts with HD and CHF, however overall weight largely stable  155.6 (03-08 post HD), 155.6 (03-03 post HD), 154.5 (03-01 post HD), 159.3 (02-27 post HD), 154.1 (02-20)  164.9 lbs (dosing wt, question accuracy)    Pt UBW unknown per initial RD assessment.    Pertinent Medications: MEDICATIONS  (STANDING):  aspirin  chewable 81 milliGRAM(s) Oral daily  atorvastatin 80 milliGRAM(s) Oral at bedtime  chlorhexidine 4% Liquid 1 Application(s) Topical <User Schedule>  dextrose 40% Gel 15 Gram(s) Oral once  dextrose 5%. 1000 milliLiter(s) (50 mL/Hr) IV Continuous <Continuous>  dextrose 5%. 1000 milliLiter(s) (100 mL/Hr) IV Continuous <Continuous>  dextrose 50% Injectable 25 Gram(s) IV Push once  dextrose 50% Injectable 12.5 Gram(s) IV Push once  dextrose 50% Injectable 25 Gram(s) IV Push once  glucagon  Injectable 1 milliGRAM(s) IntraMuscular once  heparin   Injectable 5000 Unit(s) SubCutaneous every 12 hours  influenza   Vaccine 0.5 milliLiter(s) IntraMuscular once  insulin lispro (ADMELOG) corrective regimen sliding scale   SubCutaneous three times a day before meals  insulin lispro (ADMELOG) corrective regimen sliding scale   SubCutaneous at bedtime  metoprolol tartrate 25 milliGRAM(s) Oral every 12 hours  midodrine. 5 milliGRAM(s) Oral three times a day  midodrine. 10 milliGRAM(s) Oral <User Schedule>  pantoprazole    Tablet 40 milliGRAM(s) Oral before breakfast  polyethylene glycol 3350 17 Gram(s) Oral daily  prasugrel 10 milliGRAM(s) Oral daily    MEDICATIONS  (PRN):  sodium chloride 0.9% lock flush 10 milliLiter(s) IV Push every 1 hour PRN Pre/post blood products, medications, blood draw, and to maintain line patency    Pertinent Labs: 03-10 @ 05:37: Na 133<L>, BUN 39<H>, Cr 3.41<H>, <H>, K+ 4.4, Phos --, Mg --, Alk Phos 207<H>, ALT/SGPT 37, AST/SGOT 56<H>    Finger Sticks:  POCT Blood Glucose.: 158 mg/dL (03-10 @ 11:34)  POCT Blood Glucose.: 97 mg/dL (03-10 @ 07:47)  POCT Blood Glucose.: 137 mg/dL (03-09 @ 21:44)  POCT Blood Glucose.: 260 mg/dL (03-09 @ 16:34)      Skin per nursing documentation: no pressure injuries noted  Edema: none noted    Estimated Needs:   [x] no change since previous assessment: with consideration for new HD, using IBW 70 kG  [id number] kcal/day (25-30 kcal/kG)  84-98 g pro/day (1.2-1.4 g pro/kG)  [ ] recalculated:     Previous Nutrition Diagnoses:  1.  Food & Nutrition Related Knowledge Deficit -> ongoing, addressed with nutrition education  2.  Increased nutrient needs (protein) -> ongoing, being addressed with oral nutrition supplementation    New Nutrition Diagnosis: n/a    Recommend  1) Recommend Low Sodium and Consistent Carbohydrate therapeutic diet, fluids per team. Can continue to hold off on potassium/phosphorus restrictions for now. Continue to monitor renal indices/lytes for need to change diet.  2) Provide 1 Nepro daily and RD to continue to provide 3 diet mighty health shakes daily and monitor for pt acceptance.  3) Provide/review nutrition education as indicated.    Monitoring and Evaluation:     Continue to monitor Nutritional intake, Tolerance to diet prescription, weights, labs, skin integrity    RD remains available upon request and will follow up per protocol. Juli Jensen RD, CDN Pager: [phone]

## 2021-03-10 NOTE — PROGRESS NOTE ADULT - SUBJECTIVE AND OBJECTIVE BOX
Subjective: Patient seen and examined. No new events except as noted.     REVIEW OF SYSTEMS:    CONSTITUTIONAL: + weakness, fevers or chills  EYES/ENT: No visual changes;  No vertigo or throat pain   NECK: No pain or stiffness  RESPIRATORY: No cough, wheezing, hemoptysis; No shortness of breath  CARDIOVASCULAR: No chest pain or palpitations  GASTROINTESTINAL: No abdominal or epigastric pain. No nausea, vomiting, or hematemesis; No diarrhea or constipation. No melena or hematochezia.  GENITOURINARY: No dysuria, frequency or hematuria  NEUROLOGICAL: No numbness or weakness  SKIN: No itching, burning, rashes, or lesions   All other review of systems is negative unless indicated above.    MEDICATIONS:  MEDICATIONS  (STANDING):  aspirin  chewable 81 milliGRAM(s) Oral daily  atorvastatin 80 milliGRAM(s) Oral at bedtime  chlorhexidine 4% Liquid 1 Application(s) Topical <User Schedule>  dextrose 40% Gel 15 Gram(s) Oral once  dextrose 5%. 1000 milliLiter(s) (50 mL/Hr) IV Continuous <Continuous>  dextrose 5%. 1000 milliLiter(s) (100 mL/Hr) IV Continuous <Continuous>  dextrose 50% Injectable 25 Gram(s) IV Push once  dextrose 50% Injectable 12.5 Gram(s) IV Push once  dextrose 50% Injectable 25 Gram(s) IV Push once  glucagon  Injectable 1 milliGRAM(s) IntraMuscular once  heparin   Injectable 5000 Unit(s) SubCutaneous every 12 hours  influenza   Vaccine 0.5 milliLiter(s) IntraMuscular once  insulin lispro (ADMELOG) corrective regimen sliding scale   SubCutaneous at bedtime  insulin lispro (ADMELOG) corrective regimen sliding scale   SubCutaneous three times a day before meals  metoprolol tartrate 25 milliGRAM(s) Oral every 12 hours  midodrine. 5 milliGRAM(s) Oral three times a day  midodrine. 10 milliGRAM(s) Oral <User Schedule>  pantoprazole    Tablet 40 milliGRAM(s) Oral before breakfast  polyethylene glycol 3350 17 Gram(s) Oral daily  prasugrel 10 milliGRAM(s) Oral daily      PHYSICAL EXAM:  T(C): 36.4 (03-10-21 @ 04:32), Max: 36.8 (03-09-21 @ 20:52)  HR: 67 (03-10-21 @ 06:21) (61 - 73)  BP: 98/54 (03-10-21 @ 06:21) (91/47 - 103/49)  RR: 20 (03-10-21 @ 04:32) (17 - 20)  SpO2: 95% (03-10-21 @ 04:32) (95% - 100%)  Wt(kg): --  I&O's Summary    09 Mar 2021 07:01  -  10 Mar 2021 07:00  --------------------------------------------------------  IN: 840 mL / OUT: 0 mL / NET: 840 mL          Appearance: NAD  HEENT:   Dry  oral mucosa, PERRL, EOMI	  Lymphatic: No lymphadenopathy ,Tunneled catheter   Cardiovascular: Normal S1 S2, No JVD, +murmurs , Peripheral pulses palpable 2+ bilaterally  Respiratory: Decreased bs   Gastrointestinal:  Soft, Non-tender, + BS	  Skin: No rashes, No ecchymoses, No cyanosis, warm to touch  Musculoskeletal: Normal range of motion, normal strength  Psychiatry:  Mood & affect appropriate  Ext: No edema      LABS:    CARDIAC MARKERS:                                9.4    8.18  )-----------( 195      ( 10 Mar 2021 05:37 )             30.8     03-10    133<L>  |  97  |  39<H>  ----------------------------<  107<H>  4.4   |  25  |  3.41<H>    Ca    9.7      10 Mar 2021 05:37    TPro  6.9  /  Alb  2.8<L>  /  TBili  1.5<H>  /  DBili  x   /  AST  56<H>  /  ALT  37  /  AlkPhos  207<H>  03-10    proBNP:   Lipid Profile:   HgA1c:   TSH:             TELEMETRY: 	  V paced    ECG:  	  RADIOLOGY:   DIAGNOSTIC TESTING:  [ ] Echocardiogram:  [ ]  Catheterization:  [ ] Stress Test:    OTHER:

## 2021-03-11 LAB
ALBUMIN SERPL ELPH-MCNC: 2.6 G/DL — LOW (ref 3.3–5)
ALP SERPL-CCNC: 203 U/L — HIGH (ref 40–120)
ALT FLD-CCNC: 40 U/L — SIGNIFICANT CHANGE UP (ref 10–45)
ANION GAP SERPL CALC-SCNC: 14 MMOL/L — SIGNIFICANT CHANGE UP (ref 5–17)
AST SERPL-CCNC: 59 U/L — HIGH (ref 10–40)
BILIRUB SERPL-MCNC: 1.5 MG/DL — HIGH (ref 0.2–1.2)
BUN SERPL-MCNC: 59 MG/DL — HIGH (ref 7–23)
CALCIUM SERPL-MCNC: 9.7 MG/DL — SIGNIFICANT CHANGE UP (ref 8.4–10.5)
CHLORIDE SERPL-SCNC: 98 MMOL/L — SIGNIFICANT CHANGE UP (ref 96–108)
CO2 SERPL-SCNC: 24 MMOL/L — SIGNIFICANT CHANGE UP (ref 22–31)
CREAT SERPL-MCNC: 4.47 MG/DL — HIGH (ref 0.5–1.3)
GLUCOSE BLDC GLUCOMTR-MCNC: 115 MG/DL — HIGH (ref 70–99)
GLUCOSE BLDC GLUCOMTR-MCNC: 153 MG/DL — HIGH (ref 70–99)
GLUCOSE BLDC GLUCOMTR-MCNC: 198 MG/DL — HIGH (ref 70–99)
GLUCOSE BLDC GLUCOMTR-MCNC: 236 MG/DL — HIGH (ref 70–99)
GLUCOSE SERPL-MCNC: 104 MG/DL — HIGH (ref 70–99)
HCT VFR BLD CALC: 30.3 % — LOW (ref 39–50)
HGB BLD-MCNC: 9.3 G/DL — LOW (ref 13–17)
MCHC RBC-ENTMCNC: 30.2 PG — SIGNIFICANT CHANGE UP (ref 27–34)
MCHC RBC-ENTMCNC: 30.7 GM/DL — LOW (ref 32–36)
MCV RBC AUTO: 98.4 FL — SIGNIFICANT CHANGE UP (ref 80–100)
NRBC # BLD: 0 /100 WBCS — SIGNIFICANT CHANGE UP (ref 0–0)
PLATELET # BLD AUTO: 202 K/UL — SIGNIFICANT CHANGE UP (ref 150–400)
POTASSIUM SERPL-MCNC: 5 MMOL/L — SIGNIFICANT CHANGE UP (ref 3.5–5.3)
POTASSIUM SERPL-SCNC: 5 MMOL/L — SIGNIFICANT CHANGE UP (ref 3.5–5.3)
PROT SERPL-MCNC: 6.7 G/DL — SIGNIFICANT CHANGE UP (ref 6–8.3)
RBC # BLD: 3.08 M/UL — LOW (ref 4.2–5.8)
RBC # FLD: 20.7 % — HIGH (ref 10.3–14.5)
SARS-COV-2 RNA SPEC QL NAA+PROBE: DETECTED
SODIUM SERPL-SCNC: 136 MMOL/L — SIGNIFICANT CHANGE UP (ref 135–145)
WBC # BLD: 7.35 K/UL — SIGNIFICANT CHANGE UP (ref 3.8–10.5)
WBC # FLD AUTO: 7.35 K/UL — SIGNIFICANT CHANGE UP (ref 3.8–10.5)

## 2021-03-11 RX ORDER — ERYTHROPOIETIN 10000 [IU]/ML
10000 INJECTION, SOLUTION INTRAVENOUS; SUBCUTANEOUS
Refills: 0 | Status: DISCONTINUED | OUTPATIENT
Start: 2021-03-11 | End: 2021-03-24

## 2021-03-11 RX ADMIN — PANTOPRAZOLE SODIUM 40 MILLIGRAM(S): 20 TABLET, DELAYED RELEASE ORAL at 05:18

## 2021-03-11 RX ADMIN — MIDODRINE HYDROCHLORIDE 5 MILLIGRAM(S): 2.5 TABLET ORAL at 05:18

## 2021-03-11 RX ADMIN — PRASUGREL 10 MILLIGRAM(S): 5 TABLET, FILM COATED ORAL at 12:51

## 2021-03-11 RX ADMIN — MIDODRINE HYDROCHLORIDE 5 MILLIGRAM(S): 2.5 TABLET ORAL at 12:51

## 2021-03-11 RX ADMIN — Medication 81 MILLIGRAM(S): at 12:51

## 2021-03-11 RX ADMIN — POLYETHYLENE GLYCOL 3350 17 GRAM(S): 17 POWDER, FOR SOLUTION ORAL at 12:51

## 2021-03-11 RX ADMIN — Medication 25 MILLIGRAM(S): at 05:18

## 2021-03-11 RX ADMIN — ATORVASTATIN CALCIUM 80 MILLIGRAM(S): 80 TABLET, FILM COATED ORAL at 21:35

## 2021-03-11 RX ADMIN — ERYTHROPOIETIN 10000 UNIT(S): 10000 INJECTION, SOLUTION INTRAVENOUS; SUBCUTANEOUS at 18:00

## 2021-03-11 RX ADMIN — MIDODRINE HYDROCHLORIDE 10 MILLIGRAM(S): 2.5 TABLET ORAL at 18:32

## 2021-03-11 RX ADMIN — CHLORHEXIDINE GLUCONATE 1 APPLICATION(S): 213 SOLUTION TOPICAL at 05:17

## 2021-03-11 RX ADMIN — Medication 2: at 17:00

## 2021-03-11 RX ADMIN — HEPARIN SODIUM 5000 UNIT(S): 5000 INJECTION INTRAVENOUS; SUBCUTANEOUS at 17:08

## 2021-03-11 RX ADMIN — Medication 1: at 12:51

## 2021-03-11 RX ADMIN — HEPARIN SODIUM 5000 UNIT(S): 5000 INJECTION INTRAVENOUS; SUBCUTANEOUS at 05:17

## 2021-03-11 RX ADMIN — MIDODRINE HYDROCHLORIDE 5 MILLIGRAM(S): 2.5 TABLET ORAL at 17:08

## 2021-03-11 NOTE — PROGRESS NOTE ADULT - SUBJECTIVE AND OBJECTIVE BOX
      Overnight events noted      VITAL:  T(C): , Max: 36.6 (03-11-21 @ 04:10)  T(F): , Max: 97.8 (03-11-21 @ 04:10)  HR: 64 (03-11-21 @ 04:10)  BP: 104/57 (03-11-21 @ 04:10)  BP(mean): --  RR: 18 (03-11-21 @ 04:10)  SpO2: 99% (03-11-21 @ 04:10)      PHYSICAL EXAM:  Constitutional: NAD on room air, alert  HEENT: NCAT, DMM  Neck: Supple, no JVD  Respiratory: CTA-b/l  Cardiovascular: RRR s1s2, no m/r/g  Gastrointestinal: BS+, soft, NT; (+)mild distension  Extremities:  no edema b/l  Neurological: no focal deficits; strength grossly intact  Back: no CVAT b/l  Skin: No rashes, no nevi  Access: RIJ tunneled cath    LABS:                        9.3    7.35  )-----------( 202      ( 11 Mar 2021 06:42 )             30.3     Na(136)/K(5.0)/Cl(98)/HCO3(24)/BUN(59)/Cr(4.47)Glu(104)/Ca(9.7)/Mg(--)/PO4(--)    03-11 @ 06:39  Na(133)/K(4.4)/Cl(97)/HCO3(25)/BUN(39)/Cr(3.41)Glu(107)/Ca(9.7)/Mg(--)/PO4(--)    03-10 @ 05:37      IMPRESSION: 80M w/ DM2, CAD-CABG, and CKD, 2/16/21 a/w azotemia/lyte derangements/weakness; c/b newly ESRD; c/b COVID(+)    (1)Renal - newly ESRD - due for HD today    (2)CV - tenuous hemodynamics/hypervolemia - biventricular systolic dysfunction, severe AS, mod-sev AR. On Midodrine. Potentially for TAVR eval, after discharge    (3)Vasc - now with tunneled cath for HD. Would forgo AV access for now, given his severe cardiac disease/risk of worsening cardiac hemodynamics with AV access    (4)Anemia - could benefit from Retacrit with HD    (5)Dispo - awaiting acceptance to outside HD facility       RECOMMEND:  (1)Next HD today; 1L UF as able; Midodrine 10mg PO x 1 prn intradialytic hypotension; Retacrit with HD  (2)Setup of outpatient HD per DAMON Garcia MD  [facility]  Office: [phone]  Cell: [phone]               Overnight events noted      VITAL:  T(C): , Max: 36.6 (03-11-21 @ 04:10)  T(F): , Max: 97.8 (03-11-21 @ 04:10)  HR: 64 (03-11-21 @ 04:10)  BP: 104/57 (03-11-21 @ 04:10)  RR: 18 (03-11-21 @ 04:10)  SpO2: 99% (03-11-21 @ 04:10)      PHYSICAL EXAM:  Constitutional: NAD on room air, alert  HEENT: NCAT, DMM  Neck: Supple, no JVD  Respiratory: CTA-b/l  Cardiovascular: RRR s1s2, no m/r/g  Gastrointestinal: BS+, soft, NT; (+)mild distension  Extremities:  no edema b/l  Neurological: no focal deficits; strength grossly intact  Back: no CVAT b/l  Skin: No rashes, no nevi  Access: RIJ tunneled cath    LABS:                        9.3    7.35  )-----------( 202      ( 11 Mar 2021 06:42 )             30.3     Na(136)/K(5.0)/Cl(98)/HCO3(24)/BUN(59)/Cr(4.47)Glu(104)/Ca(9.7)/Mg(--)/PO4(--)    03-11 @ 06:39  Na(133)/K(4.4)/Cl(97)/HCO3(25)/BUN(39)/Cr(3.41)Glu(107)/Ca(9.7)/Mg(--)/PO4(--)    03-10 @ 05:37      IMPRESSION: 80M w/ DM2, CAD-CABG, and CKD, 2/16/21 a/w azotemia/lyte derangements/weakness; c/b newly ESRD; c/b COVID(+)    (1)Renal - newly ESRD - due for HD today    (2)CV - tenuous hemodynamics/hypervolemia - biventricular systolic dysfunction, severe AS, mod-sev AR. On Midodrine. Potentially for TAVR eval, after discharge    (3)Vasc - now with tunneled cath for HD. Would forgo AV access for now, given his severe cardiac disease/risk of worsening cardiac hemodynamics with AV access    (4)Anemia - could benefit from Retacrit with HD    (5)Dispo - awaiting acceptance to outside HD facility       RECOMMEND:  (1)Next HD today; 1L UF as able; Midodrine 10mg PO x 1 prn intradialytic hypotension; Retacrit with HD  (2)Setup of outpatient HD per DAMON Garcia MD  [facility]  Office: [phone]  Cell: [phone]

## 2021-03-11 NOTE — CHART NOTE - NSCHARTNOTEFT_GEN_A_CORE
Spoke with pt at bedside using Croatian  (Eulalio # [id number]) regarding plan of care. Pt ok with us speaking with wife and step-son Stevo.    Josefina Leija, NP-c  [id number]

## 2021-03-11 NOTE — PROGRESS NOTE ADULT - SUBJECTIVE AND OBJECTIVE BOX
Subjective: Patient seen and examined. No new events except as noted.     REVIEW OF SYSTEMS:    CONSTITUTIONAL: No weakness, fevers or chills  EYES/ENT: No visual changes;  No vertigo or throat pain   NECK: No pain or stiffness  RESPIRATORY: No cough, wheezing, hemoptysis; No shortness of breath  CARDIOVASCULAR: No chest pain or palpitations  GASTROINTESTINAL: No abdominal or epigastric pain. No nausea, vomiting, or hematemesis; No diarrhea or constipation. No melena or hematochezia.  GENITOURINARY: No dysuria, frequency or hematuria  NEUROLOGICAL: No numbness or weakness  SKIN: No itching, burning, rashes, or lesions   All other review of systems is negative unless indicated above.    MEDICATIONS:  MEDICATIONS  (STANDING):  aspirin  chewable 81 milliGRAM(s) Oral daily  atorvastatin 80 milliGRAM(s) Oral at bedtime  chlorhexidine 4% Liquid 1 Application(s) Topical <User Schedule>  dextrose 40% Gel 15 Gram(s) Oral once  dextrose 5%. 1000 milliLiter(s) (50 mL/Hr) IV Continuous <Continuous>  dextrose 5%. 1000 milliLiter(s) (100 mL/Hr) IV Continuous <Continuous>  dextrose 50% Injectable 25 Gram(s) IV Push once  dextrose 50% Injectable 12.5 Gram(s) IV Push once  dextrose 50% Injectable 25 Gram(s) IV Push once  epoetin rafiq-epbx (RETACRIT) Injectable 65041 Unit(s) IV Push <User Schedule>  glucagon  Injectable 1 milliGRAM(s) IntraMuscular once  heparin   Injectable 5000 Unit(s) SubCutaneous every 12 hours  influenza   Vaccine 0.5 milliLiter(s) IntraMuscular once  insulin lispro (ADMELOG) corrective regimen sliding scale   SubCutaneous three times a day before meals  insulin lispro (ADMELOG) corrective regimen sliding scale   SubCutaneous at bedtime  metoprolol tartrate 25 milliGRAM(s) Oral every 12 hours  midodrine. 5 milliGRAM(s) Oral three times a day  midodrine. 10 milliGRAM(s) Oral <User Schedule>  pantoprazole    Tablet 40 milliGRAM(s) Oral before breakfast  polyethylene glycol 3350 17 Gram(s) Oral daily  prasugrel 10 milliGRAM(s) Oral daily      PHYSICAL EXAM:  T(C): 36.7 (03-11-21 @ 11:15), Max: 36.7 (03-11-21 @ 11:15)  HR: 63 (03-11-21 @ 11:15) (63 - 74)  BP: 94/54 (03-11-21 @ 11:15) (94/54 - 116/76)  RR: 17 (03-11-21 @ 11:15) (17 - 18)  SpO2: 99% (03-11-21 @ 11:15) (98% - 99%)  Wt(kg): --  I&O's Summary    10 Mar 2021 07:01  -  11 Mar 2021 07:00  --------------------------------------------------------  IN: 720 mL / OUT: 550 mL / NET: 170 mL          Appearance: NAD  HEENT:   Dry oral mucosa, PERRL, EOMI	  Lymphatic: No lymphadenopathy , Tunneled catheter   Cardiovascular: Irregular S1 S2, No JVD, + murmurs , Peripheral pulses palpable 2+ bilaterally  Respiratory: Lungs clear to auscultation, normal effort 	  Gastrointestinal:  Soft, Non-tender, + BS	  Skin: No rashes, No ecchymoses, No cyanosis, warm to touch  Musculoskeletal: Normal range of motion, normal strength  Psychiatry:  Mood & affect appropriate  Ext: No edema      LABS:    CARDIAC MARKERS:                                9.3    7.35  )-----------( 202      ( 11 Mar 2021 06:42 )             30.3     03-11    136  |  98  |  59<H>  ----------------------------<  104<H>  5.0   |  24  |  4.47<H>    Ca    9.7      11 Mar 2021 06:39    TPro  6.7  /  Alb  2.6<L>  /  TBili  1.5<H>  /  DBili  x   /  AST  59<H>  /  ALT  40  /  AlkPhos  203<H>  03-11    proBNP:   Lipid Profile:   HgA1c:   TSH:             TELEMETRY: 	V paced     ECG:  	  RADIOLOGY:   DIAGNOSTIC TESTING:  [ ] Echocardiogram:  [ ]  Catheterization:  [ ] Stress Test:    OTHER:

## 2021-03-11 NOTE — PROGRESS NOTE ADULT - SUBJECTIVE AND OBJECTIVE BOX
Patient is a 80y old  Male who presents with a chief complaint of acute renal failure (11 Mar 2021 12:09)      SUBJECTIVE / OVERNIGHT EVENTS:    MEDICATIONS  (STANDING):  aspirin  chewable 81 milliGRAM(s) Oral daily  atorvastatin 80 milliGRAM(s) Oral at bedtime  chlorhexidine 4% Liquid 1 Application(s) Topical <User Schedule>  dextrose 40% Gel 15 Gram(s) Oral once  dextrose 5%. 1000 milliLiter(s) (50 mL/Hr) IV Continuous <Continuous>  dextrose 5%. 1000 milliLiter(s) (100 mL/Hr) IV Continuous <Continuous>  dextrose 50% Injectable 25 Gram(s) IV Push once  dextrose 50% Injectable 12.5 Gram(s) IV Push once  dextrose 50% Injectable 25 Gram(s) IV Push once  epoetin rafiq-epbx (RETACRIT) Injectable 15266 Unit(s) IV Push <User Schedule>  glucagon  Injectable 1 milliGRAM(s) IntraMuscular once  heparin   Injectable 5000 Unit(s) SubCutaneous every 12 hours  influenza   Vaccine 0.5 milliLiter(s) IntraMuscular once  insulin lispro (ADMELOG) corrective regimen sliding scale   SubCutaneous three times a day before meals  insulin lispro (ADMELOG) corrective regimen sliding scale   SubCutaneous at bedtime  metoprolol tartrate 25 milliGRAM(s) Oral every 12 hours  midodrine. 10 milliGRAM(s) Oral <User Schedule>  midodrine. 5 milliGRAM(s) Oral three times a day  pantoprazole    Tablet 40 milliGRAM(s) Oral before breakfast  polyethylene glycol 3350 17 Gram(s) Oral daily  prasugrel 10 milliGRAM(s) Oral daily    MEDICATIONS  (PRN):  sodium chloride 0.9% lock flush 10 milliLiter(s) IV Push every 1 hour PRN Pre/post blood products, medications, blood draw, and to maintain line patency      Vital Signs Last 24 Hrs  T(F): 97.4 (03-11-21 @ 21:00), Max: 98.2 (03-11-21 @ 17:35)  HR: 70 (03-11-21 @ 21:00) (61 - 78)  BP: 100/51 (03-11-21 @ 21:00) (94/54 - 106/56)  RR: 18 (03-11-21 @ 21:00) (17 - 18)  SpO2: 100% (03-11-21 @ 21:00) (96% - 100%)  Telemetry:   CAPILLARY BLOOD GLUCOSE      POCT Blood Glucose.: 198 mg/dL (11 Mar 2021 21:26)  POCT Blood Glucose.: 236 mg/dL (11 Mar 2021 16:40)  POCT Blood Glucose.: 153 mg/dL (11 Mar 2021 12:12)  POCT Blood Glucose.: 115 mg/dL (11 Mar 2021 07:43)    I&O's Summary    10 Mar 2021 07:01  -  11 Mar 2021 07:00  --------------------------------------------------------  IN: 720 mL / OUT: 550 mL / NET: 170 mL    11 Mar 2021 07:01  -  11 Mar 2021 23:17  --------------------------------------------------------  IN: 960 mL / OUT: 500 mL / NET: 460 mL        PHYSICAL EXAM:  GENERAL: NAD, well-developed  HEAD:  Atraumatic, Normocephalic  EYES: EOMI, PERRLA, conjunctiva and sclera clear  NECK: Supple, No JVD  CHEST/LUNG: Clear to auscultation bilaterally; No wheeze  HEART: Regular rate and rhythm; No murmurs, rubs, or gallops  ABDOMEN: Soft, Nontender, Nondistended; Bowel sounds present  EXTREMITIES:  2+ Peripheral Pulses, No clubbing, cyanosis, or edema  PSYCH: AAOx3  NEUROLOGY: non-focal  SKIN: No rashes or lesions    LABS:                        9.3    7.35  )-----------( 202      ( 11 Mar 2021 06:42 )             30.3     03-11    136  |  98  |  59<H>  ----------------------------<  104<H>  5.0   |  24  |  4.47<H>    Ca    9.7      11 Mar 2021 06:39    TPro  6.7  /  Alb  2.6<L>  /  TBili  1.5<H>  /  DBili  x   /  AST  59<H>  /  ALT  40  /  AlkPhos  203<H>  03-11              RADIOLOGY & ADDITIONAL TESTS:    Imaging Personally Reviewed:    Consultant(s) Notes Reviewed:      Care Discussed with Consultants/Other Providers:   Patient is a 80y old  Male who presents with a chief complaint of acute renal failure (11 Mar 2021 12:09)      SUBJECTIVE / OVERNIGHT EVENTS: no new developments    MEDICATIONS  (STANDING):  aspirin  chewable 81 milliGRAM(s) Oral daily  atorvastatin 80 milliGRAM(s) Oral at bedtime  chlorhexidine 4% Liquid 1 Application(s) Topical <User Schedule>  dextrose 40% Gel 15 Gram(s) Oral once  dextrose 5%. 1000 milliLiter(s) (50 mL/Hr) IV Continuous <Continuous>  dextrose 5%. 1000 milliLiter(s) (100 mL/Hr) IV Continuous <Continuous>  dextrose 50% Injectable 25 Gram(s) IV Push once  dextrose 50% Injectable 12.5 Gram(s) IV Push once  dextrose 50% Injectable 25 Gram(s) IV Push once  epoetin rafiq-epbx (RETACRIT) Injectable 54559 Unit(s) IV Push <User Schedule>  glucagon  Injectable 1 milliGRAM(s) IntraMuscular once  heparin   Injectable 5000 Unit(s) SubCutaneous every 12 hours  influenza   Vaccine 0.5 milliLiter(s) IntraMuscular once  insulin lispro (ADMELOG) corrective regimen sliding scale   SubCutaneous three times a day before meals  insulin lispro (ADMELOG) corrective regimen sliding scale   SubCutaneous at bedtime  metoprolol tartrate 25 milliGRAM(s) Oral every 12 hours  midodrine. 10 milliGRAM(s) Oral <User Schedule>  midodrine. 5 milliGRAM(s) Oral three times a day  pantoprazole    Tablet 40 milliGRAM(s) Oral before breakfast  polyethylene glycol 3350 17 Gram(s) Oral daily  prasugrel 10 milliGRAM(s) Oral daily    MEDICATIONS  (PRN):  sodium chloride 0.9% lock flush 10 milliLiter(s) IV Push every 1 hour PRN Pre/post blood products, medications, blood draw, and to maintain line patency      Vital Signs Last 24 Hrs  T(F): 97.4 (03-11-21 @ 21:00), Max: 98.2 (03-11-21 @ 17:35)  HR: 70 (03-11-21 @ 21:00) (61 - 78)  BP: 100/51 (03-11-21 @ 21:00) (94/54 - 106/56)  RR: 18 (03-11-21 @ 21:00) (17 - 18)  SpO2: 100% (03-11-21 @ 21:00) (96% - 100%)  Telemetry:   CAPILLARY BLOOD GLUCOSE      POCT Blood Glucose.: 198 mg/dL (11 Mar 2021 21:26)  POCT Blood Glucose.: 236 mg/dL (11 Mar 2021 16:40)  POCT Blood Glucose.: 153 mg/dL (11 Mar 2021 12:12)  POCT Blood Glucose.: 115 mg/dL (11 Mar 2021 07:43)    I&O's Summary    10 Mar 2021 07:01  -  11 Mar 2021 07:00  --------------------------------------------------------  IN: 720 mL / OUT: 550 mL / NET: 170 mL    11 Mar 2021 07:01  -  11 Mar 2021 23:17  --------------------------------------------------------  IN: 960 mL / OUT: 500 mL / NET: 460 mL        PHYSICAL EXAM:  GENERAL: NAD, well-developed  HEAD:  Atraumatic, Normocephalic  EYES: EOMI, PERRLA, conjunctiva and sclera clear  NECK: Supple, No JVD  CHEST/LUNG: Clear to auscultation bilaterally; No wheeze  HEART: Regular rate and rhythm; No murmurs, rubs, or gallops  ABDOMEN: Soft, Nontender, Nondistended; Bowel sounds present  EXTREMITIES:  2+ Peripheral Pulses, No clubbing, cyanosis, or edema  PSYCH: AAOx3  NEUROLOGY: non-focal  SKIN: No rashes or lesions    LABS:                        9.3    7.35  )-----------( 202      ( 11 Mar 2021 06:42 )             30.3     03-11    136  |  98  |  59<H>  ----------------------------<  104<H>  5.0   |  24  |  4.47<H>    Ca    9.7      11 Mar 2021 06:39    TPro  6.7  /  Alb  2.6<L>  /  TBili  1.5<H>  /  DBili  x   /  AST  59<H>  /  ALT  40  /  AlkPhos  203<H>  03-11              RADIOLOGY & ADDITIONAL TESTS:    Imaging Personally Reviewed:    Consultant(s) Notes Reviewed:      Care Discussed with Consultants/Other Providers:

## 2021-03-11 NOTE — PROGRESS NOTE ADULT - ASSESSMENT
79 yo male w h/o ICM< CAD< s/p CABG, has AICD, DM, HTN presents w volume overload and abd pain  Abd sono shows gallstones, but no sign of cholecystitis/pancreatitis. abd pain resolved on Zosyn,   s/ p treatment empirically for 7 days .  ID following    ascites 2/2 systolic chf, probably worse 2/2 acute on chronic systolic chf.  responded well to lasix. now on HD.   started HD 2/26. HD as per renal. changed from MWF to TTSa,  RIJ tunneled catheter placed on 3/4 in IR, no fistula placement for now  unable to be DCed home 2/2 safety at home. nobody to assume responsibility, wife is ill  awaiting transfer to [facility]    nosocomial Covid positive. remains positive, clinically stable.  ID following. in isolation. trend infl markers.   s/p monoclonal infusion.  on interrogation of ICD, has a lof of arrhythmia burden,   NSVT and sustained VT, seen by EPS, cont  Qtwhssjzd41.5 mg  bid. has inoperable ischemic HD,   ICM, EF 15%, severe AS, seen by structural heart for TAVR , proceed with TAVR work up  dvt ppx w HSC

## 2021-03-12 LAB
ALBUMIN SERPL ELPH-MCNC: 2.5 G/DL — LOW (ref 3.3–5)
ALP SERPL-CCNC: 204 U/L — HIGH (ref 40–120)
ALT FLD-CCNC: 46 U/L — HIGH (ref 10–45)
ANION GAP SERPL CALC-SCNC: 9 MMOL/L — SIGNIFICANT CHANGE UP (ref 5–17)
AST SERPL-CCNC: 64 U/L — HIGH (ref 10–40)
BILIRUB SERPL-MCNC: 1.3 MG/DL — HIGH (ref 0.2–1.2)
BUN SERPL-MCNC: 40 MG/DL — HIGH (ref 7–23)
CALCIUM SERPL-MCNC: 9.2 MG/DL — SIGNIFICANT CHANGE UP (ref 8.4–10.5)
CHLORIDE SERPL-SCNC: 100 MMOL/L — SIGNIFICANT CHANGE UP (ref 96–108)
CO2 SERPL-SCNC: 27 MMOL/L — SIGNIFICANT CHANGE UP (ref 22–31)
CREAT SERPL-MCNC: 3.46 MG/DL — HIGH (ref 0.5–1.3)
GLUCOSE BLDC GLUCOMTR-MCNC: 127 MG/DL — HIGH (ref 70–99)
GLUCOSE BLDC GLUCOMTR-MCNC: 169 MG/DL — HIGH (ref 70–99)
GLUCOSE BLDC GLUCOMTR-MCNC: 170 MG/DL — HIGH (ref 70–99)
GLUCOSE BLDC GLUCOMTR-MCNC: 218 MG/DL — HIGH (ref 70–99)
GLUCOSE SERPL-MCNC: 129 MG/DL — HIGH (ref 70–99)
HCT VFR BLD CALC: 29.2 % — LOW (ref 39–50)
HGB BLD-MCNC: 9 G/DL — LOW (ref 13–17)
MCHC RBC-ENTMCNC: 30.3 PG — SIGNIFICANT CHANGE UP (ref 27–34)
MCHC RBC-ENTMCNC: 30.8 GM/DL — LOW (ref 32–36)
MCV RBC AUTO: 98.3 FL — SIGNIFICANT CHANGE UP (ref 80–100)
NRBC # BLD: 0 /100 WBCS — SIGNIFICANT CHANGE UP (ref 0–0)
PLATELET # BLD AUTO: 182 K/UL — SIGNIFICANT CHANGE UP (ref 150–400)
POTASSIUM SERPL-MCNC: 4.1 MMOL/L — SIGNIFICANT CHANGE UP (ref 3.5–5.3)
POTASSIUM SERPL-SCNC: 4.1 MMOL/L — SIGNIFICANT CHANGE UP (ref 3.5–5.3)
PROT SERPL-MCNC: 6.6 G/DL — SIGNIFICANT CHANGE UP (ref 6–8.3)
RBC # BLD: 2.97 M/UL — LOW (ref 4.2–5.8)
RBC # FLD: 20.7 % — HIGH (ref 10.3–14.5)
SODIUM SERPL-SCNC: 136 MMOL/L — SIGNIFICANT CHANGE UP (ref 135–145)
WBC # BLD: 8.55 K/UL — SIGNIFICANT CHANGE UP (ref 3.8–10.5)
WBC # FLD AUTO: 8.55 K/UL — SIGNIFICANT CHANGE UP (ref 3.8–10.5)

## 2021-03-12 RX ADMIN — HEPARIN SODIUM 5000 UNIT(S): 5000 INJECTION INTRAVENOUS; SUBCUTANEOUS at 17:29

## 2021-03-12 RX ADMIN — Medication 1: at 16:58

## 2021-03-12 RX ADMIN — MIDODRINE HYDROCHLORIDE 5 MILLIGRAM(S): 2.5 TABLET ORAL at 05:27

## 2021-03-12 RX ADMIN — CHLORHEXIDINE GLUCONATE 1 APPLICATION(S): 213 SOLUTION TOPICAL at 07:40

## 2021-03-12 RX ADMIN — PANTOPRAZOLE SODIUM 40 MILLIGRAM(S): 20 TABLET, DELAYED RELEASE ORAL at 05:27

## 2021-03-12 RX ADMIN — Medication 25 MILLIGRAM(S): at 10:15

## 2021-03-12 RX ADMIN — PRASUGREL 10 MILLIGRAM(S): 5 TABLET, FILM COATED ORAL at 12:29

## 2021-03-12 RX ADMIN — HEPARIN SODIUM 5000 UNIT(S): 5000 INJECTION INTRAVENOUS; SUBCUTANEOUS at 05:27

## 2021-03-12 RX ADMIN — ATORVASTATIN CALCIUM 80 MILLIGRAM(S): 80 TABLET, FILM COATED ORAL at 22:12

## 2021-03-12 RX ADMIN — MIDODRINE HYDROCHLORIDE 5 MILLIGRAM(S): 2.5 TABLET ORAL at 17:53

## 2021-03-12 RX ADMIN — Medication 2: at 11:47

## 2021-03-12 RX ADMIN — MIDODRINE HYDROCHLORIDE 5 MILLIGRAM(S): 2.5 TABLET ORAL at 12:28

## 2021-03-12 RX ADMIN — Medication 81 MILLIGRAM(S): at 12:28

## 2021-03-12 NOTE — PROGRESS NOTE ADULT - SUBJECTIVE AND OBJECTIVE BOX
Infectious Diseases progress note:    Subjective: Afebrile, NAD    ROS:  CONSTITUTIONAL:  No fever, chills, rigors  CARDIOVASCULAR:  No chest pain or palpitations  RESPIRATORY:   No SOB, cough, dyspnea on exertion.  No wheezing  GASTROINTESTINAL:  No abd pain, N/V, diarrhea/constipation  EXTREMITIES:  No swelling or joint pain  GENITOURINARY:  No burning on urination, increased frequency or urgency.  No flank pain  NEUROLOGIC:  No HA, visual disturbances  SKIN: No rashes    Allergies    No Known Allergies    Intolerances        ANTIBIOTICS/RELEVANT:  antimicrobials    immunologic:  epoetin rafiq-epbx (RETACRIT) Injectable 19619 Unit(s) IV Push <User Schedule>  influenza   Vaccine 0.5 milliLiter(s) IntraMuscular once    OTHER:  aspirin  chewable 81 milliGRAM(s) Oral daily  atorvastatin 80 milliGRAM(s) Oral at bedtime  chlorhexidine 4% Liquid 1 Application(s) Topical <User Schedule>  dextrose 40% Gel 15 Gram(s) Oral once  dextrose 5%. 1000 milliLiter(s) IV Continuous <Continuous>  dextrose 5%. 1000 milliLiter(s) IV Continuous <Continuous>  dextrose 50% Injectable 25 Gram(s) IV Push once  dextrose 50% Injectable 12.5 Gram(s) IV Push once  dextrose 50% Injectable 25 Gram(s) IV Push once  glucagon  Injectable 1 milliGRAM(s) IntraMuscular once  heparin   Injectable 5000 Unit(s) SubCutaneous every 12 hours  insulin lispro (ADMELOG) corrective regimen sliding scale   SubCutaneous three times a day before meals  insulin lispro (ADMELOG) corrective regimen sliding scale   SubCutaneous at bedtime  metoprolol tartrate 25 milliGRAM(s) Oral every 12 hours  midodrine. 5 milliGRAM(s) Oral three times a day  midodrine. 10 milliGRAM(s) Oral <User Schedule>  pantoprazole    Tablet 40 milliGRAM(s) Oral before breakfast  polyethylene glycol 3350 17 Gram(s) Oral daily  prasugrel 10 milliGRAM(s) Oral daily  sodium chloride 0.9% lock flush 10 milliLiter(s) IV Push every 1 hour PRN      Objective:  Vital Signs Last 24 Hrs  T(C): 36.3 (15 Mar 2021 20:32), Max: 36.6 (15 Mar 2021 13:05)  T(F): 97.3 (15 Mar 2021 20:32), Max: 97.9 (15 Mar 2021 13:05)  HR: 70 (15 Mar 2021 20:32) (64 - 71)  BP: 94/57 (15 Mar 2021 20:32) (90/48 - 104/64)  BP(mean): --  RR: 18 (15 Mar 2021 20:32) (18 - 18)  SpO2: 95% (15 Mar 2021 20:32) (94% - 96%)    PHYSICAL EXAM:  Constitutional:NAD  Eyes:CHERI, EOMI  Ear/Nose/Throat: no thrush, mucositis.  Moist mucous membranes	  Neck:no JVD, no lymphadenopathy, supple  Respiratory: CTA nadiya  Cardiovascular: S1S2 RRR, no murmurs  Gastrointestinal:soft, nontender,  nondistended (+) BS  Extremities:no e/e/c  Skin:  no rashes, open wounds or ulcerations        LABS:                        9.5    9.18  )-----------( 182      ( 15 Mar 2021 07:24 )             30.4     03-15    134<L>  |  98  |  51<H>  ----------------------------<  147<H>  4.4   |  25  |  4.37<H>    Ca    9.6      15 Mar 2021 07:24  Phos  3.3     03-14  Mg     2.8     03-14    TPro  6.4  /  Alb  2.4<L>  /  TBili  1.3<H>  /  DBili  x   /  AST  89<H>  /  ALT  71<H>  /  AlkPhos  185<H>  03-15          Procalcitonin, Serum: 0.20 (02-16 @ 23:54)                    MICROBIOLOGY:          RADIOLOGY & ADDITIONAL STUDIES:

## 2021-03-12 NOTE — PROGRESS NOTE ADULT - ASSESSMENT
81 yo male w h/o DM, CAD, s/p CABG, CKD 3-4.   Recently evaluated by Nephrology for elevated creatinine. Bloodwork  returned notable for  and Na 126; in light of these findings, Dr. Luz (Renal) sent the patient to the ER. Lately ptn has been having abdominal pain with elevated amylase and lipase.   As per ptn's wife: on and off R abd pain w generalized weakness and poor po intake. PCP DCed po Lasix in past few days 2/2 abnormal blood test results.  Ptn denies recent NSAID use.  He was diagnosed with diabetes 18 years ago, and denies retinopathy (last saw Optho around 2 years ago). Cardiologist has alluded to the patient having mild renal impairment in the past, as per wife.    Patient's PCP is Dr. Shyann De León; GI Dr. Wilman Villagran; Cardiology Dr. Boyce.  (16 Feb 2021 21:05)    ER vs: T 98.1, P 85, /64. WBC 8.2.  Bun/Cr - 100/2.0.  LFTs mildly elevated, Roxana 217, Lip 185.  Pct 0.2.  UA (-).  Ct c/a/p shows pulmonary edema, sm B/L pleural effusions, sm volume ascites.  VQ scan with very low probability for PE.      Pt started on zosyn to cover for intra-abd source.  ID consulted.     r/o Gallstone pancreatitis:    - Pt p/w abdominal pain, epigastric and RUQ pain, noted to have elevated LFTs and amylase/lipase.  Possible gallstone pancreatitis?    - U/S abdomen shows cholelithiasis without acute cholecystitis.  Borderline to mild gallbladder thickening is likely due to ascites.   Moderate ascites noted.  Suggestion of mild renal parenchymal disease and bilateral pleural effusions.    - CTap w/o acute intra-abd pathology    - monitor LFTs, Roxana, Lipase.  As per GI, ascites likely secondary to CHF, no evidence for pancreatitis.     - Monitor temp curve and WBC.      - bcx neg. completed  coverage with zosyn x 7 days, 2/23.  Abd pain resolved      {[id number]}Renal failure:    - Renal following for CKD.  Dose abx for GFR 15-20.  On q12 hr dosing of zosyn.     - Cr rising.  CALVIN on CKD does not appear to be antibiotic mediated, renal f/u noted.      - Pt requiring HD,  shiley catheter placed 2/26, s/p conversion to tunnelled catheter - 3/4    Aortic stenosis:    - Followed by Structural health team for TAVR evaluation    Covid (+):    - Repeat Covid pcr on 2/25 tested positive.   On 1L nc, satting >94%    - No indication for Covid therapeutics at this time.  Cont supportive care, incentive spirometery.  Monitor pulse ox    - Repeat covid test remains positive on 3/8    - Monitor inflammatory markers.       [facility]  [phone]

## 2021-03-12 NOTE — PROGRESS NOTE ADULT - SUBJECTIVE AND OBJECTIVE BOX
Patient is a 80y old  Male who presents with a chief complaint of acute renal failure (12 Mar 2021 21:20)      SUBJECTIVE / OVERNIGHT EVENTS: no new c/o    MEDICATIONS  (STANDING):  aspirin  chewable 81 milliGRAM(s) Oral daily  atorvastatin 80 milliGRAM(s) Oral at bedtime  chlorhexidine 4% Liquid 1 Application(s) Topical <User Schedule>  dextrose 40% Gel 15 Gram(s) Oral once  dextrose 5%. 1000 milliLiter(s) (50 mL/Hr) IV Continuous <Continuous>  dextrose 5%. 1000 milliLiter(s) (100 mL/Hr) IV Continuous <Continuous>  dextrose 50% Injectable 25 Gram(s) IV Push once  dextrose 50% Injectable 12.5 Gram(s) IV Push once  dextrose 50% Injectable 25 Gram(s) IV Push once  epoetin rafiq-epbx (RETACRIT) Injectable 17628 Unit(s) IV Push <User Schedule>  glucagon  Injectable 1 milliGRAM(s) IntraMuscular once  heparin   Injectable 5000 Unit(s) SubCutaneous every 12 hours  influenza   Vaccine 0.5 milliLiter(s) IntraMuscular once  insulin lispro (ADMELOG) corrective regimen sliding scale   SubCutaneous three times a day before meals  insulin lispro (ADMELOG) corrective regimen sliding scale   SubCutaneous at bedtime  metoprolol tartrate 25 milliGRAM(s) Oral every 12 hours  midodrine. 5 milliGRAM(s) Oral three times a day  midodrine. 10 milliGRAM(s) Oral <User Schedule>  pantoprazole    Tablet 40 milliGRAM(s) Oral before breakfast  polyethylene glycol 3350 17 Gram(s) Oral daily  prasugrel 10 milliGRAM(s) Oral daily    MEDICATIONS  (PRN):  sodium chloride 0.9% lock flush 10 milliLiter(s) IV Push every 1 hour PRN Pre/post blood products, medications, blood draw, and to maintain line patency      Vital Signs Last 24 Hrs  T(F): 97.9 (03-12-21 @ 20:48), Max: 97.9 (03-12-21 @ 20:48)  HR: 63 (03-12-21 @ 20:48) (63 - 93)  BP: 92/50 (03-12-21 @ 20:48) (90/47 - 104/55)  RR: 18 (03-12-21 @ 20:48) (18 - 18)  SpO2: 97% (03-12-21 @ 20:48) (95% - 100%)  Telemetry:   CAPILLARY BLOOD GLUCOSE      POCT Blood Glucose.: 170 mg/dL (12 Mar 2021 16:39)  POCT Blood Glucose.: 218 mg/dL (12 Mar 2021 11:41)  POCT Blood Glucose.: 127 mg/dL (12 Mar 2021 07:54)    I&O's Summary    11 Mar 2021 07:01  -  12 Mar 2021 07:00  --------------------------------------------------------  IN: 960 mL / OUT: 500 mL / NET: 460 mL    12 Mar 2021 07:01  -  12 Mar 2021 21:56  --------------------------------------------------------  IN: 480 mL / OUT: 0 mL / NET: 480 mL        PHYSICAL EXAM:  GENERAL: NAD, well-developed  HEAD:  Atraumatic, Normocephalic  EYES: EOMI, PERRLA, conjunctiva and sclera clear  NECK: Supple, No JVD  CHEST/LUNG: Clear to auscultation bilaterally; No wheeze  HEART: Regular rate and rhythm; No murmurs, rubs, or gallops  ABDOMEN: Soft, Nontender, Nondistended; Bowel sounds present  EXTREMITIES:  2+ Peripheral Pulses, No clubbing, cyanosis, or edema  PSYCH: AAOx3  NEUROLOGY: non-focal  SKIN: No rashes or lesions    LABS:                        9.0    8.55  )-----------( 182      ( 12 Mar 2021 05:12 )             29.2     03-12    136  |  100  |  40<H>  ----------------------------<  129<H>  4.1   |  27  |  3.46<H>    Ca    9.2      12 Mar 2021 05:12    TPro  6.6  /  Alb  2.5<L>  /  TBili  1.3<H>  /  DBili  x   /  AST  64<H>  /  ALT  46<H>  /  AlkPhos  204<H>  03-12              RADIOLOGY & ADDITIONAL TESTS:    Imaging Personally Reviewed:    Consultant(s) Notes Reviewed:      Care Discussed with Consultants/Other Providers:

## 2021-03-12 NOTE — PROGRESS NOTE ADULT - SUBJECTIVE AND OBJECTIVE BOX
Subjective: Patient seen and examined. No new events except as noted.   No cp or sob      REVIEW OF SYSTEMS:    CONSTITUTIONAL: + weakness, fevers or chills  EYES/ENT: No visual changes;  No vertigo or throat pain   NECK: No pain or stiffness  RESPIRATORY: No cough, wheezing, hemoptysis; No shortness of breath  CARDIOVASCULAR: No chest pain or palpitations  GASTROINTESTINAL: No abdominal or epigastric pain. No nausea, vomiting, or hematemesis; No diarrhea or constipation. No melena or hematochezia.  GENITOURINARY: No dysuria, frequency or hematuria  NEUROLOGICAL: No numbness or weakness  SKIN: No itching, burning, rashes, or lesions   All other review of systems is negative unless indicated above.    MEDICATIONS:  MEDICATIONS  (STANDING):  aspirin  chewable 81 milliGRAM(s) Oral daily  atorvastatin 80 milliGRAM(s) Oral at bedtime  chlorhexidine 4% Liquid 1 Application(s) Topical <User Schedule>  dextrose 40% Gel 15 Gram(s) Oral once  dextrose 5%. 1000 milliLiter(s) (50 mL/Hr) IV Continuous <Continuous>  dextrose 5%. 1000 milliLiter(s) (100 mL/Hr) IV Continuous <Continuous>  dextrose 50% Injectable 25 Gram(s) IV Push once  dextrose 50% Injectable 12.5 Gram(s) IV Push once  dextrose 50% Injectable 25 Gram(s) IV Push once  epoetin rafiq-epbx (RETACRIT) Injectable 35427 Unit(s) IV Push <User Schedule>  glucagon  Injectable 1 milliGRAM(s) IntraMuscular once  heparin   Injectable 5000 Unit(s) SubCutaneous every 12 hours  influenza   Vaccine 0.5 milliLiter(s) IntraMuscular once  insulin lispro (ADMELOG) corrective regimen sliding scale   SubCutaneous at bedtime  insulin lispro (ADMELOG) corrective regimen sliding scale   SubCutaneous three times a day before meals  metoprolol tartrate 25 milliGRAM(s) Oral every 12 hours  midodrine. 5 milliGRAM(s) Oral three times a day  midodrine. 10 milliGRAM(s) Oral <User Schedule>  pantoprazole    Tablet 40 milliGRAM(s) Oral before breakfast  polyethylene glycol 3350 17 Gram(s) Oral daily  prasugrel 10 milliGRAM(s) Oral daily      PHYSICAL EXAM:  T(C): 36.4 (03-12-21 @ 05:17), Max: 36.8 (03-11-21 @ 17:35)  HR: 75 (03-12-21 @ 10:14) (61 - 78)  BP: 102/52 (03-12-21 @ 10:14) (90/47 - 106/56)  RR: 18 (03-12-21 @ 05:17) (17 - 18)  SpO2: 96% (03-12-21 @ 05:17) (96% - 100%)  Wt(kg): --  I&O's Summary    11 Mar 2021 07:01  -  12 Mar 2021 07:00  --------------------------------------------------------  IN: 960 mL / OUT: 500 mL / NET: 460 mL    12 Mar 2021 07:01  -  12 Mar 2021 10:49  --------------------------------------------------------  IN: 120 mL / OUT: 0 mL / NET: 120 mL        Appearance: NAD  HEENT:   Dry oral mucosa, PERRL, EOMI	  Lymphatic: No lymphadenopathy , Tunneled catheter   Cardiovascular: Irregular S1 S2, No JVD, + murmurs , Peripheral pulses palpable 2+ bilaterally  Respiratory: Lungs clear to auscultation, normal effort 	  Gastrointestinal:  Soft, Non-tender, + BS	  Skin: No rashes, No ecchymoses, No cyanosis, warm to touch  Musculoskeletal: Normal range of motion, normal strength  Psychiatry:  Mood & affect appropriate  Ext: No edema          LABS:    CARDIAC MARKERS:                                9.0    8.55  )-----------( 182      ( 12 Mar 2021 05:12 )             29.2     03-12    136  |  100  |  40<H>  ----------------------------<  129<H>  4.1   |  27  |  3.46<H>    Ca    9.2      12 Mar 2021 05:12    TPro  6.6  /  Alb  2.5<L>  /  TBili  1.3<H>  /  DBili  x   /  AST  64<H>  /  ALT  46<H>  /  AlkPhos  204<H>  03-12    proBNP:   Lipid Profile:   HgA1c:   TSH:             TELEMETRY: 	 V paced    ECG:  	  RADIOLOGY:   DIAGNOSTIC TESTING:  [ ] Echocardiogram:  [ ]  Catheterization:  [ ] Stress Test:    OTHER:

## 2021-03-12 NOTE — PROGRESS NOTE ADULT - ASSESSMENT
79 yo male w h/o ICM< CAD< s/p CABG, has AICD, DM, HTN presents w volume overload and abd pain  Abd sono shows gallstones, but no sign of cholecystitis/pancreatitis. abd pain resolved on Zosyn,   s/ p treatment empirically for 7 days .  ID following    ascites 2/2 systolic chf, probably worse 2/2 acute on chronic systolic chf.  responded well to lasix. now on HD.   started HD 2/26. HD as per renal. changed from MWF to TTSa,  RIJ tunneled catheter placed on 3/4 in IR, no fistula placement for now  unable to be DCed home 2/2 safety at home. nobody to assume responsibility, wife is ill  awaiting transfer to [facility]    nosocomial Covid positive. remains positive, clinically stable.  ID following. in isolation. trend infl markers.   s/p monoclonal infusion.  on interrogation of ICD, has a lof of arrhythmia burden,   NSVT and sustained VT, seen by EPS, cont  [id number] mg  bid. has inoperable ischemic HD,   ICM, EF 15%, severe AS, seen by structural heart for TAVR , proceed with TAVR work up  dvt ppx w [facility]SC

## 2021-03-12 NOTE — PROGRESS NOTE ADULT - SUBJECTIVE AND OBJECTIVE BOX
      Overnight events noted      VITAL:  T(C): , Max: 36.8 (03-11-21 @ 17:35)  T(F): , Max: 98.2 (03-11-21 @ 17:35)  HR: 93 (03-12-21 @ 11:54)  BP: 100/58 (03-12-21 @ 11:54)  BP(mean): --  RR: 18 (03-12-21 @ 11:54)  SpO2: 95% (03-12-21 @ 11:54)      PHYSICAL EXAM:  Constitutional: NAD on room air, alert  HEENT: NCAT, DMM  Neck: Supple, no JVD  Respiratory: CTA-b/l  Cardiovascular: RRR s1s2, no m/r/g  Gastrointestinal: BS+, soft, NT; (+)mild distension  Extremities:  no edema b/l  Neurological: no focal deficits; strength grossly intact  Back: no CVAT b/l  Skin: No rashes, no nevi  Access: RIJ tunneled cath    LABS:                        9.0    8.55  )-----------( 182      ( 12 Mar 2021 05:12 )             29.2     Na(136)/K(4.1)/Cl(100)/HCO3(27)/BUN(40)/Cr(3.46)Glu(129)/Ca(9.2)/Mg(--)/PO4(--)    03-12 @ 05:12  Na(136)/K(5.0)/Cl(98)/HCO3(24)/BUN(59)/Cr(4.47)Glu(104)/Ca(9.7)/Mg(--)/PO4(--)    03-11 @ 06:39  Na(133)/K(4.4)/Cl(97)/HCO3(25)/BUN(39)/Cr(3.41)Glu(107)/Ca(9.7)/Mg(--)/PO4(--)    03-10 @ 05:37      IMPRESSION: 80M w/ DM2, CAD-CABG, and CKD, 2/16/21 a/w azotemia/lyte derangements/weakness; c/b newly ESRD; c/b COVID(+)    (1)Renal - newly ESRD - last dialyzed last night - only tolerated 500cc UF with HD    (2)CV - tenuous hemodynamics/hypervolemia - biventricular systolic dysfunction, severe AS, mod-sev AR. On Midodrine. Potentially for TAVR eval, after discharge    (3)Vasc - Forgoing AV access for now, given his severe cardiac disease/risk of worsening cardiac hemodynamics with AV access    (4)Anemia - On Retacrit with HD    (5)Dispo - awaiting acceptance to outside HD facility       RECOMMEND:  (1)Next HD tomorrow; 0.5L UF as able; Midodrine 10mg PO x 1 prn intradialytic hypotension; Retacrit with HD  (2)Setup of outpatient HD per               Suraj Garcia MD  [facility]  Office: [phone]  Cell: [phone]

## 2021-03-13 LAB
ALBUMIN SERPL ELPH-MCNC: 2.5 G/DL — LOW (ref 3.3–5)
ALP SERPL-CCNC: 196 U/L — HIGH (ref 40–120)
ALT FLD-CCNC: 52 U/L — HIGH (ref 10–45)
ANION GAP SERPL CALC-SCNC: 12 MMOL/L — SIGNIFICANT CHANGE UP (ref 5–17)
AST SERPL-CCNC: 67 U/L — HIGH (ref 10–40)
BILIRUB SERPL-MCNC: 1.4 MG/DL — HIGH (ref 0.2–1.2)
BUN SERPL-MCNC: 54 MG/DL — HIGH (ref 7–23)
CALCIUM SERPL-MCNC: 9.7 MG/DL — SIGNIFICANT CHANGE UP (ref 8.4–10.5)
CHLORIDE SERPL-SCNC: 99 MMOL/L — SIGNIFICANT CHANGE UP (ref 96–108)
CO2 SERPL-SCNC: 25 MMOL/L — SIGNIFICANT CHANGE UP (ref 22–31)
CREAT SERPL-MCNC: 4.48 MG/DL — HIGH (ref 0.5–1.3)
GLUCOSE BLDC GLUCOMTR-MCNC: 157 MG/DL — HIGH (ref 70–99)
GLUCOSE BLDC GLUCOMTR-MCNC: 188 MG/DL — HIGH (ref 70–99)
GLUCOSE BLDC GLUCOMTR-MCNC: 189 MG/DL — HIGH (ref 70–99)
GLUCOSE BLDC GLUCOMTR-MCNC: 87 MG/DL — SIGNIFICANT CHANGE UP (ref 70–99)
GLUCOSE SERPL-MCNC: 106 MG/DL — HIGH (ref 70–99)
HCT VFR BLD CALC: 30.7 % — LOW (ref 39–50)
HGB BLD-MCNC: 9.2 G/DL — LOW (ref 13–17)
MCHC RBC-ENTMCNC: 29.9 PG — SIGNIFICANT CHANGE UP (ref 27–34)
MCHC RBC-ENTMCNC: 30 GM/DL — LOW (ref 32–36)
MCV RBC AUTO: 99.7 FL — SIGNIFICANT CHANGE UP (ref 80–100)
NRBC # BLD: 0 /100 WBCS — SIGNIFICANT CHANGE UP (ref 0–0)
PLATELET # BLD AUTO: 198 K/UL — SIGNIFICANT CHANGE UP (ref 150–400)
POTASSIUM SERPL-MCNC: 4.4 MMOL/L — SIGNIFICANT CHANGE UP (ref 3.5–5.3)
POTASSIUM SERPL-SCNC: 4.4 MMOL/L — SIGNIFICANT CHANGE UP (ref 3.5–5.3)
PROT SERPL-MCNC: 6.7 G/DL — SIGNIFICANT CHANGE UP (ref 6–8.3)
RBC # BLD: 3.08 M/UL — LOW (ref 4.2–5.8)
RBC # FLD: 21.2 % — HIGH (ref 10.3–14.5)
SARS-COV-2 RNA SPEC QL NAA+PROBE: DETECTED
SODIUM SERPL-SCNC: 136 MMOL/L — SIGNIFICANT CHANGE UP (ref 135–145)
WBC # BLD: 7.81 K/UL — SIGNIFICANT CHANGE UP (ref 3.8–10.5)
WBC # FLD AUTO: 7.81 K/UL — SIGNIFICANT CHANGE UP (ref 3.8–10.5)

## 2021-03-13 RX ADMIN — ERYTHROPOIETIN 10000 UNIT(S): 10000 INJECTION, SOLUTION INTRAVENOUS; SUBCUTANEOUS at 17:37

## 2021-03-13 RX ADMIN — PANTOPRAZOLE SODIUM 40 MILLIGRAM(S): 20 TABLET, DELAYED RELEASE ORAL at 06:19

## 2021-03-13 RX ADMIN — HEPARIN SODIUM 5000 UNIT(S): 5000 INJECTION INTRAVENOUS; SUBCUTANEOUS at 05:26

## 2021-03-13 RX ADMIN — MIDODRINE HYDROCHLORIDE 5 MILLIGRAM(S): 2.5 TABLET ORAL at 16:30

## 2021-03-13 RX ADMIN — Medication 1: at 12:49

## 2021-03-13 RX ADMIN — Medication 81 MILLIGRAM(S): at 11:47

## 2021-03-13 RX ADMIN — Medication 1: at 16:58

## 2021-03-13 RX ADMIN — HEPARIN SODIUM 5000 UNIT(S): 5000 INJECTION INTRAVENOUS; SUBCUTANEOUS at 17:01

## 2021-03-13 RX ADMIN — PRASUGREL 10 MILLIGRAM(S): 5 TABLET, FILM COATED ORAL at 11:47

## 2021-03-13 RX ADMIN — MIDODRINE HYDROCHLORIDE 5 MILLIGRAM(S): 2.5 TABLET ORAL at 11:47

## 2021-03-13 RX ADMIN — CHLORHEXIDINE GLUCONATE 1 APPLICATION(S): 213 SOLUTION TOPICAL at 05:07

## 2021-03-13 RX ADMIN — ATORVASTATIN CALCIUM 80 MILLIGRAM(S): 80 TABLET, FILM COATED ORAL at 21:11

## 2021-03-13 RX ADMIN — MIDODRINE HYDROCHLORIDE 5 MILLIGRAM(S): 2.5 TABLET ORAL at 05:26

## 2021-03-13 RX ADMIN — Medication 25 MILLIGRAM(S): at 21:11

## 2021-03-13 NOTE — PROGRESS NOTE ADULT - SUBJECTIVE AND OBJECTIVE BOX
Patient is a 80y old  Male who presents with a chief complaint of acute renal failure (13 Mar 2021 21:01)      SUBJECTIVE / OVERNIGHT EVENTS: denise asked for a call, i called twice and wasnt able to leave messages bc mailbox is full. dr Garcia, renal spoke with him and confirmed home HD is not possible bc ptn is frail w unstable BPs. He also inquired about the second Covid vaccine( he had his first PTA) , ptn acquired the Covid virus during this hospitalization and remains on the Covid isolation floor. vaccine is not appropriate at this time    MEDICATIONS  (STANDING):  aspirin  chewable 81 milliGRAM(s) Oral daily  atorvastatin 80 milliGRAM(s) Oral at bedtime  chlorhexidine 4% Liquid 1 Application(s) Topical <User Schedule>  dextrose 40% Gel 15 Gram(s) Oral once  dextrose 5%. 1000 milliLiter(s) (50 mL/Hr) IV Continuous <Continuous>  dextrose 5%. 1000 milliLiter(s) (100 mL/Hr) IV Continuous <Continuous>  dextrose 50% Injectable 25 Gram(s) IV Push once  dextrose 50% Injectable 12.5 Gram(s) IV Push once  dextrose 50% Injectable 25 Gram(s) IV Push once  epoetin rafiq-epbx (RETACRIT) Injectable 82729 Unit(s) IV Push <User Schedule>  glucagon  Injectable 1 milliGRAM(s) IntraMuscular once  heparin   Injectable 5000 Unit(s) SubCutaneous every 12 hours  influenza   Vaccine 0.5 milliLiter(s) IntraMuscular once  insulin lispro (ADMELOG) corrective regimen sliding scale   SubCutaneous three times a day before meals  insulin lispro (ADMELOG) corrective regimen sliding scale   SubCutaneous at bedtime  metoprolol tartrate 25 milliGRAM(s) Oral every 12 hours  midodrine. 5 milliGRAM(s) Oral three times a day  midodrine. 10 milliGRAM(s) Oral <User Schedule>  pantoprazole    Tablet 40 milliGRAM(s) Oral before breakfast  polyethylene glycol 3350 17 Gram(s) Oral daily  prasugrel 10 milliGRAM(s) Oral daily    MEDICATIONS  (PRN):  sodium chloride 0.9% lock flush 10 milliLiter(s) IV Push every 1 hour PRN Pre/post blood products, medications, blood draw, and to maintain line patency      Vital Signs Last 24 Hrs  T(F): 97.6 (03-13-21 @ 20:50), Max: 98 (03-13-21 @ 17:18)  HR: 75 (03-13-21 @ 20:50) (63 - 76)  BP: 93/52 (03-13-21 @ 20:50) (93/52 - 103/53)  RR: 18 (03-13-21 @ 20:50) (18 - 18)  SpO2: 100% (03-13-21 @ 20:50) (95% - 100%)  Telemetry:   CAPILLARY BLOOD GLUCOSE      POCT Blood Glucose.: 157 mg/dL (13 Mar 2021 21:30)  POCT Blood Glucose.: 189 mg/dL (13 Mar 2021 16:32)  POCT Blood Glucose.: 188 mg/dL (13 Mar 2021 11:57)  POCT Blood Glucose.: 87 mg/dL (13 Mar 2021 07:51)  POCT Blood Glucose.: 169 mg/dL (12 Mar 2021 22:04)    I&O's Summary    12 Mar 2021 07:01  -  13 Mar 2021 07:00  --------------------------------------------------------  IN: 480 mL / OUT: 0 mL / NET: 480 mL    13 Mar 2021 06:01  -  13 Mar 2021 21:51  --------------------------------------------------------  IN: 1280 mL / OUT: 1300 mL / NET: -20 mL        PHYSICAL EXAM:  GENERAL: NAD, well-developed  HEAD:  Atraumatic, Normocephalic  EYES: EOMI, PERRLA, conjunctiva and sclera clear  NECK: Supple, No JVD  CHEST/LUNG: Clear to auscultation bilaterally; No wheeze  HEART: Regular rate and rhythm; No murmurs, rubs, or gallops  ABDOMEN: Soft, Nontender, Nondistended; Bowel sounds present  EXTREMITIES:  2+ Peripheral Pulses, No clubbing, cyanosis, or edema  PSYCH: AAOx3  NEUROLOGY: non-focal  SKIN: No rashes or lesions    LABS:                        9.2    7.81  )-----------( 198      ( 13 Mar 2021 06:03 )             30.7     03-13    136  |  99  |  54<H>  ----------------------------<  106<H>  4.4   |  25  |  4.48<H>    Ca    9.7      13 Mar 2021 06:03    TPro  6.7  /  Alb  2.5<L>  /  TBili  1.4<H>  /  DBili  x   /  AST  67<H>  /  ALT  52<H>  /  AlkPhos  196<H>  03-13              RADIOLOGY & ADDITIONAL TESTS:    Imaging Personally Reviewed:    Consultant(s) Notes Reviewed:      Care Discussed with Consultants/Other Providers:

## 2021-03-13 NOTE — PROGRESS NOTE ADULT - ASSESSMENT
79 yo male w h/o ICM< CAD< s/p CABG, has AICD, DM, HTN presents w volume overload and abd pain  Abd sono shows gallstones, but no sign of cholecystitis/pancreatitis. abd pain resolved on Zosyn,   s/ p treatment empirically for 7 days .  ID following    ascites 2/2 systolic chf, probably worse 2/2 acute on chronic systolic chf.  responded well to lasix. now on HD.   started HD 2/26. HD as per renal. changed from MWF to TTSa,  RIJ tunneled catheter placed on 3/4 in IR, no fistula placement for now  unable to be DCed home 2/2 safety at home. nobody to assume responsibility, wife is ill  awaiting transfer to [facility]    nosocomial Covid positive. remains positive, clinically stable.  ID following. in isolation. trend infl markers.   s/p monoclonal infusion.  on interrogation of ICD, has a lof of arrhythmia burden,   NSVT and sustained VT, seen by EPS, cont  Ctiolqefy53.5 mg  bid. has inoperable ischemic HD,   ICM, EF 15%, severe AS, seen by structural heart for TAVR , proceed with TAVR work up    on 3/13: denise asked for a call, i called twice and wasnt able to leave messages bc mailbox is full. dr Garcia, renal spoke with him and confirmed home HD is not possible bc ptn is frail w unstable BPs. He also inquired about the second Covid vaccine( he had his first PTA) , ptn acquired the Covid virus during this hospitalization and remains on the Covid isolation floor. vaccine is not appropriate at this time    dvt ppx w [facility]

## 2021-03-13 NOTE — PROGRESS NOTE ADULT - SUBJECTIVE AND OBJECTIVE BOX
Subjective: Patient seen and examined. No new events except as noted.     REVIEW OF SYSTEMS:    CONSTITUTIONAL: + weakness, fevers or chills  EYES/ENT: No visual changes;  No vertigo or throat pain   NECK: No pain or stiffness  RESPIRATORY: No cough, wheezing, hemoptysis; No shortness of breath  CARDIOVASCULAR: No chest pain or palpitations  GASTROINTESTINAL: No abdominal or epigastric pain. No nausea, vomiting, or hematemesis; No diarrhea or constipation. No melena or hematochezia.  GENITOURINARY: No dysuria, frequency or hematuria  NEUROLOGICAL: No numbness or weakness  SKIN: No itching, burning, rashes, or lesions   All other review of systems is negative unless indicated above.    MEDICATIONS:  MEDICATIONS  (STANDING):  aspirin  chewable 81 milliGRAM(s) Oral daily  atorvastatin 80 milliGRAM(s) Oral at bedtime  chlorhexidine 4% Liquid 1 Application(s) Topical <User Schedule>  dextrose 40% Gel 15 Gram(s) Oral once  dextrose 5%. 1000 milliLiter(s) (50 mL/Hr) IV Continuous <Continuous>  dextrose 5%. 1000 milliLiter(s) (100 mL/Hr) IV Continuous <Continuous>  dextrose 50% Injectable 25 Gram(s) IV Push once  dextrose 50% Injectable 12.5 Gram(s) IV Push once  dextrose 50% Injectable 25 Gram(s) IV Push once  epoetin rafiq-epbx (RETACRIT) Injectable 31228 Unit(s) IV Push <User Schedule>  glucagon  Injectable 1 milliGRAM(s) IntraMuscular once  heparin   Injectable 5000 Unit(s) SubCutaneous every 12 hours  influenza   Vaccine 0.5 milliLiter(s) IntraMuscular once  insulin lispro (ADMELOG) corrective regimen sliding scale   SubCutaneous three times a day before meals  insulin lispro (ADMELOG) corrective regimen sliding scale   SubCutaneous at bedtime  metoprolol tartrate 25 milliGRAM(s) Oral every 12 hours  midodrine. 5 milliGRAM(s) Oral three times a day  midodrine. 10 milliGRAM(s) Oral <User Schedule>  pantoprazole    Tablet 40 milliGRAM(s) Oral before breakfast  polyethylene glycol 3350 17 Gram(s) Oral daily  prasugrel 10 milliGRAM(s) Oral daily      PHYSICAL EXAM:  T(C): 36.4 (03-13-21 @ 20:50), Max: 36.7 (03-13-21 @ 17:18)  HR: 75 (03-13-21 @ 20:50) (63 - 76)  BP: 93/52 (03-13-21 @ 20:50) (93/52 - 103/53)  RR: 18 (03-13-21 @ 20:50) (18 - 18)  SpO2: 100% (03-13-21 @ 20:50) (95% - 100%)  Wt(kg): --  I&O's Summary    12 Mar 2021 07:01  -  13 Mar 2021 07:00  --------------------------------------------------------  IN: 480 mL / OUT: 0 mL / NET: 480 mL    13 Mar 2021 06:01  -  13 Mar 2021 21:01  --------------------------------------------------------  IN: 1280 mL / OUT: 1300 mL / NET: -20 mL        Appearance: NAD  HEENT:   Dry oral mucosa, PERRL, EOMI	  Lymphatic: No lymphadenopathy , Tunneled catheter   Cardiovascular: Irregular S1 S2, No JVD, + murmurs , Peripheral pulses palpable 2+ bilaterally  Respiratory: Lungs clear to auscultation, normal effort 	  Gastrointestinal:  Soft, Non-tender, + BS	  Skin: No rashes, No ecchymoses, No cyanosis, warm to touch  Musculoskeletal: Normal range of motion, normal strength  Psychiatry:  Mood & affect appropriate  Ext: No edema      LABS:    CARDIAC MARKERS:                                9.2    7.81  )-----------( 198      ( 13 Mar 2021 06:03 )             30.7     03-13    136  |  99  |  54<H>  ----------------------------<  106<H>  4.4   |  25  |  4.48<H>    Ca    9.7      13 Mar 2021 06:03    TPro  6.7  /  Alb  2.5<L>  /  TBili  1.4<H>  /  DBili  x   /  AST  67<H>  /  ALT  52<H>  /  AlkPhos  196<H>  03-13    proBNP:   Lipid Profile:   HgA1c:   TSH:             TELEMETRY: 	  V paced   ECG:  	  RADIOLOGY:   DIAGNOSTIC TESTING:  [ ] Echocardiogram:  [ ]  Catheterization:  [ ] Stress Test:    OTHER:

## 2021-03-14 LAB
ALBUMIN SERPL ELPH-MCNC: 2.5 G/DL — LOW (ref 3.3–5)
ALP SERPL-CCNC: 191 U/L — HIGH (ref 40–120)
ALT FLD-CCNC: 59 U/L — HIGH (ref 10–45)
ANION GAP SERPL CALC-SCNC: 9 MMOL/L — SIGNIFICANT CHANGE UP (ref 5–17)
AST SERPL-CCNC: 71 U/L — HIGH (ref 10–40)
BILIRUB SERPL-MCNC: 1.2 MG/DL — SIGNIFICANT CHANGE UP (ref 0.2–1.2)
BUN SERPL-MCNC: 34 MG/DL — HIGH (ref 7–23)
CALCIUM SERPL-MCNC: 9.6 MG/DL — SIGNIFICANT CHANGE UP (ref 8.4–10.5)
CHLORIDE SERPL-SCNC: 99 MMOL/L — SIGNIFICANT CHANGE UP (ref 96–108)
CO2 SERPL-SCNC: 26 MMOL/L — SIGNIFICANT CHANGE UP (ref 22–31)
CREAT SERPL-MCNC: 3.26 MG/DL — HIGH (ref 0.5–1.3)
GLUCOSE BLDC GLUCOMTR-MCNC: 125 MG/DL — HIGH (ref 70–99)
GLUCOSE BLDC GLUCOMTR-MCNC: 214 MG/DL — HIGH (ref 70–99)
GLUCOSE BLDC GLUCOMTR-MCNC: 215 MG/DL — HIGH (ref 70–99)
GLUCOSE BLDC GLUCOMTR-MCNC: 237 MG/DL — HIGH (ref 70–99)
GLUCOSE SERPL-MCNC: 141 MG/DL — HIGH (ref 70–99)
HCT VFR BLD CALC: 29.2 % — LOW (ref 39–50)
HGB BLD-MCNC: 8.8 G/DL — LOW (ref 13–17)
MAGNESIUM SERPL-MCNC: 2.8 MG/DL — HIGH (ref 1.6–2.6)
MCHC RBC-ENTMCNC: 30.1 GM/DL — LOW (ref 32–36)
MCHC RBC-ENTMCNC: 30.1 PG — SIGNIFICANT CHANGE UP (ref 27–34)
MCV RBC AUTO: 100 FL — SIGNIFICANT CHANGE UP (ref 80–100)
NRBC # BLD: 0 /100 WBCS — SIGNIFICANT CHANGE UP (ref 0–0)
PHOSPHATE SERPL-MCNC: 3.3 MG/DL — SIGNIFICANT CHANGE UP (ref 2.5–4.5)
PLATELET # BLD AUTO: 183 K/UL — SIGNIFICANT CHANGE UP (ref 150–400)
POTASSIUM SERPL-MCNC: 3.9 MMOL/L — SIGNIFICANT CHANGE UP (ref 3.5–5.3)
POTASSIUM SERPL-SCNC: 3.9 MMOL/L — SIGNIFICANT CHANGE UP (ref 3.5–5.3)
PROT SERPL-MCNC: 6.7 G/DL — SIGNIFICANT CHANGE UP (ref 6–8.3)
RBC # BLD: 2.92 M/UL — LOW (ref 4.2–5.8)
RBC # FLD: 21.1 % — HIGH (ref 10.3–14.5)
SODIUM SERPL-SCNC: 134 MMOL/L — LOW (ref 135–145)
WBC # BLD: 8.52 K/UL — SIGNIFICANT CHANGE UP (ref 3.8–10.5)
WBC # FLD AUTO: 8.52 K/UL — SIGNIFICANT CHANGE UP (ref 3.8–10.5)

## 2021-03-14 RX ADMIN — MIDODRINE HYDROCHLORIDE 5 MILLIGRAM(S): 2.5 TABLET ORAL at 13:07

## 2021-03-14 RX ADMIN — Medication 25 MILLIGRAM(S): at 18:12

## 2021-03-14 RX ADMIN — Medication 2: at 16:48

## 2021-03-14 RX ADMIN — PANTOPRAZOLE SODIUM 40 MILLIGRAM(S): 20 TABLET, DELAYED RELEASE ORAL at 05:55

## 2021-03-14 RX ADMIN — ATORVASTATIN CALCIUM 80 MILLIGRAM(S): 80 TABLET, FILM COATED ORAL at 21:17

## 2021-03-14 RX ADMIN — Medication 2: at 12:30

## 2021-03-14 RX ADMIN — Medication 81 MILLIGRAM(S): at 13:07

## 2021-03-14 RX ADMIN — MIDODRINE HYDROCHLORIDE 5 MILLIGRAM(S): 2.5 TABLET ORAL at 05:55

## 2021-03-14 RX ADMIN — HEPARIN SODIUM 5000 UNIT(S): 5000 INJECTION INTRAVENOUS; SUBCUTANEOUS at 18:12

## 2021-03-14 RX ADMIN — PRASUGREL 10 MILLIGRAM(S): 5 TABLET, FILM COATED ORAL at 13:07

## 2021-03-14 RX ADMIN — HEPARIN SODIUM 5000 UNIT(S): 5000 INJECTION INTRAVENOUS; SUBCUTANEOUS at 05:54

## 2021-03-14 RX ADMIN — MIDODRINE HYDROCHLORIDE 5 MILLIGRAM(S): 2.5 TABLET ORAL at 18:12

## 2021-03-14 RX ADMIN — POLYETHYLENE GLYCOL 3350 17 GRAM(S): 17 POWDER, FOR SOLUTION ORAL at 13:07

## 2021-03-14 RX ADMIN — CHLORHEXIDINE GLUCONATE 1 APPLICATION(S): 213 SOLUTION TOPICAL at 05:54

## 2021-03-14 RX ADMIN — Medication 25 MILLIGRAM(S): at 06:31

## 2021-03-14 NOTE — PROGRESS NOTE ADULT - SUBJECTIVE AND OBJECTIVE BOX
Nephrology progress note    Patient is a 80y male with    Allergies:  No Known Allergies    Hospital Medications:   MEDICATIONS  (STANDING):  aspirin  chewable 81 milliGRAM(s) Oral daily  atorvastatin 80 milliGRAM(s) Oral at bedtime  chlorhexidine 4% Liquid 1 Application(s) Topical <User Schedule>  dextrose 40% Gel 15 Gram(s) Oral once  dextrose 5%. 1000 milliLiter(s) (50 mL/Hr) IV Continuous <Continuous>  dextrose 5%. 1000 milliLiter(s) (100 mL/Hr) IV Continuous <Continuous>  dextrose 50% Injectable 25 Gram(s) IV Push once  dextrose 50% Injectable 12.5 Gram(s) IV Push once  dextrose 50% Injectable 25 Gram(s) IV Push once  epoetin rafiq-epbx (RETACRIT) Injectable 65367 Unit(s) IV Push <User Schedule>  glucagon  Injectable 1 milliGRAM(s) IntraMuscular once  heparin   Injectable 5000 Unit(s) SubCutaneous every 12 hours  influenza   Vaccine 0.5 milliLiter(s) IntraMuscular once  insulin lispro (ADMELOG) corrective regimen sliding scale   SubCutaneous three times a day before meals  insulin lispro (ADMELOG) corrective regimen sliding scale   SubCutaneous at bedtime  metoprolol tartrate 25 milliGRAM(s) Oral every 12 hours  midodrine. 5 milliGRAM(s) Oral three times a day  midodrine. 10 milliGRAM(s) Oral <User Schedule>  pantoprazole    Tablet 40 milliGRAM(s) Oral before breakfast  polyethylene glycol 3350 17 Gram(s) Oral daily  prasugrel 10 milliGRAM(s) Oral daily      VITALS:  T(F): 98.2 (03-14-21 @ 11:52), Max: 98.2 (03-14-21 @ 11:52)  HR: 67 (03-14-21 @ 11:52)  BP: 91/53 (03-14-21 @ 11:52)  RR: 18 (03-14-21 @ 11:52)  SpO2: 98% (03-14-21 @ 11:52)      03-12 @ 07:01  -  03-13 @ 07:00  --------------------------------------------------------  IN: 480 mL / OUT: 0 mL / NET: 480 mL    03-13 @ 06:01  -  03-14 @ 07:00  --------------------------------------------------------  IN: 1280 mL / OUT: 1600 mL / NET: -320 mL        PHYSICAL EXAM:  Constitutional: NAD on room air, alert  HEENT: NCAT, DMM  Neck: Supple, no JVD  Respiratory: CTA-b/l  Cardiovascular: RRR s1s2, no m/r/g  Gastrointestinal: BS+, soft, NT; (+)mild distension  Extremities:  no edema b/l  Neurological: no focal deficits; strength grossly intact  Back: no CVAT b/l  Skin: No rashes, no nevi  Access: RIJ tunneled cath      LABS:  03-14    134<L>  |  99  |  34<H>  ----------------------------<  141<H>  3.9   |  26  |  3.26<H>    Ca    9.6      14 Mar 2021 05:43  Phos  3.3     03-14  Mg     2.8     03-14    TPro  6.7  /  Alb  2.5<L>  /  TBili  1.2  /  DBili      /  AST  71<H>  /  ALT  59<H>  /  AlkPhos  191<H>  03-14                          8.8    8.52  )-----------( 183      ( 14 Mar 2021 05:43 )             29.2              Nephrology progress note    Patient is a 80y male in bed comfortable. No new complaints.    Allergies:  No Known Allergies    Hospital Medications:   MEDICATIONS  (STANDING):  aspirin  chewable 81 milliGRAM(s) Oral daily  atorvastatin 80 milliGRAM(s) Oral at bedtime  chlorhexidine 4% Liquid 1 Application(s) Topical <User Schedule>  dextrose 40% Gel 15 Gram(s) Oral once  dextrose 5%. 1000 milliLiter(s) (50 mL/Hr) IV Continuous <Continuous>  dextrose 5%. 1000 milliLiter(s) (100 mL/Hr) IV Continuous <Continuous>  dextrose 50% Injectable 25 Gram(s) IV Push once  dextrose 50% Injectable 12.5 Gram(s) IV Push once  dextrose 50% Injectable 25 Gram(s) IV Push once  epoetin rafiq-epbx (RETACRIT) Injectable 28673 Unit(s) IV Push <User Schedule>  glucagon  Injectable 1 milliGRAM(s) IntraMuscular once  heparin   Injectable 5000 Unit(s) SubCutaneous every 12 hours  influenza   Vaccine 0.5 milliLiter(s) IntraMuscular once  insulin lispro (ADMELOG) corrective regimen sliding scale   SubCutaneous three times a day before meals  insulin lispro (ADMELOG) corrective regimen sliding scale   SubCutaneous at bedtime  metoprolol tartrate 25 milliGRAM(s) Oral every 12 hours  midodrine. 5 milliGRAM(s) Oral three times a day  midodrine. 10 milliGRAM(s) Oral <User Schedule>  pantoprazole    Tablet 40 milliGRAM(s) Oral before breakfast  polyethylene glycol 3350 17 Gram(s) Oral daily  prasugrel 10 milliGRAM(s) Oral daily      VITALS:  T(F): 98.2 (03-14-21 @ 11:52), Max: 98.2 (03-14-21 @ 11:52)  HR: 67 (03-14-21 @ 11:52)  BP: 91/53 (03-14-21 @ 11:52)  RR: 18 (03-14-21 @ 11:52)  SpO2: 98% (03-14-21 @ 11:52)      03-12 @ 07:01  -  03-13 @ 07:00  --------------------------------------------------------  IN: 480 mL / OUT: 0 mL / NET: 480 mL    03-13 @ 06:01  -  03-14 @ 07:00  --------------------------------------------------------  IN: 1280 mL / OUT: 1600 mL / NET: -320 mL        PHYSICAL EXAM:  Constitutional: NAD  HEENT: NCAT, DMM  Neck: Supple, no JVD  Respiratory: CTA-b/l  Cardiovascular: RRR s1s2, no m/r/g  Gastrointestinal: BS+, soft, NT; (+)mild distension  Extremities:  trace peripheral edema  Neurological: no focal deficits; strength grossly intact  Back: no CVAT b/l  Skin: No rashes, no nevi  Access: RIJ tunneled cath      LABS:  03-14    134<L>  |  99  |  34<H>  ----------------------------<  141<H>  3.9   |  26  |  3.26<H>    Ca    9.6      14 Mar 2021 05:43  Phos  3.3     03-14  Mg     2.8     03-14    TPro  6.7  /  Alb  2.5<L>  /  TBili  1.2  /  DBili      /  AST  71<H>  /  ALT  59<H>  /  AlkPhos  191<H>  03-14                          8.8    8.52  )-----------( 183      ( 14 Mar 2021 05:43 )             29.2

## 2021-03-14 NOTE — PROGRESS NOTE ADULT - SUBJECTIVE AND OBJECTIVE BOX
Subjective: Patient seen and examined. No new events except as noted.     REVIEW OF SYSTEMS:    CONSTITUTIONAL: + weakness, fevers or chills  EYES/ENT: No visual changes;  No vertigo or throat pain   NECK: No pain or stiffness  RESPIRATORY: No cough, wheezing, hemoptysis; No shortness of breath  CARDIOVASCULAR: No chest pain or palpitations  GASTROINTESTINAL: No abdominal or epigastric pain. No nausea, vomiting, or hematemesis; No diarrhea or constipation. No melena or hematochezia.  GENITOURINARY: No dysuria, frequency or hematuria  NEUROLOGICAL: No numbness or weakness  SKIN: No itching, burning, rashes, or lesions   All other review of systems is negative unless indicated above.    MEDICATIONS:  MEDICATIONS  (STANDING):  aspirin  chewable 81 milliGRAM(s) Oral daily  atorvastatin 80 milliGRAM(s) Oral at bedtime  chlorhexidine 4% Liquid 1 Application(s) Topical <User Schedule>  dextrose 40% Gel 15 Gram(s) Oral once  dextrose 5%. 1000 milliLiter(s) (50 mL/Hr) IV Continuous <Continuous>  dextrose 5%. 1000 milliLiter(s) (100 mL/Hr) IV Continuous <Continuous>  dextrose 50% Injectable 25 Gram(s) IV Push once  dextrose 50% Injectable 12.5 Gram(s) IV Push once  dextrose 50% Injectable 25 Gram(s) IV Push once  epoetin rafiq-epbx (RETACRIT) Injectable 20993 Unit(s) IV Push <User Schedule>  glucagon  Injectable 1 milliGRAM(s) IntraMuscular once  heparin   Injectable 5000 Unit(s) SubCutaneous every 12 hours  influenza   Vaccine 0.5 milliLiter(s) IntraMuscular once  insulin lispro (ADMELOG) corrective regimen sliding scale   SubCutaneous three times a day before meals  insulin lispro (ADMELOG) corrective regimen sliding scale   SubCutaneous at bedtime  metoprolol tartrate 25 milliGRAM(s) Oral every 12 hours  midodrine. 5 milliGRAM(s) Oral three times a day  midodrine. 10 milliGRAM(s) Oral <User Schedule>  pantoprazole    Tablet 40 milliGRAM(s) Oral before breakfast  polyethylene glycol 3350 17 Gram(s) Oral daily  prasugrel 10 milliGRAM(s) Oral daily      PHYSICAL EXAM:  T(C): 36.7 (03-14-21 @ 05:18), Max: 36.7 (03-13-21 @ 17:18)  HR: 65 (03-14-21 @ 05:31) (63 - 76)  BP: 93/52 (03-14-21 @ 05:31) (90/45 - 103/53)  RR: 18 (03-14-21 @ 05:18) (18 - 18)  SpO2: 98% (03-14-21 @ 08:15) (95% - 100%)  Wt(kg): --  I&O's Summary    13 Mar 2021 06:01  -  14 Mar 2021 07:00  --------------------------------------------------------  IN: 1280 mL / OUT: 1600 mL / NET: -320 mL          Appearance: NAD  HEENT:   Dry oral mucosa, PERRL, EOMI	  Lymphatic: No lymphadenopathy , Tunneled catheter   Cardiovascular: Irregular S1 S2, No JVD, + murmurs , Peripheral pulses palpable 2+ bilaterally  Respiratory: Lungs clear to auscultation, normal effort 	  Gastrointestinal:  Soft, Non-tender, + BS	  Skin: No rashes, No ecchymoses, No cyanosis, warm to touch  Musculoskeletal: Normal range of motion, normal strength  Psychiatry:  Mood & affect appropriate  Ext: No edema  LABS:    CARDIAC MARKERS:                                8.8    8.52  )-----------( 183      ( 14 Mar 2021 05:43 )             29.2     03-14    134<L>  |  99  |  34<H>  ----------------------------<  141<H>  3.9   |  26  |  3.26<H>    Ca    9.6      14 Mar 2021 05:43  Phos  3.3     03-14  Mg     2.8     03-14    TPro  6.7  /  Alb  2.5<L>  /  TBili  1.2  /  DBili  x   /  AST  71<H>  /  ALT  59<H>  /  AlkPhos  191<H>  03-14    proBNP:   Lipid Profile:   HgA1c:   TSH:             TELEMETRY: V paced  	    ECG:  	  RADIOLOGY:   DIAGNOSTIC TESTING:  [ ] Echocardiogram:  [ ]  Catheterization:  [ ] Stress Test:    OTHER:

## 2021-03-14 NOTE — PROGRESS NOTE ADULT - SUBJECTIVE AND OBJECTIVE BOX
Patient is a 80y old  Male who presents with a chief complaint of acute renal failure (14 Mar 2021 14:35)      SUBJECTIVE / OVERNIGHT EVENTS: no new developments    MEDICATIONS  (STANDING):  aspirin  chewable 81 milliGRAM(s) Oral daily  atorvastatin 80 milliGRAM(s) Oral at bedtime  chlorhexidine 4% Liquid 1 Application(s) Topical <User Schedule>  dextrose 40% Gel 15 Gram(s) Oral once  dextrose 5%. 1000 milliLiter(s) (50 mL/Hr) IV Continuous <Continuous>  dextrose 5%. 1000 milliLiter(s) (100 mL/Hr) IV Continuous <Continuous>  dextrose 50% Injectable 25 Gram(s) IV Push once  dextrose 50% Injectable 12.5 Gram(s) IV Push once  dextrose 50% Injectable 25 Gram(s) IV Push once  epoetin rafiq-epbx (RETACRIT) Injectable 52956 Unit(s) IV Push <User Schedule>  glucagon  Injectable 1 milliGRAM(s) IntraMuscular once  heparin   Injectable 5000 Unit(s) SubCutaneous every 12 hours  influenza   Vaccine 0.5 milliLiter(s) IntraMuscular once  insulin lispro (ADMELOG) corrective regimen sliding scale   SubCutaneous three times a day before meals  insulin lispro (ADMELOG) corrective regimen sliding scale   SubCutaneous at bedtime  metoprolol tartrate 25 milliGRAM(s) Oral every 12 hours  midodrine. 5 milliGRAM(s) Oral three times a day  midodrine. 10 milliGRAM(s) Oral <User Schedule>  pantoprazole    Tablet 40 milliGRAM(s) Oral before breakfast  polyethylene glycol 3350 17 Gram(s) Oral daily  prasugrel 10 milliGRAM(s) Oral daily    MEDICATIONS  (PRN):  sodium chloride 0.9% lock flush 10 milliLiter(s) IV Push every 1 hour PRN Pre/post blood products, medications, blood draw, and to maintain line patency      Vital Signs Last 24 Hrs  T(F): 97.8 (03-14-21 @ 20:47), Max: 98.2 (03-14-21 @ 11:52)  HR: 63 (03-14-21 @ 20:47) (63 - 78)  BP: 93/46 (03-14-21 @ 20:47) (90/45 - 93/52)  RR: 18 (03-14-21 @ 20:47) (18 - 18)  SpO2: 97% (03-14-21 @ 20:47) (97% - 98%)  Telemetry:   CAPILLARY BLOOD GLUCOSE      POCT Blood Glucose.: 215 mg/dL (14 Mar 2021 21:16)  POCT Blood Glucose.: 237 mg/dL (14 Mar 2021 16:42)  POCT Blood Glucose.: 214 mg/dL (14 Mar 2021 12:05)  POCT Blood Glucose.: 125 mg/dL (14 Mar 2021 07:45)    I&O's Summary    13 Mar 2021 06:01  -  14 Mar 2021 07:00  --------------------------------------------------------  IN: 1280 mL / OUT: 1600 mL / NET: -320 mL        PHYSICAL EXAM:  GENERAL: NAD, well-developed  HEAD:  Atraumatic, Normocephalic  EYES: EOMI, PERRLA, conjunctiva and sclera clear  NECK: Supple, No JVD  CHEST/LUNG: Clear to auscultation bilaterally; No wheeze  HEART: Regular rate and rhythm; No murmurs, rubs, or gallops  ABDOMEN: Soft, Nontender, Nondistended; Bowel sounds present  EXTREMITIES:  2+ Peripheral Pulses, No clubbing, cyanosis, or edema  PSYCH: AAOx3  NEUROLOGY: non-focal  SKIN: No rashes or lesions    LABS:                        8.8    8.52  )-----------( 183      ( 14 Mar 2021 05:43 )             29.2     03-14    134<L>  |  99  |  34<H>  ----------------------------<  141<H>  3.9   |  26  |  3.26<H>    Ca    9.6      14 Mar 2021 05:43  Phos  3.3     03-14  Mg     2.8     03-14    TPro  6.7  /  Alb  2.5<L>  /  TBili  1.2  /  DBili  x   /  AST  71<H>  /  ALT  59<H>  /  AlkPhos  191<H>  03-14              RADIOLOGY & ADDITIONAL TESTS:    Imaging Personally Reviewed:    Consultant(s) Notes Reviewed:      Care Discussed with Consultants/Other Providers:

## 2021-03-14 NOTE — PROGRESS NOTE ADULT - ASSESSMENT
80M w/ DM2, CAD-CABG, and CKD, 2/16/21 a/w azotemia/lyte derangements/weakness; c/b newly ESRD; c/b COVID(+)    )Renal - newly ESRD - last dialyzed last night - last HD saturday, 500ml removed    (2)CV - tenuous hemodynamics/hypervolemia - biventricular systolic dysfunction, severe AS, mod-sev AR. On Midodrine. Potentially for TAVR eval, after discharge    (3)Vasc - Forgoing AV access for now, given his severe cardiac disease/risk of worsening cardiac hemodynamics with AV access    (4)Anemia - On Retacrit with HD    (5)Dispo - awaiting acceptance to outside HD facility         RECOMMEND:  - Next HD Tuesday  - continue Midodrine   - continue Retacrit with HD  -Setup of outpatient HD per   -Avoid nephrotoxins as able        Deja Panda NP  [facility]  [phone]   80M w/ DM2, CAD-CABG, and CKD, 2/16/21 a/w azotemia/lyte derangements/weakness; c/b newly ESRD; c/b COVID(+)    )Renal - newly ESRD - last dialyzed last night - last HD saturday, 500ml removed    (2)CV - tenuous hemodynamics/hypervolemia - biventricular systolic dysfunction, severe AS, mod-sev AR. On Midodrine. Potentially for TAVR eval, after discharge    (3)Vasc - Forgoing AV access for now, given his severe cardiac disease/risk of worsening cardiac hemodynamics with AV access    (4)Anemia - On Retacrit with HD    (5)Dispo - awaiting acceptance to outside HD facility         RECOMMEND:  - continue Midodrine   - continue Retacrit with HD  -Setup of outpatient HD per   -Avoid nephrotoxins as able        Deja Panda NP  [facility]  [phone]

## 2021-03-14 NOTE — PROGRESS NOTE ADULT - ASSESSMENT
79 yo male w h/o ICM< CAD< s/p CABG, has AICD, DM, HTN presents w volume overload and abd pain  Abd sono shows gallstones, but no sign of cholecystitis/pancreatitis. abd pain resolved on Zosyn,   s/ p treatment empirically for 7 days .  ID following    ascites 2/2 systolic chf, probably worse 2/2 acute on chronic systolic chf.  responded well to lasix. now on HD.   started HD 2/26. HD as per renal. changed from MWF to TTSa,  RIJ tunneled catheter placed on 3/4 in IR, no fistula placement for now  unable to be DCed home 2/2 safety at home. nobody to assume responsibility, wife is ill  awaiting transfer to [facility]    nosocomial Covid positive. remains positive, clinically stable.  ID following. in isolation. trend infl markers.   s/p monoclonal infusion.  on interrogation of ICD, has a lof of arrhythmia burden,   NSVT and sustained VT, seen by EPS, cont  [id number] mg  bid. has inoperable ischemic HD,   ICM, EF 15%, severe AS, seen by structural heart for TAVR , proceed with TAVR work up    on 3/13: denise asked for a call, i called twice and wasnt able to leave messages bc mailbox is full. dr Garcia, renal spoke with him and confirmed home HD is not possible bc ptn is frail w unstable BPs. He also inquired about the second Covid vaccine( he had his first PTA) , ptn acquired the Covid virus during this hospitalization and remains on the Covid isolation floor. vaccine is not appropriate at this time    dvt ppx w [facility]

## 2021-03-15 LAB
ALBUMIN SERPL ELPH-MCNC: 2.4 G/DL — LOW (ref 3.3–5)
ALP SERPL-CCNC: 185 U/L — HIGH (ref 40–120)
ALT FLD-CCNC: 71 U/L — HIGH (ref 10–45)
ANION GAP SERPL CALC-SCNC: 11 MMOL/L — SIGNIFICANT CHANGE UP (ref 5–17)
AST SERPL-CCNC: 89 U/L — HIGH (ref 10–40)
BILIRUB SERPL-MCNC: 1.3 MG/DL — HIGH (ref 0.2–1.2)
BUN SERPL-MCNC: 51 MG/DL — HIGH (ref 7–23)
CALCIUM SERPL-MCNC: 9.6 MG/DL — SIGNIFICANT CHANGE UP (ref 8.4–10.5)
CHLORIDE SERPL-SCNC: 98 MMOL/L — SIGNIFICANT CHANGE UP (ref 96–108)
CO2 SERPL-SCNC: 25 MMOL/L — SIGNIFICANT CHANGE UP (ref 22–31)
CREAT SERPL-MCNC: 4.37 MG/DL — HIGH (ref 0.5–1.3)
GLUCOSE BLDC GLUCOMTR-MCNC: 151 MG/DL — HIGH (ref 70–99)
GLUCOSE BLDC GLUCOMTR-MCNC: 165 MG/DL — HIGH (ref 70–99)
GLUCOSE BLDC GLUCOMTR-MCNC: 193 MG/DL — HIGH (ref 70–99)
GLUCOSE BLDC GLUCOMTR-MCNC: 243 MG/DL — HIGH (ref 70–99)
GLUCOSE SERPL-MCNC: 147 MG/DL — HIGH (ref 70–99)
HCT VFR BLD CALC: 30.4 % — LOW (ref 39–50)
HGB BLD-MCNC: 9.5 G/DL — LOW (ref 13–17)
MCHC RBC-ENTMCNC: 30.8 PG — SIGNIFICANT CHANGE UP (ref 27–34)
MCHC RBC-ENTMCNC: 31.3 GM/DL — LOW (ref 32–36)
MCV RBC AUTO: 98.7 FL — SIGNIFICANT CHANGE UP (ref 80–100)
NRBC # BLD: 0 /100 WBCS — SIGNIFICANT CHANGE UP (ref 0–0)
PLATELET # BLD AUTO: 182 K/UL — SIGNIFICANT CHANGE UP (ref 150–400)
POTASSIUM SERPL-MCNC: 4.4 MMOL/L — SIGNIFICANT CHANGE UP (ref 3.5–5.3)
POTASSIUM SERPL-SCNC: 4.4 MMOL/L — SIGNIFICANT CHANGE UP (ref 3.5–5.3)
PROT SERPL-MCNC: 6.4 G/DL — SIGNIFICANT CHANGE UP (ref 6–8.3)
RBC # BLD: 3.08 M/UL — LOW (ref 4.2–5.8)
RBC # FLD: 21.4 % — HIGH (ref 10.3–14.5)
SARS-COV-2 RNA SPEC QL NAA+PROBE: DETECTED
SODIUM SERPL-SCNC: 134 MMOL/L — LOW (ref 135–145)
WBC # BLD: 9.18 K/UL — SIGNIFICANT CHANGE UP (ref 3.8–10.5)
WBC # FLD AUTO: 9.18 K/UL — SIGNIFICANT CHANGE UP (ref 3.8–10.5)

## 2021-03-15 RX ADMIN — MIDODRINE HYDROCHLORIDE 5 MILLIGRAM(S): 2.5 TABLET ORAL at 18:23

## 2021-03-15 RX ADMIN — MIDODRINE HYDROCHLORIDE 5 MILLIGRAM(S): 2.5 TABLET ORAL at 06:15

## 2021-03-15 RX ADMIN — Medication 1: at 12:52

## 2021-03-15 RX ADMIN — PANTOPRAZOLE SODIUM 40 MILLIGRAM(S): 20 TABLET, DELAYED RELEASE ORAL at 06:15

## 2021-03-15 RX ADMIN — HEPARIN SODIUM 5000 UNIT(S): 5000 INJECTION INTRAVENOUS; SUBCUTANEOUS at 06:15

## 2021-03-15 RX ADMIN — PRASUGREL 10 MILLIGRAM(S): 5 TABLET, FILM COATED ORAL at 14:10

## 2021-03-15 RX ADMIN — HEPARIN SODIUM 5000 UNIT(S): 5000 INJECTION INTRAVENOUS; SUBCUTANEOUS at 18:23

## 2021-03-15 RX ADMIN — Medication 25 MILLIGRAM(S): at 06:15

## 2021-03-15 RX ADMIN — MIDODRINE HYDROCHLORIDE 5 MILLIGRAM(S): 2.5 TABLET ORAL at 14:10

## 2021-03-15 RX ADMIN — Medication 81 MILLIGRAM(S): at 14:10

## 2021-03-15 RX ADMIN — Medication 25 MILLIGRAM(S): at 21:31

## 2021-03-15 RX ADMIN — POLYETHYLENE GLYCOL 3350 17 GRAM(S): 17 POWDER, FOR SOLUTION ORAL at 14:10

## 2021-03-15 RX ADMIN — CHLORHEXIDINE GLUCONATE 1 APPLICATION(S): 213 SOLUTION TOPICAL at 10:55

## 2021-03-15 RX ADMIN — ATORVASTATIN CALCIUM 80 MILLIGRAM(S): 80 TABLET, FILM COATED ORAL at 21:30

## 2021-03-15 RX ADMIN — Medication 1: at 16:53

## 2021-03-15 RX ADMIN — Medication 1: at 08:53

## 2021-03-15 NOTE — PROGRESS NOTE ADULT - ASSESSMENT
79 yo male w h/o DM, CAD, s/p CABG, CKD 3-4.   Recently evaluated by Nephrology for elevated creatinine. Bloodwork  returned notable for  and Na 126; in light of these findings, Dr. Luz (Renal) sent the patient to the ER. Lately ptn has been having abdominal pain with elevated amylase and lipase.   As per ptn's wife: on and off R abd pain w generalized weakness and poor po intake. PCP DCed po Lasix in past few days 2/2 abnormal blood test results.  Ptn denies recent NSAID use.  He was diagnosed with diabetes 18 years ago, and denies retinopathy (last saw Optho around 2 years ago). Cardiologist has alluded to the patient having mild renal impairment in the past, as per wife.    Patient's PCP is Dr. Shyann De León; GI Dr. Wilman Villagran; Cardiology Dr. Boyce.  (16 Feb 2021 21:05)    ER vs: T 98.1, P 85, /64. WBC 8.2.  Bun/Cr - 100/2.0.  LFTs mildly elevated, Roxana 217, Lip 185.  Pct 0.2.  UA (-).  Ct c/a/p shows pulmonary edema, sm B/L pleural effusions, sm volume ascites.  VQ scan with very low probability for PE.      Pt started on zosyn to cover for intra-abd source.  ID consulted.     r/o Gallstone pancreatitis:    - Pt p/w abdominal pain, epigastric and RUQ pain, noted to have elevated LFTs and amylase/lipase.  Possible gallstone pancreatitis?    - U/S abdomen shows cholelithiasis without acute cholecystitis.  Borderline to mild gallbladder thickening is likely due to ascites.   Moderate ascites noted.  Suggestion of mild renal parenchymal disease and bilateral pleural effusions.    - CTap w/o acute intra-abd pathology    - monitor LFTs, Roxana, Lipase.  As per GI, ascites likely secondary to CHF, no evidence for pancreatitis.     - Monitor temp curve and WBC.      - bcx neg. completed  coverage with zosyn x 7 days, 2/23.  Abd pain resolved      {[id number]}Renal failure:    - Renal following for CKD.  Dose abx for GFR 15-20.  On q12 hr dosing of zosyn.     - Cr rising.  CALVIN on CKD does not appear to be antibiotic mediated, renal f/u noted.      - Pt requiring HD,  shiley catheter placed 2/26, s/p conversion to tunnelled catheter - 3/4    Aortic stenosis:    - Followed by Structural health team for TAVR evaluation    Covid (+):    - Repeat Covid pcr on 2/25 tested positive.   On 1L nc, satting >94%    - No indication for Covid therapeutics at this time.  Cont supportive care, incentive spirometery.  Monitor pulse ox    - Repeat covid test remains positive on 3/15 - likely secondary to nonviable virus.  Pt > 10 days since positive test, pt afebrile.  On < 4L nc.  Off airborne precautions.     * No indication for abx at this time.       Rupali Schwarz  [phone]

## 2021-03-15 NOTE — PROGRESS NOTE ADULT - SUBJECTIVE AND OBJECTIVE BOX
Infectious Diseases progress note:    Subjective: Pt seen earlier today, resting comfortably, no new somatic complaints.     ROS:  CONSTITUTIONAL:  No fever, chills, rigors  CARDIOVASCULAR:  No chest pain or palpitations  RESPIRATORY:   No SOB, cough, dyspnea on exertion.  No wheezing  GASTROINTESTINAL:  No abd pain, N/V, diarrhea/constipation  EXTREMITIES:  No swelling or joint pain  GENITOURINARY:  No burning on urination, increased frequency or urgency.  No flank pain  NEUROLOGIC:  No HA, visual disturbances  SKIN: No rashes    Allergies    No Known Allergies    Intolerances        ANTIBIOTICS/RELEVANT:  antimicrobials    immunologic:  epoetin rafiq-epbx (RETACRIT) Injectable 59705 Unit(s) IV Push <User Schedule>  influenza   Vaccine 0.5 milliLiter(s) IntraMuscular once    OTHER:  aspirin  chewable 81 milliGRAM(s) Oral daily  atorvastatin 80 milliGRAM(s) Oral at bedtime  chlorhexidine 4% Liquid 1 Application(s) Topical <User Schedule>  dextrose 40% Gel 15 Gram(s) Oral once  dextrose 5%. 1000 milliLiter(s) IV Continuous <Continuous>  dextrose 5%. 1000 milliLiter(s) IV Continuous <Continuous>  dextrose 50% Injectable 25 Gram(s) IV Push once  dextrose 50% Injectable 12.5 Gram(s) IV Push once  dextrose 50% Injectable 25 Gram(s) IV Push once  glucagon  Injectable 1 milliGRAM(s) IntraMuscular once  heparin   Injectable 5000 Unit(s) SubCutaneous every 12 hours  insulin lispro (ADMELOG) corrective regimen sliding scale   SubCutaneous three times a day before meals  insulin lispro (ADMELOG) corrective regimen sliding scale   SubCutaneous at bedtime  metoprolol tartrate 25 milliGRAM(s) Oral every 12 hours  midodrine. 5 milliGRAM(s) Oral three times a day  midodrine. 10 milliGRAM(s) Oral <User Schedule>  pantoprazole    Tablet 40 milliGRAM(s) Oral before breakfast  polyethylene glycol 3350 17 Gram(s) Oral daily  prasugrel 10 milliGRAM(s) Oral daily  sodium chloride 0.9% lock flush 10 milliLiter(s) IV Push every 1 hour PRN      Objective:  Vital Signs Last 24 Hrs  T(C): 36.3 (15 Mar 2021 20:32), Max: 36.6 (15 Mar 2021 13:05)  T(F): 97.3 (15 Mar 2021 20:32), Max: 97.9 (15 Mar 2021 13:05)  HR: 70 (15 Mar 2021 20:32) (64 - 71)  BP: 94/57 (15 Mar 2021 20:32) (90/48 - 104/64)  BP(mean): --  RR: 18 (15 Mar 2021 20:32) (18 - 18)  SpO2: 95% (15 Mar 2021 20:32) (94% - 96%)    PHYSICAL EXAM:  Constitutional:NAD  Eyes:CHERI, EOMI  Ear/Nose/Throat: no thrush, mucositis.  Moist mucous membranes	  Neck:no JVD, no lymphadenopathy, supple  Respiratory: CTA nadiya  Cardiovascular: S1S2 RRR, no murmurs  Gastrointestinal:soft, nontender,  nondistended (+) BS  Extremities:no e/e/c  Skin:  no rashes, open wounds or ulcerations        LABS:                        9.5    9.18  )-----------( 182      ( 15 Mar 2021 07:24 )             30.4     03-15    134<L>  |  98  |  51<H>  ----------------------------<  147<H>  4.4   |  25  |  4.37<H>    Ca    9.6      15 Mar 2021 07:24  Phos  3.3     03-14  Mg     2.8     03-14    TPro  6.4  /  Alb  2.4<L>  /  TBili  1.3<H>  /  DBili  x   /  AST  89<H>  /  ALT  71<H>  /  AlkPhos  185<H>  03-15          Procalcitonin, Serum: 0.20 (02-16 @ 23:54)                    MICROBIOLOGY:      COVID-19 PCR . (03.15.21 @ 11:53)   COVID-19 PCR: Detected: You can help in the fight against COVID-19. [facility] may contact   you to see if you are interested in voluntarily participating in one of   our clinical trials.   Testing is performed using polymerase chain reaction (PCR) or   transcription mediated amplification (TMA). This COVID-19 (SARS-CoV-2)   nucleic acid amplification test was validated by [facility] and is   in use under the FDA Emergency Use Authorization (EUA) for clinical labs   CLIA-certified to perform high complexity testing. Test results should be   correlated with clinical presentation, patient history, and epidemiology.     RADIOLOGY & ADDITIONAL STUDIES:

## 2021-03-15 NOTE — PROGRESS NOTE ADULT - SUBJECTIVE AND OBJECTIVE BOX
      Overnight events noted      VITAL:  T(C): , Max: 36.8 (03-14-21 @ 11:52)  T(F): , Max: 98.2 (03-14-21 @ 11:52)  HR: 69 (03-15-21 @ 05:53)  BP: 92/52 (03-15-21 @ 05:53)  BP(mean): --  RR: 18 (03-15-21 @ 05:53)  SpO2: 96% (03-15-21 @ 05:53)      PHYSICAL EXAM:  Constitutional: NAD on room air, alert  HEENT: NCAT, DMM  Neck: Supple, no JVD  Respiratory: CTA-b/l  Cardiovascular: RRR s1s2, no m/r/g  Gastrointestinal: BS+, soft, NT; (+)mild distension  Extremities:  no edema b/l  Neurological: no focal deficits; strength grossly intact  Back: no CVAT b/l  Skin: No rashes, no nevi  Access: RIJ tunneled cath    LABS:                        9.5    9.18  )-----------( 182      ( 15 Mar 2021 07:24 )             30.4     Na(134)/K(4.4)/Cl(98)/HCO3(25)/BUN(51)/Cr(4.37)Glu(147)/Ca(9.6)/Mg(--)/PO4(--)    03-15 @ 07:24  Na(134)/K(3.9)/Cl(99)/HCO3(26)/BUN(34)/Cr(3.26)Glu(141)/Ca(9.6)/Mg(2.8)/PO4(3.3)    03-14 @ 05:43  Na(136)/K(4.4)/Cl(99)/HCO3(25)/BUN(54)/Cr(4.48)Glu(106)/Ca(9.7)/Mg(--)/PO4(--)    03-13 @ 06:03      IMPRESSION: 80M w/ DM2, CAD-CABG, and CKD, 2/16/21 a/w azotemia/lyte derangements/weakness; c/b newly ESRD; c/b COVID(+)    (1)Renal - newly ESRD - last dialyzed Saturday 3/13 - due for next HD tomorrow    (2)CV - tenuous hemodynamics/hypervolemia - biventricular systolic dysfunction, severe AS, mod-sev AR. On Midodrine. Potentially for TAVR eval, after discharge    (3)Vasc - Forgoing AV access for now, given his severe cardiac disease/risk of worsening cardiac hemodynamics with AV access    (4)Anemia - On Retacrit with HD    (5)Dispo - awaiting acceptance to outside HD facility - difficult to find facility, as long as he remains COVID-positive. Last tested positive on 3/13/21.      RECOMMEND:  (1)Next HD tomorrow; 0.5L UF as able; Midodrine 10mg PO x 1 prn intradialytic hypotension; Retacrit with HD  (2)Setup of outpatient HD per SW/serial COVID tests in hope that he soon converts to COVID-negative            Suraj Garcia MD  [facility]  Office: [phone]  Cell: [phone]               Overnight events noted  Spoke with son over weekend - son asking about option of home-based dialysis modality. Explained that I do not believe he is a candidate, given his hemodynamic instability of late...to me, it appears the risk of a hypotension-associated complication at home would be too high to consider PD/home HD here.    VITAL:  T(C): , Max: 36.8 (03-14-21 @ 11:52)  T(F): , Max: 98.2 (03-14-21 @ 11:52)  HR: 69 (03-15-21 @ 05:53)  BP: 92/52 (03-15-21 @ 05:53)  BP(mean): --  RR: 18 (03-15-21 @ 05:53)  SpO2: 96% (03-15-21 @ 05:53)      PHYSICAL EXAM:  Constitutional: NAD on room air, alert  HEENT: NCAT, DMM  Neck: Supple, no JVD  Respiratory: CTA-b/l  Cardiovascular: RRR s1s2, no m/r/g  Gastrointestinal: BS+, soft, NT; (+)mild distension  Extremities:  no edema b/l  Neurological: no focal deficits; strength grossly intact  Back: no CVAT b/l  Skin: No rashes, no nevi  Access: RIJ tunneled cath    LABS:                        9.5    9.18  )-----------( 182      ( 15 Mar 2021 07:24 )             30.4     Na(134)/K(4.4)/Cl(98)/HCO3(25)/BUN(51)/Cr(4.37)Glu(147)/Ca(9.6)/Mg(--)/PO4(--)    03-15 @ 07:24  Na(134)/K(3.9)/Cl(99)/HCO3(26)/BUN(34)/Cr(3.26)Glu(141)/Ca(9.6)/Mg(2.8)/PO4(3.3)    03-14 @ 05:43  Na(136)/K(4.4)/Cl(99)/HCO3(25)/BUN(54)/Cr(4.48)Glu(106)/Ca(9.7)/Mg(--)/PO4(--)    03-13 @ 06:03      IMPRESSION: 80M w/ DM2, CAD-CABG, and CKD, 2/16/21 a/w azotemia/lyte derangements/weakness; c/b newly ESRD; c/b COVID(+)    (1)Renal - newly ESRD - last dialyzed Saturday 3/13 - due for next HD tomorrow - not a candidate for home-based dialysis modality    (2)CV - tenuous hemodynamics/hypervolemia - biventricular systolic dysfunction, severe AS, mod-sev AR. On Midodrine. Potentially for TAVR eval, after discharge    (3)Vasc - Forgoing AV access for now, given his severe cardiac disease/risk of worsening cardiac hemodynamics with AV access    (4)Anemia - On Retacrit with HD    (5)Dispo - awaiting acceptance to outside HD facility - difficult to find facility, as long as he remains COVID-positive. Last tested positive on 3/13/21.      RECOMMEND:  (1)Next HD tomorrow; 0.5L UF as able; Midodrine 10mg PO x 1 prn intradialytic hypotension; Retacrit with HD  (2)Setup of outpatient HD per SW/serial COVID tests in hope that he soon converts to COVID-negative            Suraj Garcia MD  [facility]  Office: [phone]  Cell: [phone]               Overnight events noted  Spoke with son over weekend - son asking about option of home-based dialysis modality. Explained that I do not believe he is a candidate, given his hemodynamic instability of late...to me, it appears the risk of a hypotension-associated complication at home would be too high to consider PD/home HD here.    Patient complains of weakness of his legs/limited ability to ambulate.    VITAL:  T(C): , Max: 36.8 (03-14-21 @ 11:52)  T(F): , Max: 98.2 (03-14-21 @ 11:52)  HR: 69 (03-15-21 @ 05:53)  BP: 92/52 (03-15-21 @ 05:53)  BP(mean): --  RR: 18 (03-15-21 @ 05:53)  SpO2: 96% (03-15-21 @ 05:53)      PHYSICAL EXAM:  Constitutional: NAD on room air, alert  HEENT: NCAT, DMM  Neck: Supple, no JVD  Respiratory: CTA-b/l  Cardiovascular: RRR s1s2, no m/r/g  Gastrointestinal: BS+, soft, NT; (+)mild distension  Extremities:  no edema b/l  Neurological: no focal deficits; strength grossly intact  Back: no CVAT b/l  Skin: No rashes, no nevi  Access: RIJ tunneled cath    LABS:                        9.5    9.18  )-----------( 182      ( 15 Mar 2021 07:24 )             30.4     Na(134)/K(4.4)/Cl(98)/HCO3(25)/BUN(51)/Cr(4.37)Glu(147)/Ca(9.6)/Mg(--)/PO4(--)    03-15 @ 07:24  Na(134)/K(3.9)/Cl(99)/HCO3(26)/BUN(34)/Cr(3.26)Glu(141)/Ca(9.6)/Mg(2.8)/PO4(3.3)    03-14 @ 05:43  Na(136)/K(4.4)/Cl(99)/HCO3(25)/BUN(54)/Cr(4.48)Glu(106)/Ca(9.7)/Mg(--)/PO4(--)    03-13 @ 06:03      IMPRESSION: 80M w/ DM2, CAD-CABG, and CKD, 2/16/21 a/w azotemia/lyte derangements/weakness; c/b newly ESRD; c/b COVID(+)    (1)Renal - newly ESRD - last dialyzed Saturday 3/13 - due for next HD tomorrow - not a candidate for home-based dialysis modality    (2)CV - tenuous hemodynamics/hypervolemia - biventricular systolic dysfunction, severe AS, mod-sev AR. On Midodrine. Potentially for TAVR eval, after discharge    (3)Vasc - Forgoing AV access for now, given his severe cardiac disease/risk of worsening cardiac hemodynamics with AV access    (4)Anemia - On Retacrit with HD    (5)Dispo - awaiting acceptance to outside HD facility - difficult to find facility, as long as he remains COVID-positive. Last tested positive on 3/13/21.      RECOMMEND:  (1)Next HD tomorrow; 0.5L UF as able; Midodrine 10mg PO x 1 prn intradialytic hypotension; Retacrit with HD    (2)PT    (3)Setup of outpatient HD per SW/serial COVID tests in hope that he soon converts to COVID-negative            Suraj Garcia MD  [facility]  Office: [phone]  Cell: [phone]

## 2021-03-15 NOTE — PROGRESS NOTE ADULT - ASSESSMENT
79 yo male w h/o ICM< CAD< s/p CABG, has AICD, DM, HTN presents w volume overload and abd pain  Abd sono shows gallstones, but no sign of cholecystitis/pancreatitis. abd pain resolved on Zosyn,   s/ p treatment empirically for 7 days .  ID following    ascites 2/2 systolic chf, probably worse 2/2 acute on chronic systolic chf.  responded well to lasix. now on HD.   started HD 2/26. HD as per renal. changed from MWF to TTSa,  RIJ tunneled catheter placed on 3/4 in IR, no fistula placement for now  unable to be DCed home 2/2 safety at home. nobody to assume responsibility, wife is ill  awaiting transfer to [facility]    nosocomial Covid positive. remains positive, clinically stable.  ID following. in isolation. trend infl markers.   s/p monoclonal infusion.  on interrogation of ICD, has a lof of arrhythmia burden,   NSVT and sustained VT, seen by EPS, cont  Brxdsostc06.5 mg  bid. has inoperable ischemic HD,   ICM, EF 15%, severe AS, seen by structural heart for TAVR , proceed with TAVR work up    on 3/13: denise asked for a call, i called twice and wasnt able to leave messages bc mailbox is full. dr Garcia, renal spoke with him and confirmed home HD is not possible bc ptn is frail w unstable BPs. He also inquired about the second Covid vaccine( he had his first PTA) , ptn acquired the Covid virus during this hospitalization and remains on the Covid isolation floor. vaccine is not appropriate at this time    ptn is awaiting transfer to [facility], the only possible option is [facility] in [place], family is against it, they live in [place]. ptn c/o pain in his feet, pulses are good, will check THIERRY and get neuro consult      dvt ppx w [facility]

## 2021-03-15 NOTE — PROGRESS NOTE ADULT - SUBJECTIVE AND OBJECTIVE BOX
Subjective: Patient seen and examined. No new events except as noted.     REVIEW OF SYSTEMS:    CONSTITUTIONAL: + weakness, fevers or chills  EYES/ENT: No visual changes;  No vertigo or throat pain   NECK: No pain or stiffness  RESPIRATORY: No cough, wheezing, hemoptysis; No shortness of breath  CARDIOVASCULAR: No chest pain or palpitations  GASTROINTESTINAL: No abdominal or epigastric pain. No nausea, vomiting, or hematemesis; No diarrhea or constipation. No melena or hematochezia.  GENITOURINARY: No dysuria, frequency or hematuria  NEUROLOGICAL: No numbness or weakness  SKIN: No itching, burning, rashes, or lesions   All other review of systems is negative unless indicated above.    MEDICATIONS:  MEDICATIONS  (STANDING):  aspirin  chewable 81 milliGRAM(s) Oral daily  atorvastatin 80 milliGRAM(s) Oral at bedtime  chlorhexidine 4% Liquid 1 Application(s) Topical <User Schedule>  dextrose 40% Gel 15 Gram(s) Oral once  dextrose 5%. 1000 milliLiter(s) (50 mL/Hr) IV Continuous <Continuous>  dextrose 5%. 1000 milliLiter(s) (100 mL/Hr) IV Continuous <Continuous>  dextrose 50% Injectable 25 Gram(s) IV Push once  dextrose 50% Injectable 12.5 Gram(s) IV Push once  dextrose 50% Injectable 25 Gram(s) IV Push once  epoetin rafiq-epbx (RETACRIT) Injectable 72493 Unit(s) IV Push <User Schedule>  glucagon  Injectable 1 milliGRAM(s) IntraMuscular once  heparin   Injectable 5000 Unit(s) SubCutaneous every 12 hours  influenza   Vaccine 0.5 milliLiter(s) IntraMuscular once  insulin lispro (ADMELOG) corrective regimen sliding scale   SubCutaneous three times a day before meals  insulin lispro (ADMELOG) corrective regimen sliding scale   SubCutaneous at bedtime  metoprolol tartrate 25 milliGRAM(s) Oral every 12 hours  midodrine. 5 milliGRAM(s) Oral three times a day  midodrine. 10 milliGRAM(s) Oral <User Schedule>  pantoprazole    Tablet 40 milliGRAM(s) Oral before breakfast  polyethylene glycol 3350 17 Gram(s) Oral daily  prasugrel 10 milliGRAM(s) Oral daily      PHYSICAL EXAM:  T(C): 36.3 (03-15-21 @ 05:53), Max: 36.8 (03-14-21 @ 11:52)  HR: 71 (03-15-21 @ 10:43) (63 - 78)  BP: 104/64 (03-15-21 @ 10:43) (91/49 - 104/64)  RR: 18 (03-15-21 @ 05:53) (18 - 18)  SpO2: 96% (03-15-21 @ 10:43) (96% - 98%)  Wt(kg): --  I&O's Summary        Appearance: NAD  HEENT:   Dry oral mucosa, PERRL, EOMI	  Lymphatic: No lymphadenopathy , Tunneled catheter   Cardiovascular: Irregular S1 S2, No JVD, + murmurs , Peripheral pulses palpable 2+ bilaterally  Respiratory: Lungs clear to auscultation, normal effort 	  Gastrointestinal:  Soft, Non-tender, + BS	  Skin: No rashes, No ecchymoses, No cyanosis, warm to touch  Musculoskeletal: Normal range of motion, normal strength  Psychiatry:  Mood & affect appropriate  Ext: No edema      LABS:    CARDIAC MARKERS:                                9.5    9.18  )-----------( 182      ( 15 Mar 2021 07:24 )             30.4     03-15    134<L>  |  98  |  51<H>  ----------------------------<  147<H>  4.4   |  25  |  4.37<H>    Ca    9.6      15 Mar 2021 07:24  Phos  3.3     03-14  Mg     2.8     03-14    TPro  6.4  /  Alb  2.4<L>  /  TBili  1.3<H>  /  DBili  x   /  AST  89<H>  /  ALT  71<H>  /  AlkPhos  185<H>  03-15    proBNP:   Lipid Profile:   HgA1c:   TSH:             TELEMETRY: V paced 	    ECG:  	  RADIOLOGY:   DIAGNOSTIC TESTING:  [ ] Echocardiogram:  [ ]  Catheterization:  [ ] Stress Test:    OTHER:

## 2021-03-15 NOTE — CHART NOTE - NSCHARTNOTEFT_GEN_A_CORE
PA Medicine Event Note    Patient with B/L feet pain.  Seen at bedside- + B/L sensation, cool to touch, no edema noted. Patient able to walk with PT today.  2+ pulses noted to B/L posterior tibial, 2+ noted to R dorsalis pedis. however 0 to L dorsalis pedis.  Also noted to have ecchymosis/ small hematoma noted to inner L UE where heparin injections are done. Discussed with Rn to not do SQ injections at that site anymore and to aracelis area.  Discussed with Dr. Rosario- THIERRY/PVR LE  ordered. L UE US also ordered.    Cont to monitor patient.  Will endorse to night team.    Sonia Mendenhall PA-C  Dept of Medicine  #[id number]

## 2021-03-15 NOTE — PROGRESS NOTE ADULT - SUBJECTIVE AND OBJECTIVE BOX
Patient is a 80y old  Male who presents with a chief complaint of acute renal failure (15 Mar 2021 22:09)      SUBJECTIVE / OVERNIGHT EVENTS: ptn is awaiting transfer to [facility], the only possible option is [facility] in [place], family is against it, they live in [place]. ptn c/o pain in his feet, pulses are good, will check THIERRY and get neuro consult    MEDICATIONS  (STANDING):  aspirin  chewable 81 milliGRAM(s) Oral daily  atorvastatin 80 milliGRAM(s) Oral at bedtime  chlorhexidine 4% Liquid 1 Application(s) Topical <User Schedule>  dextrose 40% Gel 15 Gram(s) Oral once  dextrose 5%. 1000 milliLiter(s) (50 mL/Hr) IV Continuous <Continuous>  dextrose 5%. 1000 milliLiter(s) (100 mL/Hr) IV Continuous <Continuous>  dextrose 50% Injectable 25 Gram(s) IV Push once  dextrose 50% Injectable 12.5 Gram(s) IV Push once  dextrose 50% Injectable 25 Gram(s) IV Push once  epoetin rafiq-epbx (RETACRIT) Injectable 32444 Unit(s) IV Push <User Schedule>  glucagon  Injectable 1 milliGRAM(s) IntraMuscular once  heparin   Injectable 5000 Unit(s) SubCutaneous every 12 hours  influenza   Vaccine 0.5 milliLiter(s) IntraMuscular once  insulin lispro (ADMELOG) corrective regimen sliding scale   SubCutaneous three times a day before meals  insulin lispro (ADMELOG) corrective regimen sliding scale   SubCutaneous at bedtime  metoprolol tartrate 25 milliGRAM(s) Oral every 12 hours  midodrine. 5 milliGRAM(s) Oral three times a day  midodrine. 10 milliGRAM(s) Oral <User Schedule>  pantoprazole    Tablet 40 milliGRAM(s) Oral before breakfast  polyethylene glycol 3350 17 Gram(s) Oral daily  prasugrel 10 milliGRAM(s) Oral daily    MEDICATIONS  (PRN):  sodium chloride 0.9% lock flush 10 milliLiter(s) IV Push every 1 hour PRN Pre/post blood products, medications, blood draw, and to maintain line patency      Vital Signs Last 24 Hrs  T(F): 97.3 (03-15-21 @ 20:32), Max: 97.9 (03-15-21 @ 13:05)  HR: 70 (03-15-21 @ 20:32) (64 - 71)  BP: 94/57 (03-15-21 @ 20:32) (90/48 - 104/64)  RR: 18 (03-15-21 @ 20:32) (18 - 18)  SpO2: 95% (03-15-21 @ 20:32) (94% - 96%)  Telemetry:   CAPILLARY BLOOD GLUCOSE      POCT Blood Glucose.: 243 mg/dL (15 Mar 2021 21:29)  POCT Blood Glucose.: 165 mg/dL (15 Mar 2021 16:40)  POCT Blood Glucose.: 193 mg/dL (15 Mar 2021 12:08)  POCT Blood Glucose.: 151 mg/dL (15 Mar 2021 08:04)    I&O's Summary    15 Mar 2021 07:01  -  15 Mar 2021 22:51  --------------------------------------------------------  IN: 360 mL / OUT: 0 mL / NET: 360 mL        PHYSICAL EXAM:  GENERAL: NAD, well-developed  HEAD:  Atraumatic, Normocephalic  EYES: EOMI, PERRLA, conjunctiva and sclera clear  NECK: Supple, No JVD  CHEST/LUNG: Clear to auscultation bilaterally; No wheeze  HEART: Regular rate and rhythm; No murmurs, rubs, or gallops  ABDOMEN: Soft, Nontender, Nondistended; Bowel sounds present  EXTREMITIES:  2+ Peripheral Pulses, No clubbing, cyanosis, or edema  PSYCH: AAOx3  NEUROLOGY: non-focal  SKIN: No rashes or lesions    LABS:                        9.5    9.18  )-----------( 182      ( 15 Mar 2021 07:24 )             30.4     03-15    134<L>  |  98  |  51<H>  ----------------------------<  147<H>  4.4   |  25  |  4.37<H>    Ca    9.6      15 Mar 2021 07:24  Phos  3.3     03-14  Mg     2.8     03-14    TPro  6.4  /  Alb  2.4<L>  /  TBili  1.3<H>  /  DBili  x   /  AST  89<H>  /  ALT  71<H>  /  AlkPhos  185<H>  03-15              RADIOLOGY & ADDITIONAL TESTS:    Imaging Personally Reviewed:    Consultant(s) Notes Reviewed:      Care Discussed with Consultants/Other Providers:

## 2021-03-16 LAB
ALBUMIN SERPL ELPH-MCNC: 2.4 G/DL — LOW (ref 3.3–5)
ALP SERPL-CCNC: 191 U/L — HIGH (ref 40–120)
ALT FLD-CCNC: 86 U/L — HIGH (ref 10–45)
ANION GAP SERPL CALC-SCNC: 14 MMOL/L — SIGNIFICANT CHANGE UP (ref 5–17)
AST SERPL-CCNC: 93 U/L — HIGH (ref 10–40)
BILIRUB SERPL-MCNC: 1.3 MG/DL — HIGH (ref 0.2–1.2)
BUN SERPL-MCNC: 65 MG/DL — HIGH (ref 7–23)
CALCIUM SERPL-MCNC: 9.9 MG/DL — SIGNIFICANT CHANGE UP (ref 8.4–10.5)
CHLORIDE SERPL-SCNC: 97 MMOL/L — SIGNIFICANT CHANGE UP (ref 96–108)
CO2 SERPL-SCNC: 23 MMOL/L — SIGNIFICANT CHANGE UP (ref 22–31)
CREAT SERPL-MCNC: 4.97 MG/DL — HIGH (ref 0.5–1.3)
GLUCOSE BLDC GLUCOMTR-MCNC: 137 MG/DL — HIGH (ref 70–99)
GLUCOSE BLDC GLUCOMTR-MCNC: 167 MG/DL — HIGH (ref 70–99)
GLUCOSE BLDC GLUCOMTR-MCNC: 213 MG/DL — HIGH (ref 70–99)
GLUCOSE BLDC GLUCOMTR-MCNC: 247 MG/DL — HIGH (ref 70–99)
GLUCOSE SERPL-MCNC: 155 MG/DL — HIGH (ref 70–99)
HCT VFR BLD CALC: 32.7 % — LOW (ref 39–50)
HGB BLD-MCNC: 10.1 G/DL — LOW (ref 13–17)
MAGNESIUM SERPL-MCNC: 2.9 MG/DL — HIGH (ref 1.6–2.6)
MCHC RBC-ENTMCNC: 30.3 PG — SIGNIFICANT CHANGE UP (ref 27–34)
MCHC RBC-ENTMCNC: 30.9 GM/DL — LOW (ref 32–36)
MCV RBC AUTO: 98.2 FL — SIGNIFICANT CHANGE UP (ref 80–100)
NRBC # BLD: 0 /100 WBCS — SIGNIFICANT CHANGE UP (ref 0–0)
PHOSPHATE SERPL-MCNC: 4.2 MG/DL — SIGNIFICANT CHANGE UP (ref 2.5–4.5)
PLATELET # BLD AUTO: 201 K/UL — SIGNIFICANT CHANGE UP (ref 150–400)
POTASSIUM SERPL-MCNC: 4.8 MMOL/L — SIGNIFICANT CHANGE UP (ref 3.5–5.3)
POTASSIUM SERPL-SCNC: 4.8 MMOL/L — SIGNIFICANT CHANGE UP (ref 3.5–5.3)
PROT SERPL-MCNC: 6.7 G/DL — SIGNIFICANT CHANGE UP (ref 6–8.3)
RBC # BLD: 3.33 M/UL — LOW (ref 4.2–5.8)
RBC # FLD: 21.1 % — HIGH (ref 10.3–14.5)
SODIUM SERPL-SCNC: 134 MMOL/L — LOW (ref 135–145)
WBC # BLD: 8.42 K/UL — SIGNIFICANT CHANGE UP (ref 3.8–10.5)
WBC # FLD AUTO: 8.42 K/UL — SIGNIFICANT CHANGE UP (ref 3.8–10.5)

## 2021-03-16 PROCEDURE — 76882 US LMTD JT/FCL EVL NVASC XTR: CPT | Mod: 26,LT

## 2021-03-16 PROCEDURE — 93923 UPR/LXTR ART STDY 3+ LVLS: CPT | Mod: 26

## 2021-03-16 RX ORDER — GABAPENTIN 400 MG/1
100 CAPSULE ORAL AT BEDTIME
Refills: 0 | Status: DISCONTINUED | OUTPATIENT
Start: 2021-03-16 | End: 2021-03-24

## 2021-03-16 RX ADMIN — POLYETHYLENE GLYCOL 3350 17 GRAM(S): 17 POWDER, FOR SOLUTION ORAL at 12:58

## 2021-03-16 RX ADMIN — PANTOPRAZOLE SODIUM 40 MILLIGRAM(S): 20 TABLET, DELAYED RELEASE ORAL at 05:19

## 2021-03-16 RX ADMIN — ERYTHROPOIETIN 10000 UNIT(S): 10000 INJECTION, SOLUTION INTRAVENOUS; SUBCUTANEOUS at 19:05

## 2021-03-16 RX ADMIN — Medication 2: at 12:57

## 2021-03-16 RX ADMIN — Medication 81 MILLIGRAM(S): at 12:58

## 2021-03-16 RX ADMIN — HEPARIN SODIUM 5000 UNIT(S): 5000 INJECTION INTRAVENOUS; SUBCUTANEOUS at 05:19

## 2021-03-16 RX ADMIN — Medication 2: at 16:54

## 2021-03-16 RX ADMIN — GABAPENTIN 100 MILLIGRAM(S): 400 CAPSULE ORAL at 22:00

## 2021-03-16 RX ADMIN — CHLORHEXIDINE GLUCONATE 1 APPLICATION(S): 213 SOLUTION TOPICAL at 08:29

## 2021-03-16 RX ADMIN — Medication 25 MILLIGRAM(S): at 05:20

## 2021-03-16 RX ADMIN — PRASUGREL 10 MILLIGRAM(S): 5 TABLET, FILM COATED ORAL at 12:58

## 2021-03-16 RX ADMIN — MIDODRINE HYDROCHLORIDE 5 MILLIGRAM(S): 2.5 TABLET ORAL at 12:58

## 2021-03-16 RX ADMIN — HEPARIN SODIUM 5000 UNIT(S): 5000 INJECTION INTRAVENOUS; SUBCUTANEOUS at 22:53

## 2021-03-16 RX ADMIN — MIDODRINE HYDROCHLORIDE 5 MILLIGRAM(S): 2.5 TABLET ORAL at 16:54

## 2021-03-16 RX ADMIN — ATORVASTATIN CALCIUM 80 MILLIGRAM(S): 80 TABLET, FILM COATED ORAL at 22:00

## 2021-03-16 RX ADMIN — MIDODRINE HYDROCHLORIDE 5 MILLIGRAM(S): 2.5 TABLET ORAL at 05:19

## 2021-03-16 NOTE — PROGRESS NOTE ADULT - SUBJECTIVE AND OBJECTIVE BOX
Subjective: Patient seen and examined. No new events except as noted.     REVIEW OF SYSTEMS:    CONSTITUTIONAL: + weakness, fevers or chills  EYES/ENT: No visual changes;  No vertigo or throat pain   NECK: No pain or stiffness  RESPIRATORY: No cough, wheezing, hemoptysis; No shortness of breath  CARDIOVASCULAR: No chest pain or palpitations  GASTROINTESTINAL: No abdominal or epigastric pain. No nausea, vomiting, or hematemesis; No diarrhea or constipation. No melena or hematochezia.  GENITOURINARY: No dysuria, frequency or hematuria  NEUROLOGICAL: No numbness or weakness  SKIN: No itching, burning, rashes, or lesions   All other review of systems is negative unless indicated above.    MEDICATIONS:  MEDICATIONS  (STANDING):  aspirin  chewable 81 milliGRAM(s) Oral daily  atorvastatin 80 milliGRAM(s) Oral at bedtime  chlorhexidine 4% Liquid 1 Application(s) Topical <User Schedule>  dextrose 40% Gel 15 Gram(s) Oral once  dextrose 5%. 1000 milliLiter(s) (50 mL/Hr) IV Continuous <Continuous>  dextrose 5%. 1000 milliLiter(s) (100 mL/Hr) IV Continuous <Continuous>  dextrose 50% Injectable 25 Gram(s) IV Push once  dextrose 50% Injectable 12.5 Gram(s) IV Push once  dextrose 50% Injectable 25 Gram(s) IV Push once  epoetin rafiq-epbx (RETACRIT) Injectable 82003 Unit(s) IV Push <User Schedule>  glucagon  Injectable 1 milliGRAM(s) IntraMuscular once  heparin   Injectable 5000 Unit(s) SubCutaneous every 12 hours  influenza   Vaccine 0.5 milliLiter(s) IntraMuscular once  insulin lispro (ADMELOG) corrective regimen sliding scale   SubCutaneous three times a day before meals  insulin lispro (ADMELOG) corrective regimen sliding scale   SubCutaneous at bedtime  metoprolol tartrate 25 milliGRAM(s) Oral every 12 hours  midodrine. 5 milliGRAM(s) Oral three times a day  midodrine. 10 milliGRAM(s) Oral <User Schedule>  pantoprazole    Tablet 40 milliGRAM(s) Oral before breakfast  polyethylene glycol 3350 17 Gram(s) Oral daily  prasugrel 10 milliGRAM(s) Oral daily      PHYSICAL EXAM:  T(C): 36.4 (03-16-21 @ 05:17), Max: 36.6 (03-15-21 @ 13:05)  HR: 70 (03-16-21 @ 05:17) (64 - 70)  BP: 100/58 (03-16-21 @ 05:17) (90/48 - 100/58)  RR: 18 (03-16-21 @ 05:17) (18 - 18)  SpO2: 96% (03-16-21 @ 05:17) (94% - 96%)  Wt(kg): --  I&O's Summary    15 Mar 2021 07:01  -  16 Mar 2021 07:00  --------------------------------------------------------  IN: 480 mL / OUT: 50 mL / NET: 430 mL        Appearance: NAD  HEENT:   Dry oral mucosa, PERRL, EOMI	  Lymphatic: No lymphadenopathy , Tunneled catheter   Cardiovascular: Irregular S1 S2, No JVD, + murmurs , Peripheral pulses palpable 2+ bilaterally  Respiratory: Lungs clear to auscultation, normal effort 	  Gastrointestinal:  Soft, Non-tender, + BS	  Skin: No rashes, No ecchymoses, No cyanosis, warm to touch  Musculoskeletal: Normal range of motion, normal strength  Psychiatry:  Mood & affect appropriate  Ext: No edema  ecchymosis/ small hematoma noted to inner L UE    LABS:    CARDIAC MARKERS:                                10.1   8.42  )-----------( 201      ( 16 Mar 2021 06:07 )             32.7     03-16    134<L>  |  97  |  65<H>  ----------------------------<  155<H>  4.8   |  23  |  4.97<H>    Ca    9.9      16 Mar 2021 06:09  Phos  4.2     03-16  Mg     2.9     03-16    TPro  6.7  /  Alb  2.4<L>  /  TBili  1.3<H>  /  DBili  x   /  AST  93<H>  /  ALT  86<H>  /  AlkPhos  191<H>  03-16    proBNP:   Lipid Profile:   HgA1c:   TSH:             TELEMETRY: 	V paced     ECG:  	  RADIOLOGY:   DIAGNOSTIC TESTING:  [ ] Echocardiogram:  [ ]  Catheterization:  [ ] Stress Test:    OTHER:

## 2021-03-16 NOTE — PROGRESS NOTE ADULT - SUBJECTIVE AND OBJECTIVE BOX
Infectious Diseases progress note:    Subjective: Afebrile, no leukcytosis.   Satting well on RA.  Pt off isolation.      ROS:  CONSTITUTIONAL:  No fever, chills, rigors  CARDIOVASCULAR:  No chest pain or palpitations  RESPIRATORY:   No SOB, cough, dyspnea on exertion.  No wheezing  GASTROINTESTINAL:  No abd pain, N/V, diarrhea/constipation  EXTREMITIES:  No swelling or joint pain  GENITOURINARY:  No burning on urination, increased frequency or urgency.  No flank pain  NEUROLOGIC:  No HA, visual disturbances  SKIN: No rashes    Allergies    No Known Allergies    Intolerances        ANTIBIOTICS/RELEVANT:  antimicrobials    immunologic:  epoetin rafiq-epbx (RETACRIT) Injectable 22539 Unit(s) IV Push <User Schedule>  influenza   Vaccine 0.5 milliLiter(s) IntraMuscular once    OTHER:  aspirin  chewable 81 milliGRAM(s) Oral daily  atorvastatin 80 milliGRAM(s) Oral at bedtime  chlorhexidine 4% Liquid 1 Application(s) Topical <User Schedule>  dextrose 40% Gel 15 Gram(s) Oral once  dextrose 5%. 1000 milliLiter(s) IV Continuous <Continuous>  dextrose 5%. 1000 milliLiter(s) IV Continuous <Continuous>  dextrose 50% Injectable 25 Gram(s) IV Push once  dextrose 50% Injectable 12.5 Gram(s) IV Push once  dextrose 50% Injectable 25 Gram(s) IV Push once  gabapentin 100 milliGRAM(s) Oral at bedtime  glucagon  Injectable 1 milliGRAM(s) IntraMuscular once  heparin   Injectable 5000 Unit(s) SubCutaneous every 12 hours  insulin lispro (ADMELOG) corrective regimen sliding scale   SubCutaneous three times a day before meals  insulin lispro (ADMELOG) corrective regimen sliding scale   SubCutaneous at bedtime  metoprolol tartrate 25 milliGRAM(s) Oral every 12 hours  midodrine. 5 milliGRAM(s) Oral three times a day  midodrine. 10 milliGRAM(s) Oral <User Schedule>  pantoprazole    Tablet 40 milliGRAM(s) Oral before breakfast  polyethylene glycol 3350 17 Gram(s) Oral daily  prasugrel 10 milliGRAM(s) Oral daily  sodium chloride 0.9% lock flush 10 milliLiter(s) IV Push every 1 hour PRN      Objective:  Vital Signs Last 24 Hrs  T(C): 36.3 (16 Mar 2021 21:00), Max: 36.4 (16 Mar 2021 05:17)  T(F): 97.4 (16 Mar 2021 21:00), Max: 97.5 (16 Mar 2021 05:17)  HR: 64 (16 Mar 2021 21:00) (64 - 655)  BP: 100/39 (16 Mar 2021 21:00) (92/41 - 106/51)  BP(mean): --  RR: 18 (16 Mar 2021 21:00) (18 - 18)  SpO2: 97% (16 Mar 2021 21:00) (92% - 97%)    PHYSICAL EXAM:  Constitutional:NAD  Eyes:CHERI, EOMI  Ear/Nose/Throat: no thrush, mucositis.  Moist mucous membranes	  Neck:no JVD, no lymphadenopathy, supple, Rt IJ tunnelled cath  Respiratory: CTA nadiya  Cardiovascular: S1S2 RRR, no murmurs  Gastrointestinal:soft, nontender,  nondistended (+) BS  Extremities:no e/e/c  Skin:  no rashes, open wounds or ulcerations        LABS:                        10.1   8.42  )-----------( 201      ( 16 Mar 2021 06:07 )             32.7     03-16    134<L>  |  97  |  65<H>  ----------------------------<  155<H>  4.8   |  23  |  4.97<H>    Ca    9.9      16 Mar 2021 06:09  Phos  4.2     03-16  Mg     2.9     03-16    TPro  6.7  /  Alb  2.4<L>  /  TBili  1.3<H>  /  DBili  x   /  AST  93<H>  /  ALT  86<H>  /  AlkPhos  191<H>  03-16          Procalcitonin, Serum: 0.20 (02-16 @ 23:54)      MICROBIOLOGY:          RADIOLOGY & ADDITIONAL STUDIES:    < from: US Extremity Nonvasc Limited, Left (03.16.21 @ 16:25) >  FINDINGS:  Patent brachial artery and vein with normal color Doppler and spectral waveforms. No evidence of pseudoaneurysm or hematoma.    IMPRESSION:  No evidence of pseudoaneurysm or hematoma.    < end of copied text >

## 2021-03-16 NOTE — PROGRESS NOTE ADULT - ASSESSMENT
79 yo male w h/o DM, CAD, s/p CABG, CKD 3-4.   Recently evaluated by Nephrology for elevated creatinine. Bloodwork  returned notable for  and Na 126; in light of these findings, Dr. Luz (Renal) sent the patient to the ER. Lately ptn has been having abdominal pain with elevated amylase and lipase.   As per ptn's wife: on and off R abd pain w generalized weakness and poor po intake. PCP DCed po Lasix in past few days 2/2 abnormal blood test results.  Ptn denies recent NSAID use.  He was diagnosed with diabetes 18 years ago, and denies retinopathy (last saw Optho around 2 years ago). Cardiologist has alluded to the patient having mild renal impairment in the past, as per wife.    Patient's PCP is Dr. Shyann De León; GI Dr. Wilman Villagran; Cardiology Dr. Boyce.  (16 Feb 2021 21:05)    ER vs: T 98.1, P 85, /64. WBC 8.2.  Bun/Cr - 100/2.0.  LFTs mildly elevated, Roxana 217, Lip 185.  Pct 0.2.  UA (-).  Ct c/a/p shows pulmonary edema, sm B/L pleural effusions, sm volume ascites.  VQ scan with very low probability for PE.      Pt started on zosyn to cover for intra-abd source.  ID consulted.     r/o Gallstone pancreatitis:    - Pt p/w abdominal pain, epigastric and RUQ pain, noted to have elevated LFTs and amylase/lipase.  Possible gallstone pancreatitis?    - U/S abdomen shows cholelithiasis without acute cholecystitis.  Borderline to mild gallbladder thickening is likely due to ascites.   Moderate ascites noted.  Suggestion of mild renal parenchymal disease and bilateral pleural effusions.    - CTap w/o acute intra-abd pathology    - monitor LFTs, Roxana, Lipase.  As per GI, ascites likely secondary to CHF, no evidence for pancreatitis.     - Monitor temp curve and WBC.      - bcx neg. completed  coverage with zosyn x 7 days, 2/23.  Abd pain resolved      {[id number]}Renal failure:    - Renal following for CKD.  Dose abx for GFR 15-20.  On q12 hr dosing of zosyn.     - Cr rising.  CALVIN on CKD does not appear to be antibiotic mediated, renal f/u noted.      - Pt requiring HD,  shiley catheter placed 2/26, s/p conversion to tunnelled catheter - 3/4    Aortic stenosis:    - Followed by Structural health team for TAVR evaluation    Covid (+):    - Repeat Covid pcr on 2/25 tested positive.   On 1L nc, satting >94%    - No indication for Covid therapeutics at this time.  Cont supportive care, incentive spirometery.  Monitor pulse ox    - Repeat covid test remains positive on 3/15 - likely secondary to nonviable virus.  Pt > 10 days since positive test, pt afebrile.  On < 4L nc.  Off airborne precautions.     * No indication for abx at this time.   Monitor LFTs.  Pt afebrile, no leukocytosis or abd pain.         Rupali Schwarz  [phone]

## 2021-03-16 NOTE — PROGRESS NOTE ADULT - ASSESSMENT
81 yo male w h/o ICM< CAD< s/p CABG, has AICD, DM, HTN presents w volume overload and abd pain  Abd sono shows gallstones, but no sign of cholecystitis/pancreatitis. abd pain resolved on Zosyn,   s/ p treatment empirically for 7 days .  ID following    ascites 2/2 systolic chf, probably worse 2/2 acute on chronic systolic chf.  responded well to lasix. now on HD.   started HD 2/26. HD as per renal. changed from MWF to TTSa,  RIJ tunneled catheter placed on 3/4 in IR, no fistula placement for now  unable to be DCed home 2/2 safety at home. nobody to assume responsibility, wife is ill  awaiting transfer to [facility]    nosocomial Covid positive. remains positive, clinically stable.  ID following. in isolation. trend infl markers.   s/p monoclonal infusion.  on interrogation of ICD, has a lof of arrhythmia burden,   NSVT and sustained VT, seen by EPS, cont  Yjmcpkjcc58.5 mg  bid. has inoperable ischemic HD,   ICM, EF 15%, severe AS, seen by structural heart for TAVR , proceed with TAVR work up    on 3/13: denise asked for a call, i called twice and wasnt able to leave messages bc mailbox is full. dr Garcia, renal spoke with him and confirmed home HD is not possible bc ptn is frail w unstable BPs. He also inquired about the second Covid vaccine( he had his first PTA) , ptn acquired the Covid virus during this hospitalization and remains on the Covid isolation floor. vaccine is not appropriate at this time    ptn is awaiting transfer to [facility], the only possible option is [facility] in [place], family is against it, they live in [place]. ptn c/o pain in his feet, pulses are good, will check THIERRY and get neuro consult      dvt ppx w HSC

## 2021-03-16 NOTE — CONSULT NOTE ADULT - SUBJECTIVE AND OBJECTIVE BOX
[facility]        Patient is a 80y old  Male who presents with a chief complaint of acute renal failure (16 Mar 2021 11:38)    Excerpt from H&P,"     81 yo male w h/o DM, CAD, s/p CABG, CKD 3-4. He saw my partner Dr. Abel Luz yesterday as a new consult for elevated creatinine. Bloodwork today returned notable for  and Na 126; in light of these findings, Dr. Luz sent the patient to the ER. Lately ptn has been having abdominal pain with elevated amylase and lipase.   As per ptn's wife: on and off R abd pain w generalized weakness and poor po intake. PCP DCed po Lasix in past few days 2/2 abnormal blood test results.  Ptn denies recent NSAID use.  He was diagnosed with diabetes 18 years ago, and denies retinopathy (last saw Optho around 2 years ago). Cardiologist has alluded to the patient having mild renal impairment in the past, as per wife.    Patient's PCP is Dr. Shyann De León; GI Dr. Wilman Villagran; Cardiology Dr. Boyce.  (16 Feb 2021 21:05)           *****PAST MEDICAL / Surgical  HISTORY:  PAST MEDICAL & SURGICAL HISTORY:  Renal failure    CAD (coronary artery disease)    Hypertension    Diabetes mellitus             *****FAMILY HISTORY:  FAMILY HISTORY:           *****SOCIAL HISTORY:  Alcohol: None  Smoking: None         *****ALLERGIES:   Allergies    No Known Allergies    Intolerances             *****MEDICATIONS: current medication reviewed and documented.   MEDICATIONS  (STANDING):  aspirin  chewable 81 milliGRAM(s) Oral daily  atorvastatin 80 milliGRAM(s) Oral at bedtime  chlorhexidine 4% Liquid 1 Application(s) Topical <User Schedule>  dextrose 40% Gel 15 Gram(s) Oral once  dextrose 5%. 1000 milliLiter(s) (50 mL/Hr) IV Continuous <Continuous>  dextrose 5%. 1000 milliLiter(s) (100 mL/Hr) IV Continuous <Continuous>  dextrose 50% Injectable 25 Gram(s) IV Push once  dextrose 50% Injectable 12.5 Gram(s) IV Push once  dextrose 50% Injectable 25 Gram(s) IV Push once  epoetin rafiq-epbx (RETACRIT) Injectable 63710 Unit(s) IV Push <User Schedule>  glucagon  Injectable 1 milliGRAM(s) IntraMuscular once  heparin   Injectable 5000 Unit(s) SubCutaneous every 12 hours  influenza   Vaccine 0.5 milliLiter(s) IntraMuscular once  insulin lispro (ADMELOG) corrective regimen sliding scale   SubCutaneous three times a day before meals  insulin lispro (ADMELOG) corrective regimen sliding scale   SubCutaneous at bedtime  metoprolol tartrate 25 milliGRAM(s) Oral every 12 hours  midodrine. 5 milliGRAM(s) Oral three times a day  midodrine. 10 milliGRAM(s) Oral <User Schedule>  pantoprazole    Tablet 40 milliGRAM(s) Oral before breakfast  polyethylene glycol 3350 17 Gram(s) Oral daily  prasugrel 10 milliGRAM(s) Oral daily    MEDICATIONS  (PRN):  sodium chloride 0.9% lock flush 10 milliLiter(s) IV Push every 1 hour PRN Pre/post blood products, medications, blood draw, and to maintain line patency           *****REVIEW OF SYSTEM:  GEN: no fever, no chills, no pain  RESP: no SOB, no cough, no sputum  CVS: no chest pain, no palpitations, no edema  GI: no abdominal pain, no nausea, no vomiting, no constipation, no diarrhea  : no dysurea, no frequency, no hematurea  Neuro: no headache, no dizziness  PSYCH: no anxiety, no depression  Derm : no itching, no rash         *****VITAL SIGNS:  T(C): 36.2 (03-16-21 @ 12:52), Max: 36.4 (03-16-21 @ 05:17)  HR: 65 (03-16-21 @ 12:52) (65 - 70)  BP: 92/41 (03-16-21 @ 12:52) (90/48 - 100/58)  RR: 18 (03-16-21 @ 12:52) (18 - 18)  SpO2: 92% (03-16-21 @ 12:52) (92% - 96%)  Wt(kg): --    03-15 @ 07:01  -  03-16 @ 07:00  --------------------------------------------------------  IN: 480 mL / OUT: 50 mL / NET: 430 mL             *****PHYSICAL EXAM:   Alert oriented x 3   Attention comprehension are fair. Able to name, repeat, read without any difficulty.   Able to follow 3 step commands.     EOMI fundi not visualized,  VFF to confrontration  No facial asymmetry   Tongue is midline   Palate elevates symmetrically   Moving all 4 ext symmetrically no pronator drift   Reflexes are symmetric throughout   sensation is grossly symmetric  Gait : not assessed.  B/L down going toes               *****LAB AND IMAGING:                          10.1   8.42  )-----------( 201      ( 16 Mar 2021 06:07 )             32.7               03-16    134<L>  |  97  |  65<H>  ----------------------------<  155<H>  4.8   |  23  |  4.97<H>    Ca    9.9      16 Mar 2021 06:09  Phos  4.2     03-16  Mg     2.9     03-16    TPro  6.7  /  Alb  2.4<L>  /  TBili  1.3<H>  /  DBili  x   /  AST  93<H>  /  ALT  86<H>  /  AlkPhos  191<H>  03-16                                [All pertinent recent Imaging reports reviewed]         *****A S S E S S M E N T   A N D   P L A N :        81 yo male w h/o DM, CAD, s/p CABG, CKD 3-4. He saw my partner Dr. Abel Luz yesterday as a new consult for elevated creatinine. Bloodwork today returned notable for  and Na 126; in light of these findings, Dr. Luz sent the patient to the ER. Lately ptn has been having abdominal pain with elevated amylase and lipase.   As per ptn's wife: on and off R abd pain w generalized weakness and poor po intake. PCP DCed po Lasix in past few days 2/2 abnormal blood test results.  Ptn denies recent NSAID use.  He was diagnosed with diabetes 18 years ago, and denies retinopathy (last saw Optho around 2 years ago). Cardiologist has alluded to the patient having mild renal impairment in the past, as per wife.    Problem/Recommendations 1: foot pain likely related to poor circulation/diabetic neuropathy   consider emg as outpt   b12/folate/tsh/ ipep   a1c 6.2   gabapentin 100 qhs for symptomatic relief   arterial doppler can be considered inpt vs. outpt   although foot is warm and decent capillary refil       Problem/Recommendations 2: hyponatremia   will continue to trend  defer to renal, new dialysis pt   low albumin      Problem/Recommendations 3: transaminitis   will trend   ___________________________  Will follow with you.  Thank you,  Linette Toribio MD  Diplomate of the American Board of Neurology and Psychiatry.  Diplomate of the American Board of Vascular Neurology.   [facility] ([facility]), [facility]   Ph: [phone]    Differential diagnosis and plan of care discussed with patient after the evaluation.   Advanced care planning options discussed.   Pain assessed and judicious use of narcotics when appropriate was discussed.  Importance of Fall prevention discussed.  Counseling on Smoking and Alcohol cessation was offered when appropriate.  Counseling on Diet, exercise, and medication compliance was done.   83 minutes spent on the total encounter;  more than 50 % of the visit was spent on counseling  and or coordinating care by the attending physician.    Thank you for allowing me to participate in the care of this key patient. Please do not hesitate to call me if you have any questions.     This and subsequent notes were partially created using voice recognition software and will  inherently be subject to errors including those of syntax and sound alike substitutions which may escape proofreading. In such instances original meaning may be extrapolated by contextual derivation.

## 2021-03-16 NOTE — PROGRESS NOTE ADULT - SUBJECTIVE AND OBJECTIVE BOX
      Overnight events noted      VITAL:  T(C): , Max: 36.6 (03-15-21 @ 13:05)  T(F): , Max: 97.9 (03-15-21 @ 13:05)  HR: 70 (03-16-21 @ 05:17)  BP: 100/58 (03-16-21 @ 05:17)  BP(mean): --  RR: 18 (03-16-21 @ 05:17)  SpO2: 96% (03-16-21 @ 05:17)      PHYSICAL EXAM:  Constitutional: NAD on room air, alert  HEENT: NCAT, DMM  Neck: Supple, no JVD  Respiratory: CTA-b/l  Cardiovascular: RRR s1s2, no m/r/g  Gastrointestinal: BS+, soft, NT; (+)mild distension  Extremities:  no edema b/l  Neurological: no focal deficits; strength grossly intact  Back: no CVAT b/l  Skin: No rashes, no nevi  Access: RIJ tunneled cath    LABS:                        10.1   8.42  )-----------( 201      ( 16 Mar 2021 06:07 )             32.7     Na(134)/K(4.8)/Cl(97)/HCO3(23)/BUN(65)/Cr(4.97)Glu(155)/Ca(9.9)/Mg(2.9)/PO4(4.2)    03-16 @ 06:09  Na(134)/K(4.4)/Cl(98)/HCO3(25)/BUN(51)/Cr(4.37)Glu(147)/Ca(9.6)/Mg(--)/PO4(--)    03-15 @ 07:24  Na(134)/K(3.9)/Cl(99)/HCO3(26)/BUN(34)/Cr(3.26)Glu(141)/Ca(9.6)/Mg(2.8)/PO4(3.3)    03-14 @ 05:43      IMPRESSION: 80M w/ DM2, CAD-CABG, and CKD, 2/16/21 a/w azotemia/lyte derangements/weakness; c/b newly ESRD; c/b COVID(+)    (1)Renal - newly ESRD - last dialyzed Saturday 3/13 - due for next HD today - not a candidate for home-based dialysis modality     (2)CV - tenuous hemodynamics/hypervolemia - biventricular systolic dysfunction, severe AS, mod-sev AR. On Midodrine. Potentially for TAVR eval, after discharge    (3)Vasc - Forgoing AV access for now, given his severe cardiac disease/risk of worsening cardiac hemodynamics with AV access    (4)Anemia - On Retacrit with HD    (5)Dispo - awaiting acceptance to outside HD facility       RECOMMEND:  (1)Next HD today; 0.5L UF as able; Midodrine 10mg PO x 1 prn intradialytic hypotension; Retacrit with HD    (2)PT    (3)Setup of outpatient HD per DAMON Garcia MD  [facility]  Office: [phone]  Cell: [phone]               No pain, no sob      VITAL:  T(C): , Max: 36.6 (03-15-21 @ 13:05)  T(F): , Max: 97.9 (03-15-21 @ 13:05)  HR: 70 (03-16-21 @ 05:17)  BP: 100/58 (03-16-21 @ 05:17)  RR: 18 (03-16-21 @ 05:17)  SpO2: 96% (03-16-21 @ 05:17)      PHYSICAL EXAM:  Constitutional: NAD on room air, alert  HEENT: NCAT, DMM  Neck: Supple, no JVD  Respiratory: CTA-b/l  Cardiovascular: RRR s1s2, no m/r/g  Gastrointestinal: BS+, soft, NT; (+)mild distension  Extremities:  no edema b/l  Neurological: no focal deficits; strength grossly intact  Back: no CVAT b/l  Skin: No rashes, no nevi  Access: RIJ tunneled cath    LABS:                        10.1   8.42  )-----------( 201      ( 16 Mar 2021 06:07 )             32.7     Na(134)/K(4.8)/Cl(97)/HCO3(23)/BUN(65)/Cr(4.97)Glu(155)/Ca(9.9)/Mg(2.9)/PO4(4.2)    03-16 @ 06:09  Na(134)/K(4.4)/Cl(98)/HCO3(25)/BUN(51)/Cr(4.37)Glu(147)/Ca(9.6)/Mg(--)/PO4(--)    03-15 @ 07:24  Na(134)/K(3.9)/Cl(99)/HCO3(26)/BUN(34)/Cr(3.26)Glu(141)/Ca(9.6)/Mg(2.8)/PO4(3.3)    03-14 @ 05:43      IMPRESSION: 80M w/ DM2, CAD-CABG, and CKD, 2/16/21 a/w azotemia/lyte derangements/weakness; c/b newly ESRD; c/b COVID(+)    (1)Renal - newly ESRD - last dialyzed Saturday 3/13 - due for next HD today - not a candidate for home-based dialysis modality     (2)CV - tenuous hemodynamics/hypervolemia - biventricular systolic dysfunction, severe AS, mod-sev AR. On Midodrine. Potentially for TAVR eval, after discharge    (3)Vasc - Forgoing AV access for now, given his severe cardiac disease/risk of worsening cardiac hemodynamics with AV access    (4)Anemia - On Retacrit with HD    (5)Dispo - awaiting acceptance to outside HD facility       RECOMMEND:  (1)Next HD today; 0.5L UF as able; Midodrine 10mg PO x 1 prn intradialytic hypotension; Retacrit with HD    (2)PT    (3)Setup of outpatient HD per DAMON Garcia MD  [facility]  Office: [phone]  Cell: [phone]

## 2021-03-17 LAB
ALBUMIN SERPL ELPH-MCNC: 2.6 G/DL — LOW (ref 3.3–5)
ALP SERPL-CCNC: 186 U/L — HIGH (ref 40–120)
ALT FLD-CCNC: 86 U/L — HIGH (ref 10–45)
ANION GAP SERPL CALC-SCNC: 13 MMOL/L — SIGNIFICANT CHANGE UP (ref 5–17)
AST SERPL-CCNC: 84 U/L — HIGH (ref 10–40)
BILIRUB SERPL-MCNC: 1.2 MG/DL — SIGNIFICANT CHANGE UP (ref 0.2–1.2)
BUN SERPL-MCNC: 45 MG/DL — HIGH (ref 7–23)
CALCIUM SERPL-MCNC: 9.4 MG/DL — SIGNIFICANT CHANGE UP (ref 8.4–10.5)
CHLORIDE SERPL-SCNC: 96 MMOL/L — SIGNIFICANT CHANGE UP (ref 96–108)
CO2 SERPL-SCNC: 25 MMOL/L — SIGNIFICANT CHANGE UP (ref 22–31)
CREAT SERPL-MCNC: 3.54 MG/DL — HIGH (ref 0.5–1.3)
D DIMER BLD IA.RAPID-MCNC: 879 NG/ML DDU — HIGH
FOLATE SERPL-MCNC: 12.5 NG/ML — SIGNIFICANT CHANGE UP
GLUCOSE BLDC GLUCOMTR-MCNC: 145 MG/DL — HIGH (ref 70–99)
GLUCOSE BLDC GLUCOMTR-MCNC: 171 MG/DL — HIGH (ref 70–99)
GLUCOSE BLDC GLUCOMTR-MCNC: 194 MG/DL — HIGH (ref 70–99)
GLUCOSE BLDC GLUCOMTR-MCNC: 237 MG/DL — HIGH (ref 70–99)
GLUCOSE SERPL-MCNC: 123 MG/DL — HIGH (ref 70–99)
HCT VFR BLD CALC: 31.8 % — LOW (ref 39–50)
HGB BLD-MCNC: 9.9 G/DL — LOW (ref 13–17)
MAGNESIUM SERPL-MCNC: 2.5 MG/DL — SIGNIFICANT CHANGE UP (ref 1.6–2.6)
MCHC RBC-ENTMCNC: 30.5 PG — SIGNIFICANT CHANGE UP (ref 27–34)
MCHC RBC-ENTMCNC: 31.1 GM/DL — LOW (ref 32–36)
MCV RBC AUTO: 97.8 FL — SIGNIFICANT CHANGE UP (ref 80–100)
NRBC # BLD: 0 /100 WBCS — SIGNIFICANT CHANGE UP (ref 0–0)
PHOSPHATE SERPL-MCNC: 4 MG/DL — SIGNIFICANT CHANGE UP (ref 2.5–4.5)
PLATELET # BLD AUTO: 123 K/UL — LOW (ref 150–400)
POTASSIUM SERPL-MCNC: 4.3 MMOL/L — SIGNIFICANT CHANGE UP (ref 3.5–5.3)
POTASSIUM SERPL-SCNC: 4.3 MMOL/L — SIGNIFICANT CHANGE UP (ref 3.5–5.3)
PROT SERPL-MCNC: 6.3 G/DL — SIGNIFICANT CHANGE UP (ref 6–8.3)
PROT SERPL-MCNC: 6.3 G/DL — SIGNIFICANT CHANGE UP (ref 6–8.3)
PROT SERPL-MCNC: 6.6 G/DL — SIGNIFICANT CHANGE UP (ref 6–8.3)
RBC # BLD: 3.25 M/UL — LOW (ref 4.2–5.8)
RBC # FLD: 21.1 % — HIGH (ref 10.3–14.5)
SODIUM SERPL-SCNC: 134 MMOL/L — LOW (ref 135–145)
TSH SERPL-MCNC: 4.7 UIU/ML — HIGH (ref 0.27–4.2)
VIT B12 SERPL-MCNC: 1110 PG/ML — SIGNIFICANT CHANGE UP (ref 232–1245)
WBC # BLD: 10.08 K/UL — SIGNIFICANT CHANGE UP (ref 3.8–10.5)
WBC # FLD AUTO: 10.08 K/UL — SIGNIFICANT CHANGE UP (ref 3.8–10.5)

## 2021-03-17 PROCEDURE — 99223 1ST HOSP IP/OBS HIGH 75: CPT

## 2021-03-17 RX ORDER — LEVOTHYROXINE SODIUM 125 MCG
25 TABLET ORAL DAILY
Refills: 0 | Status: DISCONTINUED | OUTPATIENT
Start: 2021-03-17 | End: 2021-03-24

## 2021-03-17 RX ADMIN — POLYETHYLENE GLYCOL 3350 17 GRAM(S): 17 POWDER, FOR SOLUTION ORAL at 12:13

## 2021-03-17 RX ADMIN — Medication 2: at 12:12

## 2021-03-17 RX ADMIN — MIDODRINE HYDROCHLORIDE 5 MILLIGRAM(S): 2.5 TABLET ORAL at 12:13

## 2021-03-17 RX ADMIN — Medication 81 MILLIGRAM(S): at 12:43

## 2021-03-17 RX ADMIN — MIDODRINE HYDROCHLORIDE 5 MILLIGRAM(S): 2.5 TABLET ORAL at 05:24

## 2021-03-17 RX ADMIN — Medication 25 MILLIGRAM(S): at 17:17

## 2021-03-17 RX ADMIN — CHLORHEXIDINE GLUCONATE 1 APPLICATION(S): 213 SOLUTION TOPICAL at 05:29

## 2021-03-17 RX ADMIN — PANTOPRAZOLE SODIUM 40 MILLIGRAM(S): 20 TABLET, DELAYED RELEASE ORAL at 06:06

## 2021-03-17 RX ADMIN — PRASUGREL 10 MILLIGRAM(S): 5 TABLET, FILM COATED ORAL at 12:13

## 2021-03-17 RX ADMIN — GABAPENTIN 100 MILLIGRAM(S): 400 CAPSULE ORAL at 21:34

## 2021-03-17 RX ADMIN — HEPARIN SODIUM 5000 UNIT(S): 5000 INJECTION INTRAVENOUS; SUBCUTANEOUS at 05:24

## 2021-03-17 RX ADMIN — Medication 1: at 17:05

## 2021-03-17 RX ADMIN — HEPARIN SODIUM 5000 UNIT(S): 5000 INJECTION INTRAVENOUS; SUBCUTANEOUS at 17:16

## 2021-03-17 RX ADMIN — Medication 25 MILLIGRAM(S): at 05:24

## 2021-03-17 RX ADMIN — ATORVASTATIN CALCIUM 80 MILLIGRAM(S): 80 TABLET, FILM COATED ORAL at 21:34

## 2021-03-17 RX ADMIN — MIDODRINE HYDROCHLORIDE 5 MILLIGRAM(S): 2.5 TABLET ORAL at 17:17

## 2021-03-17 NOTE — CONSULT NOTE ADULT - ASSESSMENT
80M w/ hx DM, CAD, s/p CABG presents with volume overload and CALVIN on CKD, now progressed to ESRD on HD via RIJ permacath now with b/l foot pain x 2 days, with abnormal THIERRY/PVR with ankle pressure    Recommendation:  - No acute vascular surgery intervention  - please obtain THIERRY/PVR with toe pressure  - plan discussed with vascular fellow, Dr. David    [id number]

## 2021-03-17 NOTE — PROGRESS NOTE ADULT - SUBJECTIVE AND OBJECTIVE BOX
      Overnight events noted      VITAL:  T(C): , Max: 36.5 (03-17-21 @ 04:47)  T(F): , Max: 97.7 (03-17-21 @ 04:47)  HR: 62 (03-17-21 @ 11:10)  BP: 91/47 (03-17-21 @ 11:10)  BP(mean): --  RR: 18 (03-17-21 @ 11:10)  SpO2: 99% (03-17-21 @ 11:10)      PHYSICAL EXAM:  Constitutional: NAD on room air, alert  HEENT: NCAT, DMM  Neck: Supple, no JVD  Respiratory: CTA-b/l  Cardiovascular: RRR s1s2, no m/r/g  Gastrointestinal: BS+, soft, NT; (+)mild distension  Extremities:  no edema b/l  Neurological: no focal deficits; strength grossly intact  Back: no CVAT b/l  Skin: No rashes, no nevi  Access: RIJ tunneled cath    LABS:                        9.9    10.08 )-----------( 123      ( 17 Mar 2021 05:51 )             31.8     Na(134)/K(4.3)/Cl(96)/HCO3(25)/BUN(45)/Cr(3.54)Glu(123)/Ca(9.4)/Mg(2.5)/PO4(4.0)    03-17 @ 05:51  Na(134)/K(4.8)/Cl(97)/HCO3(23)/BUN(65)/Cr(4.97)Glu(155)/Ca(9.9)/Mg(2.9)/PO4(4.2)    03-16 @ 06:09  Na(134)/K(4.4)/Cl(98)/HCO3(25)/BUN(51)/Cr(4.37)Glu(147)/Ca(9.6)/Mg(--)/PO4(--)    03-15 @ 07:24      IMPRESSION: 80M w/ DM2, CAD-CABG, and CKD, 2/16/21 a/w azotemia/lyte derangements/weakness; c/b newly ESRD; c/b COVID(+)    (1)Renal - newly ESRD - last dialyzed yesterday - due for next HD tomorrow     (2)CV - tenuous hemodynamics/hypervolemia - biventricular systolic dysfunction, severe AS, mod-sev AR. On Midodrine.     (3)Anemia - On Retacrit with HD    (4)Dispo - awaiting acceptance to outside HD facility       RECOMMEND:  (1)Next HD tomorrow; 0.5L UF as able; Midodrine 10mg PO x 1 prn intradialytic hypotension; Retacrit with HD  (2)PT  (3)Setup of outpatient HD per DAMON Garcia MD  [facility]  Office: [phone]  Cell: [phone]               c/o LE weakness b/l      VITAL:  T(C): , Max: 36.5 (03-17-21 @ 04:47)  T(F): , Max: 97.7 (03-17-21 @ 04:47)  HR: 62 (03-17-21 @ 11:10)  BP: 91/47 (03-17-21 @ 11:10)  BP(mean): --  RR: 18 (03-17-21 @ 11:10)  SpO2: 99% (03-17-21 @ 11:10)      PHYSICAL EXAM:  Constitutional: NAD on room air, alert  HEENT: NCAT, DMM  Neck: Supple, no JVD  Respiratory: CTA-b/l  Cardiovascular: RRR s1s2, no m/r/g  Gastrointestinal: BS+, soft, NT; (+)mild distension  Extremities:  no edema b/l  Neurological: no focal deficits; strength grossly intact  Back: no CVAT b/l  Skin: No rashes, no nevi  Access: RIJ tunneled cath    LABS:                        9.9    10.08 )-----------( 123      ( 17 Mar 2021 05:51 )             31.8     Na(134)/K(4.3)/Cl(96)/HCO3(25)/BUN(45)/Cr(3.54)Glu(123)/Ca(9.4)/Mg(2.5)/PO4(4.0)    03-17 @ 05:51  Na(134)/K(4.8)/Cl(97)/HCO3(23)/BUN(65)/Cr(4.97)Glu(155)/Ca(9.9)/Mg(2.9)/PO4(4.2)    03-16 @ 06:09  Na(134)/K(4.4)/Cl(98)/HCO3(25)/BUN(51)/Cr(4.37)Glu(147)/Ca(9.6)/Mg(--)/PO4(--)    03-15 @ 07:24      IMPRESSION: 80M w/ DM2, CAD-CABG, and CKD, 2/16/21 a/w azotemia/lyte derangements/weakness; c/b newly ESRD; c/b COVID(+)    (1)Renal - newly ESRD - last dialyzed yesterday - due for next HD tomorrow     (2)CV - tenuous hemodynamics/hypervolemia - biventricular systolic dysfunction, severe AS, mod-sev AR. On Midodrine.     (3)Anemia - On Retacrit with HD    (4)Dispo - awaiting acceptance to outside HD facility       RECOMMEND:  (1)Next HD tomorrow; 0.5L UF as able; Midodrine 10mg PO x 1 prn intradialytic hypotension; Retacrit with HD  (2)PT  (3)Setup of outpatient HD per DAMON Garcia MD  [facility]  Office: [phone]  Cell: [phone]

## 2021-03-17 NOTE — PROGRESS NOTE ADULT - ASSESSMENT
79 yo male w h/o DM, CAD, s/p CABG, CKD 3-4.   Recently evaluated by Nephrology for elevated creatinine. Bloodwork  returned notable for  and Na 126; in light of these findings, Dr. Luz (Renal) sent the patient to the ER. Lately ptn has been having abdominal pain with elevated amylase and lipase.   As per ptn's wife: on and off R abd pain w generalized weakness and poor po intake. PCP DCed po Lasix in past few days 2/2 abnormal blood test results.  Ptn denies recent NSAID use.  He was diagnosed with diabetes 18 years ago, and denies retinopathy (last saw Optho around 2 years ago). Cardiologist has alluded to the patient having mild renal impairment in the past, as per wife.    Patient's PCP is Dr. Shyann De León; GI Dr. Wilman Villagran; Cardiology Dr. Boyce.  (16 Feb 2021 21:05)    ER vs: T 98.1, P 85, /64. WBC 8.2.  Bun/Cr - 100/2.0.  LFTs mildly elevated, Roxana 217, Lip 185.  Pct 0.2.  UA (-).  Ct c/a/p shows pulmonary edema, sm B/L pleural effusions, sm volume ascites.  VQ scan with very low probability for PE.      Pt started on zosyn to cover for intra-abd source.  ID consulted.     r/o Gallstone pancreatitis:    - Pt p/w abdominal pain, epigastric and RUQ pain, noted to have elevated LFTs and amylase/lipase.  Possible gallstone pancreatitis?    - U/S abdomen shows cholelithiasis without acute cholecystitis.  Borderline to mild gallbladder thickening is likely due to ascites.   Moderate ascites noted.  Suggestion of mild renal parenchymal disease and bilateral pleural effusions.    - CTap w/o acute intra-abd pathology    - monitor LFTs, Roxana, Lipase.  As per GI, ascites likely secondary to CHF, no evidence for pancreatitis.     - Monitor temp curve and WBC.      - bcx neg. completed  coverage with zosyn x 7 days, 2/23.  Abd pain resolved      {[id number]}Renal failure:    - Renal following for CKD.  Dose abx for GFR 15-20.  On q12 hr dosing of zosyn.     - Cr rising.  CALVIN on CKD does not appear to be antibiotic mediated, renal f/u noted.      - Pt requiring HD,  shiley catheter placed 2/26, s/p conversion to tunnelled catheter - 3/4    Aortic stenosis:    - Followed by Structural health team for TAVR evaluation    Covid (+):    - Repeat Covid pcr on 2/25 tested positive.   On 1L nc, satting >94%    - No indication for Covid therapeutics at this time.  Cont supportive care, incentive spirometery.  Monitor pulse ox    - Repeat covid test remains positive on 3/15 - likely secondary to nonviable virus.  Pt > 10 days since positive test, pt afebrile.  On < 4L nc.  Off airborne precautions.     * No indication for abx at this time.   Monitor LFTs - remain stable.  Pt afebrile, no leukocytosis or abd pain.         Rupali Schwarz  [phone]

## 2021-03-17 NOTE — PROGRESS NOTE ADULT - ASSESSMENT
79 yo male w h/o ICM< CAD< s/p CABG, has AICD, DM, HTN presents w volume overload and abd pain  Abd sono shows gallstones, but no sign of cholecystitis/pancreatitis. abd pain resolved on Zosyn,   s/ p treatment empirically for 7 days .  ID following    ascites 2/2 systolic chf, probably worse 2/2 acute on chronic systolic chf.  responded well to lasix. now on HD.   started HD 2/26. HD as per renal. changed from MWF to TTSa,  RIJ tunneled catheter placed on 3/4 in IR, no fistula placement for now  unable to be DCed home 2/2 safety at home. nobody to assume responsibility, wife is ill  awaiting transfer to [facility]    nosocomial Covid positive. remains positive, clinically stable.  ID following. in isolation. trend infl markers.   s/p monoclonal infusion.  on interrogation of ICD, has a lof of arrhythmia burden,   NSVT and sustained VT, seen by EPS, cont  [id number] mg  bid. has inoperable ischemic HD,   ICM, EF 15%, severe AS, seen by structural heart for TAVR , proceed with TAVR work up    on 3/13: denise asked for a call, i called twice and wasnt able to leave messages bc mailbox is full. dr Garcia, renal spoke with him and confirmed home HD is not possible bc ptn is frail w unstable BPs. He also inquired about the second Covid vaccine( he had his first PTA) , ptn acquired the Covid virus during this hospitalization and remains on the Covid isolation floor. vaccine is not appropriate at this time    ptn is awaiting transfer to [facility], the only possible option is [facility] in [place], family is against it, they live in [place]. ptn c/o pain in his feet, pulses are good,    THIERRY reviewed vascular consulted, THIERRY w toe pressures requested.       dvt ppx w [facility]

## 2021-03-17 NOTE — CHART NOTE - NSCHARTNOTEFT_GEN_A_CORE
PA Medicine Event Note    THIERRY PVR showing  moderate left lower extremity arterial vascular disease.  Vasuclar consult placed.  D dimer also noted to be 879. Repeat LE doppler ordered as patient with hx of leg pain this week.   Pt with no SOB, cough or hypoxia.  Vascular following patient.  Cont to monitor    Sonia Mendenhall PA-C  Dept of Medicine  #[phone]

## 2021-03-17 NOTE — CONSULT NOTE ADULT - ATTENDING COMMENTS
Advanced care planning was discussed with patient and family.  Advanced care planning forms were reviewed and discussed as appropriate.  Differential diagnosis and plan of care discussed with patient after the evaluation.   Pain assessed and judicious use of narcotics when appropriate was discussed.  Importance of Fall prevention discussed.  Counseling on Smoking and Alcohol cessation was offered when appropriate.  Counseling on Diet, exercise, and medication compliance was done.
I have discussed the case with the surgical house staff. I have personally seen, examined, and participated in the care of this patient. I have reviewed all pertinent clinical information.    Patient denies pain at rest but notes weakness upon standing. He also notes soreness on bilateral legs when standing.  Palpable femoral pulses with nonpalpable pedal pulses. No wounds on foot.   ABIs reviewed.     A/P  Patient with PAD but not with critical limb ischemia at this time (rest pain or tissue loss).   - Recommend THIERRY with PVRs and toe pressures for further evaluation.   - Continue aspirin and statin.   - No acute intervention needed at this time but recommend outpatient follow up once recovered from COVID.
Patient should be optimized before getting tunneled dialysis catheter.
Per team, patient is optimized for procedure.

## 2021-03-17 NOTE — CONSULT NOTE ADULT - SUBJECTIVE AND OBJECTIVE BOX
VASCULAR SURGERY CONSULT NOTE    Patient is a 80y old  Male who presents with a chief complaint of acute renal failure (17 Mar 2021 18:07)    HPI:  79 yo male w h/o DM, CAD, s/p CABG presents with volume overload and CALVIN on CKD, now progressed to ESRD on HD via [facility] permacat 3/4. Patient now c/o b/l foot pain x 2 days. Vascular surgery consulted for abnormal THIERRY/PVR with ankle pressure    10-points review of system performed with pertinent negative and postive findings documented in the HPI     PAST MEDICAL & SURGICAL HISTORY:  Renal failure    CAD (coronary artery disease)    Hypertension    Diabetes mellitus      [  ] No significant past history as reviewed with the patient and family    FAMILY HISTORY:  : Family history not pertinent as reviewed with the patient and family    SOCIAL HISTORY: No pertinent social history    MEDICATIONS  (STANDING):  aspirin  chewable 81 milliGRAM(s) Oral daily  atorvastatin 80 milliGRAM(s) Oral at bedtime  chlorhexidine 4% Liquid 1 Application(s) Topical <User Schedule>  dextrose 40% Gel 15 Gram(s) Oral once  dextrose 5%. 1000 milliLiter(s) (50 mL/Hr) IV Continuous <Continuous>  dextrose 5%. 1000 milliLiter(s) (100 mL/Hr) IV Continuous <Continuous>  dextrose 50% Injectable 25 Gram(s) IV Push once  dextrose 50% Injectable 12.5 Gram(s) IV Push once  dextrose 50% Injectable 25 Gram(s) IV Push once  epoetin rafiq-epbx (RETACRIT) Injectable 53012 Unit(s) IV Push <User Schedule>  gabapentin 100 milliGRAM(s) Oral at bedtime  glucagon  Injectable 1 milliGRAM(s) IntraMuscular once  heparin   Injectable 5000 Unit(s) SubCutaneous every 12 hours  influenza   Vaccine 0.5 milliLiter(s) IntraMuscular once  insulin lispro (ADMELOG) corrective regimen sliding scale   SubCutaneous three times a day before meals  insulin lispro (ADMELOG) corrective regimen sliding scale   SubCutaneous at bedtime  metoprolol tartrate 25 milliGRAM(s) Oral every 12 hours  midodrine. 10 milliGRAM(s) Oral <User Schedule>  midodrine. 5 milliGRAM(s) Oral three times a day  pantoprazole    Tablet 40 milliGRAM(s) Oral before breakfast  polyethylene glycol 3350 17 Gram(s) Oral daily  prasugrel 10 milliGRAM(s) Oral daily    MEDICATIONS  (PRN):  sodium chloride 0.9% lock flush 10 milliLiter(s) IV Push every 1 hour PRN Pre/post blood products, medications, blood draw, and to maintain line patency    Allergies    No Known Allergies    Intolerances        Vital Signs Last 24 Hrs  T(C): 36.6 (17 Mar 2021 17:18), Max: 36.6 (17 Mar 2021 17:18)  T(F): 97.9 (17 Mar 2021 17:18), Max: 97.9 (17 Mar 2021 17:18)  HR: 66 (17 Mar 2021 17:18) (62 - 72)  BP: 96/- (17 Mar 2021 17:18) (91/47 - 100/39)  BP(mean): --  RR: 18 (17 Mar 2021 17:18) (18 - 18)  SpO2: 98% (17 Mar 2021 17:18) (96% - 99%)  Daily     Daily Weight in k.5 (16 Mar 2021 21:00)    Exam:  General: resting in bed, NAD  Resp: nonlabored breathing  Ext:   RLE: palpable PT; motor sensory grossly intact  LLE: palpable PT; motor sensory grossly intact  Neuro: AAOx3                       9.9    10.08 )-----------( 123      ( 17 Mar 2021 05:51 )             31.8     17    134<L>  |  96  |  45<H>  ----------------------------<  123<H>  4.3   |  25  |  3.54<H>    Ca    9.4      17 Mar 2021 05:51  Phos  4.0       Mg     2.5         TPro  6.3  /  Alb  x   /  TBili  x   /  DBili  x   /  AST  x   /  ALT  x   /  AlkPhos  x       IMAGING STUDIES:  < from: [facility] Physiol Extremity Lower 3+ Level, BI (21 @ 18:12) >  The right ankle-brachial index equals 0.97; the left ankle-brachial index equals 0.67.    The blood pressure measurements at the right ankle are 107 mmHg in the posterior tibial artery and 55 mmHg in the dorsal pedis artery.    The blood pressure measurements at the left ankle are 74 mmHg in the posterior tibial artery and 69 mmHg in the dorsal pedis artery.    The amplitude of the pulse volume recordings at the right ankle are reduced.    The amplitude of the pulse volume recordings at the left ankle are reduced, more than on the right.    IMPRESSION: There ismoderate left lower extremity arterial vascular disease.    < end of copied text >

## 2021-03-17 NOTE — CHART NOTE - NSCHARTNOTEFT_GEN_A_CORE
Nutrition Follow Up Note  Patient seen for: nutrition follow-up    Source: patient, medical record     Diet :     Patient reports:     PO intake :     Source for PO intake:     Enteral /Parenteral Nutrition:       Daily Weight in k.5 (-), Weight in k (-), Weight in k (-), Weight in k.5 (), Weight in k.2 (), Weight in k.7 ()  % Weight Change    Pertinent Medications: MEDICATIONS  (STANDING):  aspirin  chewable 81 milliGRAM(s) Oral daily  atorvastatin 80 milliGRAM(s) Oral at bedtime  chlorhexidine 4% Liquid 1 Application(s) Topical <User Schedule>  dextrose 40% Gel 15 Gram(s) Oral once  dextrose 5%. 1000 milliLiter(s) (50 mL/Hr) IV Continuous <Continuous>  dextrose 5%. 1000 milliLiter(s) (100 mL/Hr) IV Continuous <Continuous>  dextrose 50% Injectable 25 Gram(s) IV Push once  dextrose 50% Injectable 12.5 Gram(s) IV Push once  dextrose 50% Injectable 25 Gram(s) IV Push once  epoetin rafiq-epbx (RETACRIT) Injectable 18945 Unit(s) IV Push <User Schedule>  gabapentin 100 milliGRAM(s) Oral at bedtime  glucagon  Injectable 1 milliGRAM(s) IntraMuscular once  heparin   Injectable 5000 Unit(s) SubCutaneous every 12 hours  influenza   Vaccine 0.5 milliLiter(s) IntraMuscular once  insulin lispro (ADMELOG) corrective regimen sliding scale   SubCutaneous three times a day before meals  insulin lispro (ADMELOG) corrective regimen sliding scale   SubCutaneous at bedtime  metoprolol tartrate 25 milliGRAM(s) Oral every 12 hours  midodrine. 10 milliGRAM(s) Oral <User Schedule>  midodrine. 5 milliGRAM(s) Oral three times a day  pantoprazole    Tablet 40 milliGRAM(s) Oral before breakfast  polyethylene glycol 3350 17 Gram(s) Oral daily  prasugrel 10 milliGRAM(s) Oral daily    MEDICATIONS  (PRN):  sodium chloride 0.9% lock flush 10 milliLiter(s) IV Push every 1 hour PRN Pre/post blood products, medications, blood draw, and to maintain line patency    Pertinent Labs:  @ 05:51: Na 134<L>, BUN 45<H>, Cr 3.54<H>, <H>, K+ 4.3, Phos 4.0, Mg 2.5, Alk Phos 186<H>, ALT/SGPT 86<H>, AST/SGOT 84<H>, HbA1c --    Finger Sticks:  POCT Blood Glucose.: 237 mg/dL ( @ 11:47)  POCT Blood Glucose.: 145 mg/dL ( @ 07:57)  POCT Blood Glucose.: 167 mg/dL ( @ 21:30)  POCT Blood Glucose.: 247 mg/dL ( @ 16:39)      Skin per nursing documentation:   Edema:    Estimated Needs:   [ ] no change since previous assessment  [ ] recalculated:     Previous Nutrition Diagnosis:   Nutrition Diagnosis is:    New Nutrition Diagnosis:  Related to:    As evidenced by:      Interventions:     Recommend  1)    Monitoring and Evaluation:     Continue to monitor Nutritional intake, Tolerance to diet prescription, weights, labs, skin integrity    RD remains available upon request and will follow up per protocol Nutrition Follow Up Note  Patient seen for: nutrition follow-up    Chart reviewed, events noted. This is a "80M w/ DM2, CAD-CABG, and CKD, 2/16/21 a/w azotemia/lyte derangements/weakness; c/b newly ESRD; c/b COVID (+), last HD yesterday. Discharge planning.     Source: patient, medical record     Diet : carbohydrate consistent 1200ml fluid restriction, DASH + Nepro 1x     Pt Telugu speaking, able to make basic needs known. Patient seen at bedside, endorses good PO intake. RD observed lunch tray with > 75% of meal consumed. Pt drinking sugar-free Mighty shakes as well. Previous extensive diet education provided pt's wife during past RD visits, RD to remain available and follow-up as medically appropriate.    No acute GI distress noted. Last BM 3/16.      PO intake : % of meals      Source for PO intake: observation, per nursing flow sheets      Enteral /Parenteral Nutrition: N/A    Post-HD Weights--weights relatively stable for the last 2 weeks with some fluctuations noted likely due to fluids, will continue to monitor.   159.3lbs (2/27)  154.5lbs (3/1)  155.6lbs (3/8)  153.2lbs (3/16)    Pertinent Medications: MEDICATIONS  (STANDING):  aspirin  chewable 81 milliGRAM(s) Oral daily  atorvastatin 80 milliGRAM(s) Oral at bedtime  chlorhexidine 4% Liquid 1 Application(s) Topical <User Schedule>  dextrose 40% Gel 15 Gram(s) Oral once  dextrose 5%. 1000 milliLiter(s) (50 mL/Hr) IV Continuous <Continuous>  dextrose 5%. 1000 milliLiter(s) (100 mL/Hr) IV Continuous <Continuous>  dextrose 50% Injectable 25 Gram(s) IV Push once  dextrose 50% Injectable 12.5 Gram(s) IV Push once  dextrose 50% Injectable 25 Gram(s) IV Push once  epoetin rafiq-epbx (RETACRIT) Injectable 03625 Unit(s) IV Push <User Schedule>  gabapentin 100 milliGRAM(s) Oral at bedtime  glucagon  Injectable 1 milliGRAM(s) IntraMuscular once  heparin   Injectable 5000 Unit(s) SubCutaneous every 12 hours  influenza   Vaccine 0.5 milliLiter(s) IntraMuscular once  insulin lispro (ADMELOG) corrective regimen sliding scale   SubCutaneous three times a day before meals  insulin lispro (ADMELOG) corrective regimen sliding scale   SubCutaneous at bedtime  metoprolol tartrate 25 milliGRAM(s) Oral every 12 hours  midodrine. 10 milliGRAM(s) Oral <User Schedule>  midodrine. 5 milliGRAM(s) Oral three times a day  pantoprazole    Tablet 40 milliGRAM(s) Oral before breakfast  polyethylene glycol 3350 17 Gram(s) Oral daily  prasugrel 10 milliGRAM(s) Oral daily    MEDICATIONS  (PRN):  sodium chloride 0.9% lock flush 10 milliLiter(s) IV Push every 1 hour PRN Pre/post blood products, medications, blood draw, and to maintain line patency    Pertinent Labs: 03-17 @ 05:51: Na 134<L>, BUN 45<H>, Cr 3.54<H>, <H>, K+ 4.3, Phos 4.0, Mg 2.5, Alk Phos 186<H>, ALT/SGPT 86<H>, AST/SGOT 84<H>, HbA1c --    Finger Sticks:  POCT Blood Glucose.: 237 mg/dL (03-17 @ 11:47)  POCT Blood Glucose.: 145 mg/dL (03-17 @ 07:57)  POCT Blood Glucose.: 167 mg/dL (03-16 @ 21:30)  POCT Blood Glucose.: 247 mg/dL (03-16 @ 16:39)      Skin per nursing documentation: free of pressure injuries per nursing flow sheets   Edema: +1 left/right foot     Estimated Needs:   [x] no change since previous assessment  [ ] recalculated:     Previous Nutrition Diagnosis:   1. Food & Nutrition Related Knowledge Deficit -> ongoing, addressed with nutrition education as needed   2.Increased nutrient needs (protein) -> ongoing, being addressed with oral nutrition supplementation    New Nutrition Diagnosis: N/A     Interventions:   Recommend  1) Continue current diet as tolerated.   2) Continue Nepro 1x daily to supplement PO intake + Sugar-free Mighty Shakes TID to supplement PO intake.   3) Diet education reinforcement as needed.     Monitoring and Evaluation:     Continue to monitor Nutritional intake, Tolerance to diet prescription, weights, labs, skin integrity    RD remains available upon request and will follow up per protocol  Deja Gonzalez RD, CDN, Pager # [phone] Nutrition Follow Up Note  Patient seen for: nutrition follow-up    Chart reviewed, events noted. This is a "80M w/ DM2, CAD-CABG, and CKD, 2/16/21 a/w azotemia/lyte derangements/weakness; c/b newly ESRD; c/b COVID (+), last HD yesterday. Discharge planning.     Source: patient, medical record     Diet : carbohydrate consistent 1200ml fluid restriction, DASH + Nepro 1x     Pt Divehi speaking, able to make basic needs known. Declining full interview, on phone. Endorses good PO intake. RD observed lunch tray with > 75% of meal consumed. Pt drinking sugar-free Mighty shakes as well. Previous extensive diet education provided pt's wife during past RD visits, RD to remain available and follow-up as medically appropriate.    No acute GI distress noted. Last BM 3/16.      PO intake : % of meals      Source for PO intake: observation, per nursing flow sheets      Enteral /Parenteral Nutrition: N/A    Post-HD Weights--weights relatively stable for the last 2 weeks with some fluctuations noted likely due to fluids, will continue to monitor.   159.3lbs (2/27)  154.5lbs (3/1)  155.6lbs (3/8)  153.2lbs (3/16)    Pertinent Medications: MEDICATIONS  (STANDING):  aspirin  chewable 81 milliGRAM(s) Oral daily  atorvastatin 80 milliGRAM(s) Oral at bedtime  chlorhexidine 4% Liquid 1 Application(s) Topical <User Schedule>  dextrose 40% Gel 15 Gram(s) Oral once  dextrose 5%. 1000 milliLiter(s) (50 mL/Hr) IV Continuous <Continuous>  dextrose 5%. 1000 milliLiter(s) (100 mL/Hr) IV Continuous <Continuous>  dextrose 50% Injectable 25 Gram(s) IV Push once  dextrose 50% Injectable 12.5 Gram(s) IV Push once  dextrose 50% Injectable 25 Gram(s) IV Push once  epoetin rafiq-epbx (RETACRIT) Injectable 53540 Unit(s) IV Push <User Schedule>  gabapentin 100 milliGRAM(s) Oral at bedtime  glucagon  Injectable 1 milliGRAM(s) IntraMuscular once  heparin   Injectable 5000 Unit(s) SubCutaneous every 12 hours  influenza   Vaccine 0.5 milliLiter(s) IntraMuscular once  insulin lispro (ADMELOG) corrective regimen sliding scale   SubCutaneous three times a day before meals  insulin lispro (ADMELOG) corrective regimen sliding scale   SubCutaneous at bedtime  metoprolol tartrate 25 milliGRAM(s) Oral every 12 hours  midodrine. 10 milliGRAM(s) Oral <User Schedule>  midodrine. 5 milliGRAM(s) Oral three times a day  pantoprazole    Tablet 40 milliGRAM(s) Oral before breakfast  polyethylene glycol 3350 17 Gram(s) Oral daily  prasugrel 10 milliGRAM(s) Oral daily    MEDICATIONS  (PRN):  sodium chloride 0.9% lock flush 10 milliLiter(s) IV Push every 1 hour PRN Pre/post blood products, medications, blood draw, and to maintain line patency    Pertinent Labs: 03-17 @ 05:51: Na 134<L>, BUN 45<H>, Cr 3.54<H>, <H>, K+ 4.3, Phos 4.0, Mg 2.5, Alk Phos 186<H>, ALT/SGPT 86<H>, AST/SGOT 84<H>, HbA1c --    Finger Sticks:  POCT Blood Glucose.: 237 mg/dL (03-17 @ 11:47)  POCT Blood Glucose.: 145 mg/dL (03-17 @ 07:57)  POCT Blood Glucose.: 167 mg/dL (03-16 @ 21:30)  POCT Blood Glucose.: 247 mg/dL (03-16 @ 16:39)      Skin per nursing documentation: free of pressure injuries per nursing flow sheets   Edema: +1 left/right foot     Estimated Needs:   [x] no change since previous assessment  [ ] recalculated:     Previous Nutrition Diagnosis:   1. Food & Nutrition Related Knowledge Deficit -> ongoing, addressed with nutrition education as needed   2.Increased nutrient needs (protein) -> ongoing, being addressed with oral nutrition supplementation    New Nutrition Diagnosis: N/A     Interventions:   Recommend  1) Continue current diet as tolerated.   2) Continue Nepro 1x daily to supplement PO intake + Sugar-free Mighty Shakes TID to supplement PO intake.   3) Diet education reinforcement as needed.     Monitoring and Evaluation:     Continue to monitor Nutritional intake, Tolerance to diet prescription, weights, labs, skin integrity    RD remains available upon request and will follow up per protocol  Deja Gonzalez RD, CDN, Pager # [phone]

## 2021-03-17 NOTE — PROGRESS NOTE ADULT - SUBJECTIVE AND OBJECTIVE BOX
Subjective: Patient seen and examined. No new events except as noted.     REVIEW OF SYSTEMS:    CONSTITUTIONAL: + weakness, fevers or chills  EYES/ENT: No visual changes;  No vertigo or throat pain   NECK: No pain or stiffness  RESPIRATORY: No cough, wheezing, hemoptysis; No shortness of breath  CARDIOVASCULAR: No chest pain or palpitations  GASTROINTESTINAL: No abdominal or epigastric pain. No nausea, vomiting, or hematemesis; No diarrhea or constipation. No melena or hematochezia.  GENITOURINARY: No dysuria, frequency or hematuria  NEUROLOGICAL: No numbness or weakness  SKIN: No itching, burning, rashes, or lesions   All other review of systems is negative unless indicated above.    MEDICATIONS:  MEDICATIONS  (STANDING):  aspirin  chewable 81 milliGRAM(s) Oral daily  atorvastatin 80 milliGRAM(s) Oral at bedtime  chlorhexidine 4% Liquid 1 Application(s) Topical <User Schedule>  dextrose 40% Gel 15 Gram(s) Oral once  dextrose 5%. 1000 milliLiter(s) (50 mL/Hr) IV Continuous <Continuous>  dextrose 5%. 1000 milliLiter(s) (100 mL/Hr) IV Continuous <Continuous>  dextrose 50% Injectable 25 Gram(s) IV Push once  dextrose 50% Injectable 12.5 Gram(s) IV Push once  dextrose 50% Injectable 25 Gram(s) IV Push once  epoetin rafiq-epbx (RETACRIT) Injectable 64582 Unit(s) IV Push <User Schedule>  gabapentin 100 milliGRAM(s) Oral at bedtime  glucagon  Injectable 1 milliGRAM(s) IntraMuscular once  heparin   Injectable 5000 Unit(s) SubCutaneous every 12 hours  influenza   Vaccine 0.5 milliLiter(s) IntraMuscular once  insulin lispro (ADMELOG) corrective regimen sliding scale   SubCutaneous three times a day before meals  insulin lispro (ADMELOG) corrective regimen sliding scale   SubCutaneous at bedtime  metoprolol tartrate 25 milliGRAM(s) Oral every 12 hours  midodrine. 10 milliGRAM(s) Oral <User Schedule>  midodrine. 5 milliGRAM(s) Oral three times a day  pantoprazole    Tablet 40 milliGRAM(s) Oral before breakfast  polyethylene glycol 3350 17 Gram(s) Oral daily  prasugrel 10 milliGRAM(s) Oral daily      PHYSICAL EXAM:  T(C): 36.5 (03-17-21 @ 11:10), Max: 36.5 (03-17-21 @ 04:47)  HR: 62 (03-17-21 @ 11:10) (62 - 655)  BP: 91/47 (03-17-21 @ 11:10) (91/47 - 106/51)  RR: 18 (03-17-21 @ 11:10) (18 - 18)  SpO2: 99% (03-17-21 @ 11:10) (92% - 99%)  Wt(kg): --  I&O's Summary    16 Mar 2021 07:01  -  17 Mar 2021 07:00  --------------------------------------------------------  IN: 1040 mL / OUT: 1300 mL / NET: -260 mL          Appearance: NAD  HEENT:   Dry oral mucosa, PERRL, EOMI	  Lymphatic: No lymphadenopathy , Tunneled catheter   Cardiovascular: Irregular S1 S2, No JVD, + murmurs , Peripheral pulses palpable 2+ bilaterally  Respiratory: Lungs clear to auscultation, normal effort 	  Gastrointestinal:  Soft, Non-tender, + BS	  Skin: No rashes, No ecchymoses, No cyanosis, warm to touch  Musculoskeletal: Normal range of motion, normal strength  Psychiatry:  Mood & affect appropriate  Ext: No edema  ecchymosis/ small hematoma noted to inner L UE      LABS:    CARDIAC MARKERS:                                9.9    10.08 )-----------( 123      ( 17 Mar 2021 05:51 )             31.8     03-17    134<L>  |  96  |  45<H>  ----------------------------<  123<H>  4.3   |  25  |  3.54<H>    Ca    9.4      17 Mar 2021 05:51  Phos  4.0     03-17  Mg     2.5     03-17    TPro  6.6  /  Alb  2.6<L>  /  TBili  1.2  /  DBili  x   /  AST  84<H>  /  ALT  86<H>  /  AlkPhos  186<H>  03-17    proBNP:   Lipid Profile:   HgA1c:   TSH:             TELEMETRY: 	    ECG:  	  RADIOLOGY:   DIAGNOSTIC TESTING:  [ ] Echocardiogram:  [ ]  Catheterization:  [ ] Stress Test:    OTHER:

## 2021-03-17 NOTE — PROGRESS NOTE ADULT - SUBJECTIVE AND OBJECTIVE BOX
Infectious Diseases progress note:    Subjective: NAD, afebrile, satting adequately on RA    ROS:  CONSTITUTIONAL:  No fever, chills, rigors  CARDIOVASCULAR:  No chest pain or palpitations  RESPIRATORY:   No SOB, cough, dyspnea on exertion.  No wheezing  GASTROINTESTINAL:  No abd pain, N/V, diarrhea/constipation  EXTREMITIES:  No swelling or joint pain  GENITOURINARY:  No burning on urination, increased frequency or urgency.  No flank pain  NEUROLOGIC:  No HA, visual disturbances  SKIN: No rashes    Allergies    No Known Allergies    Intolerances        ANTIBIOTICS/RELEVANT:  antimicrobials    immunologic:  epoetin rafiq-epbx (RETACRIT) Injectable 45634 Unit(s) IV Push <User Schedule>  influenza   Vaccine 0.5 milliLiter(s) IntraMuscular once    OTHER:  aspirin  chewable 81 milliGRAM(s) Oral daily  atorvastatin 80 milliGRAM(s) Oral at bedtime  chlorhexidine 4% Liquid 1 Application(s) Topical <User Schedule>  dextrose 40% Gel 15 Gram(s) Oral once  dextrose 5%. 1000 milliLiter(s) IV Continuous <Continuous>  dextrose 5%. 1000 milliLiter(s) IV Continuous <Continuous>  dextrose 50% Injectable 25 Gram(s) IV Push once  dextrose 50% Injectable 12.5 Gram(s) IV Push once  dextrose 50% Injectable 25 Gram(s) IV Push once  gabapentin 100 milliGRAM(s) Oral at bedtime  glucagon  Injectable 1 milliGRAM(s) IntraMuscular once  heparin   Injectable 5000 Unit(s) SubCutaneous every 12 hours  insulin lispro (ADMELOG) corrective regimen sliding scale   SubCutaneous three times a day before meals  insulin lispro (ADMELOG) corrective regimen sliding scale   SubCutaneous at bedtime  metoprolol tartrate 25 milliGRAM(s) Oral every 12 hours  midodrine. 10 milliGRAM(s) Oral <User Schedule>  midodrine. 5 milliGRAM(s) Oral three times a day  pantoprazole    Tablet 40 milliGRAM(s) Oral before breakfast  polyethylene glycol 3350 17 Gram(s) Oral daily  prasugrel 10 milliGRAM(s) Oral daily  sodium chloride 0.9% lock flush 10 milliLiter(s) IV Push every 1 hour PRN      Objective:  Vital Signs Last 24 Hrs  T(C): 36.6 (17 Mar 2021 17:18), Max: 36.6 (17 Mar 2021 17:18)  T(F): 97.9 (17 Mar 2021 17:18), Max: 97.9 (17 Mar 2021 17:18)  HR: 66 (17 Mar 2021 17:18) (62 - 72)  BP: 96/- (17 Mar 2021 17:18) (91/47 - 100/39)  BP(mean): --  RR: 18 (17 Mar 2021 17:18) (18 - 18)  SpO2: 98% (17 Mar 2021 17:18) (96% - 99%)    PHYSICAL EXAM:  Constitutional:NAD  Eyes:CHERI, EOMI  Ear/Nose/Throat: no thrush, mucositis.  Moist mucous membranes	  Neck:no JVD, no lymphadenopathy, supple  Respiratory: CTA nadiya  Cardiovascular: S1S2 RRR, no murmurs  Gastrointestinal:soft, nontender,  nondistended (+) BS  Extremities:no e/e/c  Skin:  no rashes, open wounds or ulcerations        LABS:                        9.9    10.08 )-----------( 123      ( 17 Mar 2021 05:51 )             31.8     03-17    134<L>  |  96  |  45<H>  ----------------------------<  123<H>  4.3   |  25  |  3.54<H>    Ca    9.4      17 Mar 2021 05:51  Phos  4.0     03-17  Mg     2.5     03-17    TPro  6.3  /  Alb  x   /  TBili  x   /  DBili  x   /  AST  x   /  ALT  x   /  AlkPhos  x   03-17          Procalcitonin, Serum: 0.20 (02-16 @ 23:54)                    MICROBIOLOGY:          RADIOLOGY & ADDITIONAL STUDIES:

## 2021-03-17 NOTE — PROGRESS NOTE ADULT - SUBJECTIVE AND OBJECTIVE BOX
Patient is a 80y old  Male who presents with a chief complaint of acute renal failure (17 Mar 2021 19:47)      SUBJECTIVE / OVERNIGHT EVENTS: THIERRY reviewed vascular consulted, no intervention planned, THIERRY w toe pressures requested     MEDICATIONS  (STANDING):  aspirin  chewable 81 milliGRAM(s) Oral daily  atorvastatin 80 milliGRAM(s) Oral at bedtime  chlorhexidine 4% Liquid 1 Application(s) Topical <User Schedule>  dextrose 40% Gel 15 Gram(s) Oral once  dextrose 5%. 1000 milliLiter(s) (50 mL/Hr) IV Continuous <Continuous>  dextrose 5%. 1000 milliLiter(s) (100 mL/Hr) IV Continuous <Continuous>  dextrose 50% Injectable 25 Gram(s) IV Push once  dextrose 50% Injectable 12.5 Gram(s) IV Push once  dextrose 50% Injectable 25 Gram(s) IV Push once  epoetin rafiq-epbx (RETACRIT) Injectable 12548 Unit(s) IV Push <User Schedule>  gabapentin 100 milliGRAM(s) Oral at bedtime  glucagon  Injectable 1 milliGRAM(s) IntraMuscular once  heparin   Injectable 5000 Unit(s) SubCutaneous every 12 hours  influenza   Vaccine 0.5 milliLiter(s) IntraMuscular once  insulin lispro (ADMELOG) corrective regimen sliding scale   SubCutaneous three times a day before meals  insulin lispro (ADMELOG) corrective regimen sliding scale   SubCutaneous at bedtime  levothyroxine 25 MICROGram(s) Oral daily  metoprolol tartrate 25 milliGRAM(s) Oral every 12 hours  midodrine. 10 milliGRAM(s) Oral <User Schedule>  midodrine. 5 milliGRAM(s) Oral three times a day  pantoprazole    Tablet 40 milliGRAM(s) Oral before breakfast  polyethylene glycol 3350 17 Gram(s) Oral daily  prasugrel 10 milliGRAM(s) Oral daily    MEDICATIONS  (PRN):  sodium chloride 0.9% lock flush 10 milliLiter(s) IV Push every 1 hour PRN Pre/post blood products, medications, blood draw, and to maintain line patency      Vital Signs Last 24 Hrs  T(F): 98 (03-17-21 @ 21:26), Max: 98 (03-17-21 @ 21:26)  HR: 63 (03-17-21 @ 21:26) (62 - 72)  BP: 107/59 (03-17-21 @ 21:26) (91/47 - 107/59)  RR: 18 (03-17-21 @ 21:26) (18 - 18)  SpO2: 98% (03-17-21 @ 21:26) (96% - 99%)  Telemetry:   CAPILLARY BLOOD GLUCOSE      POCT Blood Glucose.: 194 mg/dL (17 Mar 2021 21:25)  POCT Blood Glucose.: 171 mg/dL (17 Mar 2021 16:45)  POCT Blood Glucose.: 237 mg/dL (17 Mar 2021 11:47)  POCT Blood Glucose.: 145 mg/dL (17 Mar 2021 07:57)    I&O's Summary    16 Mar 2021 07:01  -  17 Mar 2021 07:00  --------------------------------------------------------  IN: 1040 mL / OUT: 1300 mL / NET: -260 mL    17 Mar 2021 07:01  -  17 Mar 2021 23:06  --------------------------------------------------------  IN: 240 mL / OUT: 0 mL / NET: 240 mL        PHYSICAL EXAM:  GENERAL: NAD, well-developed  HEAD:  Atraumatic, Normocephalic  EYES: EOMI, PERRLA, conjunctiva and sclera clear  NECK: Supple, No JVD  CHEST/LUNG: Clear to auscultation bilaterally; No wheeze  HEART: Regular rate and rhythm; No murmurs, rubs, or gallops  ABDOMEN: Soft, Nontender, Nondistended; Bowel sounds present  EXTREMITIES:  2+ Peripheral Pulses, No clubbing, cyanosis, or edema  PSYCH: AAOx3  NEUROLOGY: non-focal  SKIN: No rashes or lesions    LABS:                        9.9    10.08 )-----------( 123      ( 17 Mar 2021 05:51 )             31.8     03-17    134<L>  |  96  |  45<H>  ----------------------------<  123<H>  4.3   |  25  |  3.54<H>    Ca    9.4      17 Mar 2021 05:51  Phos  4.0     03-17  Mg     2.5     03-17    TPro  6.3  /  Alb  x   /  TBili  x   /  DBili  x   /  AST  x   /  ALT  x   /  AlkPhos  x   03-17              RADIOLOGY & ADDITIONAL TESTS:    Imaging Personally Reviewed:    Consultant(s) Notes Reviewed:      Care Discussed with Consultants/Other Providers:

## 2021-03-18 LAB
ALBUMIN SERPL ELPH-MCNC: 2.6 G/DL — LOW (ref 3.3–5)
ALP SERPL-CCNC: 194 U/L — HIGH (ref 40–120)
ALT FLD-CCNC: 82 U/L — HIGH (ref 10–45)
ANION GAP SERPL CALC-SCNC: 11 MMOL/L — SIGNIFICANT CHANGE UP (ref 5–17)
AST SERPL-CCNC: 69 U/L — HIGH (ref 10–40)
BILIRUB SERPL-MCNC: 1.1 MG/DL — SIGNIFICANT CHANGE UP (ref 0.2–1.2)
BUN SERPL-MCNC: 60 MG/DL — HIGH (ref 7–23)
CALCIUM SERPL-MCNC: 9.7 MG/DL — SIGNIFICANT CHANGE UP (ref 8.4–10.5)
CHLORIDE SERPL-SCNC: 97 MMOL/L — SIGNIFICANT CHANGE UP (ref 96–108)
CO2 SERPL-SCNC: 26 MMOL/L — SIGNIFICANT CHANGE UP (ref 22–31)
CREAT SERPL-MCNC: 4.45 MG/DL — HIGH (ref 0.5–1.3)
D DIMER BLD IA.RAPID-MCNC: 551 NG/ML DDU — HIGH
GLUCOSE BLDC GLUCOMTR-MCNC: 124 MG/DL — HIGH (ref 70–99)
GLUCOSE BLDC GLUCOMTR-MCNC: 163 MG/DL — HIGH (ref 70–99)
GLUCOSE BLDC GLUCOMTR-MCNC: 189 MG/DL — HIGH (ref 70–99)
GLUCOSE BLDC GLUCOMTR-MCNC: 191 MG/DL — HIGH (ref 70–99)
GLUCOSE SERPL-MCNC: 173 MG/DL — HIGH (ref 70–99)
HCT VFR BLD CALC: 31.6 % — LOW (ref 39–50)
HGB BLD-MCNC: 9.3 G/DL — LOW (ref 13–17)
MCHC RBC-ENTMCNC: 29.4 GM/DL — LOW (ref 32–36)
MCHC RBC-ENTMCNC: 29.6 PG — SIGNIFICANT CHANGE UP (ref 27–34)
MCV RBC AUTO: 100.6 FL — HIGH (ref 80–100)
NRBC # BLD: 0 /100 WBCS — SIGNIFICANT CHANGE UP (ref 0–0)
PLATELET # BLD AUTO: 133 K/UL — LOW (ref 150–400)
POTASSIUM SERPL-MCNC: 5 MMOL/L — SIGNIFICANT CHANGE UP (ref 3.5–5.3)
POTASSIUM SERPL-SCNC: 5 MMOL/L — SIGNIFICANT CHANGE UP (ref 3.5–5.3)
PROT SERPL-MCNC: 6.7 G/DL — SIGNIFICANT CHANGE UP (ref 6–8.3)
RBC # BLD: 3.14 M/UL — LOW (ref 4.2–5.8)
RBC # FLD: 21 % — HIGH (ref 10.3–14.5)
SODIUM SERPL-SCNC: 134 MMOL/L — LOW (ref 135–145)
WBC # BLD: 9.22 K/UL — SIGNIFICANT CHANGE UP (ref 3.8–10.5)
WBC # FLD AUTO: 9.22 K/UL — SIGNIFICANT CHANGE UP (ref 3.8–10.5)

## 2021-03-18 PROCEDURE — 93970 EXTREMITY STUDY: CPT | Mod: 26

## 2021-03-18 RX ORDER — SENNA PLUS 8.6 MG/1
2 TABLET ORAL AT BEDTIME
Refills: 0 | Status: DISCONTINUED | OUTPATIENT
Start: 2021-03-18 | End: 2021-03-23

## 2021-03-18 RX ADMIN — Medication 81 MILLIGRAM(S): at 11:26

## 2021-03-18 RX ADMIN — HEPARIN SODIUM 5000 UNIT(S): 5000 INJECTION INTRAVENOUS; SUBCUTANEOUS at 14:55

## 2021-03-18 RX ADMIN — Medication 25 MICROGRAM(S): at 05:46

## 2021-03-18 RX ADMIN — HEPARIN SODIUM 5000 UNIT(S): 5000 INJECTION INTRAVENOUS; SUBCUTANEOUS at 22:12

## 2021-03-18 RX ADMIN — PRASUGREL 10 MILLIGRAM(S): 5 TABLET, FILM COATED ORAL at 11:26

## 2021-03-18 RX ADMIN — Medication 25 MILLIGRAM(S): at 05:37

## 2021-03-18 RX ADMIN — Medication 1: at 12:34

## 2021-03-18 RX ADMIN — MIDODRINE HYDROCHLORIDE 5 MILLIGRAM(S): 2.5 TABLET ORAL at 05:47

## 2021-03-18 RX ADMIN — ERYTHROPOIETIN 10000 UNIT(S): 10000 INJECTION, SOLUTION INTRAVENOUS; SUBCUTANEOUS at 17:31

## 2021-03-18 RX ADMIN — SENNA PLUS 2 TABLET(S): 8.6 TABLET ORAL at 22:06

## 2021-03-18 RX ADMIN — HEPARIN SODIUM 5000 UNIT(S): 5000 INJECTION INTRAVENOUS; SUBCUTANEOUS at 05:37

## 2021-03-18 RX ADMIN — POLYETHYLENE GLYCOL 3350 17 GRAM(S): 17 POWDER, FOR SOLUTION ORAL at 11:26

## 2021-03-18 RX ADMIN — GABAPENTIN 100 MILLIGRAM(S): 400 CAPSULE ORAL at 22:06

## 2021-03-18 RX ADMIN — MIDODRINE HYDROCHLORIDE 5 MILLIGRAM(S): 2.5 TABLET ORAL at 11:26

## 2021-03-18 RX ADMIN — MIDODRINE HYDROCHLORIDE 5 MILLIGRAM(S): 2.5 TABLET ORAL at 19:32

## 2021-03-18 RX ADMIN — PANTOPRAZOLE SODIUM 40 MILLIGRAM(S): 20 TABLET, DELAYED RELEASE ORAL at 05:47

## 2021-03-18 RX ADMIN — CHLORHEXIDINE GLUCONATE 1 APPLICATION(S): 213 SOLUTION TOPICAL at 05:46

## 2021-03-18 RX ADMIN — ATORVASTATIN CALCIUM 80 MILLIGRAM(S): 80 TABLET, FILM COATED ORAL at 22:06

## 2021-03-18 RX ADMIN — MIDODRINE HYDROCHLORIDE 10 MILLIGRAM(S): 2.5 TABLET ORAL at 17:52

## 2021-03-18 NOTE — PROGRESS NOTE ADULT - SUBJECTIVE AND OBJECTIVE BOX
      Overnight events noted      VITAL:  T(C): , Max: 36.8 (03-18-21 @ 04:38)  T(F): , Max: 98.3 (03-18-21 @ 04:38)  HR: 60 (03-18-21 @ 04:38)  BP: 92/52 (03-18-21 @ 04:38)  BP(mean): --  RR: 18 (03-18-21 @ 04:38)  SpO2: 100% (03-18-21 @ 04:38)      PHYSICAL EXAM:  Constitutional: NAD on room air, alert  HEENT: NCAT, DMM  Neck: Supple, no JVD  Respiratory: CTA-b/l  Cardiovascular: RRR s1s2, no m/r/g  Gastrointestinal: BS+, soft, NT; (+)mild distension  Extremities:  no edema b/l  Neurological: no focal deficits; strength grossly intact  Back: no CVAT b/l  Skin: No rashes, no nevi  Access: RIJ tunneled cath    LABS:                        9.3    9.22  )-----------( 133      ( 18 Mar 2021 05:36 )             31.6     Na(134)/K(5.0)/Cl(97)/HCO3(26)/BUN(60)/Cr(4.45)Glu(173)/Ca(9.7)/Mg(--)/PO4(--)    03-18 @ 05:36  Na(134)/K(4.3)/Cl(96)/HCO3(25)/BUN(45)/Cr(3.54)Glu(123)/Ca(9.4)/Mg(2.5)/PO4(4.0)    03-17 @ 05:51  Na(134)/K(4.8)/Cl(97)/HCO3(23)/BUN(65)/Cr(4.97)Glu(155)/Ca(9.9)/Mg(2.9)/PO4(4.2)    03-16 @ 06:09      IMPRESSION: 80M w/ DM2, CAD-CABG, and CKD, 2/16/21 a/w azotemia/lyte derangements/weakness; c/b newly ESRD; c/b COVID(+)    (1)Renal - newly ESRD - due for HD today     (2)CV - tenuous hemodynamics/hypervolemia - biventricular systolic dysfunction, severe AS, mod-sev AR. On Midodrine.     (3)Anemia - On Retacrit with HD    (4)Dispo - awaiting acceptance to outside HD facility       RECOMMEND:  (1)Next HD today; 0.5L UF as able; Midodrine 10mg PO x 1 prn intradialytic hypotension; Retacrit with HD  (2)PT  (3)Setup of outpatient HD per DAMON Garcia MD  [facility]  Office: [phone]  Cell: [phone]                   No pain; (+)LE weakness b/l    VITAL:  T(C): , Max: 36.8 (03-18-21 @ 04:38)  T(F): , Max: 98.3 (03-18-21 @ 04:38)  HR: 60 (03-18-21 @ 04:38)  BP: 92/52 (03-18-21 @ 04:38)  BP(mean): --  RR: 18 (03-18-21 @ 04:38)  SpO2: 100% (03-18-21 @ 04:38)      PHYSICAL EXAM:  Constitutional: NAD on room air, alert  HEENT: NCAT, DMM  Neck: Supple, no JVD  Respiratory: CTA-b/l  Cardiovascular: RRR s1s2, no m/r/g  Gastrointestinal: BS+, soft, NT; (+)mild distension  Extremities:  no edema b/l  Neurological: no focal deficits; strength grossly intact  Back: no CVAT b/l  Skin: No rashes, no nevi  Access: RIJ tunneled cath    LABS:                        9.3    9.22  )-----------( 133      ( 18 Mar 2021 05:36 )             31.6     Na(134)/K(5.0)/Cl(97)/HCO3(26)/BUN(60)/Cr(4.45)Glu(173)/Ca(9.7)/Mg(--)/PO4(--)    03-18 @ 05:36  Na(134)/K(4.3)/Cl(96)/HCO3(25)/BUN(45)/Cr(3.54)Glu(123)/Ca(9.4)/Mg(2.5)/PO4(4.0)    03-17 @ 05:51  Na(134)/K(4.8)/Cl(97)/HCO3(23)/BUN(65)/Cr(4.97)Glu(155)/Ca(9.9)/Mg(2.9)/PO4(4.2)    03-16 @ 06:09      IMPRESSION: 80M w/ DM2, CAD-CABG, and CKD, 2/16/21 a/w azotemia/lyte derangements/weakness; c/b newly ESRD; c/b COVID(+)    (1)Renal - newly ESRD - due for HD today     (2)CV - tenuous hemodynamics/hypervolemia - biventricular systolic dysfunction, severe AS, mod-sev AR. On Midodrine.     (3)Anemia - On Retacrit with HD    (4)Dispo - awaiting acceptance to outside HD facility       RECOMMEND:  (1)Next HD today; 0.5L UF as able; Midodrine 10mg PO x 1 prn intradialytic hypotension; Retacrit with HD  (2)PT  (3)Setup of outpatient HD per DAMON Garcia MD  [facility]  Office: [phone]  Cell: [phone]

## 2021-03-18 NOTE — PROGRESS NOTE ADULT - SUBJECTIVE AND OBJECTIVE BOX
Patient is a 80y old  Male who presents with a chief complaint of acute renal failure (18 Mar 2021 20:02)      SUBJECTIVE / OVERNIGHT EVENTS: ongoing LE pain/weakness    MEDICATIONS  (STANDING):  aspirin  chewable 81 milliGRAM(s) Oral daily  atorvastatin 80 milliGRAM(s) Oral at bedtime  chlorhexidine 4% Liquid 1 Application(s) Topical <User Schedule>  dextrose 40% Gel 15 Gram(s) Oral once  dextrose 5%. 1000 milliLiter(s) (50 mL/Hr) IV Continuous <Continuous>  dextrose 5%. 1000 milliLiter(s) (100 mL/Hr) IV Continuous <Continuous>  dextrose 50% Injectable 25 Gram(s) IV Push once  dextrose 50% Injectable 12.5 Gram(s) IV Push once  dextrose 50% Injectable 25 Gram(s) IV Push once  epoetin rafiq-epbx (RETACRIT) Injectable 69345 Unit(s) IV Push <User Schedule>  gabapentin 100 milliGRAM(s) Oral at bedtime  glucagon  Injectable 1 milliGRAM(s) IntraMuscular once  heparin   Injectable 5000 Unit(s) SubCutaneous every 8 hours  influenza   Vaccine 0.5 milliLiter(s) IntraMuscular once  insulin lispro (ADMELOG) corrective regimen sliding scale   SubCutaneous three times a day before meals  insulin lispro (ADMELOG) corrective regimen sliding scale   SubCutaneous at bedtime  levothyroxine 25 MICROGram(s) Oral daily  metoprolol tartrate 25 milliGRAM(s) Oral every 12 hours  midodrine. 10 milliGRAM(s) Oral <User Schedule>  midodrine. 5 milliGRAM(s) Oral three times a day  pantoprazole    Tablet 40 milliGRAM(s) Oral before breakfast  polyethylene glycol 3350 17 Gram(s) Oral daily  prasugrel 10 milliGRAM(s) Oral daily  senna 2 Tablet(s) Oral at bedtime    MEDICATIONS  (PRN):  bisacodyl Suppository 10 milliGRAM(s) Rectal daily PRN Constipation  sodium chloride 0.9% lock flush 10 milliLiter(s) IV Push every 1 hour PRN Pre/post blood products, medications, blood draw, and to maintain line patency      Vital Signs Last 24 Hrs  T(F): 97.3 (03-18-21 @ 22:14), Max: 98.3 (03-18-21 @ 04:38)  HR: 70 (03-18-21 @ 22:14) (60 - 70)  BP: 92/50 (03-18-21 @ 22:14) (92/50 - 95/45)  RR: 18 (03-18-21 @ 22:14) (18 - 18)  SpO2: 97% (03-18-21 @ 22:14) (97% - 100%)  Telemetry:   CAPILLARY BLOOD GLUCOSE      POCT Blood Glucose.: 163 mg/dL (18 Mar 2021 21:04)  POCT Blood Glucose.: 189 mg/dL (18 Mar 2021 16:36)  POCT Blood Glucose.: 191 mg/dL (18 Mar 2021 12:02)  POCT Blood Glucose.: 124 mg/dL (18 Mar 2021 07:40)    I&O's Summary    17 Mar 2021 07:01  -  18 Mar 2021 07:00  --------------------------------------------------------  IN: 240 mL / OUT: 0 mL / NET: 240 mL    18 Mar 2021 07:01  -  18 Mar 2021 22:20  --------------------------------------------------------  IN: 240 mL / OUT: 500 mL / NET: -260 mL        PHYSICAL EXAM:  GENERAL: NAD, well-developed  HEAD:  Atraumatic, Normocephalic  EYES: EOMI, PERRLA, conjunctiva and sclera clear  NECK: Supple, No JVD  CHEST/LUNG: Clear to auscultation bilaterally; No wheeze  HEART: Regular rate and rhythm; No murmurs, rubs, or gallops  ABDOMEN: Soft, Nontender, Nondistended; Bowel sounds present  EXTREMITIES:  2+ Peripheral Pulses, No clubbing, cyanosis, or edema  PSYCH: AAOx3  NEUROLOGY: non-focal  SKIN: No rashes or lesions    LABS:                        9.3    9.22  )-----------( 133      ( 18 Mar 2021 05:36 )             31.6     03-18    134<L>  |  97  |  60<H>  ----------------------------<  173<H>  5.0   |  26  |  4.45<H>    Ca    9.7      18 Mar 2021 05:36  Phos  4.0     03-17  Mg     2.5     03-17    TPro  6.7  /  Alb  2.6<L>  /  TBili  1.1  /  DBili  x   /  AST  69<H>  /  ALT  82<H>  /  AlkPhos  194<H>  03-18              RADIOLOGY & ADDITIONAL TESTS:    Imaging Personally Reviewed:    Consultant(s) Notes Reviewed:      Care Discussed with Consultants/Other Providers:

## 2021-03-18 NOTE — PROGRESS NOTE ADULT - SUBJECTIVE AND OBJECTIVE BOX
Subjective: Patient seen and examined. No new events except as noted.     REVIEW OF SYSTEMS:    CONSTITUTIONAL: +weakness, fevers or chills  EYES/ENT: No visual changes;  No vertigo or throat pain   NECK: No pain or stiffness  RESPIRATORY: No cough, wheezing, hemoptysis; No shortness of breath  CARDIOVASCULAR: No chest pain or palpitations  GASTROINTESTINAL: No abdominal or epigastric pain. No nausea, vomiting, or hematemesis; No diarrhea or constipation. No melena or hematochezia.  GENITOURINARY: No dysuria, frequency or hematuria  NEUROLOGICAL: No numbness or weakness  SKIN: No itching, burning, rashes, or lesions   All other review of systems is negative unless indicated above.    MEDICATIONS:  MEDICATIONS  (STANDING):  aspirin  chewable 81 milliGRAM(s) Oral daily  atorvastatin 80 milliGRAM(s) Oral at bedtime  chlorhexidine 4% Liquid 1 Application(s) Topical <User Schedule>  dextrose 40% Gel 15 Gram(s) Oral once  dextrose 5%. 1000 milliLiter(s) (50 mL/Hr) IV Continuous <Continuous>  dextrose 5%. 1000 milliLiter(s) (100 mL/Hr) IV Continuous <Continuous>  dextrose 50% Injectable 25 Gram(s) IV Push once  dextrose 50% Injectable 12.5 Gram(s) IV Push once  dextrose 50% Injectable 25 Gram(s) IV Push once  epoetin rafiq-epbx (RETACRIT) Injectable 06689 Unit(s) IV Push <User Schedule>  gabapentin 100 milliGRAM(s) Oral at bedtime  glucagon  Injectable 1 milliGRAM(s) IntraMuscular once  heparin   Injectable 5000 Unit(s) SubCutaneous every 8 hours  influenza   Vaccine 0.5 milliLiter(s) IntraMuscular once  insulin lispro (ADMELOG) corrective regimen sliding scale   SubCutaneous three times a day before meals  insulin lispro (ADMELOG) corrective regimen sliding scale   SubCutaneous at bedtime  levothyroxine 25 MICROGram(s) Oral daily  metoprolol tartrate 25 milliGRAM(s) Oral every 12 hours  midodrine. 5 milliGRAM(s) Oral three times a day  midodrine. 10 milliGRAM(s) Oral <User Schedule>  pantoprazole    Tablet 40 milliGRAM(s) Oral before breakfast  polyethylene glycol 3350 17 Gram(s) Oral daily  prasugrel 10 milliGRAM(s) Oral daily  senna 2 Tablet(s) Oral at bedtime      PHYSICAL EXAM:  T(C): 36.7 (03-18-21 @ 17:15), Max: 36.8 (03-18-21 @ 04:38)  HR: 62 (03-18-21 @ 17:15) (60 - 63)  BP: 95/45 (03-18-21 @ 17:15) (92/52 - 107/59)  RR: 18 (03-18-21 @ 17:15) (18 - 18)  SpO2: 97% (03-18-21 @ 17:15) (97% - 100%)  Wt(kg): --  I&O's Summary    17 Mar 2021 07:01  -  18 Mar 2021 07:00  --------------------------------------------------------  IN: 240 mL / OUT: 0 mL / NET: 240 mL    18 Mar 2021 07:01  -  18 Mar 2021 20:02  --------------------------------------------------------  IN: 240 mL / OUT: 0 mL / NET: 240 mL          Appearance: NAD  HEENT:   Dry oral mucosa, PERRL, EOMI	  Lymphatic: No lymphadenopathy , Tunneled catheter   Cardiovascular: Irregular S1 S2, No JVD, + murmurs , Peripheral pulses palpable 2+ bilaterally  Respiratory: Lungs clear to auscultation, normal effort 	  Gastrointestinal:  Soft, Non-tender, + BS	  Skin: No rashes, No ecchymoses, No cyanosis, warm to touch  Musculoskeletal: Normal range of motion, normal strength  Psychiatry:  Mood & affect appropriate  Ext: No edema  ecchymosis/ small hematoma noted to inner L UE          LABS:    CARDIAC MARKERS:                                9.3    9.22  )-----------( 133      ( 18 Mar 2021 05:36 )             31.6     03-18    134<L>  |  97  |  60<H>  ----------------------------<  173<H>  5.0   |  26  |  4.45<H>    Ca    9.7      18 Mar 2021 05:36  Phos  4.0     03-17  Mg     2.5     03-17    TPro  6.7  /  Alb  2.6<L>  /  TBili  1.1  /  DBili  x   /  AST  69<H>  /  ALT  82<H>  /  AlkPhos  194<H>  03-18    proBNP:   Lipid Profile:   HgA1c:   TSH:             TELEMETRY: 	  v paced    ECG:  	  RADIOLOGY:   DIAGNOSTIC TESTING:  [ ] Echocardiogram:  [ ]  Catheterization:  [ ] Stress Test:    OTHER:

## 2021-03-18 NOTE — PROGRESS NOTE ADULT - ASSESSMENT
79 yo male w h/o ICM< CAD< s/p CABG, has AICD, DM, HTN presents w volume overload and abd pain  Abd sono shows gallstones, but no sign of cholecystitis/pancreatitis. abd pain resolved on Zosyn,   s/ p treatment empirically for 7 days .  ID following    ascites 2/2 systolic chf, probably worse 2/2 acute on chronic systolic chf.  responded well to lasix. now on HD.   started HD 2/26. HD as per renal. changed from MWF to TTSa,  RIJ tunneled catheter placed on 3/4 in IR, no fistula placement for now  unable to be DCed home 2/2 safety at home. nobody to assume responsibility, wife is ill  awaiting transfer to [facility]    nosocomial Covid positive. remains positive, clinically stable.  ID following. in isolation. trend infl markers.   s/p monoclonal infusion.  on interrogation of ICD, has a lof of arrhythmia burden,   NSVT and sustained VT, seen by EPS, cont  Iathsvynf90.5 mg  bid. has inoperable ischemic HD,   ICM, EF 15%, severe AS, seen by structural heart for TAVR , proceed with TAVR work up    on 3/13: denise asked for a call, i called twice and wasnt able to leave messages bc mailbox is full. dr Garcia, renal spoke with him and confirmed home HD is not possible bc ptn is frail w unstable BPs. He also inquired about the second Covid vaccine( he had his first PTA) , ptn acquired the Covid virus during this hospitalization and remains on the Covid isolation floor. vaccine is not appropriate at this time    ptn is awaiting transfer to [facility], the only possible option is [facility] in [place], family is against it, they live in [place]. ptn c/o pain in his feet, pulses are good,    THIERRY reviewed vascular consulted, THIERRY w toe pressures requested.       dvt ppx w [facility]

## 2021-03-19 LAB
% ALBUMIN: 40.8 % — SIGNIFICANT CHANGE UP
% ALPHA 1: 6 % — SIGNIFICANT CHANGE UP
% ALPHA 2: 11.2 % — SIGNIFICANT CHANGE UP
% BETA: 13.1 % — SIGNIFICANT CHANGE UP
% GAMMA: 28.9 % — SIGNIFICANT CHANGE UP
% M SPIKE: 4.5 % — SIGNIFICANT CHANGE UP
ALBUMIN SERPL ELPH-MCNC: 2.5 G/DL — LOW (ref 3.3–5)
ALBUMIN SERPL ELPH-MCNC: 2.6 G/DL — LOW (ref 3.6–5.5)
ALBUMIN/GLOB SERPL ELPH: 0.7 RATIO — SIGNIFICANT CHANGE UP
ALP SERPL-CCNC: 195 U/L — HIGH (ref 40–120)
ALPHA1 GLOB SERPL ELPH-MCNC: 0.4 G/DL — SIGNIFICANT CHANGE UP (ref 0.1–0.4)
ALPHA2 GLOB SERPL ELPH-MCNC: 0.7 G/DL — SIGNIFICANT CHANGE UP (ref 0.5–1)
ALT FLD-CCNC: 81 U/L — HIGH (ref 10–45)
ANION GAP SERPL CALC-SCNC: 13 MMOL/L — SIGNIFICANT CHANGE UP (ref 5–17)
AST SERPL-CCNC: 68 U/L — HIGH (ref 10–40)
B-GLOBULIN SERPL ELPH-MCNC: 0.8 G/DL — SIGNIFICANT CHANGE UP (ref 0.5–1)
BILIRUB SERPL-MCNC: 1.1 MG/DL — SIGNIFICANT CHANGE UP (ref 0.2–1.2)
BUN SERPL-MCNC: 42 MG/DL — HIGH (ref 7–23)
CALCIUM SERPL-MCNC: 9.2 MG/DL — SIGNIFICANT CHANGE UP (ref 8.4–10.5)
CHLORIDE SERPL-SCNC: 96 MMOL/L — SIGNIFICANT CHANGE UP (ref 96–108)
CO2 SERPL-SCNC: 25 MMOL/L — SIGNIFICANT CHANGE UP (ref 22–31)
CREAT SERPL-MCNC: 3.42 MG/DL — HIGH (ref 0.5–1.3)
GAMMA GLOBULIN: 1.8 G/DL — HIGH (ref 0.6–1.6)
GLUCOSE BLDC GLUCOMTR-MCNC: 116 MG/DL — HIGH (ref 70–99)
GLUCOSE BLDC GLUCOMTR-MCNC: 159 MG/DL — HIGH (ref 70–99)
GLUCOSE BLDC GLUCOMTR-MCNC: 174 MG/DL — HIGH (ref 70–99)
GLUCOSE BLDC GLUCOMTR-MCNC: 202 MG/DL — HIGH (ref 70–99)
GLUCOSE SERPL-MCNC: 153 MG/DL — HIGH (ref 70–99)
HCT VFR BLD CALC: 30.4 % — LOW (ref 39–50)
HGB BLD-MCNC: 9.3 G/DL — LOW (ref 13–17)
INTERPRETATION SERPL IFE-IMP: SIGNIFICANT CHANGE UP
M-SPIKE: 0.3 G/DL — HIGH (ref 0–0)
MCHC RBC-ENTMCNC: 30.6 GM/DL — LOW (ref 32–36)
MCHC RBC-ENTMCNC: 30.6 PG — SIGNIFICANT CHANGE UP (ref 27–34)
MCV RBC AUTO: 100 FL — SIGNIFICANT CHANGE UP (ref 80–100)
NRBC # BLD: 0 /100 WBCS — SIGNIFICANT CHANGE UP (ref 0–0)
PLATELET # BLD AUTO: 123 K/UL — LOW (ref 150–400)
POTASSIUM SERPL-MCNC: 4.4 MMOL/L — SIGNIFICANT CHANGE UP (ref 3.5–5.3)
POTASSIUM SERPL-SCNC: 4.4 MMOL/L — SIGNIFICANT CHANGE UP (ref 3.5–5.3)
PROT PATTERN SERPL ELPH-IMP: SIGNIFICANT CHANGE UP
PROT SERPL-MCNC: 6.6 G/DL — SIGNIFICANT CHANGE UP (ref 6–8.3)
RBC # BLD: 3.04 M/UL — LOW (ref 4.2–5.8)
RBC # FLD: 21.2 % — HIGH (ref 10.3–14.5)
SARS-COV-2 RNA SPEC QL NAA+PROBE: SIGNIFICANT CHANGE UP
SODIUM SERPL-SCNC: 134 MMOL/L — LOW (ref 135–145)
WBC # BLD: 10.93 K/UL — HIGH (ref 3.8–10.5)
WBC # FLD AUTO: 10.93 K/UL — HIGH (ref 3.8–10.5)

## 2021-03-19 RX ORDER — MIDODRINE HYDROCHLORIDE 2.5 MG/1
10 TABLET ORAL THREE TIMES A DAY
Refills: 0 | Status: DISCONTINUED | OUTPATIENT
Start: 2021-03-19 | End: 2021-03-23

## 2021-03-19 RX ORDER — LATANOPROST 0.05 MG/ML
1 SOLUTION/ DROPS OPHTHALMIC; TOPICAL AT BEDTIME
Refills: 0 | Status: DISCONTINUED | OUTPATIENT
Start: 2021-03-19 | End: 2021-03-19

## 2021-03-19 RX ORDER — BRIMONIDINE TARTRATE 2 MG/MG
1 SOLUTION/ DROPS OPHTHALMIC DAILY
Refills: 0 | Status: DISCONTINUED | OUTPATIENT
Start: 2021-03-19 | End: 2021-03-19

## 2021-03-19 RX ORDER — DORZOLAMIDE HYDROCHLORIDE TIMOLOL MALEATE 20; 5 MG/ML; MG/ML
1 SOLUTION/ DROPS OPHTHALMIC EVERY 12 HOURS
Refills: 0 | Status: DISCONTINUED | OUTPATIENT
Start: 2021-03-19 | End: 2021-03-19

## 2021-03-19 RX ADMIN — GABAPENTIN 100 MILLIGRAM(S): 400 CAPSULE ORAL at 22:01

## 2021-03-19 RX ADMIN — Medication 25 MICROGRAM(S): at 05:05

## 2021-03-19 RX ADMIN — MIDODRINE HYDROCHLORIDE 5 MILLIGRAM(S): 2.5 TABLET ORAL at 11:51

## 2021-03-19 RX ADMIN — Medication 1: at 17:06

## 2021-03-19 RX ADMIN — HEPARIN SODIUM 5000 UNIT(S): 5000 INJECTION INTRAVENOUS; SUBCUTANEOUS at 05:05

## 2021-03-19 RX ADMIN — ATORVASTATIN CALCIUM 80 MILLIGRAM(S): 80 TABLET, FILM COATED ORAL at 22:01

## 2021-03-19 RX ADMIN — HEPARIN SODIUM 5000 UNIT(S): 5000 INJECTION INTRAVENOUS; SUBCUTANEOUS at 18:10

## 2021-03-19 RX ADMIN — PANTOPRAZOLE SODIUM 40 MILLIGRAM(S): 20 TABLET, DELAYED RELEASE ORAL at 05:05

## 2021-03-19 RX ADMIN — MIDODRINE HYDROCHLORIDE 5 MILLIGRAM(S): 2.5 TABLET ORAL at 05:05

## 2021-03-19 RX ADMIN — HEPARIN SODIUM 5000 UNIT(S): 5000 INJECTION INTRAVENOUS; SUBCUTANEOUS at 22:01

## 2021-03-19 RX ADMIN — POLYETHYLENE GLYCOL 3350 17 GRAM(S): 17 POWDER, FOR SOLUTION ORAL at 11:51

## 2021-03-19 RX ADMIN — MIDODRINE HYDROCHLORIDE 5 MILLIGRAM(S): 2.5 TABLET ORAL at 18:14

## 2021-03-19 RX ADMIN — Medication 0: at 22:01

## 2021-03-19 RX ADMIN — CHLORHEXIDINE GLUCONATE 1 APPLICATION(S): 213 SOLUTION TOPICAL at 05:05

## 2021-03-19 RX ADMIN — PRASUGREL 10 MILLIGRAM(S): 5 TABLET, FILM COATED ORAL at 11:51

## 2021-03-19 RX ADMIN — MIDODRINE HYDROCHLORIDE 10 MILLIGRAM(S): 2.5 TABLET ORAL at 22:02

## 2021-03-19 RX ADMIN — Medication 2: at 12:35

## 2021-03-19 RX ADMIN — Medication 81 MILLIGRAM(S): at 11:51

## 2021-03-19 NOTE — PROGRESS NOTE ADULT - ASSESSMENT
81 yo male w h/o DM, CAD, s/p CABG, CKD 3-4.   Recently evaluated by Nephrology for elevated creatinine. Bloodwork  returned notable for  and Na 126; in light of these findings, Dr. Luz (Renal) sent the patient to the ER. Lately ptn has been having abdominal pain with elevated amylase and lipase.   As per ptn's wife: on and off R abd pain w generalized weakness and poor po intake. PCP DCed po Lasix in past few days 2/2 abnormal blood test results.  Ptn denies recent NSAID use.  He was diagnosed with diabetes 18 years ago, and denies retinopathy (last saw Optho around 2 years ago). Cardiologist has alluded to the patient having mild renal impairment in the past, as per wife.    Patient's PCP is Dr. Shyann De León; GI Dr. Wilman Villagran; Cardiology Dr. Boyce.  (16 Feb 2021 21:05)    ER vs: T 98.1, P 85, /64. WBC 8.2.  Bun/Cr - 100/2.0.  LFTs mildly elevated, Roxana 217, Lip 185.  Pct 0.2.  UA (-).  Ct c/a/p shows pulmonary edema, sm B/L pleural effusions, sm volume ascites.  VQ scan with very low probability for PE.      Pt started on zosyn to cover for intra-abd source.  ID consulted.     r/o Gallstone pancreatitis:    - Pt p/w abdominal pain, epigastric and RUQ pain, noted to have elevated LFTs and amylase/lipase.  Possible gallstone pancreatitis?    - U/S abdomen shows cholelithiasis without acute cholecystitis.  Borderline to mild gallbladder thickening is likely due to ascites.   Moderate ascites noted.  Suggestion of mild renal parenchymal disease and bilateral pleural effusions.    - CTap w/o acute intra-abd pathology    - monitor LFTs, Roxana, Lipase.  As per GI, ascites likely secondary to CHF, no evidence for pancreatitis.     - Monitor temp curve and WBC.      - bcx neg. completed  coverage with zosyn x 7 days, 2/23.  Abd pain resolved      {[id number]}Renal failure:    - Renal following for CKD.  Dose abx for GFR 15-20.  On q12 hr dosing of zosyn.     - Cr rising.  CALVIN on CKD does not appear to be antibiotic mediated, renal f/u noted.      - Pt requiring HD,  shiley catheter placed 2/26, s/p conversion to tunnelled catheter - 3/4    Aortic stenosis:    - Followed by Structural health team for TAVR evaluation    Covid (+):    - Repeat Covid pcr on 2/25 tested positive.   On 1L nc, satting >94%    - No indication for Covid therapeutics at this time.  Cont supportive care, incentive spirometery.  Monitor pulse ox    - Repeat covid test remains positive on 3/15 - likely secondary to nonviable virus.  Pt > 10 days since positive test, pt afebrile.  On < 4L nc.  Off airborne precautions.     - Repeat covid test 3/19 (-).      * No acute ID issues at this time        Rupali Schwarz  [phone]

## 2021-03-19 NOTE — PROGRESS NOTE ADULT - SUBJECTIVE AND OBJECTIVE BOX
Subjective: Patient seen and examined. No new events except as noted.     REVIEW OF SYSTEMS:    CONSTITUTIONAL: + weakness, fevers or chills  EYES/ENT: No visual changes;  No vertigo or throat pain   NECK: No pain or stiffness  RESPIRATORY: No cough, wheezing, hemoptysis; No shortness of breath  CARDIOVASCULAR: No chest pain or palpitations  GASTROINTESTINAL: No abdominal or epigastric pain. No nausea, vomiting, or hematemesis; No diarrhea or constipation. No melena or hematochezia.  GENITOURINARY: No dysuria, frequency or hematuria  NEUROLOGICAL: No numbness or weakness  SKIN: No itching, burning, rashes, or lesions   All other review of systems is negative unless indicated above.    MEDICATIONS:  MEDICATIONS  (STANDING):  aspirin  chewable 81 milliGRAM(s) Oral daily  atorvastatin 80 milliGRAM(s) Oral at bedtime  chlorhexidine 4% Liquid 1 Application(s) Topical <User Schedule>  dextrose 40% Gel 15 Gram(s) Oral once  dextrose 5%. 1000 milliLiter(s) (50 mL/Hr) IV Continuous <Continuous>  dextrose 5%. 1000 milliLiter(s) (100 mL/Hr) IV Continuous <Continuous>  dextrose 50% Injectable 25 Gram(s) IV Push once  dextrose 50% Injectable 12.5 Gram(s) IV Push once  dextrose 50% Injectable 25 Gram(s) IV Push once  epoetin rafiq-epbx (RETACRIT) Injectable 72101 Unit(s) IV Push <User Schedule>  gabapentin 100 milliGRAM(s) Oral at bedtime  glucagon  Injectable 1 milliGRAM(s) IntraMuscular once  heparin   Injectable 5000 Unit(s) SubCutaneous every 8 hours  influenza   Vaccine 0.5 milliLiter(s) IntraMuscular once  insulin lispro (ADMELOG) corrective regimen sliding scale   SubCutaneous three times a day before meals  insulin lispro (ADMELOG) corrective regimen sliding scale   SubCutaneous at bedtime  levothyroxine 25 MICROGram(s) Oral daily  metoprolol tartrate 25 milliGRAM(s) Oral every 12 hours  midodrine. 5 milliGRAM(s) Oral three times a day  midodrine. 10 milliGRAM(s) Oral <User Schedule>  pantoprazole    Tablet 40 milliGRAM(s) Oral before breakfast  polyethylene glycol 3350 17 Gram(s) Oral daily  prasugrel 10 milliGRAM(s) Oral daily  senna 2 Tablet(s) Oral at bedtime      PHYSICAL EXAM:  T(C): 36.8 (03-19-21 @ 04:14), Max: 36.8 (03-19-21 @ 04:14)  HR: 67 (03-19-21 @ 05:46) (62 - 70)  BP: 93/51 (03-19-21 @ 05:46) (84/44 - 95/45)  RR: 18 (03-19-21 @ 04:14) (18 - 18)  SpO2: 100% (03-19-21 @ 04:14) (97% - 100%)  Wt(kg): --  I&O's Summary    18 Mar 2021 07:01  -  19 Mar 2021 07:00  --------------------------------------------------------  IN: 240 mL / OUT: 500 mL / NET: -260 mL        Appearance: NAD  HEENT:   Dry oral mucosa, PERRL, EOMI	  Lymphatic: No lymphadenopathy , Tunneled catheter   Cardiovascular: Irregular S1 S2, No JVD, + murmurs , Peripheral pulses palpable 2+ bilaterally  Respiratory: Lungs clear to auscultation, normal effort 	  Gastrointestinal:  Soft, Non-tender, + BS	  Skin: No rashes, No ecchymoses, No cyanosis, warm to touch  Musculoskeletal: Normal range of motion, normal strength  Psychiatry:  Mood & affect appropriate  Ext: No edema  ecchymosis/ small hematoma noted to inner L UE        LABS:    CARDIAC MARKERS:                                9.3    10.93 )-----------( 123      ( 19 Mar 2021 05:38 )             30.4     03-19    134<L>  |  96  |  42<H>  ----------------------------<  153<H>  4.4   |  25  |  3.42<H>    Ca    9.2      19 Mar 2021 05:37    TPro  6.6  /  Alb  2.5<L>  /  TBili  1.1  /  DBili  x   /  AST  68<H>  /  ALT  81<H>  /  AlkPhos  195<H>  03-19    proBNP:   Lipid Profile:   HgA1c:   TSH:             TELEMETRY: 	    ECG:  	  RADIOLOGY:   DIAGNOSTIC TESTING:  [ ] Echocardiogram:  [ ]  Catheterization:  [ ] Stress Test:    OTHER:

## 2021-03-19 NOTE — PROGRESS NOTE ADULT - SUBJECTIVE AND OBJECTIVE BOX
      Overnight events noted      VITAL:  T(C): , Max: 36.8 (03-19-21 @ 04:14)  T(F): , Max: 98.2 (03-19-21 @ 04:14)  HR: 70 (03-19-21 @ 10:49)  BP: 91/52 (03-19-21 @ 10:49)  BP(mean): --  RR: 18 (03-19-21 @ 10:49)  SpO2: 98% (03-19-21 @ 10:49)      PHYSICAL EXAM:  Constitutional: NAD on room air, alert  HEENT: NCAT, DMM  Neck: Supple, no JVD  Respiratory: CTA-b/l  Cardiovascular: RRR s1s2, no m/r/g  Gastrointestinal: BS+, soft, NT; (+)mild distension  Extremities:  no edema b/l  Neurological: no focal deficits; strength grossly intact  Back: no CVAT b/l  Skin: No rashes, no nevi  Access: RIJ tunneled cath    LABS:                        9.3    10.93 )-----------( 123      ( 19 Mar 2021 05:38 )             30.4     Na(134)/K(4.4)/Cl(96)/HCO3(25)/BUN(42)/Cr(3.42)Glu(153)/Ca(9.2)/Mg(--)/PO4(--)    03-19 @ 05:37  Na(134)/K(5.0)/Cl(97)/HCO3(26)/BUN(60)/Cr(4.45)Glu(173)/Ca(9.7)/Mg(--)/PO4(--)    03-18 @ 05:36  Na(134)/K(4.3)/Cl(96)/HCO3(25)/BUN(45)/Cr(3.54)Glu(123)/Ca(9.4)/Mg(2.5)/PO4(4.0)    03-17 @ 05:51      IMPRESSION: 80M w/ DM2, CAD-CABG, and CKD, 2/16/21 a/w azotemia/lyte derangements/weakness; c/b newly ESRD; c/b COVID(+)    (1)Renal - newly ESRD - due for HD tomorrow     (2)CV - tenuous hemodynamics/hypervolemia - biventricular systolic dysfunction, severe AS, mod-sev AR. On Midodrine.     (3)Anemia - On Retacrit with HD    (4)Dispo - awaiting acceptance to outside HD facility       RECOMMEND:  (1)Next HD tomorrow; 0.5L UF as able; Midodrine 10mg PO x 1 prn intradialytic hypotension; Retacrit with HD  (2)PT  (3)Setup of outpatient HD per DAMON Garcia MD  [facility]  Office: [phone]  Cell: [phone]               No pain, no sob      VITAL:  T(C): , Max: 36.8 (03-19-21 @ 04:14)  T(F): , Max: 98.2 (03-19-21 @ 04:14)  HR: 70 (03-19-21 @ 10:49)  BP: 91/52 (03-19-21 @ 10:49)  BP(mean): --  RR: 18 (03-19-21 @ 10:49)  SpO2: 98% (03-19-21 @ 10:49)      PHYSICAL EXAM:  Constitutional: NAD on room air, alert  HEENT: NCAT, DMM  Neck: Supple, no JVD  Respiratory: CTA-b/l  Cardiovascular: RRR s1s2, no m/r/g  Gastrointestinal: BS+, soft, NT; (+)mild distension  Extremities:  no edema b/l  Neurological: no focal deficits; strength grossly intact  Back: no CVAT b/l  Skin: No rashes, no nevi  Access: RIJ tunneled cath    LABS:                        9.3    10.93 )-----------( 123      ( 19 Mar 2021 05:38 )             30.4     Na(134)/K(4.4)/Cl(96)/HCO3(25)/BUN(42)/Cr(3.42)Glu(153)/Ca(9.2)/Mg(--)/PO4(--)    03-19 @ 05:37  Na(134)/K(5.0)/Cl(97)/HCO3(26)/BUN(60)/Cr(4.45)Glu(173)/Ca(9.7)/Mg(--)/PO4(--)    03-18 @ 05:36  Na(134)/K(4.3)/Cl(96)/HCO3(25)/BUN(45)/Cr(3.54)Glu(123)/Ca(9.4)/Mg(2.5)/PO4(4.0)    03-17 @ 05:51      IMPRESSION: 80M w/ DM2, CAD-CABG, and CKD, 2/16/21 a/w azotemia/lyte derangements/weakness; c/b newly ESRD; c/b COVID(+)    (1)Renal - newly ESRD - due for HD tomorrow     (2)CV - tenuous hemodynamics/hypervolemia - biventricular systolic dysfunction, severe AS, mod-sev AR. On Midodrine.     (3)Anemia - On Retacrit with HD    (4)Dispo - awaiting acceptance to outside HD facility       RECOMMEND:  (1)Next HD tomorrow; 0.5L UF as able; Midodrine 10mg PO x 1 prn intradialytic hypotension; Retacrit with HD  (2)PT  (3)Setup of outpatient HD per DAMON Garcia MD  [facility]  Office: [phone]  Cell: [phone]

## 2021-03-19 NOTE — CHART NOTE - NSCHARTNOTEFT_GEN_A_CORE
Spoke with vascular Dr. David regarding THIERRY/PVR cancelled.   - No acute intervention needed at this time but pt should follow up as outpatient with Dr. Maxwell once recovered from COVID.    - Further test can be done as outpatient.     Marika Rajan NP-C  #[id number]

## 2021-03-19 NOTE — DISCHARGE NOTE PROVIDER - NSDCCPCAREPLAN_GEN_ALL_CORE_FT
PRINCIPAL DISCHARGE DIAGNOSIS  Diagnosis: Renal failure  Assessment and Plan of Treatment: Renal failure      SECONDARY DISCHARGE DIAGNOSES  Diagnosis: Arterial vascular disease  Assessment and Plan of Treatment: THIERRY/ PVR showed moderate left lower extremity arterial vascular disease  You were evaluated by vascular surgery  No acute intervention recommended   Please follow up with Vascular surgeon Dr. Maxwell as outpatient for further test and treatment    Diagnosis: Systolic heart failure  Assessment and Plan of Treatment:     Diagnosis: CAD (coronary artery disease)  Assessment and Plan of Treatment: CAD (coronary artery disease)    Diagnosis: Aortic valve stenosis, etiology of cardiac valve disease unspecified  Assessment and Plan of Treatment: Aortic valve stenosis, etiology of cardiac valve disease unspecified

## 2021-03-19 NOTE — PROGRESS NOTE ADULT - ASSESSMENT
81 yo male w h/o ICM< CAD< s/p CABG, has AICD, DM, HTN presents w volume overload and abd pain  Abd sono shows gallstones, but no sign of cholecystitis/pancreatitis. abd pain resolved on Zosyn,   s/ p treatment empirically for 7 days .  ID following    ascites 2/2 systolic chf, probably worse 2/2 acute on chronic systolic chf.  responded well to lasix. now on HD.   started HD 2/26. HD as per renal. changed from MWF to TTSa,  RIJ tunneled catheter placed on 3/4 in IR, no fistula placement for now  unable to be DCed home 2/2 safety at home. nobody to assume responsibility, wife is ill  awaiting transfer to [facility]    nosocomial Covid positive. remains positive, clinically stable.  ID following. in isolation. trend infl markers.   s/p monoclonal infusion.  on interrogation of ICD, has a lof of arrhythmia burden,   NSVT and sustained VT, seen by EPS, cont  Avaklxkrc27.5 mg  bid. has inoperable ischemic HD,   ICM, EF 15%, severe AS, seen by structural heart for TAVR , proceed with TAVR work up    on 3/13: stepson asked for a call, i called twice and wasnt able to leave messages bc mailbox is full. dr Garcia, renal spoke with him and confirmed home HD is not possible bc ptn is frail w unstable BPs. He also inquired about the second Covid vaccine( he had his first PTA) , ptn acquired the Covid virus during this hospitalization and remains on the Covid isolation floor. vaccine is not appropriate at this time  BP is on the low side, ptn is on midodrine 5 tid off dialysis and 10 tid on days of HD. will raise to 10 tid daily.   ptn is awaiting transfer to [facility], the only possible option is [facility] in [place], family is against it, they live in [place]. ptn c/o pain in his feet, pulses are good,    THIERRY reviewed vascular consulted, THIERRY w toe pressures requested initially by vascular, but at this point its being deterred and not necessary for any further inptn work up of PAD. ptn will need outptn F/U with vascular       dvt ppx w [facility]

## 2021-03-19 NOTE — DISCHARGE NOTE PROVIDER - NSDCMRMEDTOKEN_GEN_ALL_CORE_FT
aspirin 81 mg oral delayed release tablet: 1 tab(s) orally once a day  atorvastatin 80 mg oral tablet: 1 tab(s) orally once a day  Ayr Saline Mist 0.65% nasal spray: 2 spray(s) in each nostril , As Needed  ferrous sulfate 325 mg (65 mg elemental iron) oral tablet: 1 tab(s) orally 2 times a day  furosemide 40 mg oral tablet: 1 tab(s) orally once a day  note: on hold due to dehydration per pt spouse since 2/13/21  glimepiride 1 mg oral tablet: 0.5 tab(s) orally once a day (in the evening)  irbesartan 75 mg oral tablet: 1 tab(s) orally once a day (in the evening)  note: on hold due to low blood pressure per pt spouse since 2/15/21  metFORMIN 500 mg oral tablet: 1 tab(s) orally 2 times a day  note: on hold due to dehydration per pt spouse since 2/13/21  metoprolol succinate 25 mg oral tablet, extended release: 1 tab(s) orally once a day  Omega-3:   prasugrel 10 mg oral tablet: 1 tab(s) orally once a day

## 2021-03-19 NOTE — DISCHARGE NOTE PROVIDER - CARE PROVIDER_API CALL
Sonia Maxwell (MD)  Surgery  [address], [address] [place] [address]  Phone: [phone]  Fax: [phone]  Follow Up Time: 1 week

## 2021-03-19 NOTE — PROGRESS NOTE ADULT - SUBJECTIVE AND OBJECTIVE BOX
Infectious Diseases progress note:    Subjective:  NAD, afebrile.  Satting well on 1L nc    ROS:  CONSTITUTIONAL:  No fever, chills, rigors  CARDIOVASCULAR:  No chest pain or palpitations  RESPIRATORY:   No SOB, cough, dyspnea on exertion.  No wheezing  GASTROINTESTINAL:  No abd pain, N/V, diarrhea/constipation  EXTREMITIES:  No swelling or joint pain  GENITOURINARY:  No burning on urination, increased frequency or urgency.  No flank pain  NEUROLOGIC:  No HA, visual disturbances  SKIN: No rashes    Allergies    No Known Allergies    Intolerances        ANTIBIOTICS/RELEVANT:  antimicrobials    immunologic:  epoetin rafiq-epbx (RETACRIT) Injectable 19966 Unit(s) IV Push <User Schedule>  influenza   Vaccine 0.5 milliLiter(s) IntraMuscular once    OTHER:  aspirin  chewable 81 milliGRAM(s) Oral daily  atorvastatin 80 milliGRAM(s) Oral at bedtime  bisacodyl Suppository 10 milliGRAM(s) Rectal daily PRN  chlorhexidine 4% Liquid 1 Application(s) Topical <User Schedule>  dextrose 40% Gel 15 Gram(s) Oral once  dextrose 5%. 1000 milliLiter(s) IV Continuous <Continuous>  dextrose 5%. 1000 milliLiter(s) IV Continuous <Continuous>  dextrose 50% Injectable 25 Gram(s) IV Push once  dextrose 50% Injectable 12.5 Gram(s) IV Push once  dextrose 50% Injectable 25 Gram(s) IV Push once  gabapentin 100 milliGRAM(s) Oral at bedtime  glucagon  Injectable 1 milliGRAM(s) IntraMuscular once  heparin   Injectable 5000 Unit(s) SubCutaneous every 8 hours  insulin lispro (ADMELOG) corrective regimen sliding scale   SubCutaneous three times a day before meals  insulin lispro (ADMELOG) corrective regimen sliding scale   SubCutaneous at bedtime  levothyroxine 25 MICROGram(s) Oral daily  metoprolol tartrate 25 milliGRAM(s) Oral every 12 hours  midodrine. 10 milliGRAM(s) Oral <User Schedule>  midodrine. 10 milliGRAM(s) Oral three times a day  pantoprazole    Tablet 40 milliGRAM(s) Oral before breakfast  polyethylene glycol 3350 17 Gram(s) Oral daily  prasugrel 10 milliGRAM(s) Oral daily  senna 2 Tablet(s) Oral at bedtime  sodium chloride 0.9% lock flush 10 milliLiter(s) IV Push every 1 hour PRN      Objective:  Vital Signs Last 24 Hrs  T(C): 36.4 (19 Mar 2021 20:53), Max: 36.8 (19 Mar 2021 04:14)  T(F): 97.6 (19 Mar 2021 20:53), Max: 98.2 (19 Mar 2021 04:14)  HR: 76 (19 Mar 2021 20:53) (66 - 76)  BP: 96/56 (19 Mar 2021 20:53) (84/44 - 96/56)  BP(mean): --  RR: 18 (19 Mar 2021 20:53) (18 - 18)  SpO2: 100% (19 Mar 2021 20:53) (98% - 100%)    PHYSICAL EXAM:    Constitutional:NAD  Eyes:CHERI, EOMI  Ear/Nose/Throat: no thrush, mucositis.  Moist mucous membranes	  Neck:no JVD, no lymphadenopathy, supple  Respiratory: CTA nadiya  Cardiovascular: S1S2 RRR, no murmurs  Gastrointestinal:soft, nontender,  nondistended (+) BS  Extremities:no e/e/c  Skin:  no rashes, open wounds or ulcerations        LABS:                        9.3    10.93 )-----------( 123      ( 19 Mar 2021 05:38 )             30.4     03-19    134<L>  |  96  |  42<H>  ----------------------------<  153<H>  4.4   |  25  |  3.42<H>    Ca    9.2      19 Mar 2021 05:37    TPro  6.6  /  Alb  2.5<L>  /  TBili  1.1  /  DBili  x   /  AST  68<H>  /  ALT  81<H>  /  AlkPhos  195<H>  03-19          MICROBIOLOGY:      Culture - Urine (02.17.21 @ 09:06)   Specimen Source: .Urine Clean Catch (Midstream)   Culture Results:   No growth     Culture - Blood (02.17.21 @ 05:49)   Specimen Source: .Blood Blood-Peripheral   Culture Results:   No Growth Final     RADIOLOGY & ADDITIONAL STUDIES:    < from: [facility] Duplex Lower Ext Vein Scan, Bilat (03.18.21 @ 12:14) >  IMPRESSION:    No evidence of deep venous thrombosis in either proximal lower extremity.    Low velocity is noted at the mid right femoral artery at 14.9 cm/s.    < end of copied text >      < from: [facility] Physiol Extremity Lower 3+ Level, BI (03.16.21 @ 18:12) >    EXAM:  PHYSIOL EXTREM LOW 3+ LEV BI                            PROCEDURE DATE:  03/16/2021            INTERPRETATION:  History: COVID positive, diminished left dorsal pedis pulse.    The right ankle-brachial index equals 0.97; the left ankle-brachial index equals 0.67.    The blood pressure measurements at the right ankle are 107 mmHg in the posterior tibial artery and 55 mmHg in the dorsal pedis artery.    The blood pressure measurements at the left ankle are 74 mmHg in the posterior tibial artery and 69 mmHg in the dorsal pedis artery.    The amplitude of the pulse volume recordings at the right ankle are reduced.    The amplitude of the pulse volume recordings at the left ankle are reduced, more than on the right.    IMPRESSION: There ismoderate left lower extremity arterial vascular disease.    < end of copied text >

## 2021-03-19 NOTE — PROGRESS NOTE ADULT - SUBJECTIVE AND OBJECTIVE BOX
Patient is a 80y old  Male who presents with a chief complaint of acute renal failure (19 Mar 2021 18:20)      SUBJECTIVE / OVERNIGHT EVENTS: BP is low, vascular wont plan any interventions until ptn is covid negative. no urgent need for any intervention    MEDICATIONS  (STANDING):  aspirin  chewable 81 milliGRAM(s) Oral daily  atorvastatin 80 milliGRAM(s) Oral at bedtime  chlorhexidine 4% Liquid 1 Application(s) Topical <User Schedule>  dextrose 40% Gel 15 Gram(s) Oral once  dextrose 5%. 1000 milliLiter(s) (50 mL/Hr) IV Continuous <Continuous>  dextrose 5%. 1000 milliLiter(s) (100 mL/Hr) IV Continuous <Continuous>  dextrose 50% Injectable 25 Gram(s) IV Push once  dextrose 50% Injectable 12.5 Gram(s) IV Push once  dextrose 50% Injectable 25 Gram(s) IV Push once  epoetin rafiq-epbx (RETACRIT) Injectable 93507 Unit(s) IV Push <User Schedule>  gabapentin 100 milliGRAM(s) Oral at bedtime  glucagon  Injectable 1 milliGRAM(s) IntraMuscular once  heparin   Injectable 5000 Unit(s) SubCutaneous every 8 hours  influenza   Vaccine 0.5 milliLiter(s) IntraMuscular once  insulin lispro (ADMELOG) corrective regimen sliding scale   SubCutaneous three times a day before meals  insulin lispro (ADMELOG) corrective regimen sliding scale   SubCutaneous at bedtime  levothyroxine 25 MICROGram(s) Oral daily  metoprolol tartrate 25 milliGRAM(s) Oral every 12 hours  midodrine. 10 milliGRAM(s) Oral <User Schedule>  midodrine. 10 milliGRAM(s) Oral three times a day  pantoprazole    Tablet 40 milliGRAM(s) Oral before breakfast  polyethylene glycol 3350 17 Gram(s) Oral daily  prasugrel 10 milliGRAM(s) Oral daily  senna 2 Tablet(s) Oral at bedtime    MEDICATIONS  (PRN):  bisacodyl Suppository 10 milliGRAM(s) Rectal daily PRN Constipation  sodium chloride 0.9% lock flush 10 milliLiter(s) IV Push every 1 hour PRN Pre/post blood products, medications, blood draw, and to maintain line patency      Vital Signs Last 24 Hrs  T(F): 97.9 (03-19-21 @ 16:53), Max: 98.2 (03-19-21 @ 04:14)  HR: 74 (03-19-21 @ 16:53) (66 - 74)  BP: 91/47 (03-19-21 @ 16:53) (84/44 - 94/45)  RR: 18 (03-19-21 @ 16:53) (18 - 18)  SpO2: 100% (03-19-21 @ 16:53) (97% - 100%)  Telemetry:   CAPILLARY BLOOD GLUCOSE      POCT Blood Glucose.: 159 mg/dL (19 Mar 2021 16:43)  POCT Blood Glucose.: 202 mg/dL (19 Mar 2021 11:58)  POCT Blood Glucose.: 116 mg/dL (19 Mar 2021 07:28)  POCT Blood Glucose.: 163 mg/dL (18 Mar 2021 21:04)    I&O's Summary    18 Mar 2021 07:01  -  19 Mar 2021 07:00  --------------------------------------------------------  IN: 240 mL / OUT: 500 mL / NET: -260 mL        PHYSICAL EXAM:  GENERAL: NAD, well-developed  HEAD:  Atraumatic, Normocephalic  EYES: EOMI, PERRLA, conjunctiva and sclera clear  NECK: Supple, No JVD  CHEST/LUNG: Clear to auscultation bilaterally; No wheeze  HEART: Regular rate and rhythm; No murmurs, rubs, or gallops  ABDOMEN: Soft, Nontender, Nondistended; Bowel sounds present  EXTREMITIES:  2+ Peripheral Pulses, No clubbing, cyanosis, or edema  PSYCH: AAOx3  NEUROLOGY: non-focal  SKIN: No rashes or lesions    LABS:                        9.3    10.93 )-----------( 123      ( 19 Mar 2021 05:38 )             30.4     03-19    134<L>  |  96  |  42<H>  ----------------------------<  153<H>  4.4   |  25  |  3.42<H>    Ca    9.2      19 Mar 2021 05:37    TPro  6.6  /  Alb  2.5<L>  /  TBili  1.1  /  DBili  x   /  AST  68<H>  /  ALT  81<H>  /  AlkPhos  195<H>  03-19              RADIOLOGY & ADDITIONAL TESTS:    Imaging Personally Reviewed:    Consultant(s) Notes Reviewed:      Care Discussed with Consultants/Other Providers:

## 2021-03-20 LAB
ALBUMIN SERPL ELPH-MCNC: 2.6 G/DL — LOW (ref 3.3–5)
ALP SERPL-CCNC: 206 U/L — HIGH (ref 40–120)
ALT FLD-CCNC: 87 U/L — HIGH (ref 10–45)
ANION GAP SERPL CALC-SCNC: 13 MMOL/L — SIGNIFICANT CHANGE UP (ref 5–17)
AST SERPL-CCNC: 71 U/L — HIGH (ref 10–40)
BILIRUB SERPL-MCNC: 1 MG/DL — SIGNIFICANT CHANGE UP (ref 0.2–1.2)
BUN SERPL-MCNC: 58 MG/DL — HIGH (ref 7–23)
CALCIUM SERPL-MCNC: 9.7 MG/DL — SIGNIFICANT CHANGE UP (ref 8.4–10.5)
CHLORIDE SERPL-SCNC: 99 MMOL/L — SIGNIFICANT CHANGE UP (ref 96–108)
CO2 SERPL-SCNC: 25 MMOL/L — SIGNIFICANT CHANGE UP (ref 22–31)
CREAT SERPL-MCNC: 4.33 MG/DL — HIGH (ref 0.5–1.3)
GLUCOSE BLDC GLUCOMTR-MCNC: 149 MG/DL — HIGH (ref 70–99)
GLUCOSE BLDC GLUCOMTR-MCNC: 179 MG/DL — HIGH (ref 70–99)
GLUCOSE BLDC GLUCOMTR-MCNC: 182 MG/DL — HIGH (ref 70–99)
GLUCOSE BLDC GLUCOMTR-MCNC: 195 MG/DL — HIGH (ref 70–99)
GLUCOSE SERPL-MCNC: 167 MG/DL — HIGH (ref 70–99)
HCT VFR BLD CALC: 32.4 % — LOW (ref 39–50)
HGB BLD-MCNC: 9.6 G/DL — LOW (ref 13–17)
MCHC RBC-ENTMCNC: 29.6 GM/DL — LOW (ref 32–36)
MCHC RBC-ENTMCNC: 29.8 PG — SIGNIFICANT CHANGE UP (ref 27–34)
MCV RBC AUTO: 100.6 FL — HIGH (ref 80–100)
NRBC # BLD: 0 /100 WBCS — SIGNIFICANT CHANGE UP (ref 0–0)
PLATELET # BLD AUTO: 150 K/UL — SIGNIFICANT CHANGE UP (ref 150–400)
POTASSIUM SERPL-MCNC: 4.9 MMOL/L — SIGNIFICANT CHANGE UP (ref 3.5–5.3)
POTASSIUM SERPL-SCNC: 4.9 MMOL/L — SIGNIFICANT CHANGE UP (ref 3.5–5.3)
PROT SERPL-MCNC: 6.8 G/DL — SIGNIFICANT CHANGE UP (ref 6–8.3)
RBC # BLD: 3.22 M/UL — LOW (ref 4.2–5.8)
RBC # FLD: 20.8 % — HIGH (ref 10.3–14.5)
SODIUM SERPL-SCNC: 137 MMOL/L — SIGNIFICANT CHANGE UP (ref 135–145)
WBC # BLD: 9.19 K/UL — SIGNIFICANT CHANGE UP (ref 3.8–10.5)
WBC # FLD AUTO: 9.19 K/UL — SIGNIFICANT CHANGE UP (ref 3.8–10.5)

## 2021-03-20 RX ORDER — METOPROLOL TARTRATE 50 MG
12.5 TABLET ORAL ONCE
Refills: 0 | Status: COMPLETED | OUTPATIENT
Start: 2021-03-20 | End: 2021-03-20

## 2021-03-20 RX ADMIN — Medication 81 MILLIGRAM(S): at 12:23

## 2021-03-20 RX ADMIN — MIDODRINE HYDROCHLORIDE 10 MILLIGRAM(S): 2.5 TABLET ORAL at 16:46

## 2021-03-20 RX ADMIN — Medication 0: at 21:12

## 2021-03-20 RX ADMIN — ATORVASTATIN CALCIUM 80 MILLIGRAM(S): 80 TABLET, FILM COATED ORAL at 21:38

## 2021-03-20 RX ADMIN — MIDODRINE HYDROCHLORIDE 10 MILLIGRAM(S): 2.5 TABLET ORAL at 05:18

## 2021-03-20 RX ADMIN — PANTOPRAZOLE SODIUM 40 MILLIGRAM(S): 20 TABLET, DELAYED RELEASE ORAL at 05:18

## 2021-03-20 RX ADMIN — GABAPENTIN 100 MILLIGRAM(S): 400 CAPSULE ORAL at 21:39

## 2021-03-20 RX ADMIN — ERYTHROPOIETIN 10000 UNIT(S): 10000 INJECTION, SOLUTION INTRAVENOUS; SUBCUTANEOUS at 16:48

## 2021-03-20 RX ADMIN — MIDODRINE HYDROCHLORIDE 10 MILLIGRAM(S): 2.5 TABLET ORAL at 16:10

## 2021-03-20 RX ADMIN — HEPARIN SODIUM 5000 UNIT(S): 5000 INJECTION INTRAVENOUS; SUBCUTANEOUS at 21:39

## 2021-03-20 RX ADMIN — Medication 12.5 MILLIGRAM(S): at 23:30

## 2021-03-20 RX ADMIN — Medication 25 MICROGRAM(S): at 05:18

## 2021-03-20 RX ADMIN — Medication 1: at 18:00

## 2021-03-20 RX ADMIN — Medication 1: at 12:29

## 2021-03-20 RX ADMIN — POLYETHYLENE GLYCOL 3350 17 GRAM(S): 17 POWDER, FOR SOLUTION ORAL at 12:23

## 2021-03-20 RX ADMIN — PRASUGREL 10 MILLIGRAM(S): 5 TABLET, FILM COATED ORAL at 12:23

## 2021-03-20 RX ADMIN — CHLORHEXIDINE GLUCONATE 1 APPLICATION(S): 213 SOLUTION TOPICAL at 05:44

## 2021-03-20 RX ADMIN — HEPARIN SODIUM 5000 UNIT(S): 5000 INJECTION INTRAVENOUS; SUBCUTANEOUS at 14:38

## 2021-03-20 RX ADMIN — MIDODRINE HYDROCHLORIDE 10 MILLIGRAM(S): 2.5 TABLET ORAL at 12:23

## 2021-03-20 RX ADMIN — HEPARIN SODIUM 5000 UNIT(S): 5000 INJECTION INTRAVENOUS; SUBCUTANEOUS at 05:18

## 2021-03-20 NOTE — PROGRESS NOTE ADULT - ASSESSMENT
81 yo male w h/o ICM< CAD< s/p CABG, has AICD, DM, HTN presents w volume overload and abd pain  Abd sono shows gallstones, but no sign of cholecystitis/pancreatitis. abd pain resolved on Zosyn,   s/ p treatment empirically for 7 days .  ID following    ascites 2/2 systolic chf, probably worse 2/2 acute on chronic systolic chf.  responded well to lasix. now on HD.   started HD 2/26. HD as per renal. changed from MWF to TTSa,  RIJ tunneled catheter placed on 3/4 in IR, no fistula placement for now  unable to be DCed home 2/2 safety at home. nobody to assume responsibility, wife is ill  awaiting transfer to [facility]    nosocomial Covid positive. remains positive, clinically stable.  ID following. in isolation. trend infl markers.   s/p monoclonal infusion.  on interrogation of ICD, has a lof of arrhythmia burden,   NSVT and sustained VT, seen by EPS, cont  Pfexbjvbd33.5 mg  bid. has inoperable ischemic HD,   ICM, EF 15%, severe AS, seen by structural heart for TAVR , proceed with TAVR work up    on 3/13: stepson asked for a call, i called twice and wasnt able to leave messages bc mailbox is full. dr Garcia, renal spoke with him and confirmed home HD is not possible bc ptn is frail w unstable BPs. He also inquired about the second Covid vaccine( he had his first PTA) , ptn acquired the Covid virus during this hospitalization and remains on the Covid isolation floor. vaccine is not appropriate at this time  BP is on the low side, ptn is on midodrine 5 tid off dialysis and 10 tid on days of HD. will raise to 10 tid daily.   ptn is awaiting transfer to [facility], the only possible option is [facility] in [place], family is against it, they live in [place]. ptn c/o pain in his feet, pulses are good,    THIERRY reviewed vascular consulted, THIERRY w toe pressures requested initially by vascular, but at this point its being deterred and not necessary for any further inptn work up of PAD. ptn will need outptn F/U with vascular       dvt ppx w [facility]

## 2021-03-20 NOTE — PROGRESS NOTE ADULT - SUBJECTIVE AND OBJECTIVE BOX
Subjective: Patient seen and examined. No new events except as noted.     REVIEW OF SYSTEMS:    CONSTITUTIONAL: + weakness, fevers or chills  EYES/ENT: No visual changes;  No vertigo or throat pain   NECK: No pain or stiffness  RESPIRATORY: No cough, wheezing, hemoptysis; No shortness of breath  CARDIOVASCULAR: No chest pain or palpitations  GASTROINTESTINAL: No abdominal or epigastric pain. No nausea, vomiting, or hematemesis; No diarrhea or constipation. No melena or hematochezia.  GENITOURINARY: No dysuria, frequency or hematuria  NEUROLOGICAL: No numbness or weakness  SKIN: No itching, burning, rashes, or lesions   All other review of systems is negative unless indicated above.    MEDICATIONS:  MEDICATIONS  (STANDING):  aspirin  chewable 81 milliGRAM(s) Oral daily  atorvastatin 80 milliGRAM(s) Oral at bedtime  chlorhexidine 4% Liquid 1 Application(s) Topical <User Schedule>  dextrose 40% Gel 15 Gram(s) Oral once  dextrose 5%. 1000 milliLiter(s) (50 mL/Hr) IV Continuous <Continuous>  dextrose 5%. 1000 milliLiter(s) (100 mL/Hr) IV Continuous <Continuous>  dextrose 50% Injectable 25 Gram(s) IV Push once  dextrose 50% Injectable 12.5 Gram(s) IV Push once  dextrose 50% Injectable 25 Gram(s) IV Push once  epoetin rafiq-epbx (RETACRIT) Injectable 44518 Unit(s) IV Push <User Schedule>  gabapentin 100 milliGRAM(s) Oral at bedtime  glucagon  Injectable 1 milliGRAM(s) IntraMuscular once  heparin   Injectable 5000 Unit(s) SubCutaneous every 8 hours  influenza   Vaccine 0.5 milliLiter(s) IntraMuscular once  insulin lispro (ADMELOG) corrective regimen sliding scale   SubCutaneous three times a day before meals  insulin lispro (ADMELOG) corrective regimen sliding scale   SubCutaneous at bedtime  levothyroxine 25 MICROGram(s) Oral daily  metoprolol tartrate 25 milliGRAM(s) Oral every 12 hours  midodrine. 10 milliGRAM(s) Oral three times a day  midodrine. 10 milliGRAM(s) Oral <User Schedule>  pantoprazole    Tablet 40 milliGRAM(s) Oral before breakfast  polyethylene glycol 3350 17 Gram(s) Oral daily  prasugrel 10 milliGRAM(s) Oral daily  senna 2 Tablet(s) Oral at bedtime      PHYSICAL EXAM:  T(C): 36.6 (03-20-21 @ 21:14), Max: 36.7 (03-20-21 @ 04:36)  HR: 90 (03-20-21 @ 21:40) (69 - 90)  BP: 92/55 (03-20-21 @ 21:40) (90/58 - 101/46)  RR: 18 (03-20-21 @ 21:14) (18 - 18)  SpO2: 96% (03-20-21 @ 21:14) (96% - 99%)  Wt(kg): --  I&O's Summary    20 Mar 2021 07:01  -  20 Mar 2021 22:57  --------------------------------------------------------  IN: 800 mL / OUT: 1300 mL / NET: -500 mL          Appearance: NAD  HEENT:   Dry oral mucosa, PERRL, EOMI	  Lymphatic: No lymphadenopathy , Tunneled catheter   Cardiovascular: Irregular S1 S2, No JVD, + murmurs , Peripheral pulses palpable 2+ bilaterally  Respiratory: Lungs clear to auscultation, normal effort 	  Gastrointestinal:  Soft, Non-tender, + BS	  Skin: No rashes, No ecchymoses, No cyanosis, warm to touch  Musculoskeletal: Normal range of motion, normal strength  Psychiatry:  Mood & affect appropriate  Ext: No edema  ecchymosis/ small hematoma noted to inner L UE          LABS:    CARDIAC MARKERS:                                9.6    9.19  )-----------( 150      ( 20 Mar 2021 05:41 )             32.4     03-20    137  |  99  |  58<H>  ----------------------------<  167<H>  4.9   |  25  |  4.33<H>    Ca    9.7      20 Mar 2021 05:41    TPro  6.8  /  Alb  2.6<L>  /  TBili  1.0  /  DBili  x   /  AST  71<H>  /  ALT  87<H>  /  AlkPhos  206<H>  03-20    proBNP:   Lipid Profile:   HgA1c:   TSH:             TELEMETRY: V paced 	    ECG:  	  RADIOLOGY:   DIAGNOSTIC TESTING:  [ ] Echocardiogram:  [ ]  Catheterization:  [ ] Stress Test:    OTHER:

## 2021-03-20 NOTE — PROGRESS NOTE ADULT - SUBJECTIVE AND OBJECTIVE BOX
Patient is a 80y old  Male who presents with a chief complaint of acute renal failure (20 Mar 2021 22:57)      SUBJECTIVE / OVERNIGHT EVENTS: no new c/o    MEDICATIONS  (STANDING):  aspirin  chewable 81 milliGRAM(s) Oral daily  atorvastatin 80 milliGRAM(s) Oral at bedtime  chlorhexidine 4% Liquid 1 Application(s) Topical <User Schedule>  dextrose 40% Gel 15 Gram(s) Oral once  dextrose 5%. 1000 milliLiter(s) (50 mL/Hr) IV Continuous <Continuous>  dextrose 5%. 1000 milliLiter(s) (100 mL/Hr) IV Continuous <Continuous>  dextrose 50% Injectable 25 Gram(s) IV Push once  dextrose 50% Injectable 12.5 Gram(s) IV Push once  dextrose 50% Injectable 25 Gram(s) IV Push once  epoetin rafiq-epbx (RETACRIT) Injectable 58601 Unit(s) IV Push <User Schedule>  gabapentin 100 milliGRAM(s) Oral at bedtime  glucagon  Injectable 1 milliGRAM(s) IntraMuscular once  heparin   Injectable 5000 Unit(s) SubCutaneous every 8 hours  influenza   Vaccine 0.5 milliLiter(s) IntraMuscular once  insulin lispro (ADMELOG) corrective regimen sliding scale   SubCutaneous three times a day before meals  insulin lispro (ADMELOG) corrective regimen sliding scale   SubCutaneous at bedtime  levothyroxine 25 MICROGram(s) Oral daily  metoprolol tartrate 25 milliGRAM(s) Oral every 12 hours  midodrine. 10 milliGRAM(s) Oral three times a day  midodrine. 10 milliGRAM(s) Oral <User Schedule>  pantoprazole    Tablet 40 milliGRAM(s) Oral before breakfast  polyethylene glycol 3350 17 Gram(s) Oral daily  prasugrel 10 milliGRAM(s) Oral daily  senna 2 Tablet(s) Oral at bedtime    MEDICATIONS  (PRN):  bisacodyl Suppository 10 milliGRAM(s) Rectal daily PRN Constipation  sodium chloride 0.9% lock flush 10 milliLiter(s) IV Push every 1 hour PRN Pre/post blood products, medications, blood draw, and to maintain line patency      Vital Signs Last 24 Hrs  T(F): 97.8 (03-20-21 @ 21:14), Max: 98.1 (03-20-21 @ 04:36)  HR: 90 (03-20-21 @ 21:40) (69 - 90)  BP: 92/55 (03-20-21 @ 21:40) (90/58 - 101/46)  RR: 18 (03-20-21 @ 21:14) (18 - 18)  SpO2: 96% (03-20-21 @ 21:14) (96% - 99%)  Telemetry:   CAPILLARY BLOOD GLUCOSE      POCT Blood Glucose.: 195 mg/dL (20 Mar 2021 21:03)  POCT Blood Glucose.: 179 mg/dL (20 Mar 2021 17:59)  POCT Blood Glucose.: 182 mg/dL (20 Mar 2021 12:24)  POCT Blood Glucose.: 149 mg/dL (20 Mar 2021 07:58)    I&O's Summary    20 Mar 2021 07:01  -  20 Mar 2021 23:00  --------------------------------------------------------  IN: 800 mL / OUT: 1300 mL / NET: -500 mL        PHYSICAL EXAM:  GENERAL: NAD, well-developed  HEAD:  Atraumatic, Normocephalic  EYES: EOMI, PERRLA, conjunctiva and sclera clear  NECK: Supple, No JVD  CHEST/LUNG: Clear to auscultation bilaterally; No wheeze  HEART: Regular rate and rhythm; No murmurs, rubs, or gallops  ABDOMEN: Soft, Nontender, Nondistended; Bowel sounds present  EXTREMITIES:  2+ Peripheral Pulses, No clubbing, cyanosis, or edema  PSYCH: AAOx3  NEUROLOGY: non-focal  SKIN: No rashes or lesions    LABS:                        9.6    9.19  )-----------( 150      ( 20 Mar 2021 05:41 )             32.4     03-20    137  |  99  |  58<H>  ----------------------------<  167<H>  4.9   |  25  |  4.33<H>    Ca    9.7      20 Mar 2021 05:41    TPro  6.8  /  Alb  2.6<L>  /  TBili  1.0  /  DBili  x   /  AST  71<H>  /  ALT  87<H>  /  AlkPhos  206<H>  03-20              RADIOLOGY & ADDITIONAL TESTS:    Imaging Personally Reviewed:    Consultant(s) Notes Reviewed:      Care Discussed with Consultants/Other Providers:

## 2021-03-21 LAB
ALBUMIN SERPL ELPH-MCNC: 2.4 G/DL — LOW (ref 3.3–5)
ALP SERPL-CCNC: 205 U/L — HIGH (ref 40–120)
ALT FLD-CCNC: 91 U/L — HIGH (ref 10–45)
ANION GAP SERPL CALC-SCNC: 12 MMOL/L — SIGNIFICANT CHANGE UP (ref 5–17)
AST SERPL-CCNC: 93 U/L — HIGH (ref 10–40)
BILIRUB SERPL-MCNC: 1.1 MG/DL — SIGNIFICANT CHANGE UP (ref 0.2–1.2)
BUN SERPL-MCNC: 43 MG/DL — HIGH (ref 7–23)
CALCIUM SERPL-MCNC: 9 MG/DL — SIGNIFICANT CHANGE UP (ref 8.4–10.5)
CHLORIDE SERPL-SCNC: 99 MMOL/L — SIGNIFICANT CHANGE UP (ref 96–108)
CO2 SERPL-SCNC: 24 MMOL/L — SIGNIFICANT CHANGE UP (ref 22–31)
CREAT SERPL-MCNC: 3.38 MG/DL — HIGH (ref 0.5–1.3)
GLUCOSE BLDC GLUCOMTR-MCNC: 135 MG/DL — HIGH (ref 70–99)
GLUCOSE BLDC GLUCOMTR-MCNC: 152 MG/DL — HIGH (ref 70–99)
GLUCOSE BLDC GLUCOMTR-MCNC: 168 MG/DL — HIGH (ref 70–99)
GLUCOSE BLDC GLUCOMTR-MCNC: 293 MG/DL — HIGH (ref 70–99)
GLUCOSE SERPL-MCNC: 158 MG/DL — HIGH (ref 70–99)
HCT VFR BLD CALC: 32.4 % — LOW (ref 39–50)
HGB BLD-MCNC: 9.9 G/DL — LOW (ref 13–17)
MCHC RBC-ENTMCNC: 30.6 GM/DL — LOW (ref 32–36)
MCHC RBC-ENTMCNC: 30.7 PG — SIGNIFICANT CHANGE UP (ref 27–34)
MCV RBC AUTO: 100.3 FL — HIGH (ref 80–100)
NRBC # BLD: 0 /100 WBCS — SIGNIFICANT CHANGE UP (ref 0–0)
PLATELET # BLD AUTO: 173 K/UL — SIGNIFICANT CHANGE UP (ref 150–400)
POTASSIUM SERPL-MCNC: 5.6 MMOL/L — HIGH (ref 3.5–5.3)
POTASSIUM SERPL-SCNC: 5.6 MMOL/L — HIGH (ref 3.5–5.3)
PROT SERPL-MCNC: 6.8 G/DL — SIGNIFICANT CHANGE UP (ref 6–8.3)
RBC # BLD: 3.23 M/UL — LOW (ref 4.2–5.8)
RBC # FLD: 20.8 % — HIGH (ref 10.3–14.5)
SARS-COV-2 RNA SPEC QL NAA+PROBE: DETECTED
SODIUM SERPL-SCNC: 135 MMOL/L — SIGNIFICANT CHANGE UP (ref 135–145)
WBC # BLD: 11.38 K/UL — HIGH (ref 3.8–10.5)
WBC # FLD AUTO: 11.38 K/UL — HIGH (ref 3.8–10.5)

## 2021-03-21 RX ORDER — SODIUM ZIRCONIUM CYCLOSILICATE 10 G/10G
5 POWDER, FOR SUSPENSION ORAL ONCE
Refills: 0 | Status: COMPLETED | OUTPATIENT
Start: 2021-03-21 | End: 2021-03-21

## 2021-03-21 RX ADMIN — PRASUGREL 10 MILLIGRAM(S): 5 TABLET, FILM COATED ORAL at 11:32

## 2021-03-21 RX ADMIN — Medication 3: at 17:28

## 2021-03-21 RX ADMIN — Medication 81 MILLIGRAM(S): at 11:32

## 2021-03-21 RX ADMIN — MIDODRINE HYDROCHLORIDE 10 MILLIGRAM(S): 2.5 TABLET ORAL at 05:41

## 2021-03-21 RX ADMIN — PANTOPRAZOLE SODIUM 40 MILLIGRAM(S): 20 TABLET, DELAYED RELEASE ORAL at 05:41

## 2021-03-21 RX ADMIN — SODIUM ZIRCONIUM CYCLOSILICATE 5 GRAM(S): 10 POWDER, FOR SUSPENSION ORAL at 13:52

## 2021-03-21 RX ADMIN — HEPARIN SODIUM 5000 UNIT(S): 5000 INJECTION INTRAVENOUS; SUBCUTANEOUS at 13:53

## 2021-03-21 RX ADMIN — MIDODRINE HYDROCHLORIDE 10 MILLIGRAM(S): 2.5 TABLET ORAL at 17:27

## 2021-03-21 RX ADMIN — Medication 25 MILLIGRAM(S): at 18:35

## 2021-03-21 RX ADMIN — GABAPENTIN 100 MILLIGRAM(S): 400 CAPSULE ORAL at 20:43

## 2021-03-21 RX ADMIN — SENNA PLUS 2 TABLET(S): 8.6 TABLET ORAL at 20:43

## 2021-03-21 RX ADMIN — Medication 1: at 11:31

## 2021-03-21 RX ADMIN — HEPARIN SODIUM 5000 UNIT(S): 5000 INJECTION INTRAVENOUS; SUBCUTANEOUS at 05:40

## 2021-03-21 RX ADMIN — Medication 1: at 08:31

## 2021-03-21 RX ADMIN — MIDODRINE HYDROCHLORIDE 10 MILLIGRAM(S): 2.5 TABLET ORAL at 11:33

## 2021-03-21 RX ADMIN — ATORVASTATIN CALCIUM 80 MILLIGRAM(S): 80 TABLET, FILM COATED ORAL at 20:43

## 2021-03-21 RX ADMIN — POLYETHYLENE GLYCOL 3350 17 GRAM(S): 17 POWDER, FOR SOLUTION ORAL at 11:32

## 2021-03-21 RX ADMIN — HEPARIN SODIUM 5000 UNIT(S): 5000 INJECTION INTRAVENOUS; SUBCUTANEOUS at 20:44

## 2021-03-21 RX ADMIN — CHLORHEXIDINE GLUCONATE 1 APPLICATION(S): 213 SOLUTION TOPICAL at 05:40

## 2021-03-21 RX ADMIN — Medication 25 MICROGRAM(S): at 05:40

## 2021-03-21 NOTE — PROGRESS NOTE ADULT - ASSESSMENT
81 yo male w h/o ICM< CAD< s/p CABG, has AICD, DM, HTN presents w volume overload and abd pain  Abd sono shows gallstones, but no sign of cholecystitis/pancreatitis. abd pain resolved on Zosyn,   s/ p treatment empirically for 7 days .  ID following    ascites 2/2 systolic chf, probably worse 2/2 acute on chronic systolic chf.  responded well to lasix. now on HD.   started HD 2/26. HD as per renal. changed from MWF to TTSa,  RIJ tunneled catheter placed on 3/4 in IR, no fistula placement for now  unable to be DCed home 2/2 safety at home. nobody to assume responsibility, wife is ill  awaiting transfer to [facility]    nosocomial Covid positive. remains positive, clinically stable.  ID following. in isolation. trend infl markers.   s/p monoclonal infusion.  on interrogation of ICD, has a lof of arrhythmia burden,   NSVT and sustained VT, seen by EPS, cont  Cgdlbekva20.5 mg  bid. has inoperable ischemic HD,   ICM, EF 15%, severe AS, seen by structural heart for TAVR , proceed with TAVR work up    on 3/13: stepson asked for a call, i called twice and wasnt able to leave messages bc mailbox is full. dr Garcia, renal spoke with him and confirmed home HD is not possible bc ptn is frail w unstable BPs. He also inquired about the second Covid vaccine( he had his first PTA) , ptn acquired the Covid virus during this hospitalization and remains on the Covid isolation floor. vaccine is not appropriate at this time  BP is on the low side, ptn is on midodrine 5 tid off dialysis and 10 tid on days of HD. will raise to 10 tid daily.   ptn is awaiting transfer to [facility], the only possible option is [facility] in [place], family is against it, they live in [place]. ptn c/o pain in his feet, pulses are good,    THIERRY reviewed vascular consulted, THIERRY w toe pressures requested initially by vascular, but at this point its being deterred and not necessary for any further inptn work up of PAD. ptn will need outptn F/U with vascular       dvt ppx w [facility]

## 2021-03-21 NOTE — PROGRESS NOTE ADULT - SUBJECTIVE AND OBJECTIVE BOX
Patient is a 80y old  Male who presents with a chief complaint of acute renal failure (21 Mar 2021 13:00)      SUBJECTIVE / OVERNIGHT EVENTS: no new developments, covid PCR remains posisitve    MEDICATIONS  (STANDING):  aspirin  chewable 81 milliGRAM(s) Oral daily  atorvastatin 80 milliGRAM(s) Oral at bedtime  chlorhexidine 4% Liquid 1 Application(s) Topical <User Schedule>  dextrose 40% Gel 15 Gram(s) Oral once  dextrose 5%. 1000 milliLiter(s) (50 mL/Hr) IV Continuous <Continuous>  dextrose 5%. 1000 milliLiter(s) (100 mL/Hr) IV Continuous <Continuous>  dextrose 50% Injectable 25 Gram(s) IV Push once  dextrose 50% Injectable 12.5 Gram(s) IV Push once  dextrose 50% Injectable 25 Gram(s) IV Push once  epoetin rafiq-epbx (RETACRIT) Injectable 40295 Unit(s) IV Push <User Schedule>  gabapentin 100 milliGRAM(s) Oral at bedtime  glucagon  Injectable 1 milliGRAM(s) IntraMuscular once  heparin   Injectable 5000 Unit(s) SubCutaneous every 8 hours  influenza   Vaccine 0.5 milliLiter(s) IntraMuscular once  insulin lispro (ADMELOG) corrective regimen sliding scale   SubCutaneous three times a day before meals  insulin lispro (ADMELOG) corrective regimen sliding scale   SubCutaneous at bedtime  levothyroxine 25 MICROGram(s) Oral daily  metoprolol tartrate 25 milliGRAM(s) Oral every 12 hours  midodrine. 10 milliGRAM(s) Oral three times a day  midodrine. 10 milliGRAM(s) Oral <User Schedule>  pantoprazole    Tablet 40 milliGRAM(s) Oral before breakfast  polyethylene glycol 3350 17 Gram(s) Oral daily  prasugrel 10 milliGRAM(s) Oral daily  senna 2 Tablet(s) Oral at bedtime    MEDICATIONS  (PRN):  bisacodyl Suppository 10 milliGRAM(s) Rectal daily PRN Constipation  sodium chloride 0.9% lock flush 10 milliLiter(s) IV Push every 1 hour PRN Pre/post blood products, medications, blood draw, and to maintain line patency      Vital Signs Last 24 Hrs  T(F): 98 (03-21-21 @ 21:07), Max: 98 (03-21-21 @ 11:41)  HR: 81 (03-21-21 @ 21:07) (78 - 88)  BP: 96/80 (03-21-21 @ 21:07) (92/47 - 104/41)  RR: 18 (03-21-21 @ 21:07) (18 - 18)  SpO2: 95% (03-21-21 @ 21:07) (95% - 98%)  Telemetry:   CAPILLARY BLOOD GLUCOSE      POCT Blood Glucose.: 135 mg/dL (21 Mar 2021 21:55)  POCT Blood Glucose.: 293 mg/dL (21 Mar 2021 16:39)  POCT Blood Glucose.: 168 mg/dL (21 Mar 2021 11:28)  POCT Blood Glucose.: 152 mg/dL (21 Mar 2021 08:15)    I&O's Summary    20 Mar 2021 07:01  -  21 Mar 2021 07:00  --------------------------------------------------------  IN: 800 mL / OUT: 1300 mL / NET: -500 mL    21 Mar 2021 07:01  -  21 Mar 2021 22:19  --------------------------------------------------------  IN: 0 mL / OUT: 0 mL / NET: 0 mL        PHYSICAL EXAM:  GENERAL: NAD, well-developed  HEAD:  Atraumatic, Normocephalic  EYES: EOMI, PERRLA, conjunctiva and sclera clear  NECK: Supple, No JVD  CHEST/LUNG: Clear to auscultation bilaterally; No wheeze  HEART: Regular rate and rhythm; No murmurs, rubs, or gallops  ABDOMEN: Soft, Nontender, Nondistended; Bowel sounds present  EXTREMITIES:  2+ Peripheral Pulses, No clubbing, cyanosis, or edema  PSYCH: AAOx3  NEUROLOGY: non-focal  SKIN: No rashes or lesions    LABS:                        9.9    11.38 )-----------( 173      ( 21 Mar 2021 06:40 )             32.4     03-21    135  |  99  |  43<H>  ----------------------------<  158<H>  5.6<H>   |  24  |  3.38<H>    Ca    9.0      21 Mar 2021 06:40    TPro  6.8  /  Alb  2.4<L>  /  TBili  1.1  /  DBili  x   /  AST  93<H>  /  ALT  91<H>  /  AlkPhos  205<H>  03-21              RADIOLOGY & ADDITIONAL TESTS:    Imaging Personally Reviewed:    Consultant(s) Notes Reviewed:      Care Discussed with Consultants/Other Providers:

## 2021-03-21 NOTE — PROGRESS NOTE ADULT - SUBJECTIVE AND OBJECTIVE BOX
Subjective: Patient seen and examined. No new events except as noted.     REVIEW OF SYSTEMS:    CONSTITUTIONAL: +weakness, fevers or chills  EYES/ENT: No visual changes;  No vertigo or throat pain   NECK: No pain or stiffness  RESPIRATORY: No cough, wheezing, hemoptysis; No shortness of breath  CARDIOVASCULAR: No chest pain or palpitations  GASTROINTESTINAL: No abdominal or epigastric pain. No nausea, vomiting, or hematemesis; No diarrhea or constipation. No melena or hematochezia.  GENITOURINARY: No dysuria, frequency or hematuria  NEUROLOGICAL: No numbness or weakness  SKIN: No itching, burning, rashes, or lesions   All other review of systems is negative unless indicated above.    MEDICATIONS:  MEDICATIONS  (STANDING):  aspirin  chewable 81 milliGRAM(s) Oral daily  atorvastatin 80 milliGRAM(s) Oral at bedtime  chlorhexidine 4% Liquid 1 Application(s) Topical <User Schedule>  dextrose 40% Gel 15 Gram(s) Oral once  dextrose 5%. 1000 milliLiter(s) (50 mL/Hr) IV Continuous <Continuous>  dextrose 5%. 1000 milliLiter(s) (100 mL/Hr) IV Continuous <Continuous>  dextrose 50% Injectable 25 Gram(s) IV Push once  dextrose 50% Injectable 12.5 Gram(s) IV Push once  dextrose 50% Injectable 25 Gram(s) IV Push once  epoetin rafiq-epbx (RETACRIT) Injectable 38210 Unit(s) IV Push <User Schedule>  gabapentin 100 milliGRAM(s) Oral at bedtime  glucagon  Injectable 1 milliGRAM(s) IntraMuscular once  heparin   Injectable 5000 Unit(s) SubCutaneous every 8 hours  influenza   Vaccine 0.5 milliLiter(s) IntraMuscular once  insulin lispro (ADMELOG) corrective regimen sliding scale   SubCutaneous three times a day before meals  insulin lispro (ADMELOG) corrective regimen sliding scale   SubCutaneous at bedtime  levothyroxine 25 MICROGram(s) Oral daily  metoprolol tartrate 25 milliGRAM(s) Oral every 12 hours  midodrine. 10 milliGRAM(s) Oral three times a day  midodrine. 10 milliGRAM(s) Oral <User Schedule>  pantoprazole    Tablet 40 milliGRAM(s) Oral before breakfast  polyethylene glycol 3350 17 Gram(s) Oral daily  prasugrel 10 milliGRAM(s) Oral daily  senna 2 Tablet(s) Oral at bedtime      PHYSICAL EXAM:  T(C): 36.7 (03-21-21 @ 11:41), Max: 36.7 (03-21-21 @ 11:41)  HR: 80 (03-21-21 @ 11:41) (69 - 90)  BP: 103/58 (03-21-21 @ 11:41) (90/58 - 104/41)  RR: 18 (03-21-21 @ 11:41) (18 - 18)  SpO2: 96% (03-21-21 @ 11:41) (95% - 99%)  Wt(kg): --  I&O's Summary    20 Mar 2021 07:01  -  21 Mar 2021 07:00  --------------------------------------------------------  IN: 800 mL / OUT: 1300 mL / NET: -500 mL          Appearance: NAD  HEENT:   Dry oral mucosa, PERRL, EOMI	  Lymphatic: No lymphadenopathy , Tunneled catheter   Cardiovascular: Irregular S1 S2, No JVD, + murmurs , Peripheral pulses palpable 2+ bilaterally  Respiratory: Lungs clear to auscultation, normal effort 	  Gastrointestinal:  Soft, Non-tender, + BS	  Skin: No rashes, No ecchymoses, No cyanosis, warm to touch  Musculoskeletal: Normal range of motion, normal strength  Psychiatry:  Mood & affect appropriate  Ext: No edema  ecchymosis/ small hematoma noted to inner L UE    LABS:    CARDIAC MARKERS:                                9.9    11.38 )-----------( 173      ( 21 Mar 2021 06:40 )             32.4     03-21    135  |  99  |  43<H>  ----------------------------<  158<H>  5.6<H>   |  24  |  3.38<H>    Ca    9.0      21 Mar 2021 06:40    TPro  6.8  /  Alb  2.4<L>  /  TBili  1.1  /  DBili  x   /  AST  93<H>  /  ALT  91<H>  /  AlkPhos  205<H>  03-21    proBNP:   Lipid Profile:   HgA1c:   TSH:             TELEMETRY: 	v paced     ECG:  	  RADIOLOGY:   DIAGNOSTIC TESTING:  [ ] Echocardiogram:  [ ]  Catheterization:  [ ] Stress Test:    OTHER:

## 2021-03-21 NOTE — PROGRESS NOTE ADULT - ASSESSMENT
S/p HD Saturday with  500ml fluid removed .   Denies complaints  On nasal cannula  Covid-19 detected today     aspirin  chewable 81 milliGRAM(s) Oral daily  atorvastatin 80 milliGRAM(s) Oral at bedtime  bisacodyl Suppository 10 milliGRAM(s) Rectal daily PRN  chlorhexidine 4% Liquid 1 Application(s) Topical <User Schedule>  dextrose 40% Gel 15 Gram(s) Oral once  dextrose 5%. 1000 milliLiter(s) IV Continuous <Continuous>  dextrose 5%. 1000 milliLiter(s) IV Continuous <Continuous>  dextrose 50% Injectable 25 Gram(s) IV Push once  dextrose 50% Injectable 12.5 Gram(s) IV Push once  dextrose 50% Injectable 25 Gram(s) IV Push once  epoetin rafiq-epbx (RETACRIT) Injectable 44742 Unit(s) IV Push <User Schedule>  gabapentin 100 milliGRAM(s) Oral at bedtime  glucagon  Injectable 1 milliGRAM(s) IntraMuscular once  heparin   Injectable 5000 Unit(s) SubCutaneous every 8 hours  influenza   Vaccine 0.5 milliLiter(s) IntraMuscular once  insulin lispro (ADMELOG) corrective regimen sliding scale   SubCutaneous three times a day before meals  insulin lispro (ADMELOG) corrective regimen sliding scale   SubCutaneous at bedtime  levothyroxine 25 MICROGram(s) Oral daily  metoprolol tartrate 25 milliGRAM(s) Oral every 12 hours  midodrine. 10 milliGRAM(s) Oral three times a day  midodrine. 10 milliGRAM(s) Oral <User Schedule>  pantoprazole    Tablet 40 milliGRAM(s) Oral before breakfast  polyethylene glycol 3350 17 Gram(s) Oral daily  prasugrel 10 milliGRAM(s) Oral daily  senna 2 Tablet(s) Oral at bedtime  sodium chloride 0.9% lock flush 10 milliLiter(s) IV Push every 1 hour PRN      VITAL:  T(C): , Max: 36.7 (03-21-21 @ 11:41)  T(F): , Max: 98 (03-21-21 @ 11:41)  HR: 80 (03-21-21 @ 11:41)  BP: 103/58 (03-21-21 @ 11:41)  BP(mean): 101 (03-20-21 @ 16:30)  RR: 18 (03-21-21 @ 11:41)  SpO2: 96% (03-21-21 @ 11:41)  Wt(kg): --    03-20-21 @ 07:01  -  03-21-21 @ 07:00  --------------------------------------------------------  IN: 800 mL / OUT: 1300 mL / NET: -500 mL        PHYSICAL EXAM:  Constitutional: NAD on room air, alert  HEENT: NCAT, DMM  Neck: Supple, no JVD  Respiratory: CTA-b/l  Cardiovascular: RRR s1s2, no m/r/g  Gastrointestinal: BS+, soft, NT; (+)mild distension  Extremities:  no edema b/l  Neurological: no focal deficits; strength grossly intact  Back: no CVAT b/l  Skin: No rashes, no nevi  Access: RIJ tunneled cath      LABS:                          9.9    11.38 )-----------( 173      ( 21 Mar 2021 06:40 )             32.4     Na(135)/K(5.6)/Cl(99)/HCO3(24)/BUN(43)/Cr(3.38)Glu(158)/Ca(9.0)/Mg(--)/PO4(--)    03-21 @ 06:40  Na(137)/K(4.9)/Cl(99)/HCO3(25)/BUN(58)/Cr(4.33)Glu(167)/Ca(9.7)/Mg(--)/PO4(--)    03-20 @ 05:41  Na(134)/K(4.4)/Cl(96)/HCO3(25)/BUN(42)/Cr(3.42)Glu(153)/Ca(9.2)/Mg(--)/PO4(--)    03-19 @ 05:37            ASSESSMENT/PLAN    IMPRESSION: 80M w/ DM2, CAD-CABG, and CKD, 2/16/21 a/w azotemia/lyte derangements/weakness; c/b newly ESRD; c/b COVID(+)    (1)Renal - newly ESRD - Last HD Saturday. Next HD Monday for 2 hours  if K+ still remains elevated      (2)CV - tenuous hemodynamics/hypervolemia -  On Midodrine.  Bp acceptable for now    (3)Anemia -  hgb improving s/p Retacrit with HD Saturday    (4)Hyperkalemia- Lokelma 5mg po x 1 .       (4)Dispo - awaiting acceptance to outside HD facility       RECOMMEND:  (1)Next HD Monday for 2 hours if K+ is above 5.5 , otherwise will be Tuesday ; Midodrine 10mg PO x 1 prn intradialytic hypotension; Retacrit with HD  (2)PT  (3)Setup of outpatient HD per DAMON Garza NP-BC  [employer]  [phone]

## 2021-03-21 NOTE — PROVIDER CONTACT NOTE (OTHER) - ASSESSMENT
Pt abd taut but nontender, bladder does not feel distended, pt vss, pt denies pain or discomfort.  bladder scan showed 343

## 2021-03-22 LAB
ALBUMIN SERPL ELPH-MCNC: 2.5 G/DL — LOW (ref 3.3–5)
ALP SERPL-CCNC: 189 U/L — HIGH (ref 40–120)
ALT FLD-CCNC: 91 U/L — HIGH (ref 10–45)
ANION GAP SERPL CALC-SCNC: 14 MMOL/L — SIGNIFICANT CHANGE UP (ref 5–17)
AST SERPL-CCNC: 67 U/L — HIGH (ref 10–40)
BILIRUB SERPL-MCNC: 1.3 MG/DL — HIGH (ref 0.2–1.2)
BUN SERPL-MCNC: 60 MG/DL — HIGH (ref 7–23)
CALCIUM SERPL-MCNC: 9.3 MG/DL — SIGNIFICANT CHANGE UP (ref 8.4–10.5)
CHLORIDE SERPL-SCNC: 96 MMOL/L — SIGNIFICANT CHANGE UP (ref 96–108)
CO2 SERPL-SCNC: 24 MMOL/L — SIGNIFICANT CHANGE UP (ref 22–31)
CREAT SERPL-MCNC: 4.37 MG/DL — HIGH (ref 0.5–1.3)
GLUCOSE BLDC GLUCOMTR-MCNC: 119 MG/DL — HIGH (ref 70–99)
GLUCOSE BLDC GLUCOMTR-MCNC: 120 MG/DL — HIGH (ref 70–99)
GLUCOSE BLDC GLUCOMTR-MCNC: 123 MG/DL — HIGH (ref 70–99)
GLUCOSE BLDC GLUCOMTR-MCNC: 162 MG/DL — HIGH (ref 70–99)
GLUCOSE SERPL-MCNC: 146 MG/DL — HIGH (ref 70–99)
HCT VFR BLD CALC: 32.5 % — LOW (ref 39–50)
HGB BLD-MCNC: 10 G/DL — LOW (ref 13–17)
MCHC RBC-ENTMCNC: 30.4 PG — SIGNIFICANT CHANGE UP (ref 27–34)
MCHC RBC-ENTMCNC: 30.8 GM/DL — LOW (ref 32–36)
MCV RBC AUTO: 98.8 FL — SIGNIFICANT CHANGE UP (ref 80–100)
NRBC # BLD: 0 /100 WBCS — SIGNIFICANT CHANGE UP (ref 0–0)
PLATELET # BLD AUTO: 219 K/UL — SIGNIFICANT CHANGE UP (ref 150–400)
POTASSIUM SERPL-MCNC: 5.4 MMOL/L — HIGH (ref 3.5–5.3)
POTASSIUM SERPL-SCNC: 5.4 MMOL/L — HIGH (ref 3.5–5.3)
PROT SERPL-MCNC: 6.6 G/DL — SIGNIFICANT CHANGE UP (ref 6–8.3)
RBC # BLD: 3.29 M/UL — LOW (ref 4.2–5.8)
RBC # FLD: 20.6 % — HIGH (ref 10.3–14.5)
SARS-COV-2 RNA SPEC QL NAA+PROBE: SIGNIFICANT CHANGE UP
SODIUM SERPL-SCNC: 134 MMOL/L — LOW (ref 135–145)
WBC # BLD: 16.92 K/UL — HIGH (ref 3.8–10.5)
WBC # FLD AUTO: 16.92 K/UL — HIGH (ref 3.8–10.5)

## 2021-03-22 PROCEDURE — 71045 X-RAY EXAM CHEST 1 VIEW: CPT | Mod: 26

## 2021-03-22 RX ADMIN — Medication 25 MICROGRAM(S): at 05:17

## 2021-03-22 RX ADMIN — Medication 100 MILLIGRAM(S): at 15:45

## 2021-03-22 RX ADMIN — MIDODRINE HYDROCHLORIDE 10 MILLIGRAM(S): 2.5 TABLET ORAL at 05:16

## 2021-03-22 RX ADMIN — Medication 200 MILLIGRAM(S): at 13:51

## 2021-03-22 RX ADMIN — PANTOPRAZOLE SODIUM 40 MILLIGRAM(S): 20 TABLET, DELAYED RELEASE ORAL at 05:17

## 2021-03-22 RX ADMIN — MIDODRINE HYDROCHLORIDE 10 MILLIGRAM(S): 2.5 TABLET ORAL at 11:05

## 2021-03-22 RX ADMIN — PRASUGREL 10 MILLIGRAM(S): 5 TABLET, FILM COATED ORAL at 11:05

## 2021-03-22 RX ADMIN — Medication 81 MILLIGRAM(S): at 11:05

## 2021-03-22 RX ADMIN — HEPARIN SODIUM 5000 UNIT(S): 5000 INJECTION INTRAVENOUS; SUBCUTANEOUS at 05:18

## 2021-03-22 RX ADMIN — CHLORHEXIDINE GLUCONATE 1 APPLICATION(S): 213 SOLUTION TOPICAL at 10:29

## 2021-03-22 RX ADMIN — MIDODRINE HYDROCHLORIDE 10 MILLIGRAM(S): 2.5 TABLET ORAL at 17:03

## 2021-03-22 RX ADMIN — Medication 25 MILLIGRAM(S): at 05:17

## 2021-03-22 RX ADMIN — Medication 1: at 12:05

## 2021-03-22 RX ADMIN — HEPARIN SODIUM 5000 UNIT(S): 5000 INJECTION INTRAVENOUS; SUBCUTANEOUS at 22:18

## 2021-03-22 RX ADMIN — HEPARIN SODIUM 5000 UNIT(S): 5000 INJECTION INTRAVENOUS; SUBCUTANEOUS at 13:57

## 2021-03-22 NOTE — PROGRESS NOTE ADULT - SUBJECTIVE AND OBJECTIVE BOX
INTERVAL HPI/OVERNIGHT EVENTS:  No new events        MEDICATIONS  (STANDING):  aMIOdarone    Tablet 400 milliGRAM(s) Oral every 8 hours  aspirin  chewable 81 milliGRAM(s) Oral daily  atorvastatin 80 milliGRAM(s) Oral at bedtime  chlorhexidine 4% Liquid 1 Application(s) Topical <User Schedule>  dextrose 40% Gel 15 Gram(s) Oral once  dextrose 5%. 1000 milliLiter(s) (50 mL/Hr) IV Continuous <Continuous>  dextrose 5%. 1000 milliLiter(s) (100 mL/Hr) IV Continuous <Continuous>  dextrose 50% Injectable 25 Gram(s) IV Push once  dextrose 50% Injectable 12.5 Gram(s) IV Push once  dextrose 50% Injectable 25 Gram(s) IV Push once  ferrous    sulfate 325 milliGRAM(s) Oral two times a day  glucagon  Injectable 1 milliGRAM(s) IntraMuscular once  heparin   Injectable 5000 Unit(s) SubCutaneous every 12 hours  influenza   Vaccine 0.5 milliLiter(s) IntraMuscular once  insulin lispro (ADMELOG) corrective regimen sliding scale   SubCutaneous at bedtime  insulin lispro (ADMELOG) corrective regimen sliding scale   SubCutaneous three times a day before meals  metoprolol tartrate 25 milliGRAM(s) Oral every 12 hours  midodrine. 10 milliGRAM(s) Oral <User Schedule>  pantoprazole    Tablet 40 milliGRAM(s) Oral before breakfast  prasugrel 10 milliGRAM(s) Oral daily    MEDICATIONS  (PRN):  sodium chloride 0.9% lock flush 10 milliLiter(s) IV Push every 1 hour PRN Pre/post blood products, medications, blood draw, and to maintain line patency      Allergies    No Known Allergies    Intolerances        Review of Systems:    General:  No wt loss, fevers, chills, night sweats,fatigue,   Eyes:  Good vision, no reported pain  ENT:  No sore throat, pain, runny nose, dysphagia  CV:  No pain, palpitatioins, hypo/hypertension  Resp:  No dyspnea, cough, tachypnea, wheezing  GI:  No pain, No nausea, No vomiting, No diarrhea, No constipation, No weight loss, No fever, No pruritis, No rectal bleeding, No tarry stools, No dysphagia,  :  No pain, bleeding, incontinence, nocturia  Muscle:  No pain, weakness  Neuro:  No weakness, tingling, memory problems  Psych:  No fatigue, insomnia, mood problems, depression  Endocrine:  No polyuria, polydypsia, cold/heat intolerance  Heme:  No petechiae, ecchymosis, easy bruisability  Skin:  No rash, tattoos, scars, edema      Vital Signs Last 24 Hrs  Vital Signs Last 24 Hrs  T(C): 36.4 (22 Mar 2021 10:37), Max: 36.7 (21 Mar 2021 11:41)  T(F): 97.5 (22 Mar 2021 10:37), Max: 98 (21 Mar 2021 11:41)  HR: 70 (22 Mar 2021 10:37) (59 - 81)  BP: 87/42 (22 Mar 2021 10:37) (87/42 - 103/58)  BP(mean): --  RR: 18 (22 Mar 2021 10:37) (18 - 19)  SpO2: 95% (22 Mar 2021 10:37) (93% - 96%)PHYSICAL EXAM:    GENERAL:  no distress  HEENT:  NC/AT  ABDOMEN:  Soft, non-tender, non-distended  EXTEREMITIES:  +bilateral LE edema   NEURO:  Alert, oriented, no asterixis, no tremor, no encephalopathy    LABS:  LABS:                        10.0   16.92 )-----------( 219      ( 22 Mar 2021 10:15 )             32.5     03-22    134<L>  |  96  |  60<H>  ----------------------------<  146<H>  5.4<H>   |  24  |  4.37<H>    Ca    9.3      22 Mar 2021 10:15    TPro  6.6  /  Alb  2.5<L>  /  TBili  1.3<H>  /  DBili  x   /  AST  67<H>  /  ALT  91<H>  /  AlkPhos  189<H>  03-22    LIVER FUNCTIONS - ( 22 Mar 2021 10:15 )  Alb: 2.5 g/dL / Pro: 6.6 g/dL / ALK PHOS: 189 U/L / ALT: 91 U/L / AST: 67 U/L / GGT: x               RADIOLOGY & ADDITIONAL TESTS:

## 2021-03-22 NOTE — PROVIDER CONTACT NOTE (OTHER) - RECOMMENDATIONS
Notify provider
Notify provider.
Notify provider
Made NP aware.
NP to be notified.
Notify NP, continue to monitor, straight cth?
Notify PA
Notify Provider
Notify provider
Notify provider. Pressure applied and pressure dressing applied to site.
PA to be notified.
provider aware

## 2021-03-22 NOTE — PROGRESS NOTE ADULT - NSHPATTENDINGPLANDISCUSS_GEN_ALL_CORE
ptn, id, renal
cardiology/Dr. Velásquez, medicine/NP, cardiac surgery/Dr. Alvarez and structural heart team
ptn,
ptn, card
ptn, id, renal
ptn,
ptn,
ptn, card
ptn, id, renal
ptn, card
ptn, id, renal
ptn

## 2021-03-22 NOTE — PROGRESS NOTE ADULT - SUBJECTIVE AND OBJECTIVE BOX
Subjective: Patient seen and examined. No new events except as noted.     REVIEW OF SYSTEMS:    CONSTITUTIONAL:+weakness, fevers or chills  EYES/ENT: No visual changes;  No vertigo or throat pain   NECK: No pain or stiffness  RESPIRATORY: No cough, wheezing, hemoptysis; No shortness of breath  CARDIOVASCULAR: No chest pain or palpitations  GASTROINTESTINAL: No abdominal or epigastric pain. No nausea, vomiting, or hematemesis; No diarrhea or constipation. No melena or hematochezia.  GENITOURINARY: No dysuria, frequency or hematuria  NEUROLOGICAL: No numbness or weakness  SKIN: No itching, burning, rashes, or lesions   All other review of systems is negative unless indicated above.    MEDICATIONS:  MEDICATIONS  (STANDING):  aspirin  chewable 81 milliGRAM(s) Oral daily  atorvastatin 80 milliGRAM(s) Oral at bedtime  chlorhexidine 4% Liquid 1 Application(s) Topical <User Schedule>  dextrose 40% Gel 15 Gram(s) Oral once  dextrose 5%. 1000 milliLiter(s) (50 mL/Hr) IV Continuous <Continuous>  dextrose 5%. 1000 milliLiter(s) (100 mL/Hr) IV Continuous <Continuous>  dextrose 50% Injectable 25 Gram(s) IV Push once  dextrose 50% Injectable 12.5 Gram(s) IV Push once  dextrose 50% Injectable 25 Gram(s) IV Push once  epoetin rafiq-epbx (RETACRIT) Injectable 79648 Unit(s) IV Push <User Schedule>  gabapentin 100 milliGRAM(s) Oral at bedtime  glucagon  Injectable 1 milliGRAM(s) IntraMuscular once  heparin   Injectable 5000 Unit(s) SubCutaneous every 8 hours  influenza   Vaccine 0.5 milliLiter(s) IntraMuscular once  insulin lispro (ADMELOG) corrective regimen sliding scale   SubCutaneous three times a day before meals  insulin lispro (ADMELOG) corrective regimen sliding scale   SubCutaneous at bedtime  levothyroxine 25 MICROGram(s) Oral daily  metoprolol tartrate 25 milliGRAM(s) Oral every 12 hours  midodrine. 10 milliGRAM(s) Oral three times a day  midodrine. 10 milliGRAM(s) Oral <User Schedule>  pantoprazole    Tablet 40 milliGRAM(s) Oral before breakfast  polyethylene glycol 3350 17 Gram(s) Oral daily  prasugrel 10 milliGRAM(s) Oral daily  senna 2 Tablet(s) Oral at bedtime      PHYSICAL EXAM:  T(C): 36.4 (03-22-21 @ 10:37), Max: 36.7 (03-21-21 @ 11:41)  HR: 70 (03-22-21 @ 10:37) (59 - 81)  BP: 87/42 (03-22-21 @ 10:37) (87/42 - 103/58)  RR: 18 (03-22-21 @ 10:37) (18 - 19)  SpO2: 95% (03-22-21 @ 10:37) (93% - 96%)  Wt(kg): --  I&O's Summary    21 Mar 2021 07:01  -  22 Mar 2021 07:00  --------------------------------------------------------  IN: 320 mL / OUT: 250 mL / NET: 70 mL    22 Mar 2021 07:01  -  22 Mar 2021 11:35  --------------------------------------------------------  IN: 360 mL / OUT: 0 mL / NET: 360 mL          Appearance: NAD  HEENT:   Dry oral mucosa, PERRL, EOMI	  Lymphatic: No lymphadenopathy , Tunneled catheter   Cardiovascular: Irregular S1 S2, No JVD, + murmurs , Peripheral pulses palpable 2+ bilaterally  Respiratory: Lungs clear to auscultation, normal effort 	  Gastrointestinal:  Soft, Non-tender, + BS	  Skin: No rashes, No ecchymoses, No cyanosis, warm to touch  Musculoskeletal: Normal range of motion, normal strength  Psychiatry:  Mood & affect appropriate  Ext: No edema  ecchymosis/ small hematoma noted to inner L UE improving        LABS:    CARDIAC MARKERS:                                10.0   16.92 )-----------( 219      ( 22 Mar 2021 10:15 )             32.5     03-22    134<L>  |  96  |  60<H>  ----------------------------<  146<H>  5.4<H>   |  24  |  4.37<H>    Ca    9.3      22 Mar 2021 10:15    TPro  6.6  /  Alb  2.5<L>  /  TBili  1.3<H>  /  DBili  x   /  AST  67<H>  /  ALT  91<H>  /  AlkPhos  189<H>  03-22    proBNP:   Lipid Profile:   HgA1c:   TSH:             TELEMETRY: 	  V paced   ECG:  	  RADIOLOGY:   DIAGNOSTIC TESTING:  [ ] Echocardiogram:  [ ]  Catheterization:  [ ] Stress Test:    OTHER:

## 2021-03-22 NOTE — PROVIDER CONTACT NOTE (OTHER) - SITUATION
Pt's RIJ permacath bleeding
Patient diagnosed with Covid >10 days ago. Asymptomatic. No further isolation required.
Pt sustained in PMT on and off, longest 6 mins, pt c/o chest pain nonradiating, now in controlled rate SR.
Pt c/o SOB
Patient's BP is 100 systolic, patient gets lopressor 25mg. Patient's lopressor yesterday at 6am, was rescheduled to 8am.
Patient had 11 beats of WCT on tele.
Patient is due for 6am dose of lopressor 25mg
Patient with 2 minute duration of WCT. Patient returned to NSR 1st degree AVB on telemetry without intervention.
Patient with 3 episodes of 10 beats of WCT vs pacemaker mediated tachycardia. Patient returned to NSR 1st degree AVB on telemetry without intervention.
Patient with episode of AIVR x 3 minutes, HR 80's bpm. Returns to SR on telemetry without intervention.
Patient with persistent and frequent bursts of WCT on telemetry, longest noted episode to be 2 minutes in duration. Returns to SR A-Paced on telemetry without intervention.
Possible WTC on tele
Pt not urinating, bladder scan showed 343
Pt unable to swallow dinner
RIJ perm catheter bleeding

## 2021-03-22 NOTE — PROVIDER CONTACT NOTE (OTHER) - DATE AND TIME:
04-Mar-2021 05:30
08-Mar-2021 10:45
17-Feb-2021 04:19
22-Feb-2021 03:00
01-Mar-2021 05:56
17-Feb-2021 07:30
17-Feb-2021 21:15
17-Feb-2021 22:56
21-Mar-2021 05:20
21-Mar-2021 13:00
21-Mar-2021 22:30
22-Mar-2021 20:45
04-Mar-2021 16:05
19-Feb-2021 08:22
04-Mar-2021 20:10

## 2021-03-22 NOTE — PROGRESS NOTE ADULT - ASSESSMENT
79 yo male w h/o ICM< CAD< s/p CABG, has AICD, DM, HTN presents w volume overload and abd pain  Abd sono shows gallstones, but no sign of cholecystitis/pancreatitis. abd pain resolved on Zosyn,   s/ p treatment empirically for 7 days .  ID following    ascites 2/2 systolic chf, probably worse 2/2 acute on chronic systolic chf.  responded well to lasix. now on HD.   started HD 2/26. HD as per renal. changed from MWF to TTSa,  RIJ tunneled catheter placed on 3/4 in IR, no fistula placement for now  unable to be DCed home 2/2 safety at home. nobody to assume responsibility, wife is ill  awaiting transfer to [facility]    nosocomial Covid positive. remains positive, clinically stable.  ID following. in isolation. trend infl markers.   s/p monoclonal infusion.  on interrogation of ICD, has a lof of arrhythmia burden,   NSVT and sustained VT, seen by EPS, cont  Uhyolociq53.5 mg  bid. has inoperable ischemic HD,   ICM, EF 15%, severe AS, seen by structural heart for TAVR , proceed with TAVR work up    on 3/13: stepson asked for a call, i called twice and wasnt able to leave messages bc mailbox is full. dr Garcia, renal spoke with him and confirmed home HD is not possible bc ptn is frail w unstable BPs. He also inquired about the second Covid vaccine( he had his first PTA) , ptn acquired the Covid virus during this hospitalization and remains on the Covid isolation floor. vaccine is not appropriate at this time  BP is on the low side, ptn is on midodrine 5 tid off dialysis and 10 tid on days of HD. will raise to 10 tid daily.   ptn is awaiting transfer to [facility], the only possible option is [facility] in [place], family is against it, they live in [place]. ptn c/o pain in his feet, pulses are good,    THIERRY reviewed vascular consulted, THIERRY w toe pressures requested initially by vascular, but at this point its being deterred and not necessary for any further inptn work up of PAD. ptn will need outptn F/U with vascular       dvt ppx w [facility]

## 2021-03-22 NOTE — PROVIDER CONTACT NOTE (OTHER) - NAME OF MD/NP/PA/DO NOTIFIED:
Sydney Woodard NP
Tiny, PA
Unit leadership and bed board.
Laly Smith, NP
MARIZOL Amaya
MARIZOL Kelly
MARIZOL Kelly
MARIZOL Ott
Marifer, NP
Melida
Melida
PA Preston
Tiny, PA
Getachew Agrawal
Josefina Black

## 2021-03-22 NOTE — PROGRESS NOTE ADULT - SUBJECTIVE AND OBJECTIVE BOX
      Overnight events noted      VITAL:  T(C): , Max: 36.7 (03-21-21 @ 11:41)  T(F): , Max: 98 (03-21-21 @ 11:41)  HR: 76 (03-22-21 @ 05:10)  BP: 95/58 (03-22-21 @ 03:36)  BP(mean): --  RR: 19 (03-22-21 @ 03:36)  SpO2: 93% (03-22-21 @ 03:36)      PHYSICAL EXAM:  Constitutional: NAD on room air, alert  HEENT: NCAT, DMM  Neck: Supple, no JVD  Respiratory: CTA-b/l  Cardiovascular: RRR s1s2, no m/r/g  Gastrointestinal: BS+, soft, NT; (+)mild distension  Extremities:  no edema b/l  Neurological: no focal deficits; strength grossly intact  Back: no CVAT b/l  Skin: No rashes, no nevi  Access: RIJ tunneled cath    LABS:                        9.9    11.38 )-----------( 173      ( 21 Mar 2021 06:40 )             32.4     Na(135)/K(5.6)/Cl(99)/HCO3(24)/BUN(43)/Cr(3.38)Glu(158)/Ca(9.0)/Mg(--)/PO4(--)    03-21 @ 06:40  Na(137)/K(4.9)/Cl(99)/HCO3(25)/BUN(58)/Cr(4.33)Glu(167)/Ca(9.7)/Mg(--)/PO4(--)    03-20 @ 05:41      IMPRESSION: 80M w/ DM2, CAD-CABG, and CKD, 2/16/21 a/w azotemia/lyte derangements/weakness; c/b newly ESRD; c/b COVID(+)    (1)Renal - newly ESRD - due for HD tomorrow     (2)CV - tenuous hemodynamics/hypervolemia - biventricular systolic dysfunction, severe AS, mod-sev AR. On Midodrine.     (3)Anemia - On Retacrit with HD    (4)Dispo - awaiting acceptance to outside HD facility       RECOMMEND:  (1)Next HD tomorrow; 0.5L UF as able; Midodrine 10mg PO x 1 prn intradialytic hypotension; Retacrit with HD  (2)PT  (3)Setup of outpatient HD per DAMON Garcia MD  [facility]  Office: [phone]  Cell: [phone]               Overnight events noted      VITAL:  T(C): , Max: 36.7 (03-21-21 @ 11:41)  T(F): , Max: 98 (03-21-21 @ 11:41)  HR: 76 (03-22-21 @ 05:10)  BP: 95/58 (03-22-21 @ 03:36)  BP(mean): --  RR: 19 (03-22-21 @ 03:36)  SpO2: 93% (03-22-21 @ 03:36)      PHYSICAL EXAM:  Constitutional: NAD on room air, alert  HEENT: NCAT, DMM  Neck: Supple, no JVD  Respiratory: CTA-b/l  Cardiovascular: RRR s1s2, no m/r/g  Gastrointestinal: BS+, soft, NT; (+)mild distension  Extremities:  no edema b/l  Neurological: no focal deficits; strength grossly intact  Back: no CVAT b/l  Skin: No rashes, no nevi  Access: RIJ tunneled cath    LABS:                        9.9    11.38 )-----------( 173      ( 21 Mar 2021 06:40 )             32.4     Na(135)/K(5.6)/Cl(99)/HCO3(24)/BUN(43)/Cr(3.38)Glu(158)/Ca(9.0)/Mg(--)/PO4(--)    03-21 @ 06:40  Na(137)/K(4.9)/Cl(99)/HCO3(25)/BUN(58)/Cr(4.33)Glu(167)/Ca(9.7)/Mg(--)/PO4(--)    03-20 @ 05:41      IMPRESSION: 80M w/ DM2, CAD-CABG, and CKD, 2/16/21 a/w azotemia/lyte derangements/weakness; c/b newly ESRD; c/b COVID(+)    (1)Renal - newly ESRD - due for HD tomorrow     (2)CV - tenuous hemodynamics/hypervolemia - biventricular systolic dysfunction, severe AS, mod-sev AR. On Midodrine.     (3)Anemia - On Retacrit with HD    (4)Dispo - awaiting acceptance to outside HD facility       RECOMMEND:  (1)Next HD tomorrow; 0.5L UF as able; 145==>140 sodium modelling; Midodrine 10mg PO x 1 prn intradialytic hypotension; Retacrit with HD    (2)Setup of outpatient HD per DAMON Garcia MD  [facility]  Office: [phone]  Cell: [phone]               No pain, no sob      VITAL:  T(C): , Max: 36.7 (03-21-21 @ 11:41)  T(F): , Max: 98 (03-21-21 @ 11:41)  HR: 76 (03-22-21 @ 05:10)  BP: 95/58 (03-22-21 @ 03:36)  BP(mean): --  RR: 19 (03-22-21 @ 03:36)  SpO2: 93% (03-22-21 @ 03:36)      PHYSICAL EXAM:  Constitutional: NAD on room air, alert  HEENT: NCAT, DMM  Neck: Supple, no JVD  Respiratory: CTA-b/l  Cardiovascular: RRR s1s2, no m/r/g  Gastrointestinal: BS+, soft, NT; (+)mild distension  Extremities:  no edema b/l  Neurological: no focal deficits; strength grossly intact  Back: no CVAT b/l  Skin: No rashes, no nevi  Access: RIJ tunneled cath    LABS:                        9.9    11.38 )-----------( 173      ( 21 Mar 2021 06:40 )             32.4     Na(135)/K(5.6)/Cl(99)/HCO3(24)/BUN(43)/Cr(3.38)Glu(158)/Ca(9.0)/Mg(--)/PO4(--)    03-21 @ 06:40  Na(137)/K(4.9)/Cl(99)/HCO3(25)/BUN(58)/Cr(4.33)Glu(167)/Ca(9.7)/Mg(--)/PO4(--)    03-20 @ 05:41      IMPRESSION: 80M w/ DM2, CAD-CABG, and CKD, 2/16/21 a/w azotemia/lyte derangements/weakness; c/b newly ESRD; c/b COVID(+)    (1)Renal - newly ESRD - due for HD tomorrow     (2)CV - tenuous hemodynamics/hypervolemia - biventricular systolic dysfunction, severe AS, mod-sev AR. On Midodrine.     (3)Anemia - On Retacrit with HD    (4)Dispo - awaiting acceptance to outside HD facility       RECOMMEND:  (1)Next HD tomorrow; 0.5L UF as able; 145==>140 sodium modelling; Midodrine 10mg PO x 1 prn intradialytic hypotension; Retacrit with HD    (2)Setup of outpatient HD per DAMON Garcia MD  [facility]  Office: [phone]  Cell: [phone]

## 2021-03-22 NOTE — PROGRESS NOTE ADULT - SUBJECTIVE AND OBJECTIVE BOX
Patient is a 80y old  Male who presents with a chief complaint of acute renal failure (22 Mar 2021 11:35)      SUBJECTIVE / OVERNIGHT EVENTS: no new c/o    MEDICATIONS  (STANDING):  aspirin  chewable 81 milliGRAM(s) Oral daily  atorvastatin 80 milliGRAM(s) Oral at bedtime  benzonatate 100 milliGRAM(s) Oral every 8 hours  chlorhexidine 4% Liquid 1 Application(s) Topical <User Schedule>  dextrose 40% Gel 15 Gram(s) Oral once  dextrose 5%. 1000 milliLiter(s) (50 mL/Hr) IV Continuous <Continuous>  dextrose 5%. 1000 milliLiter(s) (100 mL/Hr) IV Continuous <Continuous>  dextrose 50% Injectable 25 Gram(s) IV Push once  dextrose 50% Injectable 12.5 Gram(s) IV Push once  dextrose 50% Injectable 25 Gram(s) IV Push once  epoetin rafiq-epbx (RETACRIT) Injectable 37092 Unit(s) IV Push <User Schedule>  gabapentin 100 milliGRAM(s) Oral at bedtime  glucagon  Injectable 1 milliGRAM(s) IntraMuscular once  heparin   Injectable 5000 Unit(s) SubCutaneous every 8 hours  influenza   Vaccine 0.5 milliLiter(s) IntraMuscular once  insulin lispro (ADMELOG) corrective regimen sliding scale   SubCutaneous three times a day before meals  insulin lispro (ADMELOG) corrective regimen sliding scale   SubCutaneous at bedtime  levothyroxine 25 MICROGram(s) Oral daily  metoprolol tartrate 25 milliGRAM(s) Oral every 12 hours  midodrine. 10 milliGRAM(s) Oral three times a day  midodrine. 10 milliGRAM(s) Oral <User Schedule>  pantoprazole    Tablet 40 milliGRAM(s) Oral before breakfast  polyethylene glycol 3350 17 Gram(s) Oral daily  prasugrel 10 milliGRAM(s) Oral daily  senna 2 Tablet(s) Oral at bedtime    MEDICATIONS  (PRN):  bisacodyl Suppository 10 milliGRAM(s) Rectal daily PRN Constipation  guaiFENesin   Syrup  (Sugar-Free) 200 milliGRAM(s) Oral every 6 hours PRN Cough  sodium chloride 0.9% lock flush 10 milliLiter(s) IV Push every 1 hour PRN Pre/post blood products, medications, blood draw, and to maintain line patency      Vital Signs Last 24 Hrs  T(F): 98.1 (03-22-21 @ 20:16), Max: 98.7 (03-22-21 @ 16:48)  HR: 78 (03-22-21 @ 20:16) (59 - 78)  BP: 91/51 (03-22-21 @ 20:16) (87/42 - 95/58)  RR: 20 (03-22-21 @ 20:16) (18 - 20)  SpO2: 94% (03-22-21 @ 20:16) (93% - 95%)  Telemetry:   CAPILLARY BLOOD GLUCOSE      POCT Blood Glucose.: 119 mg/dL (22 Mar 2021 16:51)  POCT Blood Glucose.: 162 mg/dL (22 Mar 2021 11:57)  POCT Blood Glucose.: 120 mg/dL (22 Mar 2021 07:49)  POCT Blood Glucose.: 135 mg/dL (21 Mar 2021 21:55)    I&O's Summary    21 Mar 2021 07:01  -  22 Mar 2021 07:00  --------------------------------------------------------  IN: 320 mL / OUT: 250 mL / NET: 70 mL    22 Mar 2021 07:01  -  22 Mar 2021 21:09  --------------------------------------------------------  IN: 360 mL / OUT: 0 mL / NET: 360 mL        PHYSICAL EXAM:  GENERAL: NAD, well-developed  HEAD:  Atraumatic, Normocephalic  EYES: EOMI, PERRLA, conjunctiva and sclera clear  NECK: Supple, No JVD  CHEST/LUNG: Clear to auscultation bilaterally; No wheeze  HEART: Regular rate and rhythm; No murmurs, rubs, or gallops  ABDOMEN: Soft, Nontender, Nondistended; Bowel sounds present  EXTREMITIES:  2+ Peripheral Pulses, No clubbing, cyanosis, or edema  PSYCH: AAOx3  NEUROLOGY: non-focal  SKIN: No rashes or lesions    LABS:                        10.0   16.92 )-----------( 219      ( 22 Mar 2021 10:15 )             32.5     03-22    134<L>  |  96  |  60<H>  ----------------------------<  146<H>  5.4<H>   |  24  |  4.37<H>    Ca    9.3      22 Mar 2021 10:15    TPro  6.6  /  Alb  2.5<L>  /  TBili  1.3<H>  /  DBili  x   /  AST  67<H>  /  ALT  91<H>  /  AlkPhos  189<H>  03-22              RADIOLOGY & ADDITIONAL TESTS:    Imaging Personally Reviewed:    Consultant(s) Notes Reviewed:      Care Discussed with Consultants/Other Providers:

## 2021-03-22 NOTE — PROVIDER CONTACT NOTE (OTHER) - ACTION/TREATMENT ORDERED:
NP will f/u. Continue to monitor and maintain safety.
ekg, stat labs ordered and okay to administer AM PO metoprolol. Continue to monitor.
Provider notified, endorsed holding pressure to site, and to change dressing as needed, IR made aware. Will continue to monitor closely.
Assessed by PA at bedside. STAT EKG ordered and performed, STAT BMP, Mg, Phos with cardiac enzymes drawn as ordered. Will continue to monitor.
MARIZOL Ott notified and made aware; Pt made NPO for the night; No PO meds to be given tonight; Will reassess in the morning.
NP aware, continue to monitor, straight cth
NP aware. EKG ordered. Plan endorsed to day RN.
PA notified. Pending further orders, will continue to monitor.
PA notified. To monitor on telemetry closely.
Provider orders to administer 1 dose of lopressor 12.5mg. 12.5 mg of lopressor given.
Provider orders to reschedule lopressor 2 hours later.
Provider aware. IR notified. IR to send MD to assess the site .
Provider notified, seen at bedside, pt placed on 2L nasal cannula, CXR ordered, will continue to monitor closely.
Provider orders to reschedule the medication to 8am and to recheck the BP then.

## 2021-03-22 NOTE — PROVIDER CONTACT NOTE (OTHER) - ASSESSMENT
RN and PCA tried to feed pt his dinner and pt was unable to swallow food fully. Pt started to spit up and started to desat to 87% on 2L NC. Suctioned patient immediately and food particles suctioned out of patients mouth. Pt denies feeling any respiratory distress at this time and states that he feels ok.

## 2021-03-22 NOTE — PROGRESS NOTE ADULT - ASSESSMENT
79 yo male w h/o DM, CAD, s/p CABG, CKD 3-4.   Recently evaluated by Nephrology for elevated creatinine. Bloodwork  returned notable for  and Na 126; in light of these findings, Dr. Luz (Renal) sent the patient to the ER. Lately ptn has been having abdominal pain with elevated amylase and lipase.   As per ptn's wife: on and off R abd pain w generalized weakness and poor po intake. PCP DCed po Lasix in past few days 2/2 abnormal blood test results.  Ptn denies recent NSAID use.  He was diagnosed with diabetes 18 years ago, and denies retinopathy (last saw Optho around 2 years ago). Cardiologist has alluded to the patient having mild renal impairment in the past, as per wife.    Patient's PCP is Dr. Shyann De León; GI Dr. Wilman Villagran; Cardiology Dr. Boyce.  (16 Feb 2021 21:05)    ER vs: T 98.1, P 85, /64. WBC 8.2.  Bun/Cr - 100/2.0.  LFTs mildly elevated, Roxana 217, Lip 185.  Pct 0.2.  UA (-).  Ct c/a/p shows pulmonary edema, sm B/L pleural effusions, sm volume ascites.  VQ scan with very low probability for PE.      Pt started on zosyn to cover for intra-abd source.  ID consulted.     r/o Gallstone pancreatitis:    - Pt p/w abdominal pain, epigastric and RUQ pain, noted to have elevated LFTs and amylase/lipase.  Possible gallstone pancreatitis?    - U/S abdomen shows cholelithiasis without acute cholecystitis.  Borderline to mild gallbladder thickening is likely due to ascites.   Moderate ascites noted.  Suggestion of mild renal parenchymal disease and bilateral pleural effusions.    - CTap w/o acute intra-abd pathology    - monitor LFTs, Roxana, Lipase.  As per GI, ascites likely secondary to CHF, no evidence for pancreatitis.     - Monitor temp curve and WBC.      - bcx neg. completed  coverage with zosyn x 7 days, 2/23.  Abd pain resolved      {[id number]}Renal failure:    - Renal following for CKD.  Dose abx for GFR 15-20.  On q12 hr dosing of zosyn.     - Cr rising.  CALVIN on CKD does not appear to be antibiotic mediated, renal f/u noted.      - Pt requiring HD,  shiley catheter placed 2/26, s/p conversion to tunnelled catheter - 3/4    Aortic stenosis:    - Followed by Structural health team for TAVR evaluation    Covid (+):    - Repeat Covid pcr on 2/25 tested positive.   On 1L nc, satting >94%    - No indication for Covid therapeutics at this time.  Cont supportive care, incentive spirometery.  Monitor pulse ox    - Repeat covid test remains positive on 3/15 - likely secondary to nonviable virus.  Pt > 10 days since positive test, pt afebrile.  On < 4L nc.  Off airborne precautions.     - Repeat covid test 3/19 (-).      * No acute ID issues at this time.  Pt remains afebrile        Rupali Schwarz  [phone]

## 2021-03-22 NOTE — PROGRESS NOTE ADULT - SUBJECTIVE AND OBJECTIVE BOX
Infectious Diseases progress note:    Subjective: Resting comfortably, no new complaints.  Satting adequately on nc    ROS:  CONSTITUTIONAL:  No fever, chills, rigors  CARDIOVASCULAR:  No chest pain or palpitations  RESPIRATORY:   No SOB, cough, dyspnea on exertion.  No wheezing  GASTROINTESTINAL:  No abd pain, N/V, diarrhea/constipation  EXTREMITIES:  No swelling or joint pain  GENITOURINARY:  No burning on urination, increased frequency or urgency.  No flank pain  NEUROLOGIC:  No HA, visual disturbances  SKIN: No rashes    Allergies    No Known Allergies    Intolerances        ANTIBIOTICS/RELEVANT:  antimicrobials  piperacillin/tazobactam IVPB. 3.375 Gram(s) IV Intermittent once  piperacillin/tazobactam IVPB.. 3.375 Gram(s) IV Intermittent every 12 hours    immunologic:  epoetin rafiq-epbx (RETACRIT) Injectable 17753 Unit(s) IV Push <User Schedule>  influenza   Vaccine 0.5 milliLiter(s) IntraMuscular once    OTHER:  aspirin  chewable 81 milliGRAM(s) Oral daily  atorvastatin 80 milliGRAM(s) Oral at bedtime  benzonatate 100 milliGRAM(s) Oral every 8 hours  bisacodyl Suppository 10 milliGRAM(s) Rectal daily PRN  chlorhexidine 4% Liquid 1 Application(s) Topical <User Schedule>  dextrose 40% Gel 15 Gram(s) Oral once  dextrose 5%. 1000 milliLiter(s) IV Continuous <Continuous>  dextrose 5%. 1000 milliLiter(s) IV Continuous <Continuous>  dextrose 50% Injectable 25 Gram(s) IV Push once  dextrose 50% Injectable 12.5 Gram(s) IV Push once  dextrose 50% Injectable 25 Gram(s) IV Push once  gabapentin 100 milliGRAM(s) Oral at bedtime  glucagon  Injectable 1 milliGRAM(s) IntraMuscular once  guaiFENesin   Syrup  (Sugar-Free) 200 milliGRAM(s) Oral every 6 hours PRN  heparin   Injectable 5000 Unit(s) SubCutaneous every 8 hours  insulin lispro (ADMELOG) corrective regimen sliding scale   SubCutaneous three times a day before meals  insulin lispro (ADMELOG) corrective regimen sliding scale   SubCutaneous at bedtime  levothyroxine 25 MICROGram(s) Oral daily  metoprolol tartrate 25 milliGRAM(s) Oral every 12 hours  midodrine. 10 milliGRAM(s) Oral three times a day  midodrine. 10 milliGRAM(s) Oral <User Schedule>  pantoprazole    Tablet 40 milliGRAM(s) Oral before breakfast  polyethylene glycol 3350 17 Gram(s) Oral daily  prasugrel 10 milliGRAM(s) Oral daily  senna 2 Tablet(s) Oral at bedtime  sodium chloride 0.9% lock flush 10 milliLiter(s) IV Push every 1 hour PRN      Objective:  Vital Signs Last 24 Hrs  T(C): 36.6 (23 Mar 2021 05:02), Max: 37.1 (22 Mar 2021 16:48)  T(F): 97.9 (23 Mar 2021 05:02), Max: 98.7 (22 Mar 2021 16:48)  HR: 80 (23 Mar 2021 05:02) (70 - 88)  BP: 103/63 (23 Mar 2021 05:02) (87/42 - 103/63)  BP(mean): --  RR: 20 (23 Mar 2021 05:02) (18 - 20)  SpO2: 94% (23 Mar 2021 05:02) (94% - 96%)    PHYSICAL EXAM:  Constitutional:NAD  Eyes:CHERI, EOMI  Ear/Nose/Throat: no thrush, mucositis.  Moist mucous membranes	  Neck:no JVD, no lymphadenopathy, supple  Respiratory: CTA nadiya  Cardiovascular: S1S2 RRR, no murmurs  Gastrointestinal:soft, nontender,  nondistended (+) BS  Extremities:no e/e/c  Skin:  no rashes, open wounds or ulcerations        LABS:                        10.2   18.52 )-----------( 238      ( 23 Mar 2021 06:30 )             33.7     03-23    133<L>  |  96  |  74<H>  ----------------------------<  85  6.0<H>   |  20<L>  |  4.99<H>    Ca    9.5      23 Mar 2021 06:32    TPro  7.0  /  Alb  2.5<L>  /  TBili  1.6<H>  /  DBili  x   /  AST  72<H>  /  ALT  94<H>  /  AlkPhos  195<H>  03-23                            MICROBIOLOGY:          RADIOLOGY & ADDITIONAL STUDIES:

## 2021-03-22 NOTE — PROGRESS NOTE ADULT - MINUTES
35
70
35
45
70
70
35
35
45
70
35
70
35
70
35
35
70
35
49
35
65
35

## 2021-03-22 NOTE — PROVIDER CONTACT NOTE (OTHER) - REASON
RIJ perm catheter bleeding
Discontinuation of Isolation for COVID patient
11 beats of WCT
Ectopy: AIVR
Ectopy: WCT
Ectopy: WCT / VT
Ectopy: WCT vs. PMT
Lopressor 25mg 6am dose
Possible WTC on tele
Pt not urinating, bladder scan showed 343
Pt unable to swallow dinner
RIJ permacath bleeding
Pt sustained in PMT on and off, longest 6 mins, pt c/o chest pain nonradiating
Lopressor 25mg
Pt c/o SOB

## 2021-03-22 NOTE — PROVIDER CONTACT NOTE (OTHER) - BACKGROUND
Admitting dx: weakness  PMH: renal failure, DM, HTN
patient gets HD today this morning, with midorine.
Admitting dx: weakness  PMH: CAD, renal failure, DM
Admitting dx: weakness  PMH: CAD, renal failure, DM
Patient had similar beats of WCT on tele approximately 1 month ago.
Patient has not been having his lopressor 25mg for the past 48 hours.
Pt presents w/ abdominal pain and generalized weakness. New ESRD pt requiring initiation of HD s/p R femoral shiley;
new HD pt this admission recvd HD 3/21
Admitting dx: weakness  PMH: renal failure, DM, HTN
Permacath with IR today
Pt admitted for weakness. PMH: renal failure, HTN, CAD, DM. Pt s/p RIJ permacath placement in IR 3/4.
pt admitted for Elevated BUN/creatinine - unclear baseline         Acute on chronic systolic CHF, EP on board, PPM changes made if no improvement will 	consider ablation
admitting diagnosis weakness.
Pt admitted for weakness. PMH: HTN, CAD, renal failure, DM.

## 2021-03-23 LAB
ALBUMIN SERPL ELPH-MCNC: 2.5 G/DL — LOW (ref 3.3–5)
ALP SERPL-CCNC: 195 U/L — HIGH (ref 40–120)
ALT FLD-CCNC: 94 U/L — HIGH (ref 10–45)
ANION GAP SERPL CALC-SCNC: 17 MMOL/L — SIGNIFICANT CHANGE UP (ref 5–17)
AST SERPL-CCNC: 72 U/L — HIGH (ref 10–40)
BILIRUB SERPL-MCNC: 1.6 MG/DL — HIGH (ref 0.2–1.2)
BUN SERPL-MCNC: 74 MG/DL — HIGH (ref 7–23)
CALCIUM SERPL-MCNC: 9.5 MG/DL — SIGNIFICANT CHANGE UP (ref 8.4–10.5)
CHLORIDE SERPL-SCNC: 96 MMOL/L — SIGNIFICANT CHANGE UP (ref 96–108)
CHLORIDE SERPL-SCNC: 98 MMOL/L — SIGNIFICANT CHANGE UP (ref 96–108)
CO2 SERPL-SCNC: 20 MMOL/L — LOW (ref 22–31)
CREAT SERPL-MCNC: 4.99 MG/DL — HIGH (ref 0.5–1.3)
D DIMER BLD IA.RAPID-MCNC: 645 NG/ML DDU — HIGH
GAS PNL BLDA: SIGNIFICANT CHANGE UP
GLUCOSE BLDC GLUCOMTR-MCNC: 102 MG/DL — HIGH (ref 70–99)
GLUCOSE BLDC GLUCOMTR-MCNC: 103 MG/DL — HIGH (ref 70–99)
GLUCOSE BLDC GLUCOMTR-MCNC: 108 MG/DL — HIGH (ref 70–99)
GLUCOSE BLDC GLUCOMTR-MCNC: 87 MG/DL — SIGNIFICANT CHANGE UP (ref 70–99)
GLUCOSE BLDC GLUCOMTR-MCNC: 99 MG/DL — SIGNIFICANT CHANGE UP (ref 70–99)
GLUCOSE SERPL-MCNC: 85 MG/DL — SIGNIFICANT CHANGE UP (ref 70–99)
HCT VFR BLD CALC: 31.8 % — LOW (ref 39–50)
HCT VFR BLD CALC: 33.7 % — LOW (ref 39–50)
HGB BLD-MCNC: 10.2 G/DL — LOW (ref 13–17)
HGB BLD-MCNC: 9.7 G/DL — LOW (ref 13–17)
MAGNESIUM SERPL-MCNC: 2.8 MG/DL — HIGH (ref 1.6–2.6)
MCHC RBC-ENTMCNC: 29.9 PG — SIGNIFICANT CHANGE UP (ref 27–34)
MCHC RBC-ENTMCNC: 30.3 GM/DL — LOW (ref 32–36)
MCHC RBC-ENTMCNC: 30.3 PG — SIGNIFICANT CHANGE UP (ref 27–34)
MCHC RBC-ENTMCNC: 30.5 GM/DL — LOW (ref 32–36)
MCV RBC AUTO: 98.8 FL — SIGNIFICANT CHANGE UP (ref 80–100)
MCV RBC AUTO: 99.4 FL — SIGNIFICANT CHANGE UP (ref 80–100)
NRBC # BLD: 0 /100 WBCS — SIGNIFICANT CHANGE UP (ref 0–0)
PHOSPHATE SERPL-MCNC: 7.7 MG/DL — HIGH (ref 2.5–4.5)
PLATELET # BLD AUTO: 238 K/UL — SIGNIFICANT CHANGE UP (ref 150–400)
PLATELET # BLD AUTO: 253 K/UL — SIGNIFICANT CHANGE UP (ref 150–400)
POTASSIUM SERPL-MCNC: 6 MMOL/L — HIGH (ref 3.5–5.3)
POTASSIUM SERPL-SCNC: 6 MMOL/L — HIGH (ref 3.5–5.3)
PROT SERPL-MCNC: 7 G/DL — SIGNIFICANT CHANGE UP (ref 6–8.3)
RBC # BLD: 3.2 M/UL — LOW (ref 4.2–5.8)
RBC # BLD: 3.41 M/UL — LOW (ref 4.2–5.8)
RBC # FLD: 20.1 % — HIGH (ref 10.3–14.5)
RBC # FLD: 20.6 % — HIGH (ref 10.3–14.5)
SODIUM SERPL-SCNC: 133 MMOL/L — LOW (ref 135–145)
SODIUM SERPL-SCNC: 134 MMOL/L — LOW (ref 135–145)
WBC # BLD: 18.52 K/UL — HIGH (ref 3.8–10.5)
WBC # BLD: 24.94 K/UL — HIGH (ref 3.8–10.5)
WBC # FLD AUTO: 18.52 K/UL — HIGH (ref 3.8–10.5)
WBC # FLD AUTO: 24.94 K/UL — HIGH (ref 3.8–10.5)

## 2021-03-23 PROCEDURE — 93010 ELECTROCARDIOGRAM REPORT: CPT

## 2021-03-23 PROCEDURE — 71045 X-RAY EXAM CHEST 1 VIEW: CPT | Mod: 26

## 2021-03-23 PROCEDURE — 99291 CRITICAL CARE FIRST HOUR: CPT | Mod: 25

## 2021-03-23 RX ORDER — ACETAMINOPHEN 500 MG
1000 TABLET ORAL ONCE
Refills: 0 | Status: DISCONTINUED | OUTPATIENT
Start: 2021-03-23 | End: 2021-03-23

## 2021-03-23 RX ORDER — NOREPINEPHRINE BITARTRATE/D5W 8 MG/250ML
0.05 PLASTIC BAG, INJECTION (ML) INTRAVENOUS
Qty: 8 | Refills: 0 | Status: DISCONTINUED | OUTPATIENT
Start: 2021-03-23 | End: 2021-03-24

## 2021-03-23 RX ORDER — SODIUM CHLORIDE 9 MG/ML
250 INJECTION INTRAMUSCULAR; INTRAVENOUS; SUBCUTANEOUS ONCE
Refills: 0 | Status: DISCONTINUED | OUTPATIENT
Start: 2021-03-23 | End: 2021-03-23

## 2021-03-23 RX ORDER — IPRATROPIUM/ALBUTEROL SULFATE 18-103MCG
3 AEROSOL WITH ADAPTER (GRAM) INHALATION EVERY 6 HOURS
Refills: 0 | Status: DISCONTINUED | OUTPATIENT
Start: 2021-03-23 | End: 2021-03-24

## 2021-03-23 RX ORDER — PIPERACILLIN AND TAZOBACTAM 4; .5 G/20ML; G/20ML
3.38 INJECTION, POWDER, LYOPHILIZED, FOR SOLUTION INTRAVENOUS EVERY 12 HOURS
Refills: 0 | Status: DISCONTINUED | OUTPATIENT
Start: 2021-03-23 | End: 2021-03-24

## 2021-03-23 RX ORDER — MIDODRINE HYDROCHLORIDE 2.5 MG/1
10 TABLET ORAL ONCE
Refills: 0 | Status: DISCONTINUED | OUTPATIENT
Start: 2021-03-23 | End: 2021-03-23

## 2021-03-23 RX ORDER — PIPERACILLIN AND TAZOBACTAM 4; .5 G/20ML; G/20ML
3.38 INJECTION, POWDER, LYOPHILIZED, FOR SOLUTION INTRAVENOUS ONCE
Refills: 0 | Status: COMPLETED | OUTPATIENT
Start: 2021-03-23 | End: 2021-03-23

## 2021-03-23 RX ADMIN — Medication 25 MICROGRAM(S): at 06:27

## 2021-03-23 RX ADMIN — HEPARIN SODIUM 5000 UNIT(S): 5000 INJECTION INTRAVENOUS; SUBCUTANEOUS at 06:26

## 2021-03-23 RX ADMIN — Medication 81 MILLIGRAM(S): at 13:16

## 2021-03-23 RX ADMIN — PANTOPRAZOLE SODIUM 40 MILLIGRAM(S): 20 TABLET, DELAYED RELEASE ORAL at 06:31

## 2021-03-23 RX ADMIN — POLYETHYLENE GLYCOL 3350 17 GRAM(S): 17 POWDER, FOR SOLUTION ORAL at 13:17

## 2021-03-23 RX ADMIN — MIDODRINE HYDROCHLORIDE 10 MILLIGRAM(S): 2.5 TABLET ORAL at 12:45

## 2021-03-23 RX ADMIN — ERYTHROPOIETIN 10000 UNIT(S): 10000 INJECTION, SOLUTION INTRAVENOUS; SUBCUTANEOUS at 19:05

## 2021-03-23 RX ADMIN — Medication 100 MILLIGRAM(S): at 13:16

## 2021-03-23 RX ADMIN — MIDODRINE HYDROCHLORIDE 10 MILLIGRAM(S): 2.5 TABLET ORAL at 18:00

## 2021-03-23 RX ADMIN — PIPERACILLIN AND TAZOBACTAM 25 GRAM(S): 4; .5 INJECTION, POWDER, LYOPHILIZED, FOR SOLUTION INTRAVENOUS at 18:01

## 2021-03-23 RX ADMIN — PIPERACILLIN AND TAZOBACTAM 200 GRAM(S): 4; .5 INJECTION, POWDER, LYOPHILIZED, FOR SOLUTION INTRAVENOUS at 10:47

## 2021-03-23 RX ADMIN — MIDODRINE HYDROCHLORIDE 10 MILLIGRAM(S): 2.5 TABLET ORAL at 06:26

## 2021-03-23 RX ADMIN — CHLORHEXIDINE GLUCONATE 1 APPLICATION(S): 213 SOLUTION TOPICAL at 10:20

## 2021-03-23 RX ADMIN — PRASUGREL 10 MILLIGRAM(S): 5 TABLET, FILM COATED ORAL at 13:16

## 2021-03-23 RX ADMIN — HEPARIN SODIUM 5000 UNIT(S): 5000 INJECTION INTRAVENOUS; SUBCUTANEOUS at 14:58

## 2021-03-23 NOTE — CHART NOTE - NSCHARTNOTEFT_GEN_A_CORE
MICU Accept Note    CHIEF COMPLAINT: Abdominal pain    HPI / INTERVAL HISTORY:  81 yo male w h/o DM, CAD, s/p CABG, CKD 3-4. He saw Dr. Abel Luz yesterday as a new consult for elevated creatinine. Bloodwork returned notable for  and Na 126; in light of these findings, Dr. Luz sent the patient to the ER. Lately ptn has been having abdominal pain with elevated amylase and lipase.   As per ptn's wife: on and off R abd pain w generalized weakness and poor po intake. PCP DCed po Lasix in past few days 2/2 abnormal blood test results.  Ptn denies recent NSAID use.  He was diagnosed with diabetes 18 years ago, and denies retinopathy (last saw Optho around 2 years ago). Cardiologist has alluded to the patient having mild renal impairment in the past, as per wife.  Patient's PCP is Dr. Shyann De León; GI Dr. Wilman Villagran; Cardiology Dr. Boyce.  (16 Feb 2021 21:05)  Since admission, Abd sono showed gallstones, but no sign of cholecystitis/pancreatitis. abd pain resolved on Zosyn, s/ p treatment empirically for 7 days . ID following. Pt found to have fluid overload 2/2 acute on chronic CHF which responded to lasix. Pt has been on HD since 2/26. HD as per renal. changed from MWF to TTSa,  RIJ tunneled catheter placed on 3/4 in IR, no fistula placement for now, Pt completed amiodarone loading for NSVT/VT and was being maintained on lopressor 25mg BID. Pt with EF 15% and severe AS evaluated for TAVR but found not to be a candidate. BP has been maintained initially 5 TID on non-dialysis days and 10TID on dialysis days. Pt acquired COVID during this admission in February and had COVID+ March 15th, but more recently had COVID - and is now off isolation.   On 3/22, patient had possible aspiration event.         PAST MEDICAL & SURGICAL HISTORY:  Renal failure    CAD (coronary artery disease)    Hypertension    Diabetes mellitus        FAMILY HISTORY:      SOCIAL HISTORY:  Smoking:   Substance Use:   EtOH Use:   Marital Status:   Sexual History:   Occupation:  Recent Travel:  Country of Birth:   Advance Directives:     HOME MEDICATIONS:      Allergies    No Known Allergies    Intolerances          REVIEW OF SYSTEMS:  Constitutional: No fevers, chills, weight loss, weight gain  HEENT: No vision problems, eye pain, nasal congestion, rhinorrhea, sore throat, dysphagia  CV: No chest pain, orthopnea, palpitations  Resp: No cough, dyspnea, wheezing, hemoptysis  GI: No nausea, vomiting, diarrhea, constipation, abdominal pain  : [ ] dysuria [ ] nocturia [ ] hematuria [ ] increased urinary frequency  Musculoskeletal: [ ] back pain [ ] myalgias [ ] arthralgias [ ] fracture  Skin: [ ] rash [ ] itch  Neurological: [ ] headache [ ] dizziness [ ] syncope [ ] weakness [ ] numbness  Psychiatric: [ ] anxiety [ ] depression  Endocrine: [ ] diabetes [ ] thyroid problem  Hematologic/Lymphatic: [ ] anemia [ ] bleeding problem  Allergic/Immunologic: [ ] itchy eyes [ ] nasal discharge [ ] hives [ ] angioedema  [ ] All other systems negative  [ ] Unable to assess ROS because ________    OBJECTIVE:  ICU Vital Signs Last 24 Hrs  T(C): 36.7 (23 Mar 2021 21:26), Max: 36.8 (23 Mar 2021 00:12)  T(F): 98 (23 Mar 2021 21:26), Max: 98.2 (23 Mar 2021 00:12)  HR: 93 (23 Mar 2021 21:26) (80 - 93)  BP: 71/42 (23 Mar 2021 21:26) (71/42 - 106/65)  BP(mean): --  ABP: --  ABP(mean): --  RR: 18 (23 Mar 2021 21:26) (18 - 20)  SpO2: 96% (23 Mar 2021 21:26) (91% - 96%)        03-22 @ 07:01 - 03-23 @ 07:00  --------------------------------------------------------  IN: 360 mL / OUT: 0 mL / NET: 360 mL    03-23 @ 07:01 - 03-23 @ 23:05  --------------------------------------------------------  IN: 400 mL / OUT: 0 mL / NET: 400 mL      CAPILLARY BLOOD GLUCOSE      POCT Blood Glucose.: 87 mg/dL (23 Mar 2021 22:02)      PHYSICAL EXAM:  General:   HEENT:   Neck:   Chest/Lungs:  Heart:  Abdomen:   Extremities:   Skin:   Neuro:   Psych:     LINES:     HOSPITAL MEDICATIONS:  MEDICATIONS  (STANDING):  acetaminophen  IVPB .. 1000 milliGRAM(s) IV Intermittent once  albuterol/ipratropium for Nebulization 3 milliLiter(s) Nebulizer every 6 hours  aspirin  chewable 81 milliGRAM(s) Oral daily  atorvastatin 80 milliGRAM(s) Oral at bedtime  benzonatate 100 milliGRAM(s) Oral every 8 hours  chlorhexidine 4% Liquid 1 Application(s) Topical <User Schedule>  dextrose 40% Gel 15 Gram(s) Oral once  dextrose 5%. 1000 milliLiter(s) (50 mL/Hr) IV Continuous <Continuous>  dextrose 5%. 1000 milliLiter(s) (100 mL/Hr) IV Continuous <Continuous>  dextrose 50% Injectable 25 Gram(s) IV Push once  dextrose 50% Injectable 12.5 Gram(s) IV Push once  dextrose 50% Injectable 25 Gram(s) IV Push once  epoetin rafiq-epbx (RETACRIT) Injectable 06368 Unit(s) IV Push <User Schedule>  gabapentin 100 milliGRAM(s) Oral at bedtime  glucagon  Injectable 1 milliGRAM(s) IntraMuscular once  heparin   Injectable 5000 Unit(s) SubCutaneous every 8 hours  influenza   Vaccine 0.5 milliLiter(s) IntraMuscular once  insulin lispro (ADMELOG) corrective regimen sliding scale   SubCutaneous three times a day before meals  insulin lispro (ADMELOG) corrective regimen sliding scale   SubCutaneous at bedtime  levothyroxine 25 MICROGram(s) Oral daily  metoprolol tartrate 25 milliGRAM(s) Oral every 12 hours  midodrine 10 milliGRAM(s) Oral once  midodrine. 10 milliGRAM(s) Oral <User Schedule>  midodrine. 10 milliGRAM(s) Oral three times a day  pantoprazole    Tablet 40 milliGRAM(s) Oral before breakfast  piperacillin/tazobactam IVPB.. 3.375 Gram(s) IV Intermittent every 12 hours  polyethylene glycol 3350 17 Gram(s) Oral daily  prasugrel 10 milliGRAM(s) Oral daily  senna 2 Tablet(s) Oral at bedtime  sodium chloride 0.9% Bolus 250 milliLiter(s) IV Bolus once    MEDICATIONS  (PRN):  bisacodyl Suppository 10 milliGRAM(s) Rectal daily PRN Constipation  guaiFENesin   Syrup  (Sugar-Free) 200 milliGRAM(s) Oral every 6 hours PRN Cough  sodium chloride 0.9% lock flush 10 milliLiter(s) IV Push every 1 hour PRN Pre/post blood products, medications, blood draw, and to maintain line patency      LABS:                        10.2   18.52 )-----------( 238      ( 23 Mar 2021 06:30 )             33.7     Hgb Trend: 10.2<--, 10.0<--, 9.9<--, 9.6<--, 9.3<--  03-23    133<L>  |  96  |  74<H>  ----------------------------<  85  6.0<H>   |  20<L>  |  4.99<H>    Ca    9.5      23 Mar 2021 06:32    TPro  7.0  /  Alb  2.5<L>  /  TBili  1.6<H>  /  DBili  x   /  AST  72<H>  /  ALT  94<H>  /  AlkPhos  195<H>  03-23    Creatinine Trend: 4.99<--, 4.37<--, 3.38<--, 4.33<--, 3.42<--, 4.45<--            MICROBIOLOGY:     RADIOLOGY & ADDITIONAL TESTS:      ASSESSMENT AND PLAN:  Mr./Mrs./Ms. is a ___    #Neuro    #Cardiovascular    #Respiratory    #GI/Nutrition    #/Renal    #Skin    #ID    #Endocrine    #Hematologic/DVT ppx    #Ethics      Tank Jewell MD  PGY-1 MICU Accept Note    CHIEF COMPLAINT: Abdominal pain    HPI / INTERVAL HISTORY:  79 yo male w h/o DM, CAD, s/p CABG, CKD 3-4. He saw Dr. Abel Luz yesterday as a new consult for elevated creatinine. Bloodwork returned notable for  and Na 126; in light of these findings, Dr. Luz sent the patient to the ER. Lately ptn has been having abdominal pain with elevated amylase and lipase.   As per ptn's wife: on and off R abd pain w generalized weakness and poor po intake. PCP DCed po Lasix in past few days 2/2 abnormal blood test results.  Ptn denies recent NSAID use.  He was diagnosed with diabetes 18 years ago, and denies retinopathy (last saw Optho around 2 years ago). Cardiologist has alluded to the patient having mild renal impairment in the past, as per wife.  Patient's PCP is Dr. Shyann De León; GI Dr. Wilman Villagran; Cardiology Dr. Boyce.  (16 Feb 2021 21:05)  Since admission, Abd sono showed gallstones, but no sign of cholecystitis/pancreatitis. abd pain resolved on Zosyn, s/ p treatment empirically for 7 days . ID following. Pt found to have fluid overload 2/2 acute on chronic CHF which responded to lasix. Pt has been on HD since 2/26. HD as per renal. changed from MWF to TTSa,  RIJ tunneled catheter placed on 3/4 in IR, no fistula placement for now, Pt completed amiodarone loading for NSVT/VT and was being maintained on lopressor 25mg BID. Pt with EF 15% and severe AS evaluated for TAVR but found not to be a candidate. BP has been maintained initially 5 TID on non-dialysis days and 10TID on dialysis days. Pt acquired COVID during this admission in February and had COVID+ March 15th, but more recently had COVID - and is now off isolation.   On 3/22, patient had possible aspiration event.  pt unable to swallow food fully and  started to spit up and started to desat to 87% on 2L NC Pt started on zosyn. On 3/23, patient became more hypoxic and hypotensive requiring addition of levophed for pressure support. Pt transferred to MICU for further evaluation and management on levophed drip.         PAST MEDICAL & SURGICAL HISTORY:  Renal failure    CAD (coronary artery disease)    Hypertension    Diabetes mellitus        FAMILY HISTORY:      SOCIAL HISTORY:  Smoking:   Substance Use:   EtOH Use:   Marital Status:   Sexual History:   Occupation:  Recent Travel:  Country of Birth:   Advance Directives:     HOME MEDICATIONS:      Allergies    No Known Allergies    Intolerances          REVIEW OF SYSTEMS:  Constitutional: No fevers, chills, weight loss, weight gain  HEENT: No vision problems, eye pain, nasal congestion, rhinorrhea, sore throat, dysphagia  CV: No chest pain, orthopnea, palpitations  Resp: No cough, dyspnea, wheezing, hemoptysis  GI: No nausea, vomiting, diarrhea, constipation, abdominal pain  : [ ] dysuria [ ] nocturia [ ] hematuria [ ] increased urinary frequency  Musculoskeletal: [ ] back pain [ ] myalgias [ ] arthralgias [ ] fracture  Skin: [ ] rash [ ] itch  Neurological: [ ] headache [ ] dizziness [ ] syncope [ ] weakness [ ] numbness  Psychiatric: [ ] anxiety [ ] depression  Endocrine: [ ] diabetes [ ] thyroid problem  Hematologic/Lymphatic: [ ] anemia [ ] bleeding problem  Allergic/Immunologic: [ ] itchy eyes [ ] nasal discharge [ ] hives [ ] angioedema  [ ] All other systems negative  [ ] Unable to assess ROS because ________    OBJECTIVE:  ICU Vital Signs Last 24 Hrs  T(C): 36.7 (23 Mar 2021 21:26), Max: 36.8 (23 Mar 2021 00:12)  T(F): 98 (23 Mar 2021 21:26), Max: 98.2 (23 Mar 2021 00:12)  HR: 93 (23 Mar 2021 21:26) (80 - 93)  BP: 71/42 (23 Mar 2021 21:26) (71/42 - 106/65)  BP(mean): --  ABP: --  ABP(mean): --  RR: 18 (23 Mar 2021 21:26) (18 - 20)  SpO2: 96% (23 Mar 2021 21:26) (91% - 96%)        03-22 @ 07:01  -  03-23 @ 07:00  --------------------------------------------------------  IN: 360 mL / OUT: 0 mL / NET: 360 mL    03-23 @ 07:01  - 03-23 @ 23:05  --------------------------------------------------------  IN: 400 mL / OUT: 0 mL / NET: 400 mL      CAPILLARY BLOOD GLUCOSE      POCT Blood Glucose.: 87 mg/dL (23 Mar 2021 22:02)      PHYSICAL EXAM:  General:   HEENT:   Neck:   Chest/Lungs:  Heart:  Abdomen:   Extremities:   Skin:   Neuro:   Psych:     LINES:     HOSPITAL MEDICATIONS:  MEDICATIONS  (STANDING):  acetaminophen  IVPB .. 1000 milliGRAM(s) IV Intermittent once  albuterol/ipratropium for Nebulization 3 milliLiter(s) Nebulizer every 6 hours  aspirin  chewable 81 milliGRAM(s) Oral daily  atorvastatin 80 milliGRAM(s) Oral at bedtime  benzonatate 100 milliGRAM(s) Oral every 8 hours  chlorhexidine 4% Liquid 1 Application(s) Topical <User Schedule>  dextrose 40% Gel 15 Gram(s) Oral once  dextrose 5%. 1000 milliLiter(s) (50 mL/Hr) IV Continuous <Continuous>  dextrose 5%. 1000 milliLiter(s) (100 mL/Hr) IV Continuous <Continuous>  dextrose 50% Injectable 25 Gram(s) IV Push once  dextrose 50% Injectable 12.5 Gram(s) IV Push once  dextrose 50% Injectable 25 Gram(s) IV Push once  epoetin rafiq-epbx (RETACRIT) Injectable 01444 Unit(s) IV Push <User Schedule>  gabapentin 100 milliGRAM(s) Oral at bedtime  glucagon  Injectable 1 milliGRAM(s) IntraMuscular once  heparin   Injectable 5000 Unit(s) SubCutaneous every 8 hours  influenza   Vaccine 0.5 milliLiter(s) IntraMuscular once  insulin lispro (ADMELOG) corrective regimen sliding scale   SubCutaneous three times a day before meals  insulin lispro (ADMELOG) corrective regimen sliding scale   SubCutaneous at bedtime  levothyroxine 25 MICROGram(s) Oral daily  metoprolol tartrate 25 milliGRAM(s) Oral every 12 hours  midodrine 10 milliGRAM(s) Oral once  midodrine. 10 milliGRAM(s) Oral <User Schedule>  midodrine. 10 milliGRAM(s) Oral three times a day  pantoprazole    Tablet 40 milliGRAM(s) Oral before breakfast  piperacillin/tazobactam IVPB.. 3.375 Gram(s) IV Intermittent every 12 hours  polyethylene glycol 3350 17 Gram(s) Oral daily  prasugrel 10 milliGRAM(s) Oral daily  senna 2 Tablet(s) Oral at bedtime  sodium chloride 0.9% Bolus 250 milliLiter(s) IV Bolus once    MEDICATIONS  (PRN):  bisacodyl Suppository 10 milliGRAM(s) Rectal daily PRN Constipation  guaiFENesin   Syrup  (Sugar-Free) 200 milliGRAM(s) Oral every 6 hours PRN Cough  sodium chloride 0.9% lock flush 10 milliLiter(s) IV Push every 1 hour PRN Pre/post blood products, medications, blood draw, and to maintain line patency      LABS:                        10.2   18.52 )-----------( 238      ( 23 Mar 2021 06:30 )             33.7     Hgb Trend: 10.2<--, 10.0<--, 9.9<--, 9.6<--, 9.3<--  03-23    133<L>  |  96  |  74<H>  ----------------------------<  85  6.0<H>   |  20<L>  |  4.99<H>    Ca    9.5      23 Mar 2021 06:32    TPro  7.0  /  Alb  2.5<L>  /  TBili  1.6<H>  /  DBili  x   /  AST  72<H>  /  ALT  94<H>  /  AlkPhos  195<H>  03-23    Creatinine Trend: 4.99<--, 4.37<--, 3.38<--, 4.33<--, 3.42<--, 4.45<--            MICROBIOLOGY:     RADIOLOGY & ADDITIONAL TESTS:      ASSESSMENT AND PLAN:  .//Ms. is a ___    #Neuro    #Cardiovascular    #Respiratory    #GI/Nutrition    #/Renal    #Skin    #ID    #Endocrine    #Hematologic/DVT ppx    #Ethics      Tank Jewell MD  PGY-1 MICU Accept Note    CHIEF COMPLAINT: Abdominal pain    HPI / INTERVAL HISTORY:  81 yo male w h/o DM, CAD, s/p CABG, CKD 3-4. He saw Dr. Abel Luz yesterday as a new consult for elevated creatinine. Bloodwork returned notable for  and Na 126; in light of these findings, Dr. Luz sent the patient to the ER. Lately ptn has been having abdominal pain with elevated amylase and lipase.   As per ptn's wife: on and off R abd pain w generalized weakness and poor po intake. PCP DCed po Lasix in past few days 2/2 abnormal blood test results.  Ptn denies recent NSAID use.  He was diagnosed with diabetes 18 years ago, and denies retinopathy (last saw Optho around 2 years ago). Cardiologist has alluded to the patient having mild renal impairment in the past, as per wife.  Patient's PCP is Dr. Shyann De León; GI Dr. Wilman Villagran; Cardiology Dr. Boyce.  (16 Feb 2021 21:05)  Since admission, Abd sono showed gallstones, but no sign of cholecystitis/pancreatitis. abd pain resolved on Zosyn, s/ p treatment empirically for 7 days . ID following. Pt found to have fluid overload 2/2 acute on chronic CHF which responded to lasix. Pt has been on HD since 2/26. HD as per renal. changed from MWF to TTSa,  RIJ tunneled catheter placed on 3/4 in IR, no fistula placement for now, Pt completed amiodarone loading for NSVT/VT and was being maintained on lopressor 25mg BID. Pt with EF 15% and severe AS evaluated for TAVR but found not to be a candidate. BP has been maintained initially 5 TID on non-dialysis days and 10TID on dialysis days. Pt acquired COVID during this admission in February and had COVID+ March 15th, but more recently had COVID - and is now off isolation.   On 3/22, patient had possible aspiration event.  pt unable to swallow food fully and  started to spit up and started to desat to 87% on 2L NC Pt started on zosyn. On 3/23, patient became more hypoxic and hypotensive requiring addition of levophed for pressure support. Pt transferred to MICU for further evaluation and management on levophed drip.         PAST MEDICAL & SURGICAL HISTORY:  Renal failure    CAD (coronary artery disease)    Hypertension    Diabetes mellitus        FAMILY HISTORY:      SOCIAL HISTORY:  Smoking: No   Substance Use: No  EtOH Use: No  Marital Status:   Advance Directives:     HOME MEDICATIONS:      Allergies    No Known Allergies    Intolerances          REVIEW OF SYSTEMS:  Constitutional: No fevers, chills, weight loss, weight gain  HEENT: No vision problems, eye pain, nasal congestion, rhinorrhea, sore throat, dysphagia  CV: No chest pain, orthopnea, palpitations  Resp: No cough, dyspnea, wheezing, hemoptysis  GI: No nausea, vomiting, diarrhea, constipation, abdominal pain  : [ ] dysuria [ ] nocturia [ ] hematuria [ ] increased urinary frequency  Musculoskeletal: [ ] back pain [ ] myalgias [ ] arthralgias [ ] fracture  Skin: [ ] rash [ ] itch  Neurological: [ ] headache [ ] dizziness [ ] syncope [ ] weakness [ ] numbness  Psychiatric: [ ] anxiety [ ] depression  Endocrine: [ ] diabetes [ ] thyroid problem  Hematologic/Lymphatic: [ ] anemia [ ] bleeding problem  Allergic/Immunologic: [ ] itchy eyes [ ] nasal discharge [ ] hives [ ] angioedema  [ ] All other systems negative  [ ] Unable to assess ROS because ________    OBJECTIVE:  ICU Vital Signs Last 24 Hrs  T(C): 36.7 (23 Mar 2021 21:26), Max: 36.8 (23 Mar 2021 00:12)  T(F): 98 (23 Mar 2021 21:26), Max: 98.2 (23 Mar 2021 00:12)  HR: 93 (23 Mar 2021 21:26) (80 - 93)  BP: 71/42 (23 Mar 2021 21:26) (71/42 - 106/65)  BP(mean): --  ABP: --  ABP(mean): --  RR: 18 (23 Mar 2021 21:26) (18 - 20)  SpO2: 96% (23 Mar 2021 21:26) (91% - 96%)        03-22 @ 07:01  -  03-23 @ 07:00  --------------------------------------------------------  IN: 360 mL / OUT: 0 mL / NET: 360 mL    03-23 @ 07:01  -  03-23 @ 23:05  --------------------------------------------------------  IN: 400 mL / OUT: 0 mL / NET: 400 mL      CAPILLARY BLOOD GLUCOSE      POCT Blood Glucose.: 87 mg/dL (23 Mar 2021 22:02)      PHYSICAL EXAM:  GENERAL: NAD, Resting in bed  HEENT:  Head atraumatic, EOMI, PERRLA, conjunctiva and sclera clear; Moist mucous membranes, normal oropharynx  NECK: Supple, No JVD, no lymphadenopathy, no thyroid nodules or enlargement  CHEST/LUNG: Clear to auscultation bilaterally; No rales, rhonchi, wheezing, or rubs. Unlabored respirations on room air  HEART: Regular rate and rhythm; No murmurs, rubs, or gallops  ABDOMEN: Bowel sounds present; Soft, Nontender, Nondistended. No hepatomegally  EXTREMITIES:  2+ Peripheral Pulses, brisk capillary refill. No clubbing, cyanosis, or edema  NERVOUS SYSTEM:  non-focal and spontaneous movements of all extremities  Psych: acutely encephalopathic   SKIN: No rashes or lesions    LINES:     HOSPITAL MEDICATIONS:  MEDICATIONS  (STANDING):  acetaminophen  IVPB .. 1000 milliGRAM(s) IV Intermittent once  albuterol/ipratropium for Nebulization 3 milliLiter(s) Nebulizer every 6 hours  aspirin  chewable 81 milliGRAM(s) Oral daily  atorvastatin 80 milliGRAM(s) Oral at bedtime  benzonatate 100 milliGRAM(s) Oral every 8 hours  chlorhexidine 4% Liquid 1 Application(s) Topical <User Schedule>  dextrose 40% Gel 15 Gram(s) Oral once  dextrose 5%. 1000 milliLiter(s) (50 mL/Hr) IV Continuous <Continuous>  dextrose 5%. 1000 milliLiter(s) (100 mL/Hr) IV Continuous <Continuous>  dextrose 50% Injectable 25 Gram(s) IV Push once  dextrose 50% Injectable 12.5 Gram(s) IV Push once  dextrose 50% Injectable 25 Gram(s) IV Push once  epoetin rafiq-epbx (RETACRIT) Injectable 10862 Unit(s) IV Push <User Schedule>  gabapentin 100 milliGRAM(s) Oral at bedtime  glucagon  Injectable 1 milliGRAM(s) IntraMuscular once  heparin   Injectable 5000 Unit(s) SubCutaneous every 8 hours  influenza   Vaccine 0.5 milliLiter(s) IntraMuscular once  insulin lispro (ADMELOG) corrective regimen sliding scale   SubCutaneous three times a day before meals  insulin lispro (ADMELOG) corrective regimen sliding scale   SubCutaneous at bedtime  levothyroxine 25 MICROGram(s) Oral daily  metoprolol tartrate 25 milliGRAM(s) Oral every 12 hours  midodrine 10 milliGRAM(s) Oral once  midodrine. 10 milliGRAM(s) Oral <User Schedule>  midodrine. 10 milliGRAM(s) Oral three times a day  pantoprazole    Tablet 40 milliGRAM(s) Oral before breakfast  piperacillin/tazobactam IVPB.. 3.375 Gram(s) IV Intermittent every 12 hours  polyethylene glycol 3350 17 Gram(s) Oral daily  prasugrel 10 milliGRAM(s) Oral daily  senna 2 Tablet(s) Oral at bedtime  sodium chloride 0.9% Bolus 250 milliLiter(s) IV Bolus once    MEDICATIONS  (PRN):  bisacodyl Suppository 10 milliGRAM(s) Rectal daily PRN Constipation  guaiFENesin   Syrup  (Sugar-Free) 200 milliGRAM(s) Oral every 6 hours PRN Cough  sodium chloride 0.9% lock flush 10 milliLiter(s) IV Push every 1 hour PRN Pre/post blood products, medications, blood draw, and to maintain line patency      LABS:                        10.2   18.52 )-----------( 238      ( 23 Mar 2021 06:30 )             33.7     Hgb Trend: 10.2<--, 10.0<--, 9.9<--, 9.6<--, 9.3<--  03-23    133<L>  |  96  |  74<H>  ----------------------------<  85  6.0<H>   |  20<L>  |  4.99<H>    Ca    9.5      23 Mar 2021 06:32    TPro  7.0  /  Alb  2.5<L>  /  TBili  1.6<H>  /  DBili  x   /  AST  72<H>  /  ALT  94<H>  /  AlkPhos  195<H>  03-23    Creatinine Trend: 4.99<--, 4.37<--, 3.38<--, 4.33<--, 3.42<--, 4.45<--            MICROBIOLOGY:     RADIOLOGY & ADDITIONAL TESTS:      ASSESSMENT AND PLAN:  Mr. Morelos is a 81 yo male w h/o DM, CAD, s/p CABG, CKD 3-4 here after outpatient labwork found elevated BUN, hyponatremia. Pt has since been initiated on dialysis and has required midodrine TID for pressure support. CCB abdominal pain on admission with mildly elevated amylase and lipase without evidence of cholecystitis/pancreatitis. S/P zoxyn x7 days with improvement in abdominal pain. course also CCB nosocomial COVID with monoclonal Ab infusion. In addition, pt with possible aspiration event on 3/22 with subsequent increased O2 requirements, and increased hypotension on 3/23 requiring pressure support with levophed. Pt transferred to MICU for further workup and management.      #Neuro    #Cardiovascular    #Respiratory    #GI/Nutrition    #/Renal    #Skin    #ID    #Endocrine    #Hematologic/DVT ppx    #Ethics      Tank Jewell MD  PGY-1 MICU Accept Note    CHIEF COMPLAINT: Abdominal pain    HPI / INTERVAL HISTORY:  81 yo male w h/o DM, CAD, s/p CABG, CKD 3-4. He saw Dr. Abel Luz yesterday as a new consult for elevated creatinine. Bloodwork returned notable for  and Na 126; in light of these findings, Dr. Luz sent the patient to the ER. Lately ptn has been having abdominal pain with elevated amylase and lipase.   As per ptn's wife: on and off R abd pain w generalized weakness and poor po intake. PCP DCed po Lasix in past few days 2/2 abnormal blood test results.  Ptn denies recent NSAID use.  He was diagnosed with diabetes 18 years ago, and denies retinopathy (last saw Optho around 2 years ago). Cardiologist has alluded to the patient having mild renal impairment in the past, as per wife.  Patient's PCP is Dr. Shyann De León; GI Dr. Wilman Villagran; Cardiology Dr. Boyce.  (16 Feb 2021 21:05)  Since admission, Abd sono showed gallstones, but no sign of cholecystitis/pancreatitis. abd pain resolved on Zosyn, s/ p treatment empirically for 7 days . ID following. Pt found to have fluid overload 2/2 acute on chronic CHF which responded to lasix. Pt has been on HD since 2/26. HD as per renal. changed from MWF to TTSa,  RIJ tunneled catheter placed on 3/4 in IR, no fistula placement for now, Pt completed amiodarone loading for NSVT/VT and was being maintained on lopressor 25mg BID. Pt with EF 15% and severe AS evaluated for TAVR but found not to be a candidate. BP has been maintained initially 5 TID on non-dialysis days and 10TID on dialysis days. Pt acquired COVID during this admission in February and had COVID+ March 15th, but more recently had COVID - and is now off isolation.   On 3/22, patient had possible aspiration event.  pt unable to swallow food fully and  started to spit up and started to desat to 87% on 2L NC Pt started on zosyn. On 3/23, patient became more hypoxic and hypotensive requiring addition of levophed for pressure support. Pt transferred to MICU for further evaluation and management on levophed drip.         PAST MEDICAL & SURGICAL HISTORY:  Renal failure    CAD (coronary artery disease)    Hypertension    Diabetes mellitus        FAMILY HISTORY:      SOCIAL HISTORY:  Smoking: No   Substance Use: No  EtOH Use: No  Marital Status:   Advance Directives:     HOME MEDICATIONS:      Allergies    No Known Allergies    Intolerances          REVIEW OF SYSTEMS:  Constitutional: No fevers, chills, weight loss, weight gain  HEENT: No vision problems, eye pain, nasal congestion, rhinorrhea, sore throat, dysphagia  CV: No chest pain, orthopnea, palpitations  Resp: No cough, dyspnea, wheezing, hemoptysis  GI: No nausea, vomiting, diarrhea, constipation, abdominal pain  : [ ] dysuria [ ] nocturia [ ] hematuria [ ] increased urinary frequency  Musculoskeletal: [ ] back pain [ ] myalgias [ ] arthralgias [ ] fracture  Skin: [ ] rash [ ] itch  Neurological: [ ] headache [ ] dizziness [ ] syncope [ ] weakness [ ] numbness  Psychiatric: [ ] anxiety [ ] depression  Endocrine: [ ] diabetes [ ] thyroid problem  Hematologic/Lymphatic: [ ] anemia [ ] bleeding problem  Allergic/Immunologic: [ ] itchy eyes [ ] nasal discharge [ ] hives [ ] angioedema  [ ] All other systems negative  [ ] Unable to assess ROS because ________    OBJECTIVE:  ICU Vital Signs Last 24 Hrs  T(C): 36.7 (23 Mar 2021 21:26), Max: 36.8 (23 Mar 2021 00:12)  T(F): 98 (23 Mar 2021 21:26), Max: 98.2 (23 Mar 2021 00:12)  HR: 93 (23 Mar 2021 21:26) (80 - 93)  BP: 71/42 (23 Mar 2021 21:26) (71/42 - 106/65)  BP(mean): --  ABP: --  ABP(mean): --  RR: 18 (23 Mar 2021 21:26) (18 - 20)  SpO2: 96% (23 Mar 2021 21:26) (91% - 96%)        03-22 @ 07:01  -  03-23 @ 07:00  --------------------------------------------------------  IN: 360 mL / OUT: 0 mL / NET: 360 mL    03-23 @ 07:01  -  03-23 @ 23:05  --------------------------------------------------------  IN: 400 mL / OUT: 0 mL / NET: 400 mL      CAPILLARY BLOOD GLUCOSE      POCT Blood Glucose.: 87 mg/dL (23 Mar 2021 22:02)      PHYSICAL EXAM:  GENERAL: NAD, Resting in bed  HEENT:  Head atraumatic, EOMI, PERRLA, conjunctiva and sclera clear; Moist mucous membranes, normal oropharynx  NECK: Supple, No JVD, no lymphadenopathy, no thyroid nodules or enlargement  CHEST/LUNG: Clear to auscultation bilaterally; No rales, rhonchi, wheezing, or rubs. Unlabored respirations on room air  HEART: Regular rate and rhythm; No murmurs, rubs, or gallops  ABDOMEN: Bowel sounds present; Soft, Nontender, Nondistended. No hepatomegally  EXTREMITIES:  2+ Peripheral Pulses, brisk capillary refill. No clubbing, cyanosis, or edema  NERVOUS SYSTEM:  non-focal and spontaneous movements of all extremities  Psych: acutely encephalopathic   SKIN: No rashes or lesions    LINES:     HOSPITAL MEDICATIONS:  MEDICATIONS  (STANDING):  acetaminophen  IVPB .. 1000 milliGRAM(s) IV Intermittent once  albuterol/ipratropium for Nebulization 3 milliLiter(s) Nebulizer every 6 hours  aspirin  chewable 81 milliGRAM(s) Oral daily  atorvastatin 80 milliGRAM(s) Oral at bedtime  benzonatate 100 milliGRAM(s) Oral every 8 hours  chlorhexidine 4% Liquid 1 Application(s) Topical <User Schedule>  dextrose 40% Gel 15 Gram(s) Oral once  dextrose 5%. 1000 milliLiter(s) (50 mL/Hr) IV Continuous <Continuous>  dextrose 5%. 1000 milliLiter(s) (100 mL/Hr) IV Continuous <Continuous>  dextrose 50% Injectable 25 Gram(s) IV Push once  dextrose 50% Injectable 12.5 Gram(s) IV Push once  dextrose 50% Injectable 25 Gram(s) IV Push once  epoetin rafiq-epbx (RETACRIT) Injectable 71562 Unit(s) IV Push <User Schedule>  gabapentin 100 milliGRAM(s) Oral at bedtime  glucagon  Injectable 1 milliGRAM(s) IntraMuscular once  heparin   Injectable 5000 Unit(s) SubCutaneous every 8 hours  influenza   Vaccine 0.5 milliLiter(s) IntraMuscular once  insulin lispro (ADMELOG) corrective regimen sliding scale   SubCutaneous three times a day before meals  insulin lispro (ADMELOG) corrective regimen sliding scale   SubCutaneous at bedtime  levothyroxine 25 MICROGram(s) Oral daily  metoprolol tartrate 25 milliGRAM(s) Oral every 12 hours  midodrine 10 milliGRAM(s) Oral once  midodrine. 10 milliGRAM(s) Oral <User Schedule>  midodrine. 10 milliGRAM(s) Oral three times a day  pantoprazole    Tablet 40 milliGRAM(s) Oral before breakfast  piperacillin/tazobactam IVPB.. 3.375 Gram(s) IV Intermittent every 12 hours  polyethylene glycol 3350 17 Gram(s) Oral daily  prasugrel 10 milliGRAM(s) Oral daily  senna 2 Tablet(s) Oral at bedtime  sodium chloride 0.9% Bolus 250 milliLiter(s) IV Bolus once    MEDICATIONS  (PRN):  bisacodyl Suppository 10 milliGRAM(s) Rectal daily PRN Constipation  guaiFENesin   Syrup  (Sugar-Free) 200 milliGRAM(s) Oral every 6 hours PRN Cough  sodium chloride 0.9% lock flush 10 milliLiter(s) IV Push every 1 hour PRN Pre/post blood products, medications, blood draw, and to maintain line patency      LABS:                        10.2   18.52 )-----------( 238      ( 23 Mar 2021 06:30 )             33.7     Hgb Trend: 10.2<--, 10.0<--, 9.9<--, 9.6<--, 9.3<--  03-23    133<L>  |  96  |  74<H>  ----------------------------<  85  6.0<H>   |  20<L>  |  4.99<H>    Ca    9.5      23 Mar 2021 06:32    TPro  7.0  /  Alb  2.5<L>  /  TBili  1.6<H>  /  DBili  x   /  AST  72<H>  /  ALT  94<H>  /  AlkPhos  195<H>  03-23    Creatinine Trend: 4.99<--, 4.37<--, 3.38<--, 4.33<--, 3.42<--, 4.45<--            MICROBIOLOGY:     RADIOLOGY & ADDITIONAL TESTS:      ASSESSMENT AND PLAN:  Mr. Morelos is a 81 yo male w h/o DM, CAD, s/p CABG, CKD 3-4 here after outpatient labwork found elevated BUN, hyponatremia. Pt has since been initiated on dialysis and has required midodrine TID for pressure support. CCB abdominal pain on admission with mildly elevated amylase and lipase without evidence of cholecystitis/pancreatitis. S/P zoxyn x7 days with improvement in abdominal pain. course also CCB nosocomial COVID with monoclonal Ab infusion. In addition, pt with possible aspiration event on 3/22 with subsequent increased O2 requirements, and increased hypotension on 3/23 requiring pressure support with levophed. Pt transferred to MICU for further workup and management.      #Neuro  -AOx3 at baseline, currently lethargic with symptomatic hypotension    #Cardiovascular    #Respiratory    #GI/Nutrition    #/Renal    #Skin    #ID    #Endocrine    #Hematologic/DVT ppx    #Ethics      Tank Jewell MD  PGY-1 MICU Accept Note    CHIEF COMPLAINT: Abdominal pain    HPI / INTERVAL HISTORY:  79 yo male w h/o DM, CAD, s/p CABG, CKD 3-4. He saw Dr. Abel Luz yesterday as a new consult for elevated creatinine. Bloodwork returned notable for  and Na 126; in light of these findings, Dr. Luz sent the patient to the ER. Lately ptn has been having abdominal pain with elevated amylase and lipase.   As per ptn's wife: on and off R abd pain w generalized weakness and poor po intake. PCP DCed po Lasix in past few days 2/2 abnormal blood test results.  Ptn denies recent NSAID use.  He was diagnosed with diabetes 18 years ago, and denies retinopathy (last saw Optho around 2 years ago). Cardiologist has alluded to the patient having mild renal impairment in the past, as per wife.  Patient's PCP is Dr. Shyann De León; GI Dr. Wilman Villagran; Cardiology Dr. Boyce.  (16 Feb 2021 21:05)  Since admission, Abd sono showed gallstones, but no sign of cholecystitis/pancreatitis. abd pain resolved on Zosyn, s/ p treatment empirically for 7 days . ID following. Pt found to have fluid overload 2/2 acute on chronic CHF which responded to lasix. Pt has been on HD since 2/26. HD as per renal. changed from MWF to TTSa,  RIJ tunneled catheter placed on 3/4 in IR, no fistula placement for now, Pt completed amiodarone loading for NSVT/VT and was being maintained on lopressor 25mg BID. Pt with EF 15% and severe AS evaluated for TAVR but found not to be a candidate. BP has been maintained initially 5 TID on non-dialysis days and 10TID on dialysis days. Pt acquired COVID during this admission in February and had COVID+ March 15th, but more recently had COVID - and is now off isolation.   On 3/22, patient had possible aspiration event.  pt unable to swallow food fully and  started to spit up and started to desat to 87% on 2L NC Pt started on zosyn. On 3/23, patient became more hypoxic and hypotensive requiring addition of levophed for pressure support. Pt transferred to MICU for further evaluation and management on levophed drip.         PAST MEDICAL & SURGICAL HISTORY:  Renal failure    CAD (coronary artery disease)    Hypertension    Diabetes mellitus        FAMILY HISTORY:      SOCIAL HISTORY:  Smoking: No   Substance Use: No  EtOH Use: No  Marital Status:   Advance Directives:     HOME MEDICATIONS:      Allergies    No Known Allergies    Intolerances          REVIEW OF SYSTEMS:  Constitutional: No fevers, chills, weight loss, weight gain  HEENT: No vision problems, eye pain, nasal congestion, rhinorrhea, sore throat, dysphagia  CV: No chest pain, orthopnea, palpitations  Resp: No cough, dyspnea, wheezing, hemoptysis  GI: No nausea, vomiting, diarrhea, constipation, abdominal pain  : [ ] dysuria [ ] nocturia [ ] hematuria [ ] increased urinary frequency  Musculoskeletal: [ ] back pain [ ] myalgias [ ] arthralgias [ ] fracture  Skin: [ ] rash [ ] itch  Neurological: [ ] headache [ ] dizziness [ ] syncope [ ] weakness [ ] numbness  Psychiatric: [ ] anxiety [ ] depression  Endocrine: [ ] diabetes [ ] thyroid problem  Hematologic/Lymphatic: [ ] anemia [ ] bleeding problem  Allergic/Immunologic: [ ] itchy eyes [ ] nasal discharge [ ] hives [ ] angioedema  [ ] All other systems negative  [ ] Unable to assess ROS because ________    OBJECTIVE:  ICU Vital Signs Last 24 Hrs  T(C): 36.7 (23 Mar 2021 21:26), Max: 36.8 (23 Mar 2021 00:12)  T(F): 98 (23 Mar 2021 21:26), Max: 98.2 (23 Mar 2021 00:12)  HR: 93 (23 Mar 2021 21:26) (80 - 93)  BP: 71/42 (23 Mar 2021 21:26) (71/42 - 106/65)  BP(mean): --  ABP: --  ABP(mean): --  RR: 18 (23 Mar 2021 21:26) (18 - 20)  SpO2: 96% (23 Mar 2021 21:26) (91% - 96%)        03-22 @ 07:01  -  03-23 @ 07:00  --------------------------------------------------------  IN: 360 mL / OUT: 0 mL / NET: 360 mL    03-23 @ 07:01  -  03-23 @ 23:05  --------------------------------------------------------  IN: 400 mL / OUT: 0 mL / NET: 400 mL      CAPILLARY BLOOD GLUCOSE      POCT Blood Glucose.: 87 mg/dL (23 Mar 2021 22:02)      PHYSICAL EXAM:  GENERAL: NAD, Resting in bed  HEENT:  Head atraumatic, EOMI, PERRLA, conjunctiva and sclera clear; Moist mucous membranes, normal oropharynx  NECK: Supple, No JVD, no lymphadenopathy, no thyroid nodules or enlargement  CHEST/LUNG: Clear to auscultation bilaterally; No rales, rhonchi, wheezing, or rubs. Unlabored respirations on room air  HEART: Regular rate and rhythm; No murmurs, rubs, or gallops  ABDOMEN: Bowel sounds present; Soft, Nontender, Nondistended. No hepatomegally  EXTREMITIES:  2+ Peripheral Pulses, brisk capillary refill. No clubbing, cyanosis, or edema  NERVOUS SYSTEM:  non-focal and spontaneous movements of all extremities  Psych: acutely encephalopathic   SKIN: No rashes or lesions    LINES:     HOSPITAL MEDICATIONS:  MEDICATIONS  (STANDING):  acetaminophen  IVPB .. 1000 milliGRAM(s) IV Intermittent once  albuterol/ipratropium for Nebulization 3 milliLiter(s) Nebulizer every 6 hours  aspirin  chewable 81 milliGRAM(s) Oral daily  atorvastatin 80 milliGRAM(s) Oral at bedtime  benzonatate 100 milliGRAM(s) Oral every 8 hours  chlorhexidine 4% Liquid 1 Application(s) Topical <User Schedule>  dextrose 40% Gel 15 Gram(s) Oral once  dextrose 5%. 1000 milliLiter(s) (50 mL/Hr) IV Continuous <Continuous>  dextrose 5%. 1000 milliLiter(s) (100 mL/Hr) IV Continuous <Continuous>  dextrose 50% Injectable 25 Gram(s) IV Push once  dextrose 50% Injectable 12.5 Gram(s) IV Push once  dextrose 50% Injectable 25 Gram(s) IV Push once  epoetin rafiq-epbx (RETACRIT) Injectable 55266 Unit(s) IV Push <User Schedule>  gabapentin 100 milliGRAM(s) Oral at bedtime  glucagon  Injectable 1 milliGRAM(s) IntraMuscular once  heparin   Injectable 5000 Unit(s) SubCutaneous every 8 hours  influenza   Vaccine 0.5 milliLiter(s) IntraMuscular once  insulin lispro (ADMELOG) corrective regimen sliding scale   SubCutaneous three times a day before meals  insulin lispro (ADMELOG) corrective regimen sliding scale   SubCutaneous at bedtime  levothyroxine 25 MICROGram(s) Oral daily  metoprolol tartrate 25 milliGRAM(s) Oral every 12 hours  midodrine 10 milliGRAM(s) Oral once  midodrine. 10 milliGRAM(s) Oral <User Schedule>  midodrine. 10 milliGRAM(s) Oral three times a day  pantoprazole    Tablet 40 milliGRAM(s) Oral before breakfast  piperacillin/tazobactam IVPB.. 3.375 Gram(s) IV Intermittent every 12 hours  polyethylene glycol 3350 17 Gram(s) Oral daily  prasugrel 10 milliGRAM(s) Oral daily  senna 2 Tablet(s) Oral at bedtime  sodium chloride 0.9% Bolus 250 milliLiter(s) IV Bolus once    MEDICATIONS  (PRN):  bisacodyl Suppository 10 milliGRAM(s) Rectal daily PRN Constipation  guaiFENesin   Syrup  (Sugar-Free) 200 milliGRAM(s) Oral every 6 hours PRN Cough  sodium chloride 0.9% lock flush 10 milliLiter(s) IV Push every 1 hour PRN Pre/post blood products, medications, blood draw, and to maintain line patency      LABS:                        10.2   18.52 )-----------( 238      ( 23 Mar 2021 06:30 )             33.7     Hgb Trend: 10.2<--, 10.0<--, 9.9<--, 9.6<--, 9.3<--  03-23    133<L>  |  96  |  74<H>  ----------------------------<  85  6.0<H>   |  20<L>  |  4.99<H>    Ca    9.5      23 Mar 2021 06:32    TPro  7.0  /  Alb  2.5<L>  /  TBili  1.6<H>  /  DBili  x   /  AST  72<H>  /  ALT  94<H>  /  AlkPhos  195<H>  03-23    Creatinine Trend: 4.99<--, 4.37<--, 3.38<--, 4.33<--, 3.42<--, 4.45<--            MICROBIOLOGY:     RADIOLOGY & ADDITIONAL TESTS:      ASSESSMENT AND PLAN:  Mr. Morelos is a 79 yo male w h/o DM, CAD, s/p CABG, CKD 3-4 here after outpatient labwork found elevated BUN, hyponatremia. Pt has since been initiated on dialysis and has required midodrine TID for pressure support. CCB abdominal pain on admission with mildly elevated amylase and lipase without evidence of cholecystitis/pancreatitis. S/P zoxyn x7 days with improvement in abdominal pain. course also CCB nosocomial COVID with monoclonal Ab infusion. In addition, pt with possible aspiration event on 3/22 with subsequent increased O2 requirements, and increased hypotension on 3/23 requiring pressure support with levophed. Pt transferred to MICU for further workup and management.      #Neuro  -AOx3 at baseline, currently lethargic with symptomatic hypotension    #Cardiovascular    #Respiratory    #GI/Nutrition    #/Renal    #Skin    #ID    #Endocrine    #Hematologic/DVT ppx    #Ethics      Tank Jewell MD  PGY-1      Critical Care Attending Attestation:   80M Hx Newly initiated ESRD on HD found Post HD Hypotension and Altered Mental Status started  on Pressor during RRT and transferred to MICU.   - Metabolic Encephalopathy Grade 1-2 and spoke [place]ic Language   - Septic Shock, HFrEF 15% with Mod-Sever Calcified AS on Pressor Support titration to MAP > 65 mmHg    - Empiric ABx coverage for Aspiration PNA coverage pending septic w/u   - S/P CABG, CHF s/p AICD pending TAVR evaluation   - NPO   - Received back IV Fluid for Post HD Hypotension will need CVVHD   - GOC Plans discussion and Wife3 updated clinical status upon ICU Arrival    Patient seen and examined with ICU Resident/Fellow at bedside after lab data, medical records and radiology reports reviewed. I have read and agreeable in general with resident's Documented Note, Assessment and Management Plan which reflected my opinions from bedside round and discussion.   Total Critical Care Time = 45 Min excluding teaching and procedure activity. MICU Accept Note    CHIEF COMPLAINT: Abdominal pain    HPI / INTERVAL HISTORY:  79 yo male w h/o DM, CAD, s/p CABG, CKD 3-4. He saw Dr. Abel Luz yesterday as a new consult for elevated creatinine. Bloodwork returned notable for  and Na 126; in light of these findings, Dr. Luz sent the patient to the ER. Lately ptn has been having abdominal pain with elevated amylase and lipase.   As per ptn's wife: on and off R abd pain w generalized weakness and poor po intake. PCP DCed po Lasix in past few days 2/2 abnormal blood test results.  Ptn denies recent NSAID use.  He was diagnosed with diabetes 18 years ago, and denies retinopathy (last saw Optho around 2 years ago). Cardiologist has alluded to the patient having mild renal impairment in the past, as per wife.  Patient's PCP is Dr. Shyann De León; GI Dr. Wilman Villagran; Cardiology Dr. Boyce.  (16 Feb 2021 21:05)  Since admission, Abd sono showed gallstones, but no sign of cholecystitis/pancreatitis. abd pain resolved on Zosyn, s/ p treatment empirically for 7 days . ID following. Pt found to have fluid overload 2/2 acute on chronic CHF which responded to lasix. Pt has been on HD since 2/26. HD as per renal. changed from MWF to TTSa,  RIJ tunneled catheter placed on 3/4 in IR, no fistula placement for now, Pt completed amiodarone loading for NSVT/VT and was being maintained on lopressor 25mg BID. Pt with EF 15% and severe AS evaluated for TAVR but found not to be a candidate. BP has been maintained initially 5 TID on non-dialysis days and 10TID on dialysis days. Pt acquired COVID during this admission in February and had COVID+ March 15th, but more recently had COVID - and is now off isolation.   On 3/22, patient had possible aspiration event.  pt unable to swallow food fully and  started to spit up and started to desat to 87% on 2L NC Pt started on zosyn. On 3/23, patient became more hypoxic and hypotensive requiring addition of levophed for pressure support. Pt transferred to MICU for further evaluation and management on levophed drip.         PAST MEDICAL & SURGICAL HISTORY:  Renal failure    CAD (coronary artery disease)    Hypertension    Diabetes mellitus        FAMILY HISTORY:      SOCIAL HISTORY:  Smoking: No   Substance Use: No  EtOH Use: No  Marital Status:   Advance Directives:     HOME MEDICATIONS:      Allergies    No Known Allergies    Intolerances          REVIEW OF SYSTEMS:  Constitutional: No fevers, chills, weight loss, weight gain  HEENT: No vision problems, eye pain, nasal congestion, rhinorrhea, sore throat, dysphagia  CV: No chest pain, orthopnea, palpitations  Resp: No cough, dyspnea, wheezing, hemoptysis  GI: No nausea, vomiting, diarrhea, constipation, abdominal pain  : [ ] dysuria [ ] nocturia [ ] hematuria [ ] increased urinary frequency  Musculoskeletal: [ ] back pain [ ] myalgias [ ] arthralgias [ ] fracture  Skin: [ ] rash [ ] itch  Neurological: [ ] headache [ ] dizziness [ ] syncope [ ] weakness [ ] numbness  Psychiatric: [ ] anxiety [ ] depression  Endocrine: [ ] diabetes [ ] thyroid problem  Hematologic/Lymphatic: [ ] anemia [ ] bleeding problem  Allergic/Immunologic: [ ] itchy eyes [ ] nasal discharge [ ] hives [ ] angioedema  [ ] All other systems negative  [x ] Unable to assess ROS because Patient with AMS acutely encephalopathic     OBJECTIVE:  ICU Vital Signs Last 24 Hrs  T(C): 36.7 (23 Mar 2021 21:26), Max: 36.8 (23 Mar 2021 00:12)  T(F): 98 (23 Mar 2021 21:26), Max: 98.2 (23 Mar 2021 00:12)  HR: 93 (23 Mar 2021 21:26) (80 - 93)  BP: 71/42 (23 Mar 2021 21:26) (71/42 - 106/65)  BP(mean): --  ABP: --  ABP(mean): --  RR: 18 (23 Mar 2021 21:26) (18 - 20)  SpO2: 96% (23 Mar 2021 21:26) (91% - 96%)        03-22 @ 07:01  -  03-23 @ 07:00  --------------------------------------------------------  IN: 360 mL / OUT: 0 mL / NET: 360 mL    03-23 @ 07:01  -  03-23 @ 23:05  --------------------------------------------------------  IN: 400 mL / OUT: 0 mL / NET: 400 mL      CAPILLARY BLOOD GLUCOSE      POCT Blood Glucose.: 87 mg/dL (23 Mar 2021 22:02)      PHYSICAL EXAM:  GENERAL: Uncomfortable appearing, tachypneic on O2 nc  HEENT:  Head atraumatic, EOMI, PERRLA, conjunctiva and sclera clear; Moist mucous membranes, normal oropharynx  NECK: Supple, No JVD, no lymphadenopathy, no thyroid nodules or enlargement  CHEST/LUNG: Clear to auscultation bilaterally; No rales, rhonchi, wheezing, or rubs. Tachypneic with some belly breathing  HEART: Regular rate and rhythm; No murmurs, rubs, or gallops  ABDOMEN: Bowel sounds present; Soft, Distended, nontender to palpation.   EXTREMITIES:  1+ peripheral pulses. 1+ pitting edema b/l.   NERVOUS SYSTEM:  non-focal and spontaneous movements of all extremities  Psych: acutely encephalopathic AOx0.  SKIN: bruising on feet. R. Medial malleolar ulceration     LINES:     HOSPITAL MEDICATIONS:  MEDICATIONS  (STANDING):  acetaminophen  IVPB .. 1000 milliGRAM(s) IV Intermittent once  albuterol/ipratropium for Nebulization 3 milliLiter(s) Nebulizer every 6 hours  aspirin  chewable 81 milliGRAM(s) Oral daily  atorvastatin 80 milliGRAM(s) Oral at bedtime  benzonatate 100 milliGRAM(s) Oral every 8 hours  chlorhexidine 4% Liquid 1 Application(s) Topical <User Schedule>  dextrose 40% Gel 15 Gram(s) Oral once  dextrose 5%. 1000 milliLiter(s) (50 mL/Hr) IV Continuous <Continuous>  dextrose 5%. 1000 milliLiter(s) (100 mL/Hr) IV Continuous <Continuous>  dextrose 50% Injectable 25 Gram(s) IV Push once  dextrose 50% Injectable 12.5 Gram(s) IV Push once  dextrose 50% Injectable 25 Gram(s) IV Push once  epoetin rafiq-epbx (RETACRIT) Injectable 35786 Unit(s) IV Push <User Schedule>  gabapentin 100 milliGRAM(s) Oral at bedtime  glucagon  Injectable 1 milliGRAM(s) IntraMuscular once  heparin   Injectable 5000 Unit(s) SubCutaneous every 8 hours  influenza   Vaccine 0.5 milliLiter(s) IntraMuscular once  insulin lispro (ADMELOG) corrective regimen sliding scale   SubCutaneous three times a day before meals  insulin lispro (ADMELOG) corrective regimen sliding scale   SubCutaneous at bedtime  levothyroxine 25 MICROGram(s) Oral daily  metoprolol tartrate 25 milliGRAM(s) Oral every 12 hours  midodrine 10 milliGRAM(s) Oral once  midodrine. 10 milliGRAM(s) Oral <User Schedule>  midodrine. 10 milliGRAM(s) Oral three times a day  pantoprazole    Tablet 40 milliGRAM(s) Oral before breakfast  piperacillin/tazobactam IVPB.. 3.375 Gram(s) IV Intermittent every 12 hours  polyethylene glycol 3350 17 Gram(s) Oral daily  prasugrel 10 milliGRAM(s) Oral daily  senna 2 Tablet(s) Oral at bedtime  sodium chloride 0.9% Bolus 250 milliLiter(s) IV Bolus once    MEDICATIONS  (PRN):  bisacodyl Suppository 10 milliGRAM(s) Rectal daily PRN Constipation  guaiFENesin   Syrup  (Sugar-Free) 200 milliGRAM(s) Oral every 6 hours PRN Cough  sodium chloride 0.9% lock flush 10 milliLiter(s) IV Push every 1 hour PRN Pre/post blood products, medications, blood draw, and to maintain line patency      LABS:                        10.2   18.52 )-----------( 238      ( 23 Mar 2021 06:30 )             33.7     Hgb Trend: 10.2<--, 10.0<--, 9.9<--, 9.6<--, 9.3<--  03-23    133<L>  |  96  |  74<H>  ----------------------------<  85  6.0<H>   |  20<L>  |  4.99<H>    Ca    9.5      23 Mar 2021 06:32    TPro  7.0  /  Alb  2.5<L>  /  TBili  1.6<H>  /  DBili  x   /  AST  72<H>  /  ALT  94<H>  /  AlkPhos  195<H>  03-23    Creatinine Trend: 4.99<--, 4.37<--, 3.38<--, 4.33<--, 3.42<--, 4.45<--            MICROBIOLOGY:     RADIOLOGY & ADDITIONAL TESTS:      ASSESSMENT AND PLAN:  Mr. Morelos is a 79 yo male w h/o DM, CAD, s/p CABG, CKD 3-4 here after outpatient labwork found elevated BUN, hyponatremia. Pt has since been initiated on dialysis and has required midodrine TID for pressure support. CCB abdominal pain on admission with mildly elevated amylase and lipase without evidence of cholecystitis/pancreatitis. S/P zoxyn x7 days with improvement in abdominal pain. course also CCB nosocomial COVID with monoclonal Ab infusion. In addition, pt with possible aspiration event on 3/22 with subsequent increased O2 requirements, and increased hypotension on 3/23 requiring pressure support with levophed. Pt transferred to MICU for further workup and management.      #Neuro  -AOx3 at baseline, currently lethargic and AOx0, likely 2/2 symptomatic hypotension + febrile illness  -Monitor neurological status per MICU protocol    #Cardiovascular    #Respiratory    #GI/Nutrition    #/Renal    #Skin    #ID    #Endocrine    #Hematologic/DVT ppx    #Ethics      Tank Jewell MD  PGY-1      Critical Care Attending Attestation:   80M Hx Newly initiated ESRD on HD found Post HD Hypotension and Altered Mental Status started  on Pressor during RRT and transferred to MICU.   - Metabolic Encephalopathy Grade 1-2 and spoke Occitan Language   - Septic Shock, HFrEF 15% with Mod-Sever Calcified AS on Pressor Support titration to MAP > 65 mmHg    - Empiric ABx coverage for Aspiration PNA coverage pending septic w/u   - S/P CABG, CHF s/p AICD pending TAVR evaluation   - NPO   - Received back IV Fluid for Post HD Hypotension will need CVVHD   - GOC Plans discussion and Wife3 updated clinical status upon ICU Arrival    Patient seen and examined with ICU Resident/Fellow at bedside after lab data, medical records and radiology reports reviewed. I have read and agreeable in general with resident's Documented Note, Assessment and Management Plan which reflected my opinions from bedside round and discussion.   Total Critical Care Time = 45 Min excluding teaching and procedure activity. MICU Accept Note    CHIEF COMPLAINT: Abdominal pain    HPI / INTERVAL HISTORY:  79 yo male w h/o DM, CAD, s/p CABG, CKD 3-4. He saw Dr. Abel Luz yesterday as a new consult for elevated creatinine. Bloodwork returned notable for  and Na 126; in light of these findings, Dr. Luz sent the patient to the ER. Lately ptn has been having abdominal pain with elevated amylase and lipase.   As per ptn's wife: on and off R abd pain w generalized weakness and poor po intake. PCP DCed po Lasix in past few days 2/2 abnormal blood test results.  Ptn denies recent NSAID use.  He was diagnosed with diabetes 18 years ago, and denies retinopathy (last saw Optho around 2 years ago). Cardiologist has alluded to the patient having mild renal impairment in the past, as per wife.  Patient's PCP is Dr. Shyann De León; GI Dr. Wilman Villagran; Cardiology Dr. Boyce.  (16 Feb 2021 21:05)  Since admission, Abd sono showed gallstones, but no sign of cholecystitis/pancreatitis. abd pain resolved on Zosyn, s/ p treatment empirically for 7 days . ID following. Pt found to have fluid overload 2/2 acute on chronic CHF which responded to lasix. Pt has been on HD since 2/26. HD as per renal. changed from MWF to TTSa,  RIJ tunneled catheter placed on 3/4 in IR, no fistula placement for now, Pt completed amiodarone loading for NSVT/VT and was being maintained on lopressor 25mg BID. Pt with EF 15% and severe AS evaluated for TAVR but found not to be a candidate. BP has been maintained initially 5 TID on non-dialysis days and 10TID on dialysis days. Pt acquired COVID during this admission in February and had COVID+ March 15th, but more recently had COVID - and is now off isolation.   On 3/22, patient had possible aspiration event.  pt unable to swallow food fully and  started to spit up and started to desat to 87% on 2L NC Pt started on zosyn. On 3/23, patient became more hypoxic and hypotensive requiring addition of levophed for pressure support. Pt transferred to MICU for further evaluation and management on levophed drip.         PAST MEDICAL & SURGICAL HISTORY:  Renal failure    CAD (coronary artery disease)    Hypertension    Diabetes mellitus        FAMILY HISTORY:      SOCIAL HISTORY:  Smoking: No   Substance Use: No  EtOH Use: No  Marital Status:   Advance Directives:     HOME MEDICATIONS:      Allergies    No Known Allergies    Intolerances          REVIEW OF SYSTEMS:  Constitutional: No fevers, chills, weight loss, weight gain  HEENT: No vision problems, eye pain, nasal congestion, rhinorrhea, sore throat, dysphagia  CV: No chest pain, orthopnea, palpitations  Resp: No cough, dyspnea, wheezing, hemoptysis  GI: No nausea, vomiting, diarrhea, constipation, abdominal pain  : [ ] dysuria [ ] nocturia [ ] hematuria [ ] increased urinary frequency  Musculoskeletal: [ ] back pain [ ] myalgias [ ] arthralgias [ ] fracture  Skin: [ ] rash [ ] itch  Neurological: [ ] headache [ ] dizziness [ ] syncope [ ] weakness [ ] numbness  Psychiatric: [ ] anxiety [ ] depression  Endocrine: [ ] diabetes [ ] thyroid problem  Hematologic/Lymphatic: [ ] anemia [ ] bleeding problem  Allergic/Immunologic: [ ] itchy eyes [ ] nasal discharge [ ] hives [ ] angioedema  [ ] All other systems negative  [x ] Unable to assess ROS because Patient with AMS acutely encephalopathic     OBJECTIVE:  ICU Vital Signs Last 24 Hrs  T(C): 36.7 (23 Mar 2021 21:26), Max: 36.8 (23 Mar 2021 00:12)  T(F): 98 (23 Mar 2021 21:26), Max: 98.2 (23 Mar 2021 00:12)  HR: 93 (23 Mar 2021 21:26) (80 - 93)  BP: 71/42 (23 Mar 2021 21:26) (71/42 - 106/65)  BP(mean): --  ABP: --  ABP(mean): --  RR: 18 (23 Mar 2021 21:26) (18 - 20)  SpO2: 96% (23 Mar 2021 21:26) (91% - 96%)        03-22 @ 07:01  -  03-23 @ 07:00  --------------------------------------------------------  IN: 360 mL / OUT: 0 mL / NET: 360 mL    03-23 @ 07:01  -  03-23 @ 23:05  --------------------------------------------------------  IN: 400 mL / OUT: 0 mL / NET: 400 mL      CAPILLARY BLOOD GLUCOSE      POCT Blood Glucose.: 87 mg/dL (23 Mar 2021 22:02)      PHYSICAL EXAM:  GENERAL: Uncomfortable appearing, tachypneic on O2 nc  HEENT:  Head atraumatic, EOMI, PERRLA, conjunctiva and sclera clear; Moist mucous membranes, normal oropharynx  NECK: Supple, No JVD, no lymphadenopathy, no thyroid nodules or enlargement  CHEST/LUNG: Clear to auscultation bilaterally; No rales, rhonchi, wheezing, or rubs. Tachypneic with some belly breathing  HEART: Regular rate and rhythm; No murmurs, rubs, or gallops  ABDOMEN: Bowel sounds present; Soft, Distended, nontender to palpation.   EXTREMITIES:  1+ peripheral pulses. 1+ pitting edema b/l.   NERVOUS SYSTEM:  non-focal and spontaneous movements of all extremities  Psych: acutely encephalopathic AOx0.  SKIN: bruising on feet. R. Medial malleolar ulceration     LINES:     HOSPITAL MEDICATIONS:  MEDICATIONS  (STANDING):  acetaminophen  IVPB .. 1000 milliGRAM(s) IV Intermittent once  albuterol/ipratropium for Nebulization 3 milliLiter(s) Nebulizer every 6 hours  aspirin  chewable 81 milliGRAM(s) Oral daily  atorvastatin 80 milliGRAM(s) Oral at bedtime  benzonatate 100 milliGRAM(s) Oral every 8 hours  chlorhexidine 4% Liquid 1 Application(s) Topical <User Schedule>  dextrose 40% Gel 15 Gram(s) Oral once  dextrose 5%. 1000 milliLiter(s) (50 mL/Hr) IV Continuous <Continuous>  dextrose 5%. 1000 milliLiter(s) (100 mL/Hr) IV Continuous <Continuous>  dextrose 50% Injectable 25 Gram(s) IV Push once  dextrose 50% Injectable 12.5 Gram(s) IV Push once  dextrose 50% Injectable 25 Gram(s) IV Push once  epoetin rafiq-epbx (RETACRIT) Injectable 63924 Unit(s) IV Push <User Schedule>  gabapentin 100 milliGRAM(s) Oral at bedtime  glucagon  Injectable 1 milliGRAM(s) IntraMuscular once  heparin   Injectable 5000 Unit(s) SubCutaneous every 8 hours  influenza   Vaccine 0.5 milliLiter(s) IntraMuscular once  insulin lispro (ADMELOG) corrective regimen sliding scale   SubCutaneous three times a day before meals  insulin lispro (ADMELOG) corrective regimen sliding scale   SubCutaneous at bedtime  levothyroxine 25 MICROGram(s) Oral daily  metoprolol tartrate 25 milliGRAM(s) Oral every 12 hours  midodrine 10 milliGRAM(s) Oral once  midodrine. 10 milliGRAM(s) Oral <User Schedule>  midodrine. 10 milliGRAM(s) Oral three times a day  pantoprazole    Tablet 40 milliGRAM(s) Oral before breakfast  piperacillin/tazobactam IVPB.. 3.375 Gram(s) IV Intermittent every 12 hours  polyethylene glycol 3350 17 Gram(s) Oral daily  prasugrel 10 milliGRAM(s) Oral daily  senna 2 Tablet(s) Oral at bedtime  sodium chloride 0.9% Bolus 250 milliLiter(s) IV Bolus once    MEDICATIONS  (PRN):  bisacodyl Suppository 10 milliGRAM(s) Rectal daily PRN Constipation  guaiFENesin   Syrup  (Sugar-Free) 200 milliGRAM(s) Oral every 6 hours PRN Cough  sodium chloride 0.9% lock flush 10 milliLiter(s) IV Push every 1 hour PRN Pre/post blood products, medications, blood draw, and to maintain line patency      LABS:                        10.2   18.52 )-----------( 238      ( 23 Mar 2021 06:30 )             33.7     Hgb Trend: 10.2<--, 10.0<--, 9.9<--, 9.6<--, 9.3<--  03-23    133<L>  |  96  |  74<H>  ----------------------------<  85  6.0<H>   |  20<L>  |  4.99<H>    Ca    9.5      23 Mar 2021 06:32    TPro  7.0  /  Alb  2.5<L>  /  TBili  1.6<H>  /  DBili  x   /  AST  72<H>  /  ALT  94<H>  /  AlkPhos  195<H>  03-23    Creatinine Trend: 4.99<--, 4.37<--, 3.38<--, 4.33<--, 3.42<--, 4.45<--            MICROBIOLOGY:     RADIOLOGY & ADDITIONAL TESTS:      ASSESSMENT AND PLAN:  Mr. Morelos is a 79 yo male w h/o DM, CAD, s/p CABG, CKD 3-4 here after outpatient labwork found elevated BUN, hyponatremia. Pt has since been initiated on dialysis and has required midodrine TID for pressure support. CCB abdominal pain on admission with mildly elevated amylase and lipase without evidence of cholecystitis/pancreatitis. S/P zoxyn x7 days with improvement in abdominal pain. course also CCB nosocomial COVID with monoclonal Ab infusion. In addition, pt with possible aspiration event on 3/22 with subsequent increased O2 requirements, and increased hypotension on 3/23 requiring pressure support with levophed. Pt transferred to MICU for further workup and management.      #Neuro  -AOx3 at baseline, currently lethargic and AOx0, likely 2/2 symptomatic hypotension + febrile illness  -Monitor neurological status per MICU protocol  -Will consider CTH if mental status does not improve with BP control    #Cardiovascular  Severe biventricular HFrEF with moderate to severe calcified AS   -C/W midodrine 10mg TID  -Currently requiring levophed for pressor support. Titrate as necessary to maintain MAP >65  -Pt being followed by cardiology. Will appreciate recs  -Has been evaluated for TAVR during this admission and has been deemed not a candidate at this time.    #Respiratory    #GI/Nutrition    #/Renal    #Skin    #ID    #Endocrine    #Hematologic/DVT ppx    #Ethics  -GOC discussed with patient's wife. Patient currently FULL CODE  -On discharge, pt will need JESUS Jewell MD  PGY-1      Critical Care Attending Attestation:   80M Hx Newly initiated ESRD on HD found Post HD Hypotension and Altered Mental Status started  on Pressor during RRT and transferred to MICU.   - Metabolic Encephalopathy Grade 1-2 and spoke Greek Language   - Septic Shock, HFrEF 15% with Mod-Sever Calcified AS on Pressor Support titration to MAP > 65 mmHg    - Empiric ABx coverage for Aspiration PNA coverage pending septic w/u   - S/P CABG, CHF s/p AICD pending TAVR evaluation   - NPO   - Received back IV Fluid for Post HD Hypotension will need CVVHD   - GOC Plans discussion and Wife3 updated clinical status upon ICU Arrival    Patient seen and examined with ICU Resident/Fellow at bedside after lab data, medical records and radiology reports reviewed. I have read and agreeable in general with resident's Documented Note, Assessment and Management Plan which reflected my opinions from bedside round and discussion.   Total Critical Care Time = 45 Min excluding teaching and procedure activity. MICU Accept Note    CHIEF COMPLAINT: Abdominal pain    HPI / INTERVAL HISTORY:  81 yo male w h/o DM, CAD, s/p CABG, CKD 3-4. He saw Dr. Abel Luz yesterday as a new consult for elevated creatinine. Bloodwork returned notable for  and Na 126; in light of these findings, Dr. Luz sent the patient to the ER. Lately ptn has been having abdominal pain with elevated amylase and lipase.   As per ptn's wife: on and off R abd pain w generalized weakness and poor po intake. PCP DCed po Lasix in past few days 2/2 abnormal blood test results.  Ptn denies recent NSAID use.  He was diagnosed with diabetes 18 years ago, and denies retinopathy (last saw Optho around 2 years ago). Cardiologist has alluded to the patient having mild renal impairment in the past, as per wife.  Patient's PCP is Dr. Shyann eD León; GI Dr. Wilman Villagran; Cardiology Dr. Boyce.  (16 Feb 2021 21:05)  Since admission, Abd sono showed gallstones, but no sign of cholecystitis/pancreatitis. abd pain resolved on Zosyn, s/ p treatment empirically for 7 days . ID following. Pt found to have fluid overload 2/2 acute on chronic CHF which responded to lasix. Pt has been on HD since 2/26. HD as per renal. changed from MWF to TTSa,  RIJ tunneled catheter placed on 3/4 in IR, no fistula placement for now, Pt completed amiodarone loading for NSVT/VT and was being maintained on lopressor 25mg BID. Pt with EF 15% and severe AS evaluated for TAVR but found not to be a candidate. BP has been maintained initially 5 TID on non-dialysis days and 10TID on dialysis days. Pt acquired COVID during this admission in February and had COVID+ March 15th, but more recently had COVID - and is now off isolation.   On 3/22, patient had possible aspiration event.  pt unable to swallow food fully and  started to spit up and started to desat to 87% on 2L NC Pt started on zosyn. On 3/23, patient became more hypoxic and hypotensive requiring addition of levophed for pressure support. Pt transferred to MICU for further evaluation and management on levophed drip.         PAST MEDICAL & SURGICAL HISTORY:  Renal failure    CAD (coronary artery disease)    Hypertension    Diabetes mellitus        FAMILY HISTORY:      SOCIAL HISTORY:  Smoking: No   Substance Use: No  EtOH Use: No  Marital Status:   Advance Directives:     HOME MEDICATIONS:      Allergies    No Known Allergies    Intolerances          REVIEW OF SYSTEMS:  Constitutional: No fevers, chills, weight loss, weight gain  HEENT: No vision problems, eye pain, nasal congestion, rhinorrhea, sore throat, dysphagia  CV: No chest pain, orthopnea, palpitations  Resp: No cough, dyspnea, wheezing, hemoptysis  GI: No nausea, vomiting, diarrhea, constipation, abdominal pain  : [ ] dysuria [ ] nocturia [ ] hematuria [ ] increased urinary frequency  Musculoskeletal: [ ] back pain [ ] myalgias [ ] arthralgias [ ] fracture  Skin: [ ] rash [ ] itch  Neurological: [ ] headache [ ] dizziness [ ] syncope [ ] weakness [ ] numbness  Psychiatric: [ ] anxiety [ ] depression  Endocrine: [ ] diabetes [ ] thyroid problem  Hematologic/Lymphatic: [ ] anemia [ ] bleeding problem  Allergic/Immunologic: [ ] itchy eyes [ ] nasal discharge [ ] hives [ ] angioedema  [ ] All other systems negative  [x ] Unable to assess ROS because Patient with AMS acutely encephalopathic     OBJECTIVE:  ICU Vital Signs Last 24 Hrs  T(C): 36.7 (23 Mar 2021 21:26), Max: 36.8 (23 Mar 2021 00:12)  T(F): 98 (23 Mar 2021 21:26), Max: 98.2 (23 Mar 2021 00:12)  HR: 93 (23 Mar 2021 21:26) (80 - 93)  BP: 71/42 (23 Mar 2021 21:26) (71/42 - 106/65)  BP(mean): --  ABP: --  ABP(mean): --  RR: 18 (23 Mar 2021 21:26) (18 - 20)  SpO2: 96% (23 Mar 2021 21:26) (91% - 96%)        03-22 @ 07:01  -  03-23 @ 07:00  --------------------------------------------------------  IN: 360 mL / OUT: 0 mL / NET: 360 mL    03-23 @ 07:01  -  03-23 @ 23:05  --------------------------------------------------------  IN: 400 mL / OUT: 0 mL / NET: 400 mL      CAPILLARY BLOOD GLUCOSE      POCT Blood Glucose.: 87 mg/dL (23 Mar 2021 22:02)      PHYSICAL EXAM:  GENERAL: Uncomfortable appearing, tachypneic on O2 nc  HEENT:  Head atraumatic, EOMI, PERRLA, conjunctiva and sclera clear; Moist mucous membranes, normal oropharynx  NECK: Supple, No JVD, no lymphadenopathy, no thyroid nodules or enlargement  CHEST/LUNG: Clear to auscultation bilaterally; No rales, rhonchi, wheezing, or rubs. Tachypneic with some belly breathing  HEART: Regular rate and rhythm; No murmurs, rubs, or gallops  ABDOMEN: Bowel sounds present; Soft, Distended, nontender to palpation.   EXTREMITIES:  1+ peripheral pulses. 1+ pitting edema b/l.   NERVOUS SYSTEM:  non-focal and spontaneous movements of all extremities  Psych: acutely encephalopathic AOx0.  SKIN: bruising on feet. R. Medial malleolar ulceration     LINES:     HOSPITAL MEDICATIONS:  MEDICATIONS  (STANDING):  acetaminophen  IVPB .. 1000 milliGRAM(s) IV Intermittent once  albuterol/ipratropium for Nebulization 3 milliLiter(s) Nebulizer every 6 hours  aspirin  chewable 81 milliGRAM(s) Oral daily  atorvastatin 80 milliGRAM(s) Oral at bedtime  benzonatate 100 milliGRAM(s) Oral every 8 hours  chlorhexidine 4% Liquid 1 Application(s) Topical <User Schedule>  dextrose 40% Gel 15 Gram(s) Oral once  dextrose 5%. 1000 milliLiter(s) (50 mL/Hr) IV Continuous <Continuous>  dextrose 5%. 1000 milliLiter(s) (100 mL/Hr) IV Continuous <Continuous>  dextrose 50% Injectable 25 Gram(s) IV Push once  dextrose 50% Injectable 12.5 Gram(s) IV Push once  dextrose 50% Injectable 25 Gram(s) IV Push once  epoetin rafiq-epbx (RETACRIT) Injectable 96137 Unit(s) IV Push <User Schedule>  gabapentin 100 milliGRAM(s) Oral at bedtime  glucagon  Injectable 1 milliGRAM(s) IntraMuscular once  heparin   Injectable 5000 Unit(s) SubCutaneous every 8 hours  influenza   Vaccine 0.5 milliLiter(s) IntraMuscular once  insulin lispro (ADMELOG) corrective regimen sliding scale   SubCutaneous three times a day before meals  insulin lispro (ADMELOG) corrective regimen sliding scale   SubCutaneous at bedtime  levothyroxine 25 MICROGram(s) Oral daily  metoprolol tartrate 25 milliGRAM(s) Oral every 12 hours  midodrine 10 milliGRAM(s) Oral once  midodrine. 10 milliGRAM(s) Oral <User Schedule>  midodrine. 10 milliGRAM(s) Oral three times a day  pantoprazole    Tablet 40 milliGRAM(s) Oral before breakfast  piperacillin/tazobactam IVPB.. 3.375 Gram(s) IV Intermittent every 12 hours  polyethylene glycol 3350 17 Gram(s) Oral daily  prasugrel 10 milliGRAM(s) Oral daily  senna 2 Tablet(s) Oral at bedtime  sodium chloride 0.9% Bolus 250 milliLiter(s) IV Bolus once    MEDICATIONS  (PRN):  bisacodyl Suppository 10 milliGRAM(s) Rectal daily PRN Constipation  guaiFENesin   Syrup  (Sugar-Free) 200 milliGRAM(s) Oral every 6 hours PRN Cough  sodium chloride 0.9% lock flush 10 milliLiter(s) IV Push every 1 hour PRN Pre/post blood products, medications, blood draw, and to maintain line patency      LABS:                        10.2   18.52 )-----------( 238      ( 23 Mar 2021 06:30 )             33.7     Hgb Trend: 10.2<--, 10.0<--, 9.9<--, 9.6<--, 9.3<--  03-23    133<L>  |  96  |  74<H>  ----------------------------<  85  6.0<H>   |  20<L>  |  4.99<H>    Ca    9.5      23 Mar 2021 06:32    TPro  7.0  /  Alb  2.5<L>  /  TBili  1.6<H>  /  DBili  x   /  AST  72<H>  /  ALT  94<H>  /  AlkPhos  195<H>  03-23    Creatinine Trend: 4.99<--, 4.37<--, 3.38<--, 4.33<--, 3.42<--, 4.45<--            MICROBIOLOGY:     RADIOLOGY & ADDITIONAL TESTS:      ASSESSMENT AND PLAN:  Mr. Morelos is a 81 yo male w h/o DM, CAD, s/p CABG, CKD 3-4 here after outpatient labwork found elevated BUN, hyponatremia. Pt has since been initiated on dialysis and has required midodrine TID for pressure support. CCB abdominal pain on admission with mildly elevated amylase and lipase without evidence of cholecystitis/pancreatitis. S/P zoxyn x7 days with improvement in abdominal pain. course also CCB nosocomial COVID with monoclonal Ab infusion. In addition, pt with possible aspiration event on 3/22 with subsequent increased O2 requirements, and increased hypotension on 3/23 requiring pressure support with levophed. Pt transferred to MICU for further workup and management.      #Neuro  -AOx3 at baseline, currently lethargic and AOx0, likely 2/2 symptomatic hypotension + febrile illness  -Monitor neurological status per MICU protocol  -Will consider CTH if mental status does not improve with BP control    #Cardiovascular  Severe biventricular HFrEF with moderate to severe calcified AS   -Pt has CAD s/p CABG > 2 years ago  -AICD in place  -C/W midodrine 10mg TID  -Currently requiring levophed for pressor support. Titrate as necessary to maintain MAP >65  -Pt being followed by cardiology. Will appreciate recs  -Has been evaluated for TAVR during this admission and has been deemed not a candidate at this time.  -EKG with paced rhythm  -F/U cardiac enzymes. Trend if elevated. Low concern for ACS at this time.   -C/W DAPT   NSVT/VT  -S/P amio load  -Hold lopressor in setting of hypotension   -Monitor on telemetry     #Respiratory    #GI/Nutrition  -NPO given AMS  -FSG q6h while NPO    #/Renal    #Skin    #ID    #Endocrine  -FSG q6h while NPO    #Hematologic/DVT ppx  -Heparin for VTE prophylaxis    #Ethics  -GOC discussed with patient's wife. Patient currently FULL CODE  -On discharge, pt will need JESUS       Tank Jweell MD  PGY-1      Critical Care Attending Attestation:   80M Hx Newly initiated ESRD on HD found Post HD Hypotension and Altered Mental Status started  on Pressor during RRT and transferred to MICU.   - Metabolic Encephalopathy Grade 1-2 and spoke Welsh Language   - Septic Shock, HFrEF 15% with Mod-Sever Calcified AS on Pressor Support titration to MAP > 65 mmHg    - Empiric ABx coverage for Aspiration PNA coverage pending septic w/u   - S/P CABG, CHF s/p AICD pending TAVR evaluation   - NPO   - Received back IV Fluid for Post HD Hypotension will need CVVHD   - GOC Plans discussion and Wife3 updated clinical status upon ICU Arrival    Patient seen and examined with ICU Resident/Fellow at bedside after lab data, medical records and radiology reports reviewed. I have read and agreeable in general with resident's Documented Note, Assessment and Management Plan which reflected my opinions from bedside round and discussion.   Total Critical Care Time = 45 Min excluding teaching and procedure activity. MICU Accept Note    CHIEF COMPLAINT: Abdominal pain    HPI / INTERVAL HISTORY:  81 yo male w h/o DM, CAD, s/p CABG, CKD 3-4. He saw Dr. Abel Luz yesterday as a new consult for elevated creatinine. Bloodwork returned notable for  and Na 126; in light of these findings, Dr. Luz sent the patient to the ER. Lately ptn has been having abdominal pain with elevated amylase and lipase.   As per ptn's wife: on and off R abd pain w generalized weakness and poor po intake. PCP DCed po Lasix in past few days 2/2 abnormal blood test results.  Ptn denies recent NSAID use.  He was diagnosed with diabetes 18 years ago, and denies retinopathy (last saw Optho around 2 years ago). Cardiologist has alluded to the patient having mild renal impairment in the past, as per wife.  Patient's PCP is Dr. Shyann De León; GI Dr. Wilman Villagran; Cardiology Dr. Boyce.  (16 Feb 2021 21:05)  Since admission, Abd sono showed gallstones, but no sign of cholecystitis/pancreatitis. abd pain resolved on Zosyn, s/ p treatment empirically for 7 days . ID following. Pt found to have fluid overload 2/2 acute on chronic CHF which responded to lasix. Pt has been on HD since 2/26. HD as per renal. changed from MWF to TTSa,  RIJ tunneled catheter placed on 3/4 in IR, no fistula placement for now, Pt completed amiodarone loading for NSVT/VT and was being maintained on lopressor 25mg BID. Pt with EF 15% and severe AS evaluated for TAVR but found not to be a candidate. BP has been maintained initially 5 TID on non-dialysis days and 10TID on dialysis days. Pt acquired COVID during this admission in February and had COVID+ March 15th, but more recently had COVID - and is now off isolation.   On 3/22, patient had possible aspiration event.  pt unable to swallow food fully and  started to spit up and started to desat to 87% on 2L NC Pt started on zosyn. On 3/23, patient became more hypoxic and hypotensive requiring addition of levophed for pressure support. Pt transferred to MICU for further evaluation and management on levophed drip.         PAST MEDICAL & SURGICAL HISTORY:  Renal failure    CAD (coronary artery disease)    Hypertension    Diabetes mellitus        FAMILY HISTORY:      SOCIAL HISTORY:  Smoking: No   Substance Use: No  EtOH Use: No  Marital Status:   Advance Directives:     HOME MEDICATIONS:      Allergies    No Known Allergies    Intolerances          REVIEW OF SYSTEMS:  Constitutional: No fevers, chills, weight loss, weight gain  HEENT: No vision problems, eye pain, nasal congestion, rhinorrhea, sore throat, dysphagia  CV: No chest pain, orthopnea, palpitations  Resp: No cough, dyspnea, wheezing, hemoptysis  GI: No nausea, vomiting, diarrhea, constipation, abdominal pain  : [ ] dysuria [ ] nocturia [ ] hematuria [ ] increased urinary frequency  Musculoskeletal: [ ] back pain [ ] myalgias [ ] arthralgias [ ] fracture  Skin: [ ] rash [ ] itch  Neurological: [ ] headache [ ] dizziness [ ] syncope [ ] weakness [ ] numbness  Psychiatric: [ ] anxiety [ ] depression  Endocrine: [ ] diabetes [ ] thyroid problem  Hematologic/Lymphatic: [ ] anemia [ ] bleeding problem  Allergic/Immunologic: [ ] itchy eyes [ ] nasal discharge [ ] hives [ ] angioedema  [ ] All other systems negative  [x ] Unable to assess ROS because Patient with AMS acutely encephalopathic     OBJECTIVE:  ICU Vital Signs Last 24 Hrs  T(C): 36.7 (23 Mar 2021 21:26), Max: 36.8 (23 Mar 2021 00:12)  T(F): 98 (23 Mar 2021 21:26), Max: 98.2 (23 Mar 2021 00:12)  HR: 93 (23 Mar 2021 21:26) (80 - 93)  BP: 71/42 (23 Mar 2021 21:26) (71/42 - 106/65)  BP(mean): --  ABP: --  ABP(mean): --  RR: 18 (23 Mar 2021 21:26) (18 - 20)  SpO2: 96% (23 Mar 2021 21:26) (91% - 96%)        03-22 @ 07:01  -  03-23 @ 07:00  --------------------------------------------------------  IN: 360 mL / OUT: 0 mL / NET: 360 mL    03-23 @ 07:01  -  03-23 @ 23:05  --------------------------------------------------------  IN: 400 mL / OUT: 0 mL / NET: 400 mL      CAPILLARY BLOOD GLUCOSE      POCT Blood Glucose.: 87 mg/dL (23 Mar 2021 22:02)      PHYSICAL EXAM:  GENERAL: Uncomfortable appearing, tachypneic on O2 nc  HEENT:  Head atraumatic, EOMI, PERRLA, conjunctiva and sclera clear; Moist mucous membranes, normal oropharynx  NECK: Supple, No JVD, no lymphadenopathy, no thyroid nodules or enlargement  CHEST/LUNG: Clear to auscultation bilaterally; No rales, rhonchi, wheezing, or rubs. Tachypneic with some belly breathing  HEART: Regular rate and rhythm; No murmurs, rubs, or gallops  ABDOMEN: Bowel sounds present; Soft, Distended, nontender to palpation.   EXTREMITIES:  1+ peripheral pulses. 1+ pitting edema b/l.   NERVOUS SYSTEM:  non-focal and spontaneous movements of all extremities  Psych: acutely encephalopathic AOx0.  SKIN: bruising on feet. R. Medial malleolar ulceration     LINES:     HOSPITAL MEDICATIONS:  MEDICATIONS  (STANDING):  acetaminophen  IVPB .. 1000 milliGRAM(s) IV Intermittent once  albuterol/ipratropium for Nebulization 3 milliLiter(s) Nebulizer every 6 hours  aspirin  chewable 81 milliGRAM(s) Oral daily  atorvastatin 80 milliGRAM(s) Oral at bedtime  benzonatate 100 milliGRAM(s) Oral every 8 hours  chlorhexidine 4% Liquid 1 Application(s) Topical <User Schedule>  dextrose 40% Gel 15 Gram(s) Oral once  dextrose 5%. 1000 milliLiter(s) (50 mL/Hr) IV Continuous <Continuous>  dextrose 5%. 1000 milliLiter(s) (100 mL/Hr) IV Continuous <Continuous>  dextrose 50% Injectable 25 Gram(s) IV Push once  dextrose 50% Injectable 12.5 Gram(s) IV Push once  dextrose 50% Injectable 25 Gram(s) IV Push once  epoetin rafiq-epbx (RETACRIT) Injectable 72378 Unit(s) IV Push <User Schedule>  gabapentin 100 milliGRAM(s) Oral at bedtime  glucagon  Injectable 1 milliGRAM(s) IntraMuscular once  heparin   Injectable 5000 Unit(s) SubCutaneous every 8 hours  influenza   Vaccine 0.5 milliLiter(s) IntraMuscular once  insulin lispro (ADMELOG) corrective regimen sliding scale   SubCutaneous three times a day before meals  insulin lispro (ADMELOG) corrective regimen sliding scale   SubCutaneous at bedtime  levothyroxine 25 MICROGram(s) Oral daily  metoprolol tartrate 25 milliGRAM(s) Oral every 12 hours  midodrine 10 milliGRAM(s) Oral once  midodrine. 10 milliGRAM(s) Oral <User Schedule>  midodrine. 10 milliGRAM(s) Oral three times a day  pantoprazole    Tablet 40 milliGRAM(s) Oral before breakfast  piperacillin/tazobactam IVPB.. 3.375 Gram(s) IV Intermittent every 12 hours  polyethylene glycol 3350 17 Gram(s) Oral daily  prasugrel 10 milliGRAM(s) Oral daily  senna 2 Tablet(s) Oral at bedtime  sodium chloride 0.9% Bolus 250 milliLiter(s) IV Bolus once    MEDICATIONS  (PRN):  bisacodyl Suppository 10 milliGRAM(s) Rectal daily PRN Constipation  guaiFENesin   Syrup  (Sugar-Free) 200 milliGRAM(s) Oral every 6 hours PRN Cough  sodium chloride 0.9% lock flush 10 milliLiter(s) IV Push every 1 hour PRN Pre/post blood products, medications, blood draw, and to maintain line patency      LABS:                        10.2   18.52 )-----------( 238      ( 23 Mar 2021 06:30 )             33.7     Hgb Trend: 10.2<--, 10.0<--, 9.9<--, 9.6<--, 9.3<--  03-23    133<L>  |  96  |  74<H>  ----------------------------<  85  6.0<H>   |  20<L>  |  4.99<H>    Ca    9.5      23 Mar 2021 06:32    TPro  7.0  /  Alb  2.5<L>  /  TBili  1.6<H>  /  DBili  x   /  AST  72<H>  /  ALT  94<H>  /  AlkPhos  195<H>  03-23    Creatinine Trend: 4.99<--, 4.37<--, 3.38<--, 4.33<--, 3.42<--, 4.45<--            MICROBIOLOGY:     RADIOLOGY & ADDITIONAL TESTS:      ASSESSMENT AND PLAN:  Mr. Morelos is a 81 yo male w h/o DM, CAD, s/p CABG, CKD 3-4 here after outpatient labwork found elevated BUN, hyponatremia. Pt has since been initiated on dialysis and has required midodrine TID for pressure support. CCB abdominal pain on admission with mildly elevated amylase and lipase without evidence of cholecystitis/pancreatitis. S/P zoxyn x7 days with improvement in abdominal pain. course also CCB nosocomial COVID with monoclonal Ab infusion. In addition, pt with possible aspiration event on 3/22 with subsequent increased O2 requirements, and increased hypotension on 3/23 requiring pressure support with levophed 2/2 cardiogenic vs septic shock. Pt transferred to MICU for further workup and management.      #Neuro  -AOx3 at baseline, currently lethargic and AOx0, likely 2/2 hypercapneic respiratory failure also with symptomatic hypotension and febrile illness.  -Patient now intubated and sedated as of 3/24  -Monitor neurological status per MICU protocol  -Will consider CTH if mental status does not improve with BP control and improvement in hypercapnia.     #Cardiovascular  Severe biventricular HFrEF with moderate to severe calcified AS   -Pt has CAD s/p CABG > 2 years ago  -AICD in place  -C/W midodrine 10mg TID  -Currently requiring levophed for pressor support. Titrate as necessary to maintain MAP >65  -Pt being followed by cardiology. Will appreciate recs  -Has been evaluated for TAVR during this admission and has been deemed not a candidate at this time.  -EKG with paced rhythm  -F/U cardiac enzymes. Trend if elevated. Low concern for ACS at this time.   -C/W DAPT   NSVT/VT  -S/P amio load  -Hold lopressor in setting of hypotension   -Monitor on telemetry     #Respiratory  Acute hypoxic respiratory failure requiring Intubated  -Intubated overnight 3/24  -Possible aspiration event 3/22 PM  -Vanc/Zosyn for aspiration PNA coverage  -F/U CT chest once stable  -Trend ABG    #GI/Nutrition  -NPO given AMS  -FSG q6h while NPO  -Initiate feeds once PO access obtained    #/Renal    #Skin    #ID  Septic shock possibly 2/2 aspiration PNA  -Procal elevated to 2.46  -F/U Bcx, Ucx, Sputum cx  -C/W vanc/Zosyn renally dosed  -Consider CT abd/pelvis given recent hx abdominal pain with gallstones     #Endocrine  -FSG q6h while NPO  -ISS q6h    #Hematologic/DVT ppx  -Heparin for VTE prophylaxis    #Ethics  -GOC discussed with patient's wife. Patient currently FULL CODE  -On discharge, pt will need JESUS, outpatient HD setup       Tank Jewell MD  PGY-1      Critical Care Attending Attestation:   80M Hx Newly initiated ESRD on HD found Post HD Hypotension and Altered Mental Status started  on Pressor during RRT and transferred to MICU.   - Metabolic Encephalopathy Grade 1-2 and spoke Romansh Language   - Septic Shock, HFrEF 15% with Mod-Sever Calcified AS on Pressor Support titration to MAP > 65 mmHg    - Empiric ABx coverage for Aspiration PNA coverage pending septic w/u   - S/P CABG, CHF s/p AICD pending TAVR evaluation   - NPO   - Received back IV Fluid for Post HD Hypotension will need CVVHD   - GOC Plans discussion and Wife3 updated clinical status upon ICU Arrival    Patient seen and examined with ICU Resident/Fellow at bedside after lab data, medical records and radiology reports reviewed. I have read and agreeable in general with resident's Documented Note, Assessment and Management Plan which reflected my opinions from bedside round and discussion.   Total Critical Care Time = 45 Min excluding teaching and procedure activity. MICU Accept Note    CHIEF COMPLAINT: Abdominal pain    HPI / INTERVAL HISTORY:  81 yo male w h/o DM, CAD, s/p CABG, CKD 3-4. He saw Dr. Abel Luz yesterday as a new consult for elevated creatinine. Bloodwork returned notable for  and Na 126; in light of these findings, Dr. Luz sent the patient to the ER. Lately ptn has been having abdominal pain with elevated amylase and lipase.   As per ptn's wife: on and off R abd pain w generalized weakness and poor po intake. PCP DCed po Lasix in past few days 2/2 abnormal blood test results.  Ptn denies recent NSAID use.  He was diagnosed with diabetes 18 years ago, and denies retinopathy (last saw Optho around 2 years ago). Cardiologist has alluded to the patient having mild renal impairment in the past, as per wife.  Patient's PCP is Dr. Shyann De León; GI Dr. Wilman Villagran; Cardiology Dr. Boyce.  (16 Feb 2021 21:05)  Since admission, Abd sono showed gallstones, but no sign of cholecystitis/pancreatitis. abd pain resolved on Zosyn, s/ p treatment empirically for 7 days . ID following. Pt found to have fluid overload 2/2 acute on chronic CHF which responded to lasix. Pt has been on HD since 2/26. HD as per renal. changed from MWF to TTSa,  RIJ tunneled catheter placed on 3/4 in IR, no fistula placement for now, Pt completed amiodarone loading for NSVT/VT and was being maintained on lopressor 25mg BID. Pt with EF 15% and severe AS evaluated for TAVR but found not to be a candidate. BP has been maintained initially 5 TID on non-dialysis days and 10TID on dialysis days. Pt acquired COVID during this admission in February and had COVID+ March 15th, but more recently had COVID - and is now off isolation.   On 3/22, patient had possible aspiration event.  pt unable to swallow food fully and  started to spit up and started to desat to 87% on 2L NC Pt started on zosyn. On 3/23, patient became more hypoxic and hypotensive requiring addition of levophed for pressure support. Pt transferred to MICU for further evaluation and management on levophed drip.         PAST MEDICAL & SURGICAL HISTORY:  Renal failure    CAD (coronary artery disease)    Hypertension    Diabetes mellitus        FAMILY HISTORY:      SOCIAL HISTORY:  Smoking: No   Substance Use: No  EtOH Use: No  Marital Status:   Advance Directives:     HOME MEDICATIONS:      Allergies    No Known Allergies    Intolerances          REVIEW OF SYSTEMS:  Constitutional: No fevers, chills, weight loss, weight gain  HEENT: No vision problems, eye pain, nasal congestion, rhinorrhea, sore throat, dysphagia  CV: No chest pain, orthopnea, palpitations  Resp: No cough, dyspnea, wheezing, hemoptysis  GI: No nausea, vomiting, diarrhea, constipation, abdominal pain  : [ ] dysuria [ ] nocturia [ ] hematuria [ ] increased urinary frequency  Musculoskeletal: [ ] back pain [ ] myalgias [ ] arthralgias [ ] fracture  Skin: [ ] rash [ ] itch  Neurological: [ ] headache [ ] dizziness [ ] syncope [ ] weakness [ ] numbness  Psychiatric: [ ] anxiety [ ] depression  Endocrine: [ ] diabetes [ ] thyroid problem  Hematologic/Lymphatic: [ ] anemia [ ] bleeding problem  Allergic/Immunologic: [ ] itchy eyes [ ] nasal discharge [ ] hives [ ] angioedema  [ ] All other systems negative  [x ] Unable to assess ROS because Patient with AMS acutely encephalopathic     OBJECTIVE:  ICU Vital Signs Last 24 Hrs  T(C): 36.7 (23 Mar 2021 21:26), Max: 36.8 (23 Mar 2021 00:12)  T(F): 98 (23 Mar 2021 21:26), Max: 98.2 (23 Mar 2021 00:12)  HR: 93 (23 Mar 2021 21:26) (80 - 93)  BP: 71/42 (23 Mar 2021 21:26) (71/42 - 106/65)  BP(mean): --  ABP: --  ABP(mean): --  RR: 18 (23 Mar 2021 21:26) (18 - 20)  SpO2: 96% (23 Mar 2021 21:26) (91% - 96%)        03-22 @ 07:01  -  03-23 @ 07:00  --------------------------------------------------------  IN: 360 mL / OUT: 0 mL / NET: 360 mL    03-23 @ 07:01  -  03-23 @ 23:05  --------------------------------------------------------  IN: 400 mL / OUT: 0 mL / NET: 400 mL      CAPILLARY BLOOD GLUCOSE      POCT Blood Glucose.: 87 mg/dL (23 Mar 2021 22:02)      PHYSICAL EXAM:  GENERAL: Uncomfortable appearing, tachypneic on O2 nc  HEENT:  Head atraumatic, EOMI, PERRLA, conjunctiva and sclera clear; Moist mucous membranes, normal oropharynx  NECK: Supple, No JVD, no lymphadenopathy, no thyroid nodules or enlargement  CHEST/LUNG: Clear to auscultation bilaterally; No rales, rhonchi, wheezing, or rubs. Tachypneic with some belly breathing  HEART: Regular rate and rhythm; No murmurs, rubs, or gallops  ABDOMEN: Bowel sounds present; Soft, Distended, nontender to palpation.   EXTREMITIES:  1+ peripheral pulses. 1+ pitting edema b/l.   NERVOUS SYSTEM:  non-focal and spontaneous movements of all extremities  Psych: acutely encephalopathic AOx0.  SKIN: bruising on feet. R. Medial malleolar ulceration     LINES:     HOSPITAL MEDICATIONS:  MEDICATIONS  (STANDING):  acetaminophen  IVPB .. 1000 milliGRAM(s) IV Intermittent once  albuterol/ipratropium for Nebulization 3 milliLiter(s) Nebulizer every 6 hours  aspirin  chewable 81 milliGRAM(s) Oral daily  atorvastatin 80 milliGRAM(s) Oral at bedtime  benzonatate 100 milliGRAM(s) Oral every 8 hours  chlorhexidine 4% Liquid 1 Application(s) Topical <User Schedule>  dextrose 40% Gel 15 Gram(s) Oral once  dextrose 5%. 1000 milliLiter(s) (50 mL/Hr) IV Continuous <Continuous>  dextrose 5%. 1000 milliLiter(s) (100 mL/Hr) IV Continuous <Continuous>  dextrose 50% Injectable 25 Gram(s) IV Push once  dextrose 50% Injectable 12.5 Gram(s) IV Push once  dextrose 50% Injectable 25 Gram(s) IV Push once  epoetin rafiq-epbx (RETACRIT) Injectable 64063 Unit(s) IV Push <User Schedule>  gabapentin 100 milliGRAM(s) Oral at bedtime  glucagon  Injectable 1 milliGRAM(s) IntraMuscular once  heparin   Injectable 5000 Unit(s) SubCutaneous every 8 hours  influenza   Vaccine 0.5 milliLiter(s) IntraMuscular once  insulin lispro (ADMELOG) corrective regimen sliding scale   SubCutaneous three times a day before meals  insulin lispro (ADMELOG) corrective regimen sliding scale   SubCutaneous at bedtime  levothyroxine 25 MICROGram(s) Oral daily  metoprolol tartrate 25 milliGRAM(s) Oral every 12 hours  midodrine 10 milliGRAM(s) Oral once  midodrine. 10 milliGRAM(s) Oral <User Schedule>  midodrine. 10 milliGRAM(s) Oral three times a day  pantoprazole    Tablet 40 milliGRAM(s) Oral before breakfast  piperacillin/tazobactam IVPB.. 3.375 Gram(s) IV Intermittent every 12 hours  polyethylene glycol 3350 17 Gram(s) Oral daily  prasugrel 10 milliGRAM(s) Oral daily  senna 2 Tablet(s) Oral at bedtime  sodium chloride 0.9% Bolus 250 milliLiter(s) IV Bolus once    MEDICATIONS  (PRN):  bisacodyl Suppository 10 milliGRAM(s) Rectal daily PRN Constipation  guaiFENesin   Syrup  (Sugar-Free) 200 milliGRAM(s) Oral every 6 hours PRN Cough  sodium chloride 0.9% lock flush 10 milliLiter(s) IV Push every 1 hour PRN Pre/post blood products, medications, blood draw, and to maintain line patency      LABS:                        10.2   18.52 )-----------( 238      ( 23 Mar 2021 06:30 )             33.7     Hgb Trend: 10.2<--, 10.0<--, 9.9<--, 9.6<--, 9.3<--  03-23    133<L>  |  96  |  74<H>  ----------------------------<  85  6.0<H>   |  20<L>  |  4.99<H>    Ca    9.5      23 Mar 2021 06:32    TPro  7.0  /  Alb  2.5<L>  /  TBili  1.6<H>  /  DBili  x   /  AST  72<H>  /  ALT  94<H>  /  AlkPhos  195<H>  03-23    Creatinine Trend: 4.99<--, 4.37<--, 3.38<--, 4.33<--, 3.42<--, 4.45<--            MICROBIOLOGY:     RADIOLOGY & ADDITIONAL TESTS:      ASSESSMENT AND PLAN:  Mr. Morelos is a 81 yo male w h/o DM, CAD, s/p CABG, CKD 3-4 here after outpatient labwork found elevated BUN, hyponatremia. Pt has since been initiated on dialysis and has required midodrine TID for pressure support. CCB abdominal pain on admission with mildly elevated amylase and lipase without evidence of cholecystitis/pancreatitis. S/P zoxyn x7 days with improvement in abdominal pain. course also CCB nosocomial COVID with monoclonal Ab infusion. In addition, pt with possible aspiration event on 3/22 with subsequent increased O2 requirements, and increased hypotension on 3/23 requiring pressure support with levophed 2/2 cardiogenic vs septic shock. Pt transferred to MICU for further workup and management.      #Neuro  -AOx3 at baseline, currently lethargic and AOx0, likely 2/2 hypercapneic respiratory failure also with symptomatic hypotension and febrile illness.  -Patient now intubated and sedated as of 3/24  -Monitor neurological status per MICU protocol  -Will consider CTH if mental status does not improve with BP control and improvement in hypercapnia.     #Cardiovascular  Severe biventricular HFrEF with moderate to severe calcified AS   -Pt has CAD s/p CABG > 2 years ago  -AICD in place  -C/W midodrine 10mg TID  -Currently requiring levophed for pressor support. Titrate as necessary to maintain MAP >65  -Pt being followed by cardiology. Will appreciate recs  -Has been evaluated for TAVR during this admission and has been deemed not a candidate at this time.  -EKG with paced rhythm  -Trop elevated to 541 in setting of ESRD and septic shock and biventricular HF Low concern for ACS at this time. Will trend  -C/W DAPT   NSVT/VT  -S/P amio load  -Hold lopressor in setting of hypotension   -Monitor on telemetry     #Respiratory  Acute hypoxic respiratory failure requiring Intubated  -Intubated overnight 3/24  -Possible aspiration event 3/22 PM  -Vanc/Zosyn for aspiration PNA coverage  -F/U CT chest once stable  -Trend ABG    #GI/Nutrition  -NPO given AMS  -FSG q6h while NPO  -Initiate feeds once PO access obtained    #/Renal  ESRD on HD TTS  -Now in septic/cardiogenic shock. May need to initiate CVVHD  -K 6.2. Given Insulin/D50, albuterol  -D/W nephrology regarding further HD vs CVVHD  -Monitor BMP q6h     #Skin  Has medial malleolar ulceration.   -vascular consulted during admission, THIERRY w toe pressures requested initially by vascular, but at this point its being deterred and not necessary for any further inptn work up of PAD.   -ptn will need outptn F/U with vascular     #ID  Septic shock possibly 2/2 aspiration PNA  -Procal elevated to 2.46  -F/U Bcx, Ucx, Sputum cx  -C/W vanc/Zosyn renally dosed  -Consider CT abd/pelvis given recent hx abdominal pain with gallstones     #Endocrine  -FSG q6h while NPO  -ISS q6h    #Hematologic/DVT ppx  -Heparin for VTE prophylaxis    #Ethics  -GOC discussed with patient's wife. Patient currently FULL CODE  -On discharge, pt will need JESUS, outpatient HD setup       Tank Jewell MD  PGY-1      Critical Care Attending Attestation:   80M Hx Newly initiated ESRD on HD found Post HD Hypotension and Altered Mental Status started  on Pressor during RRT and transferred to MICU.   - Metabolic Encephalopathy Grade 1-2 and spoke Hebrew Language   - Septic Shock, HFrEF 15% with Mod-Sever Calcified AS on Pressor Support titration to MAP > 65 mmHg    - Empiric ABx coverage for Aspiration PNA coverage pending septic w/u   - S/P CABG, CHF s/p AICD pending TAVR evaluation   - NPO   - Received back IV Fluid for Post HD Hypotension will need CVVHD   - GOC Plans discussion and Wife3 updated clinical status upon ICU Arrival    Patient seen and examined with ICU Resident/Fellow at bedside after lab data, medical records and radiology reports reviewed. I have read and agreeable in general with resident's Documented Note, Assessment and Management Plan which reflected my opinions from bedside round and discussion.   Total Critical Care Time = 45 Min excluding teaching and procedure activity. MICU Accept Note    CHIEF COMPLAINT: Abdominal pain    HPI / INTERVAL HISTORY:  81 yo male w h/o DM, CAD, s/p CABG, CKD 3-4. He saw Dr. Abel Luz yesterday as a new consult for elevated creatinine. Bloodwork returned notable for  and Na 126; in light of these findings, Dr. Luz sent the patient to the ER. Lately ptn has been having abdominal pain with elevated amylase and lipase.   As per ptn's wife: on and off R abd pain w generalized weakness and poor po intake. PCP DCed po Lasix in past few days 2/2 abnormal blood test results.  Ptn denies recent NSAID use.  He was diagnosed with diabetes 18 years ago, and denies retinopathy (last saw Optho around 2 years ago). Cardiologist has alluded to the patient having mild renal impairment in the past, as per wife.  Patient's PCP is Dr. Shyann De León; GI Dr. Wilman Villagran; Cardiology Dr. Boyce.  (16 Feb 2021 21:05)  Since admission, Abd sono showed gallstones, but no sign of cholecystitis/pancreatitis. CT abd/pelvis showed small volume ascites but no other abnormalities. abd pain resolved on Zosyn, s/ p treatment empirically for 7 days . ID following. Pt found to have fluid overload 2/2 acute on chronic CHF which responded to lasix. Pt has been on HD since 2/26. HD as per renal. changed from MWF to TTSa,  RIJ tunneled catheter placed on 3/4 in IR, no fistula placement for now, Pt completed amiodarone loading for NSVT/VT and was being maintained on lopressor 25mg BID. Pt with EF 15% and severe AS evaluated for TAVR but found not to be a candidate. BP has been maintained initially 5 TID on non-dialysis days and 10TID on dialysis days. Pt acquired COVID during this admission in February and had COVID+ March 15th, but more recently had COVID - and is now off isolation.   On 3/22, patient had possible aspiration event.  pt unable to swallow food fully and  started to spit up and started to desat to 87% on 2L NC Pt started on zosyn. On 3/23, patient became more hypoxic and hypotensive requiring addition of levophed for pressure support. Pt transferred to MICU for further evaluation and management on levophed drip.         PAST MEDICAL & SURGICAL HISTORY:  Renal failure    CAD (coronary artery disease)    Hypertension    Diabetes mellitus        FAMILY HISTORY:      SOCIAL HISTORY:  Smoking: No   Substance Use: No  EtOH Use: No  Marital Status:   Advance Directives:     HOME MEDICATIONS:      Allergies    No Known Allergies    Intolerances          REVIEW OF SYSTEMS:  Constitutional: No fevers, chills, weight loss, weight gain  HEENT: No vision problems, eye pain, nasal congestion, rhinorrhea, sore throat, dysphagia  CV: No chest pain, orthopnea, palpitations  Resp: No cough, dyspnea, wheezing, hemoptysis  GI: No nausea, vomiting, diarrhea, constipation, abdominal pain  : [ ] dysuria [ ] nocturia [ ] hematuria [ ] increased urinary frequency  Musculoskeletal: [ ] back pain [ ] myalgias [ ] arthralgias [ ] fracture  Skin: [ ] rash [ ] itch  Neurological: [ ] headache [ ] dizziness [ ] syncope [ ] weakness [ ] numbness  Psychiatric: [ ] anxiety [ ] depression  Endocrine: [ ] diabetes [ ] thyroid problem  Hematologic/Lymphatic: [ ] anemia [ ] bleeding problem  Allergic/Immunologic: [ ] itchy eyes [ ] nasal discharge [ ] hives [ ] angioedema  [ ] All other systems negative  [x ] Unable to assess ROS because Patient with AMS acutely encephalopathic     OBJECTIVE:  ICU Vital Signs Last 24 Hrs  T(C): 36.7 (23 Mar 2021 21:26), Max: 36.8 (23 Mar 2021 00:12)  T(F): 98 (23 Mar 2021 21:26), Max: 98.2 (23 Mar 2021 00:12)  HR: 93 (23 Mar 2021 21:26) (80 - 93)  BP: 71/42 (23 Mar 2021 21:26) (71/42 - 106/65)  BP(mean): --  ABP: --  ABP(mean): --  RR: 18 (23 Mar 2021 21:26) (18 - 20)  SpO2: 96% (23 Mar 2021 21:26) (91% - 96%)        03-22 @ 07:01  -  03-23 @ 07:00  --------------------------------------------------------  IN: 360 mL / OUT: 0 mL / NET: 360 mL    03-23 @ 07:01  -  03-23 @ 23:05  --------------------------------------------------------  IN: 400 mL / OUT: 0 mL / NET: 400 mL      CAPILLARY BLOOD GLUCOSE      POCT Blood Glucose.: 87 mg/dL (23 Mar 2021 22:02)      PHYSICAL EXAM:  GENERAL: Uncomfortable appearing, tachypneic on O2 nc  HEENT:  Head atraumatic, EOMI, PERRLA, conjunctiva and sclera clear; Moist mucous membranes, normal oropharynx  NECK: Supple, No JVD, no lymphadenopathy, no thyroid nodules or enlargement  CHEST/LUNG: Clear to auscultation bilaterally; No rales, rhonchi, wheezing, or rubs. Tachypneic with some belly breathing  HEART: Regular rate and rhythm; No murmurs, rubs, or gallops  ABDOMEN: Bowel sounds present; Soft, Distended, nontender to palpation.   EXTREMITIES:  1+ peripheral pulses. 1+ pitting edema b/l.   NERVOUS SYSTEM:  non-focal and spontaneous movements of all extremities  Psych: acutely encephalopathic AOx0.  SKIN: bruising on feet. R. Medial malleolar ulceration     LINES:     HOSPITAL MEDICATIONS:  MEDICATIONS  (STANDING):  acetaminophen  IVPB .. 1000 milliGRAM(s) IV Intermittent once  albuterol/ipratropium for Nebulization 3 milliLiter(s) Nebulizer every 6 hours  aspirin  chewable 81 milliGRAM(s) Oral daily  atorvastatin 80 milliGRAM(s) Oral at bedtime  benzonatate 100 milliGRAM(s) Oral every 8 hours  chlorhexidine 4% Liquid 1 Application(s) Topical <User Schedule>  dextrose 40% Gel 15 Gram(s) Oral once  dextrose 5%. 1000 milliLiter(s) (50 mL/Hr) IV Continuous <Continuous>  dextrose 5%. 1000 milliLiter(s) (100 mL/Hr) IV Continuous <Continuous>  dextrose 50% Injectable 25 Gram(s) IV Push once  dextrose 50% Injectable 12.5 Gram(s) IV Push once  dextrose 50% Injectable 25 Gram(s) IV Push once  epoetin rafiq-epbx (RETACRIT) Injectable 66539 Unit(s) IV Push <User Schedule>  gabapentin 100 milliGRAM(s) Oral at bedtime  glucagon  Injectable 1 milliGRAM(s) IntraMuscular once  heparin   Injectable 5000 Unit(s) SubCutaneous every 8 hours  influenza   Vaccine 0.5 milliLiter(s) IntraMuscular once  insulin lispro (ADMELOG) corrective regimen sliding scale   SubCutaneous three times a day before meals  insulin lispro (ADMELOG) corrective regimen sliding scale   SubCutaneous at bedtime  levothyroxine 25 MICROGram(s) Oral daily  metoprolol tartrate 25 milliGRAM(s) Oral every 12 hours  midodrine 10 milliGRAM(s) Oral once  midodrine. 10 milliGRAM(s) Oral <User Schedule>  midodrine. 10 milliGRAM(s) Oral three times a day  pantoprazole    Tablet 40 milliGRAM(s) Oral before breakfast  piperacillin/tazobactam IVPB.. 3.375 Gram(s) IV Intermittent every 12 hours  polyethylene glycol 3350 17 Gram(s) Oral daily  prasugrel 10 milliGRAM(s) Oral daily  senna 2 Tablet(s) Oral at bedtime  sodium chloride 0.9% Bolus 250 milliLiter(s) IV Bolus once    MEDICATIONS  (PRN):  bisacodyl Suppository 10 milliGRAM(s) Rectal daily PRN Constipation  guaiFENesin   Syrup  (Sugar-Free) 200 milliGRAM(s) Oral every 6 hours PRN Cough  sodium chloride 0.9% lock flush 10 milliLiter(s) IV Push every 1 hour PRN Pre/post blood products, medications, blood draw, and to maintain line patency      LABS:                        10.2   18.52 )-----------( 238      ( 23 Mar 2021 06:30 )             33.7     Hgb Trend: 10.2<--, 10.0<--, 9.9<--, 9.6<--, 9.3<--  03-23    133<L>  |  96  |  74<H>  ----------------------------<  85  6.0<H>   |  20<L>  |  4.99<H>    Ca    9.5      23 Mar 2021 06:32    TPro  7.0  /  Alb  2.5<L>  /  TBili  1.6<H>  /  DBili  x   /  AST  72<H>  /  ALT  94<H>  /  AlkPhos  195<H>  03-23    Creatinine Trend: 4.99<--, 4.37<--, 3.38<--, 4.33<--, 3.42<--, 4.45<--            MICROBIOLOGY:     RADIOLOGY & ADDITIONAL TESTS:      ASSESSMENT AND PLAN:  Mr. Morelos is a 81 yo male w h/o DM, CAD, s/p CABG, CKD 3-4 here after outpatient labwork found elevated BUN, hyponatremia. Pt has since been initiated on dialysis and has required midodrine TID for pressure support. CCB abdominal pain on admission with mildly elevated amylase and lipase without evidence of cholecystitis/pancreatitis. S/P zoxyn x7 days with improvement in abdominal pain. course also CCB nosocomial COVID with monoclonal Ab infusion. In addition, pt with possible aspiration event on 3/22 with subsequent increased O2 requirements, and increased hypotension on 3/23 requiring pressure support with levophed 2/2 cardiogenic vs septic shock. Pt transferred to MICU for further workup and management.      #Neuro  -AOx3 at baseline, currently lethargic and AOx0, likely 2/2 hypercapneic respiratory failure also with symptomatic hypotension and febrile illness.  -Patient now intubated and sedated as of 3/24  -Monitor neurological status per MICU protocol  -Will consider CTH if mental status does not improve with BP control and improvement in hypercapnia.     #Cardiovascular  Severe biventricular HFrEF with moderate to severe calcified AS   -Pt has CAD s/p CABG > 2 years ago  -AICD in place  -C/W midodrine 10mg TID  -Currently requiring levophed for pressor support. Titrate as necessary to maintain MAP >65  -Pt being followed by cardiology. Will appreciate recs  -Has been evaluated for TAVR during this admission and has been deemed not a candidate at this time.  -EKG with paced rhythm  -Trop elevated to 541 in setting of ESRD and septic shock and biventricular HF Low concern for ACS at this time. Will trend  -C/W DAPT   NSVT/VT  -S/P amio load  -Hold lopressor in setting of hypotension   -Monitor on telemetry     #Respiratory  Acute hypoxic respiratory failure requiring Intubated  -Intubated overnight 3/24  -Possible aspiration event 3/22 PM  -Vanc/Zosyn for aspiration PNA coverage  -F/U CT chest once stable  -Trend ABG    #GI/Nutrition  -NPO given AMS  -FSG q6h while NPO  -Initiate feeds once PO access obtained    #/Renal  ESRD on HD TTS  -Now in septic/cardiogenic shock. May need to initiate CVVHD  -K 6.2. Given Insulin/D50, albuterol  -D/W nephrology regarding further HD vs CVVHD  -Monitor BMP q6h     #Skin  Has medial malleolar ulceration.   -vascular consulted during admission, THIERRY w toe pressures requested initially by vascular, but at this point its being deterred and not necessary for any further inptn work up of PAD.   -ptn will need outptn F/U with vascular     #ID  Septic shock possibly 2/2 aspiration PNA  -Procal elevated to 2.46  -F/U Bcx, Ucx, Sputum cx  -C/W vanc/Zosyn renally dosed  -Consider CT abd/pelvis given recent hx abdominal pain with gallstones     #Endocrine  -FSG q6h while NPO  -ISS q6h    #Hematologic/DVT ppx  -Heparin for VTE prophylaxis    #Ethics  -GOC discussed with patient's wife. Patient currently FULL CODE  -On discharge, pt will need JESUS, outpatient HD setup       Tank Jewell MD  PGY-1      Critical Care Attending Attestation:   80M Hx Newly initiated ESRD on HD found Post HD Hypotension and Altered Mental Status started  on Pressor during RRT and transferred to MICU.   - Metabolic Encephalopathy Grade 1-2 and spoke [place] Language   - Septic Shock, HFrEF 15% with Mod-Sever Calcified AS on Pressor Support titration to MAP > 65 mmHg    - Empiric ABx coverage for Aspiration PNA coverage pending septic w/u   - S/P CABG, CHF s/p AICD pending TAVR evaluation   - NPO   - Received back IV Fluid for Post HD Hypotension will need CVVHD   - GOC Plans discussion and Wife3 updated clinical status upon ICU Arrival    Patient seen and examined with ICU Resident/Fellow at bedside after lab data, medical records and radiology reports reviewed. I have read and agreeable in general with resident's Documented Note, Assessment and Management Plan which reflected my opinions from bedside round and discussion.   Total Critical Care Time = 45 Min excluding teaching and procedure activity. MICU Accept Note    CHIEF COMPLAINT: Abdominal pain    HPI / INTERVAL HISTORY:  79 yo male w h/o DM, CAD, s/p CABG, CKD 3-4. He saw Dr. Abel Luz yesterday as a new consult for elevated creatinine. Bloodwork returned notable for  and Na 126; in light of these findings, Dr. Luz sent the patient to the ER. Lately ptn has been having abdominal pain with elevated amylase and lipase.   As per ptn's wife: on and off R abd pain w generalized weakness and poor po intake. PCP DCed po Lasix in past few days 2/2 abnormal blood test results.  Ptn denies recent NSAID use.  He was diagnosed with diabetes 18 years ago, and denies retinopathy (last saw Optho around 2 years ago). Cardiologist has alluded to the patient having mild renal impairment in the past, as per wife.  Patient's PCP is Dr. Shyann De León; GI Dr. Wilman Villagran; Cardiology Dr. Boyce.  (16 Feb 2021 21:05)  Since admission, Abd sono showed gallstones, but no sign of cholecystitis/pancreatitis. CT abd/pelvis showed small volume ascites but no other abnormalities. abd pain resolved on Zosyn, s/ p treatment empirically for 7 days . ID following. Pt found to have fluid overload 2/2 acute on chronic CHF which responded to lasix. Pt has been on HD since 2/26. HD as per renal. changed from MWF to TTSa,  RIJ tunneled catheter placed on 3/4 in IR, no fistula placement for now, Pt completed amiodarone loading for NSVT/VT and was being maintained on lopressor 25mg BID. Pt with EF 15% and severe AS evaluated for TAVR but found not to be a candidate. BP has been maintained initially 5 TID on non-dialysis days and 10TID on dialysis days. Pt acquired COVID during this admission in February and had COVID+ March 15th, but more recently had COVID - and is now off isolation.   On 3/22, patient had possible aspiration event.  pt unable to swallow food fully and  started to spit up and started to desat to 87% on 2L NC Pt started on zosyn. On 3/23, patient became more hypoxic and hypotensive requiring addition of levophed for pressure support. Pt transferred to MICU for further evaluation and management on levophed drip.         PAST MEDICAL & SURGICAL HISTORY:  Renal failure    CAD (coronary artery disease)    Hypertension    Diabetes mellitus        FAMILY HISTORY:      SOCIAL HISTORY:  Smoking: No   Substance Use: No  EtOH Use: No  Marital Status:   Advance Directives:     HOME MEDICATIONS:      Allergies    No Known Allergies    Intolerances          REVIEW OF SYSTEMS:  Constitutional: No fevers, chills, weight loss, weight gain  HEENT: No vision problems, eye pain, nasal congestion, rhinorrhea, sore throat, dysphagia  CV: No chest pain, orthopnea, palpitations  Resp: No cough, dyspnea, wheezing, hemoptysis  GI: No nausea, vomiting, diarrhea, constipation, abdominal pain  : [ ] dysuria [ ] nocturia [ ] hematuria [ ] increased urinary frequency  Musculoskeletal: [ ] back pain [ ] myalgias [ ] arthralgias [ ] fracture  Skin: [ ] rash [ ] itch  Neurological: [ ] headache [ ] dizziness [ ] syncope [ ] weakness [ ] numbness  Psychiatric: [ ] anxiety [ ] depression  Endocrine: [ ] diabetes [ ] thyroid problem  Hematologic/Lymphatic: [ ] anemia [ ] bleeding problem  Allergic/Immunologic: [ ] itchy eyes [ ] nasal discharge [ ] hives [ ] angioedema  [ ] All other systems negative  [x ] Unable to assess ROS because Patient with AMS acutely encephalopathic     OBJECTIVE:  ICU Vital Signs Last 24 Hrs  T(C): 36.7 (23 Mar 2021 21:26), Max: 36.8 (23 Mar 2021 00:12)  T(F): 98 (23 Mar 2021 21:26), Max: 98.2 (23 Mar 2021 00:12)  HR: 93 (23 Mar 2021 21:26) (80 - 93)  BP: 71/42 (23 Mar 2021 21:26) (71/42 - 106/65)  BP(mean): --  ABP: --  ABP(mean): --  RR: 18 (23 Mar 2021 21:26) (18 - 20)  SpO2: 96% (23 Mar 2021 21:26) (91% - 96%)        03-22 @ 07:01  -  03-23 @ 07:00  --------------------------------------------------------  IN: 360 mL / OUT: 0 mL / NET: 360 mL    03-23 @ 07:01  -  03-23 @ 23:05  --------------------------------------------------------  IN: 400 mL / OUT: 0 mL / NET: 400 mL      CAPILLARY BLOOD GLUCOSE      POCT Blood Glucose.: 87 mg/dL (23 Mar 2021 22:02)      PHYSICAL EXAM:  GENERAL: Uncomfortable appearing, tachypneic on O2 nc  HEENT:  Head atraumatic, EOMI, PERRLA, conjunctiva and sclera clear; Moist mucous membranes, normal oropharynx  NECK: Supple, No JVD, no lymphadenopathy, no thyroid nodules or enlargement  CHEST/LUNG: Clear to auscultation bilaterally; No rales, rhonchi, wheezing, or rubs. Tachypneic with some belly breathing  HEART: Regular rate and rhythm; No murmurs, rubs, or gallops  ABDOMEN: Bowel sounds present; Soft, Distended, nontender to palpation.   EXTREMITIES:  1+ peripheral pulses. 1+ pitting edema b/l.   NERVOUS SYSTEM:  non-focal and spontaneous movements of all extremities  Psych: acutely encephalopathic AOx0.  SKIN: bruising on feet. R. Medial malleolar ulceration     LINES:     HOSPITAL MEDICATIONS:  MEDICATIONS  (STANDING):  acetaminophen  IVPB .. 1000 milliGRAM(s) IV Intermittent once  albuterol/ipratropium for Nebulization 3 milliLiter(s) Nebulizer every 6 hours  aspirin  chewable 81 milliGRAM(s) Oral daily  atorvastatin 80 milliGRAM(s) Oral at bedtime  benzonatate 100 milliGRAM(s) Oral every 8 hours  chlorhexidine 4% Liquid 1 Application(s) Topical <User Schedule>  dextrose 40% Gel 15 Gram(s) Oral once  dextrose 5%. 1000 milliLiter(s) (50 mL/Hr) IV Continuous <Continuous>  dextrose 5%. 1000 milliLiter(s) (100 mL/Hr) IV Continuous <Continuous>  dextrose 50% Injectable 25 Gram(s) IV Push once  dextrose 50% Injectable 12.5 Gram(s) IV Push once  dextrose 50% Injectable 25 Gram(s) IV Push once  epoetin rafiq-epbx (RETACRIT) Injectable 51842 Unit(s) IV Push <User Schedule>  gabapentin 100 milliGRAM(s) Oral at bedtime  glucagon  Injectable 1 milliGRAM(s) IntraMuscular once  heparin   Injectable 5000 Unit(s) SubCutaneous every 8 hours  influenza   Vaccine 0.5 milliLiter(s) IntraMuscular once  insulin lispro (ADMELOG) corrective regimen sliding scale   SubCutaneous three times a day before meals  insulin lispro (ADMELOG) corrective regimen sliding scale   SubCutaneous at bedtime  levothyroxine 25 MICROGram(s) Oral daily  metoprolol tartrate 25 milliGRAM(s) Oral every 12 hours  midodrine 10 milliGRAM(s) Oral once  midodrine. 10 milliGRAM(s) Oral <User Schedule>  midodrine. 10 milliGRAM(s) Oral three times a day  pantoprazole    Tablet 40 milliGRAM(s) Oral before breakfast  piperacillin/tazobactam IVPB.. 3.375 Gram(s) IV Intermittent every 12 hours  polyethylene glycol 3350 17 Gram(s) Oral daily  prasugrel 10 milliGRAM(s) Oral daily  senna 2 Tablet(s) Oral at bedtime  sodium chloride 0.9% Bolus 250 milliLiter(s) IV Bolus once    MEDICATIONS  (PRN):  bisacodyl Suppository 10 milliGRAM(s) Rectal daily PRN Constipation  guaiFENesin   Syrup  (Sugar-Free) 200 milliGRAM(s) Oral every 6 hours PRN Cough  sodium chloride 0.9% lock flush 10 milliLiter(s) IV Push every 1 hour PRN Pre/post blood products, medications, blood draw, and to maintain line patency      LABS:                        10.2   18.52 )-----------( 238      ( 23 Mar 2021 06:30 )             33.7     Hgb Trend: 10.2<--, 10.0<--, 9.9<--, 9.6<--, 9.3<--  03-23    133<L>  |  96  |  74<H>  ----------------------------<  85  6.0<H>   |  20<L>  |  4.99<H>    Ca    9.5      23 Mar 2021 06:32    TPro  7.0  /  Alb  2.5<L>  /  TBili  1.6<H>  /  DBili  x   /  AST  72<H>  /  ALT  94<H>  /  AlkPhos  195<H>  03-23    Creatinine Trend: 4.99<--, 4.37<--, 3.38<--, 4.33<--, 3.42<--, 4.45<--            MICROBIOLOGY:     RADIOLOGY & ADDITIONAL TESTS:      ASSESSMENT AND PLAN:  Mr. Morelos is a 79 yo male w h/o DM, CAD, s/p CABG, CKD 3-4 here after outpatient labwork found elevated BUN, hyponatremia. Pt has since been initiated on dialysis and has required midodrine TID for pressure support. CCB abdominal pain on admission with mildly elevated amylase and lipase without evidence of cholecystitis/pancreatitis. S/P zoxyn x7 days with improvement in abdominal pain. course also CCB nosocomial COVID with monoclonal Ab infusion. In addition, pt with possible aspiration event on 3/22 with subsequent increased O2 requirements, and increased hypotension on 3/23 requiring pressure support with levophed 2/2 cardiogenic vs septic shock. Pt transferred to MICU for further workup and management.      #Neuro  -AOx3 at baseline, currently lethargic and AOx0, likely 2/2 hypercapneic respiratory failure also with symptomatic hypotension and febrile illness.  -Patient now intubated and sedated as of 3/24  -Monitor neurological status per MICU protocol  -Will consider CTH if mental status does not improve with BP control and improvement in hypercapnia.     #Cardiovascular  Severe biventricular HFrEF with moderate to severe calcified AS   -Pt has CAD s/p CABG > 2 years ago  -AICD in place  -C/W midodrine 10mg TID  -Currently requiring levophed for pressor support. Titrate as necessary to maintain MAP >65  -Pt being followed by cardiology. Will appreciate recs  -Has been evaluated for TAVR during this admission and has been deemed not a candidate at this time.  -EKG with paced rhythm  -Trop elevated to 541 in setting of ESRD and septic shock and biventricular HF Low concern for ACS at this time. Will trend  -C/W DAPT   NSVT/VT  -S/P amio load  -Hold lopressor in setting of hypotension   -Monitor on telemetry     #Respiratory  Acute hypoxic respiratory failure requiring Intubated  -Intubated overnight 3/24  -Possible aspiration event 3/22 PM  -Vanc/Zosyn for aspiration PNA coverage  -F/U CT chest once stable  -Trend ABG    #GI/Nutrition  -NPO given AMS  -FSG q6h while NPO  -Initiate feeds once PO access obtained    #/Renal  ESRD on HD TTS  -Now in septic/cardiogenic shock. May need to initiate CVVHD  -K 6.2. Given Insulin/D50, albuterol  -D/W nephrology regarding further HD vs CVVHD  -Monitor BMP q6h     #Skin  Has medial malleolar ulceration.   -There is moderate left lower extremity arterial vascular disease seen on [facility]  -vascular consulted during admission, THIERRY w toe pressures requested initially by vascular, but at this point its being deterred and not necessary for any further inptn work up of PAD.   -ptn will need outptn F/U with vascular     #ID  Septic shock possibly 2/2 aspiration PNA  -Procal elevated to 2.46  -F/U Bcx, Ucx, Sputum cx  -C/W vanc/Zosyn renally dosed  -Consider CT abd/pelvis given recent hx abdominal pain with gallstones     #Endocrine  -FSG q6h while NPO  -ISS q6h    #Hematologic/DVT ppx  -Heparin for VTE prophylaxis    #Ethics  -GOC discussed with patient's wife. Patient currently FULL CODE  -On discharge, pt will need JESUS, outpatient HD setup       Tank Jewell MD  PGY-1      Critical Care Attending Attestation:   80M Hx Newly initiated ESRD on HD found Post HD Hypotension and Altered Mental Status started  on Pressor during RRT and transferred to MICU.   - Metabolic Encephalopathy Grade 1-2 and spoke German Language   - Septic Shock, HFrEF 15% with Mod-Sever Calcified AS on Pressor Support titration to MAP > 65 mmHg    - Empiric ABx coverage for Aspiration PNA coverage pending septic w/u   - S/P CABG, CHF s/p AICD pending TAVR evaluation   - NPO   - Received back IV Fluid for Post HD Hypotension will need CVVHD   - GOC Plans discussion and Wife3 updated clinical status upon ICU Arrival    Patient seen and examined with ICU Resident/Fellow at bedside after lab data, medical records and radiology reports reviewed. I have read and agreeable in general with resident's Documented Note, Assessment and Management Plan which reflected my opinions from bedside round and discussion.   Total Critical Care Time = 45 Min excluding teaching and procedure activity.

## 2021-03-23 NOTE — PROGRESS NOTE ADULT - SUBJECTIVE AND OBJECTIVE BOX
Patient is a 80y old  Male who presents with a chief complaint of acute renal failure (23 Mar 2021 13:38)      SUBJECTIVE / OVERNIGHT EVENTS: ptn seems to have aspirated last night, CXR is clear, prob aspiration bronchitis    MEDICATIONS  (STANDING):  aspirin  chewable 81 milliGRAM(s) Oral daily  atorvastatin 80 milliGRAM(s) Oral at bedtime  benzonatate 100 milliGRAM(s) Oral every 8 hours  chlorhexidine 4% Liquid 1 Application(s) Topical <User Schedule>  dextrose 40% Gel 15 Gram(s) Oral once  dextrose 5%. 1000 milliLiter(s) (50 mL/Hr) IV Continuous <Continuous>  dextrose 5%. 1000 milliLiter(s) (100 mL/Hr) IV Continuous <Continuous>  dextrose 50% Injectable 25 Gram(s) IV Push once  dextrose 50% Injectable 12.5 Gram(s) IV Push once  dextrose 50% Injectable 25 Gram(s) IV Push once  epoetin rafiq-epbx (RETACRIT) Injectable 25516 Unit(s) IV Push <User Schedule>  gabapentin 100 milliGRAM(s) Oral at bedtime  glucagon  Injectable 1 milliGRAM(s) IntraMuscular once  heparin   Injectable 5000 Unit(s) SubCutaneous every 8 hours  influenza   Vaccine 0.5 milliLiter(s) IntraMuscular once  insulin lispro (ADMELOG) corrective regimen sliding scale   SubCutaneous three times a day before meals  insulin lispro (ADMELOG) corrective regimen sliding scale   SubCutaneous at bedtime  levothyroxine 25 MICROGram(s) Oral daily  metoprolol tartrate 25 milliGRAM(s) Oral every 12 hours  midodrine. 10 milliGRAM(s) Oral three times a day  midodrine. 10 milliGRAM(s) Oral <User Schedule>  pantoprazole    Tablet 40 milliGRAM(s) Oral before breakfast  piperacillin/tazobactam IVPB.. 3.375 Gram(s) IV Intermittent every 12 hours  polyethylene glycol 3350 17 Gram(s) Oral daily  prasugrel 10 milliGRAM(s) Oral daily  senna 2 Tablet(s) Oral at bedtime    MEDICATIONS  (PRN):  bisacodyl Suppository 10 milliGRAM(s) Rectal daily PRN Constipation  guaiFENesin   Syrup  (Sugar-Free) 200 milliGRAM(s) Oral every 6 hours PRN Cough  sodium chloride 0.9% lock flush 10 milliLiter(s) IV Push every 1 hour PRN Pre/post blood products, medications, blood draw, and to maintain line patency      Vital Signs Last 24 Hrs  T(F): 98 (03-23-21 @ 12:02), Max: 98.7 (03-22-21 @ 16:48)  HR: 82 (03-23-21 @ 12:02) (72 - 88)  BP: 106/65 (03-23-21 @ 12:02) (89/56 - 106/65)  RR: 18 (03-23-21 @ 12:02) (18 - 20)  SpO2: 91% (03-23-21 @ 12:02) (91% - 96%)  Telemetry:   CAPILLARY BLOOD GLUCOSE      POCT Blood Glucose.: 103 mg/dL (23 Mar 2021 12:11)  POCT Blood Glucose.: 99 mg/dL (23 Mar 2021 07:39)  POCT Blood Glucose.: 123 mg/dL (22 Mar 2021 21:47)  POCT Blood Glucose.: 119 mg/dL (22 Mar 2021 16:51)    I&O's Summary    22 Mar 2021 07:01  -  23 Mar 2021 07:00  --------------------------------------------------------  IN: 360 mL / OUT: 0 mL / NET: 360 mL        PHYSICAL EXAM:  GENERAL: NAD, well-developed  HEAD:  Atraumatic, Normocephalic  EYES: EOMI, PERRLA, conjunctiva and sclera clear  NECK: Supple, No JVD  CHEST/LUNG: Clear to auscultation bilaterally; No wheeze  HEART: Regular rate and rhythm; No murmurs, rubs, or gallops  ABDOMEN: Soft, Nontender, Nondistended; Bowel sounds present  EXTREMITIES:  2+ Peripheral Pulses, No clubbing, cyanosis, or edema  PSYCH: AAOx3  NEUROLOGY: non-focal  SKIN: No rashes or lesions    LABS:                        10.2   18.52 )-----------( 238      ( 23 Mar 2021 06:30 )             33.7     03-23    133<L>  |  96  |  74<H>  ----------------------------<  85  6.0<H>   |  20<L>  |  4.99<H>    Ca    9.5      23 Mar 2021 06:32    TPro  7.0  /  Alb  2.5<L>  /  TBili  1.6<H>  /  DBili  x   /  AST  72<H>  /  ALT  94<H>  /  AlkPhos  195<H>  03-23              RADIOLOGY & ADDITIONAL TESTS:    Imaging Personally Reviewed:    Consultant(s) Notes Reviewed:      Care Discussed with Consultants/Other Providers:

## 2021-03-23 NOTE — SWALLOW BEDSIDE ASSESSMENT ADULT - PHARYNGEAL PHASE
baseline wet junky non-productive cough with rattles/rales breath sounds before, during, and after PO making it difficult to discern if cough related to aspiration

## 2021-03-23 NOTE — PROGRESS NOTE ADULT - SUBJECTIVE AND OBJECTIVE BOX
Subjective: Patient seen and examined. No new events except as noted.     REVIEW OF SYSTEMS:    CONSTITUTIONAL: + weakness, fevers or chills  EYES/ENT: No visual changes;  No vertigo or throat pain   NECK: No pain or stiffness  RESPIRATORY: No cough, wheezing, hemoptysis; No shortness of breath  CARDIOVASCULAR: No chest pain or palpitations  GASTROINTESTINAL: No abdominal or epigastric pain. No nausea, vomiting, or hematemesis; No diarrhea or constipation. No melena or hematochezia.  GENITOURINARY: No dysuria, frequency or hematuria  NEUROLOGICAL: No numbness or weakness  SKIN: No itching, burning, rashes, or lesions   All other review of systems is negative unless indicated above.    MEDICATIONS:  MEDICATIONS  (STANDING):  aspirin  chewable 81 milliGRAM(s) Oral daily  atorvastatin 80 milliGRAM(s) Oral at bedtime  benzonatate 100 milliGRAM(s) Oral every 8 hours  chlorhexidine 4% Liquid 1 Application(s) Topical <User Schedule>  dextrose 40% Gel 15 Gram(s) Oral once  dextrose 5%. 1000 milliLiter(s) (50 mL/Hr) IV Continuous <Continuous>  dextrose 5%. 1000 milliLiter(s) (100 mL/Hr) IV Continuous <Continuous>  dextrose 50% Injectable 25 Gram(s) IV Push once  dextrose 50% Injectable 12.5 Gram(s) IV Push once  dextrose 50% Injectable 25 Gram(s) IV Push once  epoetin rafiq-epbx (RETACRIT) Injectable 13811 Unit(s) IV Push <User Schedule>  gabapentin 100 milliGRAM(s) Oral at bedtime  glucagon  Injectable 1 milliGRAM(s) IntraMuscular once  heparin   Injectable 5000 Unit(s) SubCutaneous every 8 hours  influenza   Vaccine 0.5 milliLiter(s) IntraMuscular once  insulin lispro (ADMELOG) corrective regimen sliding scale   SubCutaneous three times a day before meals  insulin lispro (ADMELOG) corrective regimen sliding scale   SubCutaneous at bedtime  levothyroxine 25 MICROGram(s) Oral daily  metoprolol tartrate 25 milliGRAM(s) Oral every 12 hours  midodrine. 10 milliGRAM(s) Oral three times a day  midodrine. 10 milliGRAM(s) Oral <User Schedule>  pantoprazole    Tablet 40 milliGRAM(s) Oral before breakfast  piperacillin/tazobactam IVPB.. 3.375 Gram(s) IV Intermittent every 12 hours  polyethylene glycol 3350 17 Gram(s) Oral daily  prasugrel 10 milliGRAM(s) Oral daily  senna 2 Tablet(s) Oral at bedtime      PHYSICAL EXAM:  T(C): 36.6 (03-23-21 @ 05:02), Max: 37.1 (03-22-21 @ 16:48)  HR: 80 (03-23-21 @ 05:02) (71 - 88)  BP: 103/63 (03-23-21 @ 05:02) (89/56 - 103/63)  RR: 20 (03-23-21 @ 05:02) (18 - 20)  SpO2: 94% (03-23-21 @ 05:02) (94% - 96%)  Wt(kg): --  I&O's Summary    22 Mar 2021 07:01  -  23 Mar 2021 07:00  --------------------------------------------------------  IN: 360 mL / OUT: 0 mL / NET: 360 mL        Appearance: NAD  HEENT:   Dry oral mucosa, PERRL, EOMI	  Lymphatic: No lymphadenopathy , Tunneled catheter   Cardiovascular: Irregular S1 S2, No JVD, + murmurs , Peripheral pulses palpable 2+ bilaterally  Respiratory: Lungs clear to auscultation, normal effort 	  Gastrointestinal:  Soft, Non-tender, + BS	  Skin: No rashes, No ecchymoses, No cyanosis, warm to touch  Musculoskeletal: Normal range of motion, normal strength  Psychiatry:  Mood & affect appropriate  Ext: No edema  ecchymosis/ small hematoma noted to inner L UE improving        LABS:    CARDIAC MARKERS:                                10.2   18.52 )-----------( 238      ( 23 Mar 2021 06:30 )             33.7     03-23    133<L>  |  96  |  74<H>  ----------------------------<  85  6.0<H>   |  20<L>  |  4.99<H>    Ca    9.5      23 Mar 2021 06:32    TPro  7.0  /  Alb  2.5<L>  /  TBili  1.6<H>  /  DBili  x   /  AST  72<H>  /  ALT  94<H>  /  AlkPhos  195<H>  03-23    proBNP:   Lipid Profile:   HgA1c:   TSH:             TELEMETRY: 	V paced     ECG:  	  RADIOLOGY:   DIAGNOSTIC TESTING:  [ ] Echocardiogram:  [ ]  Catheterization:  [ ] Stress Test:    OTHER:

## 2021-03-23 NOTE — CONSULT NOTE ADULT - CONSULT REQUESTED DATE/TIME
17-Feb-2021 18:54
23-Mar-2021 14:51
17-Feb-2021 14:00
17-Mar-2021 19:47
18-Feb-2021 06:36
16-Feb-2021 16:08
17-Feb-2021 10:03
16-Mar-2021 13:57
02-Mar-2021 14:09
01-Mar-2021 17:40

## 2021-03-23 NOTE — SWALLOW BEDSIDE ASSESSMENT ADULT - SLP PERTINENT HISTORY OF CURRENT PROBLEM
H&P: 79 yo male w h/o DM, CAD, s/p CABG, CKD 3-4. He saw my partner Dr. Abel Luz yesterday, 2/15/21, as a new consult for elevated creatinine. Bloodwork today returned notable for  and Na 126; in light of these findings, Dr. Luz sent the patient to the ER. Lately ptn has been having abdominal pain with elevated amylase and lipase. As per ptn's wife: on and off R abd pain w generalized weakness and poor po intake. PCP DCed po Lasix in past few days 2/2 abnormal blood test results. GI, ID, Cardiology, and Nephrology consulted. GI following for abdominal pain. Rec: given epigastric/RUQ pain with elevated LFTs and amylase/lipase, concern for possible gallstone pancreatitis. U/S abdomen pending. ID consulted for Abx management. Cardiology following for acute n chronic systolic heart failure. Nephrology following for Azotemia/hyponatremia.

## 2021-03-23 NOTE — SWALLOW BEDSIDE ASSESSMENT ADULT - SWALLOW EVAL: DIAGNOSIS
81 y/o M with PMH of ICM< CAD< s/p CABG, has AICD, DM, HTN p/w volume overload and ascites 2/2 acute on chronic systolic CHF now on HD, hospital course c/b becoming COVID positive and EF 15%, severe AS, seen by structural heart for TAVR , proceed with TAVR work up. 79 y/o M with PMH of ICM< CAD< s/p CABG, has AICD, DM, HTN p/w volume overload and ascites 2/2 acute on chronic systolic CHF now on HD, found to have EF 15%, severe AS, seen by structural heart for possible TAVR workup, hospital course c/b becoming COVID positive. 81 y/o M with PMH of ICM< CAD< s/p CABG, has AICD, DM, HTN p/w volume overload and ascites 2/2 acute on chronic systolic CHF now on HD, found to have EF 15%, severe AS, seen by structural heart for possible TAVR workup, hospital course c/b becoming COVID positive. Pt was seen today for bedside swallow evaluation following suspected aspiration event last night. The swallow sequence was marked by adequate oral prep and oral stage with all liquids and puree textures. Chewables not trialed given pt's sparse dentition and report of dentures being ill-fitting. Baseline wet junky non-productive cough with rattles/rales breath sounds before, during, and after PO making it difficult to discern if cough related to aspiration. MBS indicated to objectively evaluate swallow fx and r/o aspiration.

## 2021-03-23 NOTE — CONSULT NOTE ADULT - PROVIDER SPECIALTY LIST ADULT
Intervent Radiology
Nephrology
Gastroenterology
Cardiology
Intervent Radiology
Pulmonology
Electrophysiology
Infectious Disease
Intervent Radiology
Neurology
Vascular Surgery

## 2021-03-23 NOTE — SWALLOW BEDSIDE ASSESSMENT ADULT - SLP GENERAL OBSERVATIONS
Pt was received at bedside awake and alert. +NC (1L). Language Line utilized for translation to Bots[place]an (Sersabash-Unable to take down ID# [id number] isolation room status). Pt with baseline wet junky non-productive cough with rattles/rales breath sounds. Pt was cooperative and able to follow basic commands.

## 2021-03-23 NOTE — CONSULT NOTE ADULT - CONSULT REASON
Tunneled Dialysis Catheter
Aspiration Pneumonia
Cardiac management
Abx managment
Tunneled catheter placement
frequent NSVT
Azotemia/hyponatremia
foot pain
Abnormal THIERRY/PVR
abdominal pain with elevated lipase/amylase

## 2021-03-23 NOTE — SWALLOW BEDSIDE ASSESSMENT ADULT - ASPIRATION PRECAUTIONS
Monitor for s/s of aspiration or penetration (coughing, choking, wet/gurgly vocal qualiy). d/c PO intake if any signs are observed and contact speech department [phone]./yes

## 2021-03-23 NOTE — SWALLOW BEDSIDE ASSESSMENT ADULT - COMMENTS
Hospital Course:  2/18: Electrophysiology consulted for frequent NSVT. ICD interrogation for WCT on telemetry normal sensing and pacing thresholds stable lead impedence.   2/19: Structural Heart Physician consulted: "severe ischemic cardiomyopathy with low flow low gradient severe AS versus pseudosevere aortic valve stenosis along with moderate to severe aortic regurgitation. TTE also demonstrated moderate pulmonary hypertension (RVSP fifty-one), moderate tricuspid valve regurgitation, EF 15% and mild mitral valve stenosis with mean gradient five."  2/21: Abd sono shows gallstones, but no sign of cholecystitis/pancreatitis. abd pain resolved on Zosyn, will cont empirically. ascites 2/2 systolic CHF.   2/22: multiple NSVT and sustained VT for 6 minutes. further assessed for possible transcatheter aortic valve replacement.   2/25: found to be COVID +,  transferred to [facility].  2/26: Non-tunneled HD catheter insertion by IR. to start HD tonight. Abd pain resolving.   2/28: started HD, feels better.   3/4:  short episode of being tachypneic and feeling SOB, placed on 2L NC. s/p Tunneled Hemodialysis Catheter Placement.  3/6: HD MWF. unable to be DCed home 2/2 safety at home. nobody to assume responsibility wife is ill.  3/9: HD days switched to TTSa. awaiting transfer to [facility] w HD.  3/15: awaiting transfer to [facility], the only possible option is [facility] in [place], family is against it, they live in [place]. ptn c/o pain in his feet. will check THIERRY and get neuro consult.  3/16: Neuro: "foot pain likely related to poor circulation/diabetic neuropathy. consider emg as outpt."  3/17: Vascular consulted for Abnormal THIERRY/PVR. No acute vascular surgery intervention.   3/22: "RN and PCA tried to feed pt his dinner and pt was unable to swallow food fully. Pt started to spit up and started to desat to 87% on 2L NC. Suctioned patient immediately and food particles suctioned out of patients mouth." Made NPO and S&S consulted. Chewables not administered given pt's sparse dentition and report of ill-fitting dentures.

## 2021-03-23 NOTE — PROGRESS NOTE ADULT - SUBJECTIVE AND OBJECTIVE BOX
      Overnight events noted      VITAL:  T(C): , Max: 37.1 (03-22-21 @ 16:48)  T(F): , Max: 98.7 (03-22-21 @ 16:48)  HR: 80 (03-23-21 @ 05:02)  BP: 103/63 (03-23-21 @ 05:02)  BP(mean): --  RR: 20 (03-23-21 @ 05:02)  SpO2: 94% (03-23-21 @ 05:02)      PHYSICAL EXAM:  Constitutional: NAD on room air, alert  HEENT: NCAT, DMM  Neck: Supple, no JVD  Respiratory: CTA-b/l  Cardiovascular: RRR s1s2, no m/r/g  Gastrointestinal: BS+, soft, NT; (+)mild distension  Extremities:  no edema b/l  Neurological: no focal deficits; strength grossly intact  Back: no CVAT b/l  Skin: No rashes, no nevi  Access: RIJ tunneled cath    LABS:                        10.2   18.52 )-----------( 238      ( 23 Mar 2021 06:30 )             33.7     Na(133)/K(6.0)/Cl(96)/HCO3(20)/BUN(74)/Cr(4.99)Glu(85)/Ca(9.5)/Mg(--)/PO4(--)    03-23 @ 06:32  Na(134)/K(5.4)/Cl(96)/HCO3(24)/BUN(60)/Cr(4.37)Glu(146)/Ca(9.3)/Mg(--)/PO4(--)    03-22 @ 10:15  Na(135)/K(5.6)/Cl(99)/HCO3(24)/BUN(43)/Cr(3.38)Glu(158)/Ca(9.0)/Mg(--)/PO4(--)    03-21 @ 06:40      IMPRESSION: 80M w/ DM2, CAD-CABG, and CKD, 2/16/21 a/w azotemia/lyte derangements/weakness; c/b newly ESRD; c/b COVID(+)    (1)Renal - newly ESRD - due for HD today     (2)CV - tenuous hemodynamics/hypervolemia - biventricular systolic dysfunction, severe AS, mod-sev AR. On Midodrine.     (3)Anemia - On Retacrit with HD    (4)Dispo - awaiting acceptance to outside HD facility       RECOMMEND:  (1)Next HD today; 0.5L UF as able; 145==>140 sodium modelling; Midodrine 10mg PO x 1 prn intradialytic hypotension; Retacrit with HD    (2)Setup of outpatient HD per               Suraj Garcia MD  [facility]  Office: [phone]  Cell: [phone]               no complaints      VITAL:  T(C): , Max: 37.1 (03-22-21 @ 16:48)  T(F): , Max: 98.7 (03-22-21 @ 16:48)  HR: 80 (03-23-21 @ 05:02)  BP: 103/63 (03-23-21 @ 05:02)  BP(mean): --  RR: 20 (03-23-21 @ 05:02)  SpO2: 94% (03-23-21 @ 05:02)      PHYSICAL EXAM:  Constitutional: lethargic/mildly confused  HEENT: NCAT, DMM  Neck: Supple, no JVD  Respiratory: CTA-b/l  Cardiovascular: RRR s1s2, no m/r/g  Gastrointestinal: BS+, soft, NT; (+)mild distension  Extremities:  no edema b/l  Neurological: no focal deficits; strength grossly intact  Back: no CVAT b/l  Skin: No rashes, no nevi  Access: RIJ tunneled cath    LABS:                        10.2   18.52 )-----------( 238      ( 23 Mar 2021 06:30 )             33.7     Na(133)/K(6.0)/Cl(96)/HCO3(20)/BUN(74)/Cr(4.99)Glu(85)/Ca(9.5)/Mg(--)/PO4(--)    03-23 @ 06:32  Na(134)/K(5.4)/Cl(96)/HCO3(24)/BUN(60)/Cr(4.37)Glu(146)/Ca(9.3)/Mg(--)/PO4(--)    03-22 @ 10:15  Na(135)/K(5.6)/Cl(99)/HCO3(24)/BUN(43)/Cr(3.38)Glu(158)/Ca(9.0)/Mg(--)/PO4(--)    03-21 @ 06:40      IMPRESSION: 80M w/ DM2, CAD-CABG, and CKD, 2/16/21 a/w azotemia/lyte derangements/weakness; c/b newly ESRD; c/b COVID(+)    (1)Renal - newly ESRD - due for HD today     (2)CV - tenuous hemodynamics/hypervolemia - biventricular systolic dysfunction, severe AS, mod-sev AR. On Midodrine.     (3)Anemia - On Retacrit with HD    (4)Dispo - awaiting acceptance to outside HD facility       RECOMMEND:  (1)Next HD today; 0.5L UF as able; 145==>140 sodium modelling; Midodrine 10mg PO x 1 prn intradialytic hypotension; Retacrit with HD    (2)Setup of outpatient HD per               Suraj Garcia MD  [facility]  Office: [phone]  Cell: [phone]

## 2021-03-23 NOTE — PROGRESS NOTE ADULT - ASSESSMENT
79 yo male w h/o ICM< CAD< s/p CABG, has AICD, DM, HTN presents w volume overload and abd pain  Abd sono shows gallstones, but no sign of cholecystitis/pancreatitis. abd pain resolved on Zosyn,   s/ p treatment empirically for 7 days .  ID following    ascites 2/2 systolic chf, probably worse 2/2 acute on chronic systolic chf.  responded well to lasix. now on HD.   started HD 2/26. HD as per renal. changed from MWF to TTSa,  RIJ tunneled catheter placed on 3/4 in IR, no fistula placement for now  unable to be DCed home 2/2 safety at home. nobody to assume responsibility, wife is ill  awaiting transfer to [facility]    nosocomial Covid positive. remains positive, clinically stable.  ID following. in isolation. trend infl markers.   s/p monoclonal infusion.  on interrogation of ICD, has a lof of arrhythmia burden,   NSVT and sustained VT, seen by EPS, cont  Qybauhiic19.5 mg  bid. has inoperable ischemic HD,   ICM, EF 15%, severe AS, seen by structural heart for TAVR , proceed with TAVR work up    on 3/13: denise asked for a call, i called twice and wasnt able to leave messages bc mailbox is full. dr Garcia, renal spoke with him and confirmed home HD is not possible bc ptn is frail w unstable BPs. He also inquired about the second Covid vaccine( he had his first PTA) , ptn acquired the Covid virus during this hospitalization and remains on the Covid isolation floor. vaccine is not appropriate at this time  BP is on the low side, ptn is on midodrine 5 tid off dialysis and 10 tid on days of HD. will raise to 10 tid daily.   ptn is awaiting transfer to [facility], the only possible option is [facility] in [place], family is against it, they live in [place]. ptn c/o pain in his feet, pulses are good,    THIERRY reviewed vascular consulted, THIERRY w toe pressures requested initially by vascular, but at this point its being deterred and not necessary for any further inptn work up of PAD. ptn will need outptn F/U with vascular   on 3/22 in pm ptn may have had some aspiration w dinner, refuses any further suctioning, awaiting S&S eval, keep NPO, start Zosyn. pulm called      dvt ppx w HSC

## 2021-03-23 NOTE — PROGRESS NOTE ADULT - SUBJECTIVE AND OBJECTIVE BOX
INTERVAL HPI/OVERNIGHT EVENTS:  No new overnight event.  No N/V/D.  Tolerating diet.   MEDICATIONS  (STANDING):  aspirin  chewable 81 milliGRAM(s) Oral daily  atorvastatin 80 milliGRAM(s) Oral at bedtime  benzonatate 100 milliGRAM(s) Oral every 8 hours  chlorhexidine 4% Liquid 1 Application(s) Topical <User Schedule>  dextrose 40% Gel 15 Gram(s) Oral once  dextrose 5%. 1000 milliLiter(s) (50 mL/Hr) IV Continuous <Continuous>  dextrose 5%. 1000 milliLiter(s) (100 mL/Hr) IV Continuous <Continuous>  dextrose 50% Injectable 25 Gram(s) IV Push once  dextrose 50% Injectable 12.5 Gram(s) IV Push once  dextrose 50% Injectable 25 Gram(s) IV Push once  epoetin rafiq-epbx (RETACRIT) Injectable 80031 Unit(s) IV Push <User Schedule>  gabapentin 100 milliGRAM(s) Oral at bedtime  glucagon  Injectable 1 milliGRAM(s) IntraMuscular once  heparin   Injectable 5000 Unit(s) SubCutaneous every 8 hours  influenza   Vaccine 0.5 milliLiter(s) IntraMuscular once  insulin lispro (ADMELOG) corrective regimen sliding scale   SubCutaneous three times a day before meals  insulin lispro (ADMELOG) corrective regimen sliding scale   SubCutaneous at bedtime  levothyroxine 25 MICROGram(s) Oral daily  metoprolol tartrate 25 milliGRAM(s) Oral every 12 hours  midodrine. 10 milliGRAM(s) Oral three times a day  midodrine. 10 milliGRAM(s) Oral <User Schedule>  pantoprazole    Tablet 40 milliGRAM(s) Oral before breakfast  piperacillin/tazobactam IVPB.. 3.375 Gram(s) IV Intermittent every 12 hours  polyethylene glycol 3350 17 Gram(s) Oral daily  prasugrel 10 milliGRAM(s) Oral daily  senna 2 Tablet(s) Oral at bedtime    MEDICATIONS  (PRN):  bisacodyl Suppository 10 milliGRAM(s) Rectal daily PRN Constipation  guaiFENesin   Syrup  (Sugar-Free) 200 milliGRAM(s) Oral every 6 hours PRN Cough  sodium chloride 0.9% lock flush 10 milliLiter(s) IV Push every 1 hour PRN Pre/post blood products, medications, blood draw, and to maintain line patency      Allergies    No Known Allergies    Intolerances        Review of Systems:    General:  No wt loss, fevers, chills, night sweats,fatigue,   Eyes:  Good vision, no reported pain  ENT:  No sore throat, pain, runny nose, dysphagia  CV:  No pain, palpitatioins, hypo/hypertension  Resp:  No dyspnea, cough, tachypnea, wheezing  GI:  No pain, No nausea, No vomiting, No diarrhea, No constipatiion, No weight loss, No fever, No pruritis, No rectal bleeding, No tarry stools, No dysphagia,  :  No pain, bleeding, incontinence, nocturia  Muscle:  No pain, weakness  Neuro:  No weakness, tingling, memory problems  Psych:  No fatigue, insomnia, mood problems, depression  Endocrine:  No polyuria, polydypsia, cold/heat intolerance  Heme:  No petechiae, ecchymosis, easy bruisability  Skin:  No rash, tattoos, scars, edema      Vital Signs Last 24 Hrs  T(C): 36.7 (23 Mar 2021 12:02), Max: 37.1 (22 Mar 2021 16:48)  T(F): 98 (23 Mar 2021 12:02), Max: 98.7 (22 Mar 2021 16:48)  HR: 82 (23 Mar 2021 12:02) (72 - 88)  BP: 106/65 (23 Mar 2021 12:02) (89/56 - 106/65)  BP(mean): --  RR: 18 (23 Mar 2021 12:02) (18 - 20)  SpO2: 91% (23 Mar 2021 12:02) (91% - 96%)    PHYSICAL EXAM:    Constitutional: NAD, well-developed  HEENT: EOMI, throat clear  Neck: No LAD, supple  Respiratory: CTA and P  Cardiovascular: S1 and S2, RRR, no M  Gastrointestinal: BS+, soft, NT/ND, neg HSM,  Extremities: No peripheral edema, neg clubing, cyanosis  Vascular: 2+ peripheral pulses  Neurological: A/O x 3, no focal deficits  Psychiatric: Normal mood, normal affect  Skin: No rashes      LABS:                        10.2   18.52 )-----------( 238      ( 23 Mar 2021 06:30 )             33.7     03-23    133<L>  |  96  |  74<H>  ----------------------------<  85  6.0<H>   |  20<L>  |  4.99<H>    Ca    9.5      23 Mar 2021 06:32    TPro  7.0  /  Alb  2.5<L>  /  TBili  1.6<H>  /  DBili  x   /  AST  72<H>  /  ALT  94<H>  /  AlkPhos  195<H>  03-23          RADIOLOGY & ADDITIONAL TESTS:

## 2021-03-23 NOTE — CONSULT NOTE ADULT - SUBJECTIVE AND OBJECTIVE BOX
PULMONARY CONSULT  Ezra Barajas MD  [phone]    Initial HPI on admission:  HPI:  81 yo male w h/o DM, CAD, s/p CABG, CKD 3-4. He saw my partner Dr. Abel Luz yesterday as a new consult for elevated creatinine. Bloodwork today returned notable for  and Na 126; in light of these findings, Dr. Luz sent the patient to the ER. Lately ptn has been having abdominal pain with elevated amylase and lipase.   As per ptn's wife: on and off R abd pain w generalized weakness and poor po intake. PCP DCed po Lasix in past few days 2/2 abnormal blood test results.  Ptn denies recent NSAID use.  He was diagnosed with diabetes 18 years ago, and denies retinopathy (last saw Optho around 2 years ago). Cardiologist has alluded to the patient having mild renal impairment in the past, as per wife. Patient's PCP is Dr. Shyann De León; GI Dr. Wilman Villagran; Cardiology Dr. Boyce.          PAST MEDICAL & SURGICAL HISTORY:  Renal failure    CAD (coronary artery disease)    Hypertension    Diabetes mellitus      Allergies    No Known Allergies    Intolerances      FAMILY HISTORY:    SH:  Denies smoking, ETOH    ROS:   CONSTITUTIONAL: No fever, weight loss, or fatigue  EYES: No eye pain, visual disturbances, or discharge  ENMT:  No sore throat  NECK: No pain or stiffness  RESPIRATORY: No cough, wheezing, chills or hemoptysis; No shortness of breath  CARDIOVASCULAR: No chest pain, palpitations, dizziness, or leg swelling  GASTROINTESTINAL: No abdominal or epigastric pain. No nausea, vomiting, or hematemesis; No diarrhea or constipation. No melena or hematochezia.  GENITOURINARY: No dysuria, frequency, hematuria, or incontinence  NEUROLOGICAL: No headaches, memory loss, loss of strength, numbness, or tremors  SKIN: No itching, burning, rashes, or lesions   LYMPH NODES: No enlarged glands  MUSCULOSKELETAL: No joint pain or swelling; No muscle, back, or extremity pain      Medications:  MEDICATIONS  (STANDING):  aspirin  chewable 81 milliGRAM(s) Oral daily  atorvastatin 80 milliGRAM(s) Oral at bedtime  benzonatate 100 milliGRAM(s) Oral every 8 hours  chlorhexidine 4% Liquid 1 Application(s) Topical <User Schedule>  dextrose 40% Gel 15 Gram(s) Oral once  dextrose 5%. 1000 milliLiter(s) (50 mL/Hr) IV Continuous <Continuous>  dextrose 5%. 1000 milliLiter(s) (100 mL/Hr) IV Continuous <Continuous>  dextrose 50% Injectable 25 Gram(s) IV Push once  dextrose 50% Injectable 12.5 Gram(s) IV Push once  dextrose 50% Injectable 25 Gram(s) IV Push once  epoetin rafiq-epbx (RETACRIT) Injectable 58514 Unit(s) IV Push <User Schedule>  gabapentin 100 milliGRAM(s) Oral at bedtime  glucagon  Injectable 1 milliGRAM(s) IntraMuscular once  heparin   Injectable 5000 Unit(s) SubCutaneous every 8 hours  influenza   Vaccine 0.5 milliLiter(s) IntraMuscular once  insulin lispro (ADMELOG) corrective regimen sliding scale   SubCutaneous three times a day before meals  insulin lispro (ADMELOG) corrective regimen sliding scale   SubCutaneous at bedtime  levothyroxine 25 MICROGram(s) Oral daily  metoprolol tartrate 25 milliGRAM(s) Oral every 12 hours  midodrine. 10 milliGRAM(s) Oral three times a day  midodrine. 10 milliGRAM(s) Oral <User Schedule>  pantoprazole    Tablet 40 milliGRAM(s) Oral before breakfast  piperacillin/tazobactam IVPB.. 3.375 Gram(s) IV Intermittent every 12 hours  polyethylene glycol 3350 17 Gram(s) Oral daily  prasugrel 10 milliGRAM(s) Oral daily  senna 2 Tablet(s) Oral at bedtime    MEDICATIONS  (PRN):  bisacodyl Suppository 10 milliGRAM(s) Rectal daily PRN Constipation  guaiFENesin   Syrup  (Sugar-Free) 200 milliGRAM(s) Oral every 6 hours PRN Cough  sodium chloride 0.9% lock flush 10 milliLiter(s) IV Push every 1 hour PRN Pre/post blood products, medications, blood draw, and to maintain line patency    Vital Signs Last 24 Hrs  T(C): 36.7 (23 Mar 2021 12:02), Max: 37.1 (22 Mar 2021 16:48)  T(F): 98 (23 Mar 2021 12:02), Max: 98.7 (22 Mar 2021 16:48)  HR: 82 (23 Mar 2021 12:02) (72 - 88)  BP: 106/65 (23 Mar 2021 12:02) (89/56 - 106/65)  BP(mean): --  RR: 18 (23 Mar 2021 12:02) (18 - 20)  SpO2: 91% (23 Mar 2021 12:02) (91% - 96%)      03-22 @ 07:01  -  03-23 @ 07:00  --------------------------------------------------------  IN: 360 mL / OUT: 0 mL / NET: 360 mL      LABS:                        10.2   18.52 )-----------( 238      ( 23 Mar 2021 06:30 )             33.7     03-23    133<L>  |  96  |  74<H>  ----------------------------<  85  6.0<H>   |  20<L>  |  4.99<H>    Ca    9.5      23 Mar 2021 06:32    TPro  7.0  /  Alb  2.5<L>  /  TBili  1.6<H>  /  DBili  x   /  AST  72<H>  /  ALT  94<H>  /  AlkPhos  195<H>  03-23      Physical Examination:    General: No acute distress.      HEENT: Pupils equal, reactive to light.  Symmetric.    PULM: Clear to auscultation bilaterally, no significant sputum production    CVS: Regular rate and rhythm, no murmurs, rubs, or gallops    ABD: Soft, nondistended, nontender, normoactive bowel sounds, no masses    EXT: No edema, nontender    SKIN: Warm and well perfused, no rashes noted.    NEURO: Alert, oriented, interactive, nonfocal    RADIOLOGY REVIEWED PERSONALLY  CXR:    CT chest:    TTE:    Assess   PULMONARY CONSULT  Ezra Barajas MD  [phone]    Initial HPI on admission:  HPI:  79 yo male w h/o DM, CAD, s/p CABG, CKD 3-4. He saw my partner Dr. Abel Luz yesterday as a new consult for elevated creatinine. Bloodwork today returned notable for  and Na 126; in light of these findings, Dr. Luz sent the patient to the ER. Lately ptn has been having abdominal pain with elevated amylase and lipase.   As per ptn's wife: on and off R abd pain w generalized weakness and poor po intake. PCP DCed po Lasix in past few days 2/2 abnormal blood test results.  Ptn denies recent NSAID use.  He was diagnosed with diabetes 18 years ago, and denies retinopathy (last saw Optho around 2 years ago). Cardiologist has alluded to the patient having mild renal impairment in the past, as per wife. Patient's PCP is Dr. Shyann De León; GI Dr. Wilman Villagran; Cardiology Dr. Boyce.      Patient was reported to have aspiration episode  At time of visit was awake and responsive on nasal cannula with mild tachypnea    PAST MEDICAL & SURGICAL HISTORY:  Renal failure    CAD (coronary artery disease)    Hypertension    Diabetes mellitus      Allergies    No Known Allergies    Intolerances      FAMILY HISTORY:    SH:  Denies smoking, ETOH    ROS:   CONSTITUTIONAL: No fever, weight loss, or fatigue  EYES: No eye pain, visual disturbances, or discharge  ENMT:  No sore throat  NECK: No pain or stiffness  RESPIRATORY: No cough, wheezing, chills or hemoptysis; No shortness of breath  CARDIOVASCULAR: No chest pain, palpitations, dizziness, or leg swelling  GASTROINTESTINAL: No abdominal or epigastric pain. No nausea, vomiting, or hematemesis; No diarrhea or constipation. No melena or hematochezia.  GENITOURINARY: No dysuria, frequency, hematuria, or incontinence  NEUROLOGICAL: No headaches, memory loss, loss of strength, numbness, or tremors  SKIN: No itching, burning, rashes, or lesions   LYMPH NODES: No enlarged glands  MUSCULOSKELETAL: No joint pain or swelling; No muscle, back, or extremity pain      Medications:  MEDICATIONS  (STANDING):  aspirin  chewable 81 milliGRAM(s) Oral daily  atorvastatin 80 milliGRAM(s) Oral at bedtime  benzonatate 100 milliGRAM(s) Oral every 8 hours  chlorhexidine 4% Liquid 1 Application(s) Topical <User Schedule>  dextrose 40% Gel 15 Gram(s) Oral once  dextrose 5%. 1000 milliLiter(s) (50 mL/Hr) IV Continuous <Continuous>  dextrose 5%. 1000 milliLiter(s) (100 mL/Hr) IV Continuous <Continuous>  dextrose 50% Injectable 25 Gram(s) IV Push once  dextrose 50% Injectable 12.5 Gram(s) IV Push once  dextrose 50% Injectable 25 Gram(s) IV Push once  epoetin rafiq-epbx (RETACRIT) Injectable 40920 Unit(s) IV Push <User Schedule>  gabapentin 100 milliGRAM(s) Oral at bedtime  glucagon  Injectable 1 milliGRAM(s) IntraMuscular once  heparin   Injectable 5000 Unit(s) SubCutaneous every 8 hours  influenza   Vaccine 0.5 milliLiter(s) IntraMuscular once  insulin lispro (ADMELOG) corrective regimen sliding scale   SubCutaneous three times a day before meals  insulin lispro (ADMELOG) corrective regimen sliding scale   SubCutaneous at bedtime  levothyroxine 25 MICROGram(s) Oral daily  metoprolol tartrate 25 milliGRAM(s) Oral every 12 hours  midodrine. 10 milliGRAM(s) Oral three times a day  midodrine. 10 milliGRAM(s) Oral <User Schedule>  pantoprazole    Tablet 40 milliGRAM(s) Oral before breakfast  piperacillin/tazobactam IVPB.. 3.375 Gram(s) IV Intermittent every 12 hours  polyethylene glycol 3350 17 Gram(s) Oral daily  prasugrel 10 milliGRAM(s) Oral daily  senna 2 Tablet(s) Oral at bedtime    MEDICATIONS  (PRN):  bisacodyl Suppository 10 milliGRAM(s) Rectal daily PRN Constipation  guaiFENesin   Syrup  (Sugar-Free) 200 milliGRAM(s) Oral every 6 hours PRN Cough  sodium chloride 0.9% lock flush 10 milliLiter(s) IV Push every 1 hour PRN Pre/post blood products, medications, blood draw, and to maintain line patency    Vital Signs Last 24 Hrs  T(C): 36.7 (23 Mar 2021 12:02), Max: 37.1 (22 Mar 2021 16:48)  T(F): 98 (23 Mar 2021 12:02), Max: 98.7 (22 Mar 2021 16:48)  HR: 82 (23 Mar 2021 12:02) (72 - 88)  BP: 106/65 (23 Mar 2021 12:02) (89/56 - 106/65)  BP(mean): --  RR: 18 (23 Mar 2021 12:02) (18 - 20)  SpO2: 91% (23 Mar 2021 12:02) (91% - 96%)      03-22 @ 07:01  -  03-23 @ 07:00  --------------------------------------------------------  IN: 360 mL / OUT: 0 mL / NET: 360 mL      LABS:                        10.2   18.52 )-----------( 238      ( 23 Mar 2021 06:30 )             33.7     03-23    133<L>  |  96  |  74<H>  ----------------------------<  85  6.0<H>   |  20<L>  |  4.99<H>    Ca    9.5      23 Mar 2021 06:32    TPro  7.0  /  Alb  2.5<L>  /  TBili  1.6<H>  /  DBili  x   /  AST  72<H>  /  ALT  94<H>  /  AlkPhos  195<H>  03-23      Physical Examination:    General: Non toxic, mildly tachypneic. Awake and responsives.      HEENT: Pupils equal, reactive to light.  Symmetric.    PULM: Diffuse bilateral exp rhonchi/wheezes    CVS: Regular rate and rhythm, no murmurs, rubs, or gallops    ABD: Soft, nondistended, nontender, normoactive bowel sounds, no masses    EXT: No edema, nontender    SKIN: Warm and well perfused, no rashes noted.    NEURO: Alert, oriented, interactive, nonfocal    RADIOLOGY REVIEWED PERSONALLY  CXR:    PROCEDURE DATE:  03/22/2021        INTERPRETATION:  A single chest x-ray was obtained on March 22, 2021.    Indication: Aspiration. Rule out pneumonia.    Impression:    The heart isenlarged. The lungs appear to be clear. No pleural effusion. No pneumothorax. A pacer is in good position. A double-lumen lumen catheter is seen on the right and the tip is in the superior vena cava.    CT chest:    TTE:    Assess

## 2021-03-23 NOTE — PROGRESS NOTE ADULT - ASSESSMENT
81 yo male w h/o DM, CAD, s/p CABG, CKD 3-4.   Recently evaluated by Nephrology for elevated creatinine. Bloodwork  returned notable for  and Na 126; in light of these findings, Dr. Luz (Renal) sent the patient to the ER. Lately ptn has been having abdominal pain with elevated amylase and lipase.   As per ptn's wife: on and off R abd pain w generalized weakness and poor po intake. PCP DCed po Lasix in past few days 2/2 abnormal blood test results.  Ptn denies recent NSAID use.  He was diagnosed with diabetes 18 years ago, and denies retinopathy (last saw Optho around 2 years ago). Cardiologist has alluded to the patient having mild renal impairment in the past, as per wife.    Patient's PCP is Dr. Shyann De León; GI Dr. Wilman Villagran; Cardiology Dr. Boyce.  (16 Feb 2021 21:05)    ER vs: T 98.1, P 85, /64. WBC 8.2.  Bun/Cr - 100/2.0.  LFTs mildly elevated, Roxana 217, Lip 185.  Pct 0.2.  UA (-).  Ct c/a/p shows pulmonary edema, sm B/L pleural effusions, sm volume ascites.  VQ scan with very low probability for PE.      Pt started on zosyn to cover for intra-abd source.  ID consulted.     r/o Gallstone pancreatitis:    - Pt p/w abdominal pain, epigastric and RUQ pain, noted to have elevated LFTs and amylase/lipase.  Possible gallstone pancreatitis?    - U/S abdomen shows cholelithiasis without acute cholecystitis.  Borderline to mild gallbladder thickening is likely due to ascites.   Moderate ascites noted.  Suggestion of mild renal parenchymal disease and bilateral pleural effusions.    - CTap w/o acute intra-abd pathology    - monitor LFTs, Roxana, Lipase.  As per GI, ascites likely secondary to CHF, no evidence for pancreatitis.     - Monitor temp curve and WBC.      - bcx neg. completed  coverage with zosyn x 7 days, 2/23.  Abd pain resolved      {[id number]}Renal failure:    - Renal following for CKD.  Dose abx for GFR 15-20.  On q12 hr dosing of zosyn.     - Cr rising.  CALVIN on CKD does not appear to be antibiotic mediated, renal f/u noted.      - Pt requiring HD,  shiley catheter placed 2/26, s/p conversion to tunnelled catheter - 3/4    Aortic stenosis:    - Followed by Structural health team for TAVR evaluation    Covid (+):    - Repeat Covid pcr on 2/25 tested positive.   On 1L nc, satting >94%    - No indication for Covid therapeutics at this time.  Cont supportive care, incentive spirometery.  Monitor pulse ox    - Repeat covid test remains positive on 3/15 - likely secondary to nonviable virus.  Pt > 10 days since positive test, pt afebrile.  On < 4L nc.  Off airborne precautions.     - Repeat covid test 3/19 (-).      Aspiration:    - Aspiration event on 3/22, pt unable to swallow food fully and  started to spit up and started to desat to 87% on 2L NC.  Pt suctioned.  s/p swallow eval    - Repeat cxr performed, started on zosyn empirically.            Rupali Schwarz  [phone]

## 2021-03-23 NOTE — SWALLOW BEDSIDE ASSESSMENT ADULT - SWALLOW EVAL: PATIENT/FAMILY GOALS STATEMENT
Per pt, "I don't have any trouble swallowing." Per pt's spouse, "He is getting food that he can't chew. His dentures don't fit anymore. He needs soft food like ground meet or meatballs."

## 2021-03-23 NOTE — CONSULT NOTE ADULT - ASSESSMENT
80M admitted 2/16 with acute renal failure progressed to HD. Patient was intitailly COVID PCR negative, then became positive 3/5 with presumed nosocomial infection. Patient now reported to have episode of aspiration  CXR 3/22 reviewed: L retrocardiac opacity noted, otherwise clear. Limited AP portable. Patient had extensive exp rhonchi at time of visit c/w increased secretions and probable underlying aspiration.   Patient with additional history of CAD and systolic CHF    REC    Continue Zosyn for presumed noscomial aspiration plneumonia  Repeat CXR ordered: r/o progression of pneumonia and/or superimposed pulmonary edema  Aspiration precuations  Albuterol/Ipatropium q 6 ordered  Continue nasal cannula and monitor O2 saturation  D-dimer 645 today: LE dopplers ordered

## 2021-03-23 NOTE — PROGRESS NOTE ADULT - SUBJECTIVE AND OBJECTIVE BOX
Infectious Diseases progress note:    Subjective:  Events noted.  Pt started on zosyn.  On 5L NC.  WBC 18.  Cxr with no pleural effusions.  Aspiration episode noted earlier today.     ROS:  CONSTITUTIONAL:  No fever, chills, rigors  CARDIOVASCULAR:  No chest pain or palpitations  RESPIRATORY:   No SOB, cough, dyspnea on exertion.  No wheezing  GASTROINTESTINAL:  No abd pain, N/V, diarrhea/constipation  EXTREMITIES:  No swelling or joint pain  GENITOURINARY:  No burning on urination, increased frequency or urgency.  No flank pain  NEUROLOGIC:  No HA, visual disturbances  SKIN: No rashes    Allergies    No Known Allergies    Intolerances        ANTIBIOTICS/RELEVANT:  antimicrobials  piperacillin/tazobactam IVPB.. 3.375 Gram(s) IV Intermittent every 12 hours    immunologic:  epoetin rafiq-epbx (RETACRIT) Injectable 54026 Unit(s) IV Push <User Schedule>  influenza   Vaccine 0.5 milliLiter(s) IntraMuscular once    OTHER:  albuterol/ipratropium for Nebulization 3 milliLiter(s) Nebulizer every 6 hours  aspirin  chewable 81 milliGRAM(s) Oral daily  atorvastatin 80 milliGRAM(s) Oral at bedtime  benzonatate 100 milliGRAM(s) Oral every 8 hours  bisacodyl Suppository 10 milliGRAM(s) Rectal daily PRN  chlorhexidine 4% Liquid 1 Application(s) Topical <User Schedule>  dextrose 40% Gel 15 Gram(s) Oral once  dextrose 5%. 1000 milliLiter(s) IV Continuous <Continuous>  dextrose 5%. 1000 milliLiter(s) IV Continuous <Continuous>  dextrose 50% Injectable 25 Gram(s) IV Push once  dextrose 50% Injectable 12.5 Gram(s) IV Push once  dextrose 50% Injectable 25 Gram(s) IV Push once  gabapentin 100 milliGRAM(s) Oral at bedtime  glucagon  Injectable 1 milliGRAM(s) IntraMuscular once  guaiFENesin   Syrup  (Sugar-Free) 200 milliGRAM(s) Oral every 6 hours PRN  heparin   Injectable 5000 Unit(s) SubCutaneous every 8 hours  insulin lispro (ADMELOG) corrective regimen sliding scale   SubCutaneous three times a day before meals  insulin lispro (ADMELOG) corrective regimen sliding scale   SubCutaneous at bedtime  levothyroxine 25 MICROGram(s) Oral daily  metoprolol tartrate 25 milliGRAM(s) Oral every 12 hours  midodrine. 10 milliGRAM(s) Oral three times a day  midodrine. 10 milliGRAM(s) Oral <User Schedule>  pantoprazole    Tablet 40 milliGRAM(s) Oral before breakfast  polyethylene glycol 3350 17 Gram(s) Oral daily  prasugrel 10 milliGRAM(s) Oral daily  senna 2 Tablet(s) Oral at bedtime  sodium chloride 0.9% lock flush 10 milliLiter(s) IV Push every 1 hour PRN      Objective:  Vital Signs Last 24 Hrs  T(C): 36.6 (23 Mar 2021 19:05), Max: 36.8 (23 Mar 2021 00:12)  T(F): 97.8 (23 Mar 2021 19:05), Max: 98.2 (23 Mar 2021 00:12)  HR: 91 (23 Mar 2021 19:54) (78 - 91)  BP: 82/50 (23 Mar 2021 19:54) (82/50 - 106/65)  BP(mean): --  RR: 20 (23 Mar 2021 19:54) (18 - 20)  SpO2: 91% (23 Mar 2021 19:54) (91% - 96%)    PHYSICAL EXAM:  Constitutional:NAD  Eyes:CHERI, EOMI  Ear/Nose/Throat: no thrush, mucositis.  Moist mucous membranes	  Neck:no JVD, no lymphadenopathy, supple  Respiratory: CTA nadiya  Cardiovascular: S1S2 RRR, no murmurs  Gastrointestinal:soft, nontender,  nondistended (+) BS  Extremities:no e/e/c  Skin:  no rashes, open wounds or ulcerations        LABS:                        10.2   18.52 )-----------( 238      ( 23 Mar 2021 06:30 )             33.7     03-23    133<L>  |  96  |  74<H>  ----------------------------<  85  6.0<H>   |  20<L>  |  4.99<H>    Ca    9.5      23 Mar 2021 06:32    TPro  7.0  /  Alb  2.5<L>  /  TBili  1.6<H>  /  DBili  x   /  AST  72<H>  /  ALT  94<H>  /  AlkPhos  195<H>  03-23          MICROBIOLOGY:      Culture - Urine (02.17.21 @ 09:06)   Specimen Source: .Urine Clean Catch (Midstream)   Culture Results:   No growth     Culture - Blood (02.17.21 @ 05:49)   Specimen Source: .Blood Blood-Peripheral   Culture Results:   No Growth Final     RADIOLOGY & ADDITIONAL STUDIES:    < from: Xray Chest 1 View- PORTABLE-Urgent (Xray Chest 1 View- PORTABLE-Urgent .) (03.22.21 @ 23:42) >  Impression:    The heart isenlarged. The lungs appear to be clear. No pleural effusion. No pneumothorax. A pacer is in good position. A double-lumen lumen catheter is seen on the right and the tip is in the superior vena cava.    < end of copied text >

## 2021-03-23 NOTE — RAPID RESPONSE TEAM SUMMARY - NSSITUATIONBACKGROUNDRRT_GEN_ALL_CORE
81 yo male w h/o ICM< CAD< s/p CABG, has AICD, DM, HTN, HFrEF (EF 15%), severe aortic stenosis undergoing TAVR work up who presented with volume overload requiring hemodialysis. RRT called for hypotension to SBP 70s. Patient had just received hemodialysis but became hypotensive and was given back 400 mL of fluid before returning to the floor. He remained hypotensive. Patient is A&Ox4 at baseline. He appeared lethargic but was A&Ox3 on examination. Physical examination also revealed lung crackles. Patient was on 2L NC previously but now requiring 6L NC saturating at 98%. FSG was 80s. He was febrile to 100.4. Patient was administered scheduled dose of 10 mg midodrine. Tylenol was ordered. He was administered 300 mL NS bolus and failed to respond to IVFs. Levophed was started. CBC, CMP, ABG and BCXs were drawn. Patient was started on zosyn earlier in the day for possible aspiration pneumonia. He had only received one dose. Hypotension may be due to sepsis. 79 yo male w h/o ICM< CAD< s/p CABG, has AICD, DM, HTN, HFrEF (EF 15%), severe aortic stenosis undergoing TAVR work up who presented with volume overload requiring hemodialysis for new-onset ESRD. RRT called for hypotension to SBP 70s. Patient had just received hemodialysis but became hypotensive and was given back 400 mL of fluid before returning to the floor. He remained hypotensive. Patient is A&Ox4 at baseline. He appeared lethargic but was A&Ox3 on examination. Physical examination also revealed lung crackles. Patient was on 2L NC previously but now requiring 6L NC saturating at 98%. FSG was 80s. He was febrile to 100.4. Patient was administered scheduled dose of 10 mg midodrine. Tylenol was ordered. He was administered 300 mL NS bolus and failed to respond to IVFs. Levophed was started. CBC, CMP, ABG and BCXs were drawn. Patient was started on zosyn earlier in the day for possible aspiration pneumonia. He had only received one dose. Hypotension may be due to sepsis. 81 yo male w h/o ICM< CAD< s/p CABG, has AICD, DM, HTN, HFrEF (EF 15%), severe aortic stenosis undergoing TAVR work up who presented with volume overload requiring hemodialysis for new-onset ESRD. RRT called for hypotension to SBP 70s. Patient had just received hemodialysis but became hypotensive and was given back 400 mL of fluid before returning to the floor. He remained hypotensive. Patient is A&Ox4 at baseline. He appeared lethargic but was A&Ox3 on examination. Physical examination also revealed lung crackles. Patient was on 2L NC previously but now requiring 6L NC saturating at 98%. FSG was 80s. He was febrile to 100.4. Patient was administered scheduled dose of 10 mg midodrine. Tylenol was ordered. He was administered 300 mL NS bolus and failed to respond to IVFs. Given his hx of ESRD, Severe AS, and HFrEF 15%, levophed was started. CBC, CMP, ABG and BCXs were drawn. Patient was started on zosyn earlier in the day for possible aspiration pneumonia. He had only received one dose. Hypotension may be due to sepsis.

## 2021-03-23 NOTE — SWALLOW BEDSIDE ASSESSMENT ADULT - ASR SWALLOW DENTITION
edentulous upper; no molars on bottom; dentures at bedside however pt reported they "don't fit anymore. they hurt."

## 2021-03-23 NOTE — CONSULT NOTE ADULT - REASON FOR ADMISSION
acute renal failure

## 2021-03-24 LAB
ALBUMIN SERPL ELPH-MCNC: 2.2 G/DL — LOW (ref 3.3–5)
ALBUMIN SERPL ELPH-MCNC: 2.2 G/DL — LOW (ref 3.3–5)
ALBUMIN SERPL ELPH-MCNC: 2.4 G/DL — LOW (ref 3.3–5)
ALP SERPL-CCNC: 174 U/L — HIGH (ref 40–120)
ALP SERPL-CCNC: 175 U/L — HIGH (ref 40–120)
ALP SERPL-CCNC: 191 U/L — HIGH (ref 40–120)
ALT FLD-CCNC: 107 U/L — HIGH (ref 10–45)
ALT FLD-CCNC: 137 U/L — HIGH (ref 10–45)
ALT FLD-CCNC: 95 U/L — HIGH (ref 10–45)
ANION GAP SERPL CALC-SCNC: 17 MMOL/L — SIGNIFICANT CHANGE UP (ref 5–17)
ANION GAP SERPL CALC-SCNC: 19 MMOL/L — HIGH (ref 5–17)
ANION GAP SERPL CALC-SCNC: 26 MMOL/L — HIGH (ref 5–17)
ANISOCYTOSIS BLD QL: SIGNIFICANT CHANGE UP
AST SERPL-CCNC: 119 U/L — HIGH (ref 10–40)
AST SERPL-CCNC: 220 U/L — HIGH (ref 10–40)
AST SERPL-CCNC: 85 U/L — HIGH (ref 10–40)
BASE EXCESS BLDV CALC-SCNC: -14.6 MMOL/L — LOW (ref -2–2)
BASE EXCESS BLDV CALC-SCNC: -9 MMOL/L — LOW (ref -2–2)
BASOPHILS # BLD AUTO: 0.05 K/UL — SIGNIFICANT CHANGE UP (ref 0–0.2)
BASOPHILS # BLD AUTO: 0.22 K/UL — HIGH (ref 0–0.2)
BASOPHILS NFR BLD AUTO: 0.2 % — SIGNIFICANT CHANGE UP (ref 0–2)
BASOPHILS NFR BLD AUTO: 0.9 % — SIGNIFICANT CHANGE UP (ref 0–2)
BILIRUB SERPL-MCNC: 1.9 MG/DL — HIGH (ref 0.2–1.2)
BILIRUB SERPL-MCNC: 2 MG/DL — HIGH (ref 0.2–1.2)
BILIRUB SERPL-MCNC: 2 MG/DL — HIGH (ref 0.2–1.2)
BUN SERPL-MCNC: 82 MG/DL — HIGH (ref 7–23)
BUN SERPL-MCNC: 84 MG/DL — HIGH (ref 7–23)
BUN SERPL-MCNC: 85 MG/DL — HIGH (ref 7–23)
BURR CELLS BLD QL SMEAR: PRESENT — SIGNIFICANT CHANGE UP
CA-I SERPL-SCNC: 1.14 MMOL/L — SIGNIFICANT CHANGE UP (ref 1.12–1.3)
CALCIUM SERPL-MCNC: 7.5 MG/DL — LOW (ref 8.4–10.5)
CALCIUM SERPL-MCNC: 7.9 MG/DL — LOW (ref 8.4–10.5)
CALCIUM SERPL-MCNC: 8.3 MG/DL — LOW (ref 8.4–10.5)
CHLORIDE BLDV-SCNC: 101 MMOL/L — SIGNIFICANT CHANGE UP (ref 96–108)
CHLORIDE SERPL-SCNC: 95 MMOL/L — LOW (ref 96–108)
CHLORIDE SERPL-SCNC: 98 MMOL/L — SIGNIFICANT CHANGE UP (ref 96–108)
CK MB CFR SERPL CALC: 13.7 NG/ML — HIGH (ref 0–6.7)
CO2 BLDV-SCNC: 15 MMOL/L — LOW (ref 22–30)
CO2 BLDV-SCNC: 23 MMOL/L — SIGNIFICANT CHANGE UP (ref 22–30)
CO2 SERPL-SCNC: 12 MMOL/L — LOW (ref 22–31)
CO2 SERPL-SCNC: 15 MMOL/L — LOW (ref 22–31)
CO2 SERPL-SCNC: 19 MMOL/L — LOW (ref 22–31)
CREAT SERPL-MCNC: 5.21 MG/DL — HIGH (ref 0.5–1.3)
CREAT SERPL-MCNC: 5.35 MG/DL — HIGH (ref 0.5–1.3)
CREAT SERPL-MCNC: 5.53 MG/DL — HIGH (ref 0.5–1.3)
ELLIPTOCYTES BLD QL SMEAR: SLIGHT — SIGNIFICANT CHANGE UP
EOSINOPHIL # BLD AUTO: 0 K/UL — SIGNIFICANT CHANGE UP (ref 0–0.5)
EOSINOPHIL # BLD AUTO: 0.04 K/UL — SIGNIFICANT CHANGE UP (ref 0–0.5)
EOSINOPHIL NFR BLD AUTO: 0 % — SIGNIFICANT CHANGE UP (ref 0–6)
EOSINOPHIL NFR BLD AUTO: 0.2 % — SIGNIFICANT CHANGE UP (ref 0–6)
GAS PNL BLDA: SIGNIFICANT CHANGE UP
GAS PNL BLDA: SIGNIFICANT CHANGE UP
GAS PNL BLDV: 133 MMOL/L — LOW (ref 135–145)
GAS PNL BLDV: SIGNIFICANT CHANGE UP
GAS PNL BLDV: SIGNIFICANT CHANGE UP
GIANT PLATELETS BLD QL SMEAR: PRESENT — SIGNIFICANT CHANGE UP
GLUCOSE BLDC GLUCOMTR-MCNC: 111 MG/DL — HIGH (ref 70–99)
GLUCOSE BLDC GLUCOMTR-MCNC: 271 MG/DL — HIGH (ref 70–99)
GLUCOSE BLDC GLUCOMTR-MCNC: 45 MG/DL — CRITICAL LOW (ref 70–99)
GLUCOSE BLDC GLUCOMTR-MCNC: 58 MG/DL — LOW (ref 70–99)
GLUCOSE BLDC GLUCOMTR-MCNC: 71 MG/DL — SIGNIFICANT CHANGE UP (ref 70–99)
GLUCOSE BLDC GLUCOMTR-MCNC: 83 MG/DL — SIGNIFICANT CHANGE UP (ref 70–99)
GLUCOSE BLDC GLUCOMTR-MCNC: 99 MG/DL — SIGNIFICANT CHANGE UP (ref 70–99)
GLUCOSE BLDV-MCNC: 83 MG/DL — SIGNIFICANT CHANGE UP (ref 70–99)
GLUCOSE SERPL-MCNC: 142 MG/DL — HIGH (ref 70–99)
GLUCOSE SERPL-MCNC: 147 MG/DL — HIGH (ref 70–99)
GLUCOSE SERPL-MCNC: 92 MG/DL — SIGNIFICANT CHANGE UP (ref 70–99)
HCO3 BLDV-SCNC: 14 MMOL/L — LOW (ref 21–29)
HCO3 BLDV-SCNC: 21 MMOL/L — SIGNIFICANT CHANGE UP (ref 21–29)
HCT VFR BLD CALC: 36.3 % — LOW (ref 39–50)
HCT VFR BLDA CALC: 35 % — LOW (ref 39–50)
HGB BLD CALC-MCNC: 11.4 G/DL — LOW (ref 13–17)
HGB BLD-MCNC: 10.4 G/DL — LOW (ref 13–17)
HOROWITZ INDEX BLDV+IHG-RTO: 40 — SIGNIFICANT CHANGE UP
IMM GRANULOCYTES NFR BLD AUTO: 1.1 % — SIGNIFICANT CHANGE UP (ref 0–1.5)
LACTATE BLDV-MCNC: 3.7 MMOL/L — HIGH (ref 0.7–2)
LACTATE BLDV-MCNC: 5.4 MMOL/L — CRITICAL HIGH (ref 0.7–2)
LYMPHOCYTES # BLD AUTO: 0.22 K/UL — LOW (ref 1–3.3)
LYMPHOCYTES # BLD AUTO: 0.23 K/UL — LOW (ref 1–3.3)
LYMPHOCYTES # BLD AUTO: 0.9 % — LOW (ref 13–44)
LYMPHOCYTES # BLD AUTO: 1.1 % — LOW (ref 13–44)
MACROCYTES BLD QL: SLIGHT — SIGNIFICANT CHANGE UP
MAGNESIUM SERPL-MCNC: 2.8 MG/DL — HIGH (ref 1.6–2.6)
MAGNESIUM SERPL-MCNC: 2.9 MG/DL — HIGH (ref 1.6–2.6)
MANUAL SMEAR VERIFICATION: SIGNIFICANT CHANGE UP
MCHC RBC-ENTMCNC: 28.7 GM/DL — LOW (ref 32–36)
MCHC RBC-ENTMCNC: 29.5 PG — SIGNIFICANT CHANGE UP (ref 27–34)
MCV RBC AUTO: 103.1 FL — HIGH (ref 80–100)
MONOCYTES # BLD AUTO: 0.65 K/UL — SIGNIFICANT CHANGE UP (ref 0–0.9)
MONOCYTES # BLD AUTO: 0.86 K/UL — SIGNIFICANT CHANGE UP (ref 0–0.9)
MONOCYTES NFR BLD AUTO: 2.6 % — SIGNIFICANT CHANGE UP (ref 2–14)
MONOCYTES NFR BLD AUTO: 4.1 % — SIGNIFICANT CHANGE UP (ref 2–14)
NEUTROPHILS # BLD AUTO: 19.5 K/UL — HIGH (ref 1.8–7.4)
NEUTROPHILS # BLD AUTO: 23.84 K/UL — HIGH (ref 1.8–7.4)
NEUTROPHILS NFR BLD AUTO: 93.3 % — HIGH (ref 43–77)
NEUTROPHILS NFR BLD AUTO: 95.6 % — HIGH (ref 43–77)
NRBC # BLD: 1 /100 — HIGH (ref 0–0)
NRBC # BLD: 2 /100 WBCS — HIGH (ref 0–0)
OTHER CELLS CSF MANUAL: 8 ML/DL — LOW (ref 18–22)
OVALOCYTES BLD QL SMEAR: SLIGHT — SIGNIFICANT CHANGE UP
PCO2 BLDV: 43 MMHG — SIGNIFICANT CHANGE UP (ref 35–50)
PCO2 BLDV: 70 MMHG — HIGH (ref 35–50)
PH BLDV: 7.11 — CRITICAL LOW (ref 7.35–7.45)
PH BLDV: 7.13 — CRITICAL LOW (ref 7.35–7.45)
PHOSPHATE SERPL-MCNC: 8 MG/DL — HIGH (ref 2.5–4.5)
PHOSPHATE SERPL-MCNC: 9.3 MG/DL — HIGH (ref 2.5–4.5)
PLAT MORPH BLD: NORMAL — SIGNIFICANT CHANGE UP
PLATELET # BLD AUTO: 297 K/UL — SIGNIFICANT CHANGE UP (ref 150–400)
PLATELET COUNT - ESTIMATE: NORMAL — SIGNIFICANT CHANGE UP
PO2 BLDV: 39 MMHG — SIGNIFICANT CHANGE UP (ref 25–45)
PO2 BLDV: 56 MMHG — HIGH (ref 25–45)
POIKILOCYTOSIS BLD QL AUTO: SIGNIFICANT CHANGE UP
POLYCHROMASIA BLD QL SMEAR: SLIGHT — SIGNIFICANT CHANGE UP
POTASSIUM BLDV-SCNC: 6 MMOL/L — HIGH (ref 3.5–5.3)
POTASSIUM SERPL-MCNC: 5.8 MMOL/L — HIGH (ref 3.5–5.3)
POTASSIUM SERPL-MCNC: 6.2 MMOL/L — CRITICAL HIGH (ref 3.5–5.3)
POTASSIUM SERPL-MCNC: 6.7 MMOL/L — CRITICAL HIGH (ref 3.5–5.3)
POTASSIUM SERPL-SCNC: 5.8 MMOL/L — HIGH (ref 3.5–5.3)
POTASSIUM SERPL-SCNC: 6.2 MMOL/L — CRITICAL HIGH (ref 3.5–5.3)
POTASSIUM SERPL-SCNC: 6.7 MMOL/L — CRITICAL HIGH (ref 3.5–5.3)
PROCALCITONIN SERPL-MCNC: 2.46 NG/ML — HIGH (ref 0.02–0.1)
PROT SERPL-MCNC: 6.2 G/DL — SIGNIFICANT CHANGE UP (ref 6–8.3)
PROT SERPL-MCNC: 6.3 G/DL — SIGNIFICANT CHANGE UP (ref 6–8.3)
PROT SERPL-MCNC: 6.7 G/DL — SIGNIFICANT CHANGE UP (ref 6–8.3)
RBC # BLD: 3.52 M/UL — LOW (ref 4.2–5.8)
RBC # FLD: 20.3 % — HIGH (ref 10.3–14.5)
RBC BLD AUTO: ABNORMAL
SAO2 % BLDV: 54 % — LOW (ref 67–88)
SAO2 % BLDV: 78 % — SIGNIFICANT CHANGE UP (ref 67–88)
SCHISTOCYTES BLD QL AUTO: SLIGHT — SIGNIFICANT CHANGE UP
SODIUM SERPL-SCNC: 132 MMOL/L — LOW (ref 135–145)
SODIUM SERPL-SCNC: 133 MMOL/L — LOW (ref 135–145)
SPHEROCYTES BLD QL SMEAR: SLIGHT — SIGNIFICANT CHANGE UP
TROPONIN T, HIGH SENSITIVITY RESULT: 541 NG/L — HIGH (ref 0–51)
WBC # BLD: 20.92 K/UL — HIGH (ref 3.8–10.5)
WBC # FLD AUTO: 20.92 K/UL — HIGH (ref 3.8–10.5)

## 2021-03-24 PROCEDURE — 99291 CRITICAL CARE FIRST HOUR: CPT | Mod: 25

## 2021-03-24 PROCEDURE — 71045 X-RAY EXAM CHEST 1 VIEW: CPT | Mod: 26

## 2021-03-24 PROCEDURE — 36556 INSERT NON-TUNNEL CV CATH: CPT

## 2021-03-24 PROCEDURE — 93308 TTE F-UP OR LMTD: CPT | Mod: 26

## 2021-03-24 PROCEDURE — 93284 PRGRMG EVAL IMPLANTABLE DFB: CPT | Mod: 26

## 2021-03-24 PROCEDURE — 99292 CRITICAL CARE ADDL 30 MIN: CPT | Mod: 25

## 2021-03-24 PROCEDURE — 31500 INSERT EMERGENCY AIRWAY: CPT

## 2021-03-24 PROCEDURE — 93306 TTE W/DOPPLER COMPLETE: CPT | Mod: 26

## 2021-03-24 PROCEDURE — 76604 US EXAM CHEST: CPT | Mod: 26

## 2021-03-24 RX ORDER — FENTANYL CITRATE 50 UG/ML
100 INJECTION INTRAVENOUS ONCE
Refills: 0 | Status: DISCONTINUED | OUTPATIENT
Start: 2021-03-24 | End: 2021-03-24

## 2021-03-24 RX ORDER — SODIUM ZIRCONIUM CYCLOSILICATE 10 G/10G
10 POWDER, FOR SUSPENSION ORAL ONCE
Refills: 0 | Status: COMPLETED | OUTPATIENT
Start: 2021-03-24 | End: 2021-03-24

## 2021-03-24 RX ORDER — SODIUM CHLORIDE 9 MG/ML
1000 INJECTION, SOLUTION INTRAVENOUS
Refills: 0 | Status: DISCONTINUED | OUTPATIENT
Start: 2021-03-24 | End: 2021-03-24

## 2021-03-24 RX ORDER — DEXTROSE 50 % IN WATER 50 %
50 SYRINGE (ML) INTRAVENOUS ONCE
Refills: 0 | Status: COMPLETED | OUTPATIENT
Start: 2021-03-24 | End: 2021-03-24

## 2021-03-24 RX ORDER — INSULIN HUMAN 100 [IU]/ML
10 INJECTION, SOLUTION SUBCUTANEOUS ONCE
Refills: 0 | Status: COMPLETED | OUTPATIENT
Start: 2021-03-24 | End: 2021-03-24

## 2021-03-24 RX ORDER — CHLORHEXIDINE GLUCONATE 213 G/1000ML
15 SOLUTION TOPICAL EVERY 12 HOURS
Refills: 0 | Status: DISCONTINUED | OUTPATIENT
Start: 2021-03-24 | End: 2021-03-24

## 2021-03-24 RX ORDER — ALBUMIN HUMAN 25 %
500 VIAL (ML) INTRAVENOUS ONCE
Refills: 0 | Status: DISCONTINUED | OUTPATIENT
Start: 2021-03-24 | End: 2021-03-24

## 2021-03-24 RX ORDER — PHENYLEPHRINE HYDROCHLORIDE 10 MG/ML
0.1 INJECTION INTRAVENOUS
Qty: 160 | Refills: 0 | Status: DISCONTINUED | OUTPATIENT
Start: 2021-03-24 | End: 2021-03-24

## 2021-03-24 RX ORDER — VANCOMYCIN HCL 1 G
1000 VIAL (EA) INTRAVENOUS ONCE
Refills: 0 | Status: COMPLETED | OUTPATIENT
Start: 2021-03-24 | End: 2021-03-24

## 2021-03-24 RX ORDER — ALBUTEROL 90 UG/1
10 AEROSOL, METERED ORAL ONCE
Refills: 0 | Status: COMPLETED | OUTPATIENT
Start: 2021-03-24 | End: 2021-03-24

## 2021-03-24 RX ORDER — ALBUMIN HUMAN 25 %
250 VIAL (ML) INTRAVENOUS ONCE
Refills: 0 | Status: DISCONTINUED | OUTPATIENT
Start: 2021-03-24 | End: 2021-03-24

## 2021-03-24 RX ORDER — NOREPINEPHRINE BITARTRATE/D5W 8 MG/250ML
0.75 PLASTIC BAG, INJECTION (ML) INTRAVENOUS
Qty: 16 | Refills: 0 | Status: DISCONTINUED | OUTPATIENT
Start: 2021-03-24 | End: 2021-03-24

## 2021-03-24 RX ORDER — VASOPRESSIN 20 [USP'U]/ML
0.04 INJECTION INTRAVENOUS
Qty: 50 | Refills: 0 | Status: DISCONTINUED | OUTPATIENT
Start: 2021-03-24 | End: 2021-03-24

## 2021-03-24 RX ORDER — INSULIN LISPRO 100/ML
VIAL (ML) SUBCUTANEOUS EVERY 6 HOURS
Refills: 0 | Status: DISCONTINUED | OUTPATIENT
Start: 2021-03-24 | End: 2021-03-24

## 2021-03-24 RX ORDER — PROPOFOL 10 MG/ML
15 INJECTION, EMULSION INTRAVENOUS
Qty: 1000 | Refills: 0 | Status: DISCONTINUED | OUTPATIENT
Start: 2021-03-24 | End: 2021-03-24

## 2021-03-24 RX ORDER — PROPOFOL 10 MG/ML
15 INJECTION, EMULSION INTRAVENOUS
Qty: 500 | Refills: 0 | Status: DISCONTINUED | OUTPATIENT
Start: 2021-03-24 | End: 2021-03-24

## 2021-03-24 RX ADMIN — Medication 52.6 MICROGRAM(S)/KG/MIN: at 05:34

## 2021-03-24 RX ADMIN — Medication 50 MILLILITER(S): at 13:50

## 2021-03-24 RX ADMIN — CHLORHEXIDINE GLUCONATE 15 MILLILITER(S): 213 SOLUTION TOPICAL at 17:42

## 2021-03-24 RX ADMIN — CHLORHEXIDINE GLUCONATE 1 APPLICATION(S): 213 SOLUTION TOPICAL at 05:35

## 2021-03-24 RX ADMIN — FENTANYL CITRATE 100 MICROGRAM(S): 50 INJECTION INTRAVENOUS at 01:30

## 2021-03-24 RX ADMIN — PIPERACILLIN AND TAZOBACTAM 25 GRAM(S): 4; .5 INJECTION, POWDER, LYOPHILIZED, FOR SOLUTION INTRAVENOUS at 05:35

## 2021-03-24 RX ADMIN — Medication 81 MILLIGRAM(S): at 11:38

## 2021-03-24 RX ADMIN — FENTANYL CITRATE 100 MICROGRAM(S): 50 INJECTION INTRAVENOUS at 02:15

## 2021-03-24 RX ADMIN — PIPERACILLIN AND TAZOBACTAM 25 GRAM(S): 4; .5 INJECTION, POWDER, LYOPHILIZED, FOR SOLUTION INTRAVENOUS at 17:43

## 2021-03-24 RX ADMIN — INSULIN HUMAN 10 UNIT(S): 100 INJECTION, SOLUTION SUBCUTANEOUS at 00:38

## 2021-03-24 RX ADMIN — Medication 3 MILLILITER(S): at 05:56

## 2021-03-24 RX ADMIN — Medication 52.6 MICROGRAM(S)/KG/MIN: at 02:05

## 2021-03-24 RX ADMIN — Medication 50 MILLILITER(S): at 11:55

## 2021-03-24 RX ADMIN — ALBUTEROL 10 MILLIGRAM(S): 90 AEROSOL, METERED ORAL at 01:48

## 2021-03-24 RX ADMIN — VASOPRESSIN 2.4 UNIT(S)/MIN: 20 INJECTION INTRAVENOUS at 06:30

## 2021-03-24 RX ADMIN — Medication 52.6 MICROGRAM(S)/KG/MIN: at 11:44

## 2021-03-24 RX ADMIN — Medication 25 MICROGRAM(S): at 05:33

## 2021-03-24 RX ADMIN — SODIUM ZIRCONIUM CYCLOSILICATE 10 GRAM(S): 10 POWDER, FOR SUSPENSION ORAL at 04:32

## 2021-03-24 RX ADMIN — PHENYLEPHRINE HYDROCHLORIDE 1.4 MICROGRAM(S)/KG/MIN: 10 INJECTION INTRAVENOUS at 11:44

## 2021-03-24 RX ADMIN — Medication 50 MILLILITER(S): at 00:39

## 2021-03-24 RX ADMIN — Medication 250 MILLILITER(S): at 02:10

## 2021-03-24 RX ADMIN — PRASUGREL 10 MILLIGRAM(S): 5 TABLET, FILM COATED ORAL at 11:38

## 2021-03-24 RX ADMIN — CHLORHEXIDINE GLUCONATE 15 MILLILITER(S): 213 SOLUTION TOPICAL at 05:33

## 2021-03-24 RX ADMIN — Medication 3 MILLILITER(S): at 11:49

## 2021-03-24 RX ADMIN — Medication 250 MILLIGRAM(S): at 02:05

## 2021-03-24 RX ADMIN — PROPOFOL 6.73 MICROGRAM(S)/KG/MIN: 10 INJECTION, EMULSION INTRAVENOUS at 12:26

## 2021-03-24 RX ADMIN — VASOPRESSIN 2.4 UNIT(S)/MIN: 20 INJECTION INTRAVENOUS at 11:43

## 2021-03-24 RX ADMIN — HEPARIN SODIUM 5000 UNIT(S): 5000 INJECTION INTRAVENOUS; SUBCUTANEOUS at 05:33

## 2021-03-24 RX ADMIN — PHENYLEPHRINE HYDROCHLORIDE 1.4 MICROGRAM(S)/KG/MIN: 10 INJECTION INTRAVENOUS at 05:34

## 2021-03-24 RX ADMIN — PROPOFOL 6.73 MICROGRAM(S)/KG/MIN: 10 INJECTION, EMULSION INTRAVENOUS at 05:36

## 2021-03-24 NOTE — PROGRESS NOTE ADULT - PROBLEM SELECTOR PLAN 4
RISS

## 2021-03-24 NOTE — CHART NOTE - NSCHARTNOTEFT_GEN_A_CORE
HPI:  79 yo male w h/o DM, CAD, s/p CABG, CKD 3-4. He saw my partner Dr. Abel Luz yesterday as a new consult for elevated creatinine. Bloodwork today returned notable for  and Na 126; in light of these findings, Dr. Luz sent the patient to the ER. Lately ptn has been having abdominal pain with elevated amylase and lipase.   As per ptn's wife: on and off R abd pain w generalized weakness and poor po intake. PCP DCed po Lasix in past few days 2/2 abnormal blood test results.  Ptn denies recent NSAID use.  He was diagnosed with diabetes 18 years ago, and denies retinopathy (last saw Optho around 2 years ago). Cardiologist has alluded to the patient having mild renal impairment in the past, as per wife.    Patient's PCP is Dr. Shyann De León; GI Dr. Wilman Villagran; Cardiology Dr. Boyce.  (16 Feb 2021 21:05)     Patient is s/p RRT for hypotension . See RRT note  Post RRT, patient transferred to MICU,  Discussed with Dr. Rosario  Continue to monitor  Will endorse to AM team      Namrata Chris PA-C  #[id number]  Dept of Medicine

## 2021-03-24 NOTE — PROCEDURE NOTE - ADDITIONAL PROCEDURE DETAILS
Indication for Interrogation: Disable Tachy Therapy  Presenting Rhythm: A-Paced / BiV-Paced, rate 40s  Changes made: Tachy therapy disabled as per primary team and family.       VIRGILIO Davis PA-C  [id number] Indication for Interrogation: Disable Tachy Therapy  Changes made: Tachy therapy disabled as per primary team and family.     VIRGILIO Davis PA-C  [id number]

## 2021-03-24 NOTE — CHART NOTE - NSCHARTNOTESELECT_GEN_ALL_CORE
COVID+/Event Note
Event Note
Event Note
MS change/Event Note
Nutrition Services
Nutrition Services
Post RRT Assessment/Event Note
Vascular recommendation/Event Note
dyspnea/Event Note
2 minutes WCT on tele/Event Note
Discontinuation of AICD/Event Note
Event Note
Expiration/Event Note
Interventional Radiology/Off Service Note
MICU Accept Note/Transfer Note
Nutrition Services
Nutrition Services
Palliative/Event Note
WCT vs pacemaker mediated tachycardia/Event Note

## 2021-03-24 NOTE — PROGRESS NOTE ADULT - ASSESSMENT
ASSESSMENT AND PLAN:  Mr. Morelos is a 79 yo male w h/o DM, CAD, s/p CABG, CKD 3-4 here after outpatient labwork found elevated BUN, hyponatremia. Pt has since been initiated on dialysis and has required midodrine TID for pressure support. CCB abdominal pain on admission with mildly elevated amylase and lipase without evidence of cholecystitis/pancreatitis. S/P zoxyn x7 days with improvement in abdominal pain. course also CCB nosocomial COVID with monoclonal Ab infusion. In addition, pt with possible aspiration event on 3/22 with subsequent increased O2 requirements, and increased hypotension on 3/23 requiring pressure support with levophed 2/2 cardiogenic vs septic shock. Pt transferred to MICU for further workup and management.      #Neuro  -AOx3 at baseline, currently lethargic and AOx0, likely 2/2 hypercapneic respiratory failure also with symptomatic hypotension and febrile illness.  -Patient now intubated and sedated as of 3/24  -Monitor neurological status per MICU protocol  -Will consider CTH if mental status does not improve with BP control and improvement in hypercapnia.     #Cardiovascular  Severe biventricular HFrEF with moderate to severe calcified AS   -Pt has CAD s/p CABG > 2 years ago  -AICD in place  -C/W midodrine 10mg TID  -Currently requiring levophed for pressor support. Titrate as necessary to maintain MAP >65  -Pt being followed by cardiology. Will appreciate recs  -Has been evaluated for TAVR during this admission and has been deemed not a candidate at this time.  -EKG with paced rhythm  -Trop elevated to 541 in setting of ESRD and septic shock and biventricular HF Low concern for ACS at this time. Will trend  -C/W DAPT   NSVT/VT  -S/P amio load  -Hold lopressor in setting of hypotension   -Monitor on telemetry     #Respiratory  Acute hypoxic respiratory failure requiring Intubated  -Intubated overnight 3/24  -Possible aspiration event 3/22 PM  -Vanc/Zosyn for aspiration PNA coverage  -F/U CT chest once stable  -Trend ABG    #GI/Nutrition  -NPO given AMS  -FSG q6h while NPO  -Initiate feeds once PO access obtained    #/Renal  ESRD on HD TTS  -Now in septic/cardiogenic shock. May need to initiate CVVHD  -K 6.2. Given Insulin/D50, albuterol  -D/W nephrology regarding further HD vs CVVHD  -Monitor BMP q6h     #Skin  Has medial malleolar ulceration.   -vascular consulted during admission, THIERRY w toe pressures requested initially by vascular, but at this point its being deterred and not necessary for any further inptn work up of PAD.   -ptn will need outptn F/U with vascular     #ID  Septic shock possibly 2/2 aspiration PNA  -Procal elevated to 2.46  -F/U Bcx, Ucx, Sputum cx  -C/W vanc/Zosyn renally dosed  -Consider CT abd/pelvis given recent hx abdominal pain with gallstones     #Endocrine  -FSG q6h while NPO  -ISS q6h    #Hematologic/DVT ppx  -Heparin for VTE prophylaxis    #Ethics  -GOC discussed with patient's wife. Patient currently FULL CODE  -On discharge, pt will need JESUS, outpatient HD setup  ASSESSMENT AND PLAN:  Mr. Morelos is a 81 yo male w h/o DM, CAD, s/p CABG, CKD 3-4 here after outpatient labwork found elevated BUN, hyponatremia. Pt has since been initiated on dialysis and has required midodrine TID for pressure support. CCB abdominal pain on admission with mildly elevated amylase and lipase without evidence of cholecystitis/pancreatitis. S/P zoxyn x7 days with improvement in abdominal pain. course also CCB nosocomial COVID with monoclonal Ab infusion. In addition, pt with possible aspiration event on 3/22 with subsequent increased O2 requirements, and increased hypotension on 3/23 requiring pressure support with levophed 2/2 cardiogenic vs septic shock. Pt transferred to MICU for further workup and management.      #Neuro  -AOx3 at baseline, currently lethargic and AOx0, likely 2/2 hypercapneic respiratory failure also with symptomatic hypotension and febrile illness.  -Patient now intubated and sedated as of 3/24  -Monitor neurological status per MICU protocol  -Will consider CTH if mental status does not improve with BP control and improvement in hypercapnia.     #Cardiovascular  Severe biventricular HFrEF with moderate to severe calcified AS   -Pt has CAD s/p CABG > 2 years ago  -AICD in place  -C/W midodrine 10mg TID  -Currently requiring levophed for pressor support. Titrate as necessary to maintain MAP >65  -Pt being followed by cardiology. Will appreciate recs  -Has been evaluated for TAVR during this admission and has been deemed not a candidate at this time.  -EKG with paced rhythm  -Trop elevated to 541 in setting of ESRD and septic shock and biventricular HF Low concern for ACS at this time. Will trend  -C/W DAPT   NSVT/VT  -S/P amio load  -Hold lopressor in setting of hypotension   -Monitor on telemetry   - TTE unchanged w/ EF 15%    #Respiratory  Acute hypoxic/hypercapnic respiratory failure requiring Intubated  -Intubated overnight 3/24  -Possible aspiration event 3/22 PM  -Vanc/Zosyn for aspiration PNA coverage  - no more vanc, c/w zosyn    #GI/Nutrition  -NPO given AMS  -FSG q6h while NPO  - no feeds for now    #/Renal  ESRD on HD TTS  -Now in septic/cardiogenic shock. May need to initiate CVVHD but likely not a candidate.   -K 6.2. Given Insulin/D50, albuterol  -D/W nephrology regarding further HD vs CVVHD  -Monitor BMP q6h     #Skin  Has medial malleolar ulceration.   -vascular consulted during admission, THIERRY w toe pressures requested initially by vascular, but at this point its being deterred and not necessary for any further inpatient work up of PAD.     #ID  Septic shock possibly 2/2 aspiration PNA  -Procal elevated to 2.46  -F/U Bcx, Ucx, Sputum cx  -C/W zosyn  -Consider CT abd/pelvis given recent hx abdominal pain with gallstones     #Endocrine  -FSG q6h while NPO  -ISS q6h    #Hematologic/DVT ppx  -Heparin for VTE prophylaxis    #Ethics  -GOC discussed with patient's wife. CPR is not being offered by ICU team.

## 2021-03-24 NOTE — CHART NOTE - NSCHARTNOTEFT_GEN_A_CORE
: Ceferino     INDICATION:    PROCEDURE:  [x] LIMITED ECHO  [x] LIMITED CHEST  [ ] LIMITED RETROPERITONEAL  [ ] LIMITED ABDOMINAL  [ ] LIMITED DVT  [ ] NEEDLE GUIDANCE VASCULAR  [ ] NEEDLE GUIDANCE THORACENTESIS  [ ] NEEDLE GUIDANCE PARACENTESIS  [ ] NEEDLE GUIDANCE PERICARDIOCENTESIS  [ ] OTHER    FINDINGS:     Lung: Tansolobar consolidation RLL Scattered B line elsewhere    Cardiac: Severely reduced LV fxn No RV dilation No PEF Transaortic velocity at around 3.5 m/sec with unitless ration of 0.20 LVOT VTI 7.2 cm     INTERPRETATION:    Findings C/W RLL PNA Severe AS with AS velocity attenuated by severe LV failure Estimated DO2 at 2.6ml/kg which is at life threatening level

## 2021-03-24 NOTE — PROGRESS NOTE ADULT - PROBLEM SELECTOR PLAN 2
stable/around baseline   nephrology follow up
stable/around baseline   nephrology follow up  For HD catheter placement once COVID PCR negative
stable/around baseline   nephrology follow up  For HD catheter placement once COVID PCR negative
stable/around baseline   nephrology follow up
stable/around baseline   nephrology follow up  For HD catheter placement once COVID PCR negative
stable/around baseline   nephrology follow up
stable/around baseline   nephrology follow up  For HD catheter placement once COVID PCR negative
stable/around baseline   nephrology follow up
stable/around baseline   nephrology follow up  For HD catheter placement once COVID PCR negative
stable/around baseline   nephrology follow up  For HD catheter placement once COVID PCR negative
stable/around baseline   nephrology follow up
Cont HD  nephrology follow up
stable/around baseline   nephrology follow up
stable/around baseline   nephrology follow up  For HD catheter placement once COVID PCR negative
stable/around baseline   nephrology follow up

## 2021-03-24 NOTE — CHART NOTE - NSCHARTNOTEFT_GEN_A_CORE
Nutrition Follow Up Note  Patient seen for: follow up assessment on MICU    Chart reviewed and events noted. Pt is a 80y male w/ pmhx of T2DM, CAD, CABG, CHD, c/b newly ESRD and COVID (+). Unable to tolerate HD last night due to hypotension. Pt intubated and sedated overnight 3/24. Pt currently NPO, holding off TF as pt is on 3 pressors.    Source:  electronic medical record, team    Diet, NPO: Except Medications (21 @ 01:51) [Active]    Nutrition Events:   Pt trached, unable to interview at this time. Pt was made NPO on 3/23 and holding off TF due to 3 pressors. Pt had been tolerating meals in house prior to transfer to MICU, eating % of meals x 2 weeks per flowsheet. No nausea/vomiting, abdominal distention noted per flowsheet and last bowel movement 3/24. Pt was noted with possible aspiration on 3/22, speech/swallow evaluation recommended purees/thin liquids. Advancement of diet order pending.    Enteral /Parenteral Nutrition: N/A    Daily Weight in k.9 (), Weight in k.5 (), Weight in k.6 (), Weight in k.1 (), Weight in k.5 (), Weight in k ()  % Weight Change: pt now with 2+ generalized edema, likely contributing to upward trend x 1 week. Will continue to monitor    Pertinent Medications:   insulin lispro (ADMELOG) corrective regimen sliding scale   SubCutaneous every 6 hours  levothyroxine 25 MICROGram(s) Oral daily  norepinephrine Infusion 0.75 MICROgram(s)/kG/Min (52.6 mL/Hr) IV Continuous <Continuous>  phenylephrine    Infusion 0.1 MICROgram(s)/kG/Min (1.4 mL/Hr) IV Continuous <Continuous>.   polyethylene glycol 3350 17 Gram(s) Oral daily  propofol Infusion 15 MICROgram(s)/kG/Min (6.73 mL/Hr) IV Continuous <Continuous>  vasopressin Infusion 0.04 Unit(s)/Min (2.4 mL/Hr) IV Continuous <Continuous>    Pertinent Labs:  @ 03:21: Na 132<L>, BUN 84<H>, Cr 5.21<H>, <H>, K+ 5.8<H>, Phos 8.0<H>, Mg 2.8<H>, Alk Phos 191<H>, ALT/SGPT 107<H>, AST/SGOT 119<H>, HbA1c --   @ 23:12: Na 134<L>, BUN 82<H>, Cr 5.35<H>, BG 92, K+ 6.2<HH>, Phos 7.7<H>, Mg 2.8<H>, Alk Phos 174<H>, ALT/SGPT 95<H>, AST/SGOT 85<H>, HbA1c --    Finger Sticks:  POCT Blood Glucose.: 111 mg/dL ( @ 01:37)  POCT Blood Glucose.: 99 mg/dL ( @ 00:36)  POCT Blood Glucose.: 87 mg/dL ( @ 22:02)  POCT Blood Glucose.: 102 mg/dL ( @ 21:25)  POCT Blood Glucose.: 108 mg/dL ( @ 16:38)  POCT Blood Glucose.: 103 mg/dL ( @ 12:11)    Skin per nursing documentation: intact  Edema: +2 generalized per flowsheet    Estimated Needs:   [x ] recalculated: based on dosing weight of 154lb (70kg)  Estimated Energy Needs: 20-25 kcal/kg (5390-7753 kcal/d)  Estimated Protein Needs: 1.4-1.6 g/kg (98 - 112g/d)  Defer fluid needs to team    Previous Nutrition Diagnosis: food and nutrition related knowledge deficit, increased nutrient needs  Nutrition Diagnosis is: being addressed with ongoing education, diet order advancement per team    New Nutrition Diagnosis: N/A    Interventions/Recommend:  1) For enteral feeds, recommend Nepro 1.8 at 55ml/hr x 18 hr plus 1 packet of Nocarb prosource (To provide 1842 kcal, 95g protein, and 720ml free H20: Meets 26 kcal/kg, 1.4 g/kg protein)  2) Defer fluid needs to team    Continue to monitor Nutritional intake, Tolerance to diet prescription, weights, labs, skin integrity    RD remains available upon request and will follow up per protocol  Isabel Aguilar, Dietetic Intern   Pager #[phone] Nutrition Follow Up Note  Patient seen for: follow up assessment on MICU    Chart reviewed and events noted. Pt is a 80y male w/ pmhx of T2DM, CAD, CABG, CHD, c/b newly ESRD and COVID (+). Unable to tolerate HD last night due to hypotension. Pt intubated and sedated overnight 3/24. Pt currently NPO, holding off TF as pt is on 3 pressors.    Source:  electronic medical record, team    Diet, NPO: Except Medications (21 @ 01:51) [Active]    Nutrition Events:   Pt unable to interview at this time. Pt was made NPO on 3/23 and holding off TF due to 3 pressors. Pt had been tolerating meals in house prior to transfer to MICU, eating % of meals x 2 weeks per flowsheet. No nausea/vomiting, abdominal distention noted per flowsheet and last bowel movement 3/24. Pt was noted with possible aspiration on 3/22, speech/swallow evaluation recommended purees/thin liquids.     Enteral /Parenteral Nutrition: N/A    Daily Weight in k.9 (), Weight in k.5 (), Weight in k.6 (), Weight in k.1 (), Weight in k.5 (), Weight in k ()  % Weight Change: pt now with 2+ generalized edema, likely contributing to upward trend x 1 week. Will continue to monitor    Pertinent Medications:   insulin lispro (ADMELOG) corrective regimen sliding scale   SubCutaneous every 6 hours  levothyroxine 25 MICROGram(s) Oral daily  norepinephrine Infusion 0.75 MICROgram(s)/kG/Min (52.6 mL/Hr) IV Continuous <Continuous>  phenylephrine    Infusion 0.1 MICROgram(s)/kG/Min (1.4 mL/Hr) IV Continuous <Continuous>.   polyethylene glycol 3350 17 Gram(s) Oral daily  propofol Infusion 15 MICROgram(s)/kG/Min (6.73 mL/Hr) IV Continuous <Continuous>  vasopressin Infusion 0.04 Unit(s)/Min (2.4 mL/Hr) IV Continuous <Continuous>    Pertinent Labs:  @ 03:21: Na 132<L>, BUN 84<H>, Cr 5.21<H>, <H>, K+ 5.8<H>, Phos 8.0<H>, Mg 2.8<H>, Alk Phos 191<H>, ALT/SGPT 107<H>, AST/SGOT 119<H>, HbA1c --   @ 23:12: Na 134<L>, BUN 82<H>, Cr 5.35<H>, BG 92, K+ 6.2<HH>, Phos 7.7<H>, Mg 2.8<H>, Alk Phos 174<H>, ALT/SGPT 95<H>, AST/SGOT 85<H>, HbA1c --    Finger Sticks:  POCT Blood Glucose.: 111 mg/dL ( @ 01:37)  POCT Blood Glucose.: 99 mg/dL ( @ 00:36)  POCT Blood Glucose.: 87 mg/dL ( @ 22:02)  POCT Blood Glucose.: 102 mg/dL ( @ 21:25)  POCT Blood Glucose.: 108 mg/dL ( @ 16:38)  POCT Blood Glucose.: 103 mg/dL ( @ 12:11)    Skin per nursing documentation: intact  Edema: +2 generalized per flowsheet    Estimated Needs:   [x ] recalculated: based on dosing weight of 154lb (70kg)  Estimated Energy Needs: 20-25 kcal/kg (8105-3916 kcal/d)  Estimated Protein Needs: 1.4-1.6 g/kg (98 - 112g/d)  Defer fluid needs to team    Previous Nutrition Diagnosis: increased nutrient needs  Nutrition Diagnosis is: food and nutrition related knowledge deficit not applicable at this time due to intubation/sedation, diet order advancement per team    New Nutrition Diagnosis: N/A    Interventions/Recommend:  1) For enteral feeds, recommend Nepro 1.8 at 55ml/hr x 18 hr plus 1 packet of Nocarb prosource (To provide 1842 kcal, 95g protein, and 720ml free H20: Meets 26 kcal/kg, 1.4 g/kg protein)  2) Defer fluid needs to team    Continue to monitor Nutritional intake, Tolerance to diet prescription, weights, labs, skin integrity    RD remains available upon request and will follow up per protocol  Isabel Aguilar, Dietetic Intern   Pager #[phone]

## 2021-03-24 NOTE — DISCHARGE NOTE FOR THE EXPIRED PATIENT - HOSPITAL COURSE
81 yo male w h/o DM, CAD, s/p CABG, CKD 3-4. He saw Dr. Abel Luz yesterday as a new consult for elevated creatinine. Bloodwork returned notable for  and Na 126; in light of these findings, Dr. Luz sent the patient to the ER. Lately ptn has been having abdominal pain with elevated amylase and lipase.   As per ptn's wife: on and off R abd pain w generalized weakness and poor po intake. PCP DCed po Lasix in past few days 2/2 abnormal blood test results.  Ptn denies recent NSAID use.  He was diagnosed with diabetes 18 years ago, and denies retinopathy (last saw Optho around 2 years ago). Cardiologist has alluded to the patient having mild renal impairment in the past, as per wife.  Patient's PCP is Dr. Shyann De León; GI Dr. Wilman Villagran; Cardiology Dr. Boyce.  (16 Feb 2021 21:05)  Since admission, Abd sono showed gallstones, but no sign of cholecystitis/pancreatitis. CT abd/pelvis showed small volume ascites but no other abnormalities. abd pain resolved on Zosyn, s/ p treatment empirically for 7 days . ID following. Pt found to have fluid overload 2/2 acute on chronic CHF which responded to lasix. Pt has been on HD since 2/26. HD as per renal. changed from MWF to TTSa,  RIJ tunneled catheter placed on 3/4 in IR, no fistula placement for now, Pt completed amiodarone loading for NSVT/VT and was being maintained on lopressor 25mg BID. Pt with EF 15% and severe AS evaluated for TAVR but found not to be a candidate. BP has been maintained initially 5 TID on non-dialysis days and 10TID on dialysis days. Pt acquired COVID during this admission in February and had COVID+ March 15th, but more recently had COVID - and is now off isolation.   On 3/22, patient had possible aspiration event.  pt unable to swallow food fully and  started to spit up and started to desat to 87% on 2L NC Pt started on zosyn. On 3/23, an RRT was called for hypotension after HD. Noted to have become more hypoxic and hypotensive requiring addition of levophed for pressure support. Pt transferred to MICU for further evaluation and management on levophed drip. Patient was made DNR 81 yo male w h/o DM, CAD, s/p CABG, CKD 3-4. He saw Dr. Abel Luz yesterday as a new consult for elevated creatinine. Bloodwork returned notable for  and Na 126; in light of these findings, Dr. Luz sent the patient to the ER. Lately ptn has been having abdominal pain with elevated amylase and lipase.   As per ptn's wife: on and off R abd pain w generalized weakness and poor po intake. PCP DCed po Lasix in past few days 2/2 abnormal blood test results.  Ptn denies recent NSAID use.  He was diagnosed with diabetes 18 years ago, and denies retinopathy (last saw Optho around 2 years ago). Cardiologist has alluded to the patient having mild renal impairment in the past, as per wife.  Patient's PCP is Dr. Shyann De León; GI Dr. Wilman Villagran; Cardiology Dr. Boyce.  (16 Feb 2021 21:05)  Since admission, Abd sono showed gallstones, but no sign of cholecystitis/pancreatitis. CT abd/pelvis showed small volume ascites but no other abnormalities. abd pain resolved on Zosyn, s/ p treatment empirically for 7 days . ID following. Pt found to have fluid overload 2/2 acute on chronic CHF which responded to lasix. Pt has been on HD since 2/26. HD as per renal. changed from MWF to TTSa,  RIJ tunneled catheter placed on 3/4 in IR, no fistula placement for now, Pt completed amiodarone loading for NSVT/VT and was being maintained on lopressor 25mg BID. Pt with EF 15% and severe AS evaluated for TAVR but found not to be a candidate. BP has been maintained initially 5 TID on non-dialysis days and 10TID on dialysis days. Pt acquired COVID during this admission in February and had COVID+ March 15th, but more recently had COVID - and is now off isolation.   On 3/22, patient had possible aspiration event.  pt unable to swallow food fully and  started to spit up and started to desat to 87% on 2L NC Pt started on zosyn. On 3/23, an RRT was called for hypotension after HD. Noted to have become more hypoxic and hypotensive requiring addition of levophed for pressure support. Pt transferred to MICU for further evaluation and management on levophed drip. Patient was made DNR on 3/24 (MOLST in chart).     Asystole noted on cardiac monitor at 20:57.    On physical exam, no spontaneous movements were present. Patient did not respond to verbal or physical stimuli. Pupils were mid-dilated and fixed. No breath sounds were appreciated over either lung field. No carotid pulses were palpable. No heart sounds were auscultated. Patient pronounced dead at 20:59. Attending notified.  Family at bedside. Family declined autopsy. 79 yo male w h/o DM, CAD, s/p CABG, CKD 3-4. He saw Dr. Abel Luz yesterday as a new consult for elevated creatinine. Bloodwork returned notable for  and Na 126; in light of these findings, Dr. Luz sent the patient to the ER. Lately ptn has been having abdominal pain with elevated amylase and lipase.   As per ptn's wife: on and off R abd pain w generalized weakness and poor po intake. PCP DCed po Lasix in past few days 2/2 abnormal blood test results.  Ptn denies recent NSAID use.  He was diagnosed with diabetes 18 years ago, and denies retinopathy (last saw Optho around 2 years ago). Cardiologist has alluded to the patient having mild renal impairment in the past, as per wife.  Patient's PCP is Dr. Shyann De León; GI Dr. Wilman Villagran; Cardiology Dr. Boyce.  (16 Feb 2021 21:05)  Since admission, Abd sono showed gallstones, but no sign of cholecystitis/pancreatitis. CT abd/pelvis showed small volume ascites but no other abnormalities. abd pain resolved on Zosyn, s/ p treatment empirically for 7 days . ID following. Pt found to have fluid overload 2/2 acute on chronic CHF which responded to lasix. Pt has been on HD since 2/26. HD as per renal. changed from MWF to TTSa,  RIJ tunneled catheter placed on 3/4 in IR, no fistula placement for now, Pt completed amiodarone loading for NSVT/VT and was being maintained on lopressor 25mg BID. Pt with EF 15% and severe AS evaluated for TAVR but found not to be a candidate. BP has been maintained initially 5 TID on non-dialysis days and 10TID on dialysis days. Pt acquired COVID during this admission in February and had COVID+ March 15th, but more recently had COVID - and is now off isolation.   On 3/22, patient had possible aspiration event.  pt unable to swallow food fully and  started to spit up and started to desat to 87% on 2L NC Pt started on zosyn. On 3/23, an RRT was called for hypotension after HD. Noted to have become more hypoxic and hypotensive requiring addition of levophed for pressure support. Pt transferred to MICU for further evaluation and management on levophed drip. Patient was made DNR on 3/24 (MOLST in chart). CRT-D was turned off in line with family's wishes. Pressor was capped.    Asystole noted on cardiac monitor at 20:47.    On physical exam, no spontaneous movements were present. Patient did not respond to verbal or physical stimuli. Pupils were mid-dilated and fixed. No breath sounds were appreciated over either lung field. No carotid pulses were palpable. No heart sounds were auscultated. Patient pronounced dead at 20:49. Attending notified.  Family at bedside. Family declined autopsy.

## 2021-03-24 NOTE — PROGRESS NOTE ADULT - PROBLEM SELECTOR PLAN 1
Off PO Lasix for now given rise in Cr   s/p IVF  Monitor UO
UF as tolerated   cont with Midodrine
UF as tolerated   cont with Midodrine
Off PO Lasix for now given rise in Cr   Monitor UO
UF as tolerated   cont with Midodrine
s/p intubation for respiratory failure   Cont with hemodynamic and ventilatory support   End stage heart failure   do not trend trops  Prognosis poor
- moderate vs low flow, low gradient AS  -- can clarify with  stress echo  - in the meantime, will request noncon chest CT to determine calcium score  - please obtain cardiac cath results from [facility] to clarify CAD history  - explained to pt what AS is and the progression as well as possible treatments  - continue current management
Off PO Lasix for now given rise in Cr   s/p IVF  Monitor UO
Off PO Lasix for now given rise in Cr   s/p IVF  Monitor UO
PO Lasix as ordered   Monitor UO
UF as tolerated   cont with Midodrine
Cont with IV lasix as ordered   Monitor UO
Off PO Lasix for now given rise in Cr   s/p IVF  Monitor UO
UF as tolerated   cont with Midodrine
Off PO Lasix for now given rise in Cr   s/p IVF  Monitor UO
PO Lasix as ordered   Monitor UO
UF as tolerated   cont with Midodrine
Off PO Lasix for now given rise in Cr   s/p IVF  Monitor UO
UF as tolerated   cont with Midodrine
Change to PO Lasix as ordered   Monitor UO
Off PO Lasix for now given rise in Cr   s/p IVF  Monitor UO
UF as tolerated   cont with Midodrine
UF as tolerated   cont with Midodrine
Off PO Lasix for now given rise in Cr   s/p IVF  Monitor UO
Off PO Lasix for now given rise in Cr   s/p IVF  Monitor UO
UF as tolerated   cont with Midodrine
UF as tolerated   cont with Midodrine
Off PO Lasix for now given rise in Cr   s/p IVF  Monitor UO
Start Lasix 60 mg IV BID for now    Monitor UO

## 2021-03-24 NOTE — PROGRESS NOTE ADULT - ASSESSMENT
81 yo male w h/o DM, CAD, s/p CABG, CKD 3-4.   Recently evaluated by Nephrology for elevated creatinine. Bloodwork  returned notable for  and Na 126; in light of these findings, Dr. Luz (Renal) sent the patient to the ER. Lately ptn has been having abdominal pain with elevated amylase and lipase.   As per ptn's wife: on and off R abd pain w generalized weakness and poor po intake. PCP DCed po Lasix in past few days 2/2 abnormal blood test results.  Ptn denies recent NSAID use.  He was diagnosed with diabetes 18 years ago, and denies retinopathy (last saw Optho around 2 years ago). Cardiologist has alluded to the patient having mild renal impairment in the past, as per wife.    Patient's PCP is Dr. Shyann De León; GI Dr. Wilman Villagran; Cardiology Dr. Boyce.  (16 Feb 2021 21:05)    ER vs: T 98.1, P 85, /64. WBC 8.2.  Bun/Cr - 100/2.0.  LFTs mildly elevated, Roxana 217, Lip 185.  Pct 0.2.  UA (-).  Ct c/a/p shows pulmonary edema, sm B/L pleural effusions, sm volume ascites.  VQ scan with very low probability for PE.      Pt started on zosyn to cover for intra-abd source.  ID consulted.     r/o Gallstone pancreatitis:    - Pt p/w abdominal pain, epigastric and RUQ pain, noted to have elevated LFTs and amylase/lipase.  Possible gallstone pancreatitis?    - U/S abdomen shows cholelithiasis without acute cholecystitis.  Borderline to mild gallbladder thickening is likely due to ascites.   Moderate ascites noted.  Suggestion of mild renal parenchymal disease and bilateral pleural effusions.    - CTap w/o acute intra-abd pathology    - monitor LFTs, Roxana, Lipase.  As per GI, ascites likely secondary to CHF, no evidence for pancreatitis.     - Monitor temp curve and WBC.      - bcx neg. completed  coverage with zosyn x 7 days, 2/23.  Abd pain resolved      {[id number]}Renal failure:    - Renal following for CKD.  Dose abx for GFR 15-20.  On q12 hr dosing of zosyn.     - Cr rising.  CALVIN on CKD does not appear to be antibiotic mediated, renal f/u noted.      - Pt requiring HD,  shiley catheter placed 2/26, s/p conversion to tunnelled catheter - 3/4    Aortic stenosis:    - Followed by Structural health team for TAVR evaluation    Covid (+):    - Repeat Covid pcr on 2/25 tested positive.   On 1L nc, satting >94%    - No indication for Covid therapeutics at this time.  Cont supportive care, incentive spirometery.  Monitor pulse ox    - Repeat covid test remains positive on 3/15 - likely secondary to nonviable virus.  Pt > 10 days since positive test, pt afebrile.  On < 4L nc.  Off airborne precautions.     - Repeat covid test 3/19 (-).      Aspiration:    - Aspiration event on 3/22, pt unable to swallow food fully and  started to spit up and started to desat to 87% on 2L NC.  Pt suctioned.  s/p swallow eval    - Repeat cxr performed, started on zosyn empirically.      - RRT o/n, s/p intubation and transferred to MICU.  Pt hypotensive, RLL consolidation s/o asp pna, and severe life threatening AS.  Pt now hypotensive, d/w MICU resident.  Pt is comfort care, for deactivation of AICD.  Family at bedside, emotional support provided.           Rupali Schwarz  [phone]

## 2021-03-24 NOTE — PROGRESS NOTE ADULT - SUBJECTIVE AND OBJECTIVE BOX
Infectious Diseases progress note:    Subjective: Events noted, pt s/p RRT intubated for airway protection and transferred to MICU.  Pt with RLL pna, bedside US with severe AS.  Pt on comfort care measures.  Hypotensive, family at bedside.     ROS:  nonverbal    Allergies    No Known Allergies    Intolerances        ANTIBIOTICS/RELEVANT:  antimicrobials  piperacillin/tazobactam IVPB.. 3.375 Gram(s) IV Intermittent every 12 hours    immunologic:  epoetin rafiq-epbx (RETACRIT) Injectable 60684 Unit(s) IV Push <User Schedule>  influenza   Vaccine 0.5 milliLiter(s) IntraMuscular once    OTHER:  albuterol/ipratropium for Nebulization 3 milliLiter(s) Nebulizer every 6 hours  aspirin  chewable 81 milliGRAM(s) Oral daily  atorvastatin 80 milliGRAM(s) Oral at bedtime  chlorhexidine 0.12% Liquid 15 milliLiter(s) Oral Mucosa every 12 hours  chlorhexidine 4% Liquid 1 Application(s) Topical <User Schedule>  dextrose 40% Gel 15 Gram(s) Oral once  dextrose 5% + sodium chloride 0.45%. 1000 milliLiter(s) IV Continuous <Continuous>  dextrose 5%. 1000 milliLiter(s) IV Continuous <Continuous>  dextrose 5%. 1000 milliLiter(s) IV Continuous <Continuous>  dextrose 50% Injectable 25 Gram(s) IV Push once  dextrose 50% Injectable 12.5 Gram(s) IV Push once  dextrose 50% Injectable 25 Gram(s) IV Push once  glucagon  Injectable 1 milliGRAM(s) IntraMuscular once  heparin   Injectable 5000 Unit(s) SubCutaneous every 8 hours  insulin lispro (ADMELOG) corrective regimen sliding scale   SubCutaneous every 6 hours  levothyroxine 25 MICROGram(s) Oral daily  norepinephrine Infusion 0.75 MICROgram(s)/kG/Min IV Continuous <Continuous>  phenylephrine    Infusion 0.1 MICROgram(s)/kG/Min IV Continuous <Continuous>  polyethylene glycol 3350 17 Gram(s) Oral daily  prasugrel 10 milliGRAM(s) Oral daily  propofol Infusion 15 MICROgram(s)/kG/Min IV Continuous <Continuous>  vasopressin Infusion 0.04 Unit(s)/Min IV Continuous <Continuous>      Objective:  Vital Signs Last 24 Hrs  T(C): 37.2 (24 Mar 2021 12:00), Max: 37.9 (23 Mar 2021 23:25)  T(F): 99 (24 Mar 2021 12:00), Max: 100.2 (23 Mar 2021 23:25)  HR: 83 (24 Mar 2021 16:30) (58 - 97)  BP: 30/10 (24 Mar 2021 16:30) (30/10 - 114/55)  BP(mean): 15 (24 Mar 2021 16:30) (15 - 79)  RR: 28 (24 Mar 2021 16:30) (16 - 32)  SpO2: 98% (24 Mar 2021 16:30) (89% - 100%)    PHYSICAL EXAM:  Constitutional:  intubated, unresponsive  Eyes:CHERI, EOMI  Ear/Nose/Throat: no thrush, mucositis.  Moist mucous membranes	  Neck:no JVD, no lymphadenopathy, supple  Respiratory: CTA nadiya  Cardiovascular: S1S2 RRR, no murmurs  Gastrointestinal:soft, nontender,  nondistended (+) BS  Extremities:no e/e/c  Skin:  no rashes, open wounds or ulcerations        LABS:                        10.4   20.92 )-----------( 297      ( 24 Mar 2021 12:26 )             36.3     03-24    133<L>  |  95<L>  |  85<H>  ----------------------------<  142<H>  6.7<HH>   |  12<L>  |  5.53<H>    Ca    7.5<L>      24 Mar 2021 12:26  Phos  9.3     03-24  Mg     2.9     03-24    TPro  6.2  /  Alb  2.2<L>  /  TBili  2.0<H>  /  DBili  x   /  AST  220<H>  /  ALT  137<H>  /  AlkPhos  175<H>  03-24          Procalcitonin, Serum: 2.46 (03-24 @ 00:07)                    MICROBIOLOGY:          RADIOLOGY & ADDITIONAL STUDIES:    < from: Xray Chest 1 View- PORTABLE-Urgent (Xray Chest 1 View- PORTABLE-Urgent .) (03.24.21 @ 02:45) >    Impression:    The heart is enlarged. No acute consolidation could be identified. Endotracheal tube is in good position. NG tube is in the stomach however the sidehole is in the gastroesophageal junction. A double-lumen catheter is seen on the right and the tip is in the superior vena cava. A pacer is in good position. No pleural effusion. No pneumothorax.    < end of copied text >

## 2021-03-24 NOTE — PROGRESS NOTE ADULT - SUBJECTIVE AND OBJECTIVE BOX
INTERVAL HPI/OVERNIGHT EVENTS:    SUBJECTIVE: Patient seen and examined at bedside.       VITAL SIGNS:  ICU Vital Signs Last 24 Hrs  T(C): 37.9 (24 Mar 2021 00:00), Max: 37.9 (23 Mar 2021 23:25)  T(F): 100.2 (24 Mar 2021 00:00), Max: 100.2 (23 Mar 2021 23:25)  HR: 89 (24 Mar 2021 04:15) (80 - 93)  BP: 101/54 (24 Mar 2021 04:15) (71/42 - 114/55)  BP(mean): 76 (24 Mar 2021 04:15) (57 - 79)  ABP: --  ABP(mean): --  RR: 20 (24 Mar 2021 04:15) (16 - 32)  SpO2: 98% (24 Mar 2021 04:15) (89% - 99%)    Mode: AC/ CMV (Assist Control/ Continuous Mandatory Ventilation), RR (machine): 20, TV (machine): 450, FiO2: 75, PEEP: 5, ITime: 1, MAP: 10, PIP: 32  Plateau pressure:   P/F ratio:     03-22 @ 07:01  -  03-23 @ 07:00  --------------------------------------------------------  IN: 360 mL / OUT: 0 mL / NET: 360 mL    03-23 @ 07:01  -  03-24 @ 04:54  --------------------------------------------------------  IN: 1227.9 mL / OUT: 10 mL / NET: 1217.9 mL      CAPILLARY BLOOD GLUCOSE      POCT Blood Glucose.: 111 mg/dL (24 Mar 2021 01:37)    ECG:    PHYSICAL EXAM:    General:   HEENT:   Neck:   Respiratory:   Cardiovascular:   Abdomen:   Extremities:  Neurological:    MEDICATIONS:  MEDICATIONS  (STANDING):  albuterol/ipratropium for Nebulization 3 milliLiter(s) Nebulizer every 6 hours  aspirin  chewable 81 milliGRAM(s) Oral daily  atorvastatin 80 milliGRAM(s) Oral at bedtime  chlorhexidine 0.12% Liquid 15 milliLiter(s) Oral Mucosa every 12 hours  chlorhexidine 4% Liquid 1 Application(s) Topical <User Schedule>  dextrose 40% Gel 15 Gram(s) Oral once  dextrose 5%. 1000 milliLiter(s) (50 mL/Hr) IV Continuous <Continuous>  dextrose 5%. 1000 milliLiter(s) (100 mL/Hr) IV Continuous <Continuous>  dextrose 50% Injectable 25 Gram(s) IV Push once  dextrose 50% Injectable 12.5 Gram(s) IV Push once  dextrose 50% Injectable 25 Gram(s) IV Push once  epoetin rafiq-epbx (RETACRIT) Injectable 17760 Unit(s) IV Push <User Schedule>  glucagon  Injectable 1 milliGRAM(s) IntraMuscular once  heparin   Injectable 5000 Unit(s) SubCutaneous every 8 hours  influenza   Vaccine 0.5 milliLiter(s) IntraMuscular once  insulin lispro (ADMELOG) corrective regimen sliding scale   SubCutaneous every 6 hours  levothyroxine 25 MICROGram(s) Oral daily  norepinephrine Infusion 0.75 MICROgram(s)/kG/Min (52.6 mL/Hr) IV Continuous <Continuous>  phenylephrine    Infusion 0.1 MICROgram(s)/kG/Min (1.4 mL/Hr) IV Continuous <Continuous>  piperacillin/tazobactam IVPB.. 3.375 Gram(s) IV Intermittent every 12 hours  polyethylene glycol 3350 17 Gram(s) Oral daily  prasugrel 10 milliGRAM(s) Oral daily  propofol Infusion 15 MICROgram(s)/kG/Min (6.73 mL/Hr) IV Continuous <Continuous>    MEDICATIONS  (PRN):      ALLERGIES:  Allergies    No Known Allergies    Intolerances        LABS:                        9.7    24.94 )-----------( 253      ( 23 Mar 2021 23:12 )             31.8     03-24    132<L>  |  98  |  84<H>  ----------------------------<  147<H>  5.8<H>   |  15<L>  |  5.21<H>    Ca    7.9<L>      24 Mar 2021 03:21  Phos  8.0     03-24  Mg     2.8     03-24    TPro  6.7  /  Alb  2.4<L>  /  TBili  2.0<H>  /  DBili  x   /  AST  119<H>  /  ALT  107<H>  /  AlkPhos  191<H>  03-24          RADIOLOGY & ADDITIONAL TESTS: Reviewed.   INTERVAL HPI/OVERNIGHT EVENTS: Pt had rapid called yesterday during dialysis for low blood pressure and was also in respiratory distress requiring intubation and initiation of pressors.     SUBJECTIVE: Patient seen and examined at bedside. Unable to assess ROS.      VITAL SIGNS:  ICU Vital Signs Last 24 Hrs  T(C): 37.9 (24 Mar 2021 00:00), Max: 37.9 (23 Mar 2021 23:25)  T(F): 100.2 (24 Mar 2021 00:00), Max: 100.2 (23 Mar 2021 23:25)  HR: 89 (24 Mar 2021 04:15) (80 - 93)  BP: 101/54 (24 Mar 2021 04:15) (71/42 - 114/55)  BP(mean): 76 (24 Mar 2021 04:15) (57 - 79)  ABP: --  ABP(mean): --  RR: 20 (24 Mar 2021 04:15) (16 - 32)  SpO2: 98% (24 Mar 2021 04:15) (89% - 99%)    Mode: AC/ CMV (Assist Control/ Continuous Mandatory Ventilation), RR (machine): 20, TV (machine): 450, FiO2: 75, PEEP: 5, ITime: 1, MAP: 10, PIP: 32  Plateau pressure:   P/F ratio:     03-22 @ 07:01  -  03-23 @ 07:00  --------------------------------------------------------  IN: 360 mL / OUT: 0 mL / NET: 360 mL    03-23 @ 07:01  -  03-24 @ 04:54  --------------------------------------------------------  IN: 1227.9 mL / OUT: 10 mL / NET: 1217.9 mL      CAPILLARY BLOOD GLUCOSE      POCT Blood Glucose.: 111 mg/dL (24 Mar 2021 01:37)    ECG:    PHYSICAL EXAM:  GENERAL: sedated, intubated  HEAD:  Atraumatic, Normocephalic  EYES: conjunctiva/sclera clear  NECK: Supple, No JVD  CHEST/LUNG: ventilated  HEART: Regular rate and rhythm; No murmurs, rubs, or gallops  ABDOMEN: Soft, Nontender, Nondistended; Bowel sounds present  EXTREMITIES:  2+ Peripheral Pulses, No clubbing, cyanosis, or edema  PSYCH: AAOx3  NEUROLOGY: non-focal  SKIN: No rashes or lesions    MEDICATIONS:  MEDICATIONS  (STANDING):  albuterol/ipratropium for Nebulization 3 milliLiter(s) Nebulizer every 6 hours  aspirin  chewable 81 milliGRAM(s) Oral daily  atorvastatin 80 milliGRAM(s) Oral at bedtime  chlorhexidine 0.12% Liquid 15 milliLiter(s) Oral Mucosa every 12 hours  chlorhexidine 4% Liquid 1 Application(s) Topical <User Schedule>  dextrose 40% Gel 15 Gram(s) Oral once  dextrose 5%. 1000 milliLiter(s) (50 mL/Hr) IV Continuous <Continuous>  dextrose 5%. 1000 milliLiter(s) (100 mL/Hr) IV Continuous <Continuous>  dextrose 50% Injectable 25 Gram(s) IV Push once  dextrose 50% Injectable 12.5 Gram(s) IV Push once  dextrose 50% Injectable 25 Gram(s) IV Push once  epoetin rafiq-epbx (RETACRIT) Injectable 53170 Unit(s) IV Push <User Schedule>  glucagon  Injectable 1 milliGRAM(s) IntraMuscular once  heparin   Injectable 5000 Unit(s) SubCutaneous every 8 hours  influenza   Vaccine 0.5 milliLiter(s) IntraMuscular once  insulin lispro (ADMELOG) corrective regimen sliding scale   SubCutaneous every 6 hours  levothyroxine 25 MICROGram(s) Oral daily  norepinephrine Infusion 0.75 MICROgram(s)/kG/Min (52.6 mL/Hr) IV Continuous <Continuous>  phenylephrine    Infusion 0.1 MICROgram(s)/kG/Min (1.4 mL/Hr) IV Continuous <Continuous>  piperacillin/tazobactam IVPB.. 3.375 Gram(s) IV Intermittent every 12 hours  polyethylene glycol 3350 17 Gram(s) Oral daily  prasugrel 10 milliGRAM(s) Oral daily  propofol Infusion 15 MICROgram(s)/kG/Min (6.73 mL/Hr) IV Continuous <Continuous>    MEDICATIONS  (PRN):    ALLERGIES:  Allergies    No Known Allergies    Intolerances        LABS:                        9.7    24.94 )-----------( 253      ( 23 Mar 2021 23:12 )             31.8     03-24    132<L>  |  98  |  84<H>  ----------------------------<  147<H>  5.8<H>   |  15<L>  |  5.21<H>    Ca    7.9<L>      24 Mar 2021 03:21  Phos  8.0     03-24  Mg     2.8     03-24    TPro  6.7  /  Alb  2.4<L>  /  TBili  2.0<H>  /  DBili  x   /  AST  119<H>  /  ALT  107<H>  /  AlkPhos  191<H>  03-24          < from: TTE with Doppler (w/Cont) (03.24.21 @ 08:13) >  Conclusions:  1. Calcified aortic valve with decreased opening. Peak  transaortic valve gradient equals 41 mm Hg, mean  transaortic valve gradient equals 21 mm Hg, estimated  aortic valve area equals 0.9 sqcm (by continuity equation),  aortic valve velocity time integral equals 51 cm,  consistent with severe aortic stenosis. Moderate aortic  regurgitation.  2. Moderate left ventricular enlargement.  3. Severe global left ventricular systolic dysfunction.  Endocardial visualization enhanced with intravenous  injection of Ultrasonic Enhancing Agent (Definity). No left  ventricular thrombus.  4. Severe right atrial enlargement.  5. Right ventricular enlargement with decreased right  ventricular systolic function. A device wire is noted in  the right heart.  6. Normal tricuspid valve. Moderate tricuspid  regurgitation.  7. Estimated pulmonary artery systolic pressure equals 53  mm Hg, assuming right atrial pressure equals 12 mm Hg,  consistent with moderate pulmonary pressures.  *** Compared with echocardiogram of 2/18/2021, no  significant changes noted.  EF: 15%  < end of copied text >

## 2021-03-24 NOTE — PROGRESS NOTE ADULT - SUBJECTIVE AND OBJECTIVE BOX
Subjective: Patient seen and examined. RRT for hypotension  then intubated and now in ICU   REVIEW OF SYSTEMS:  Unable to obtain   MEDICATIONS:  MEDICATIONS  (STANDING):  albuterol/ipratropium for Nebulization 3 milliLiter(s) Nebulizer every 6 hours  aspirin  chewable 81 milliGRAM(s) Oral daily  atorvastatin 80 milliGRAM(s) Oral at bedtime  chlorhexidine 0.12% Liquid 15 milliLiter(s) Oral Mucosa every 12 hours  chlorhexidine 4% Liquid 1 Application(s) Topical <User Schedule>  dextrose 40% Gel 15 Gram(s) Oral once  dextrose 5% + sodium chloride 0.45%. 1000 milliLiter(s) (75 mL/Hr) IV Continuous <Continuous>  dextrose 5%. 1000 milliLiter(s) (50 mL/Hr) IV Continuous <Continuous>  dextrose 5%. 1000 milliLiter(s) (100 mL/Hr) IV Continuous <Continuous>  dextrose 50% Injectable 25 Gram(s) IV Push once  dextrose 50% Injectable 12.5 Gram(s) IV Push once  dextrose 50% Injectable 25 Gram(s) IV Push once  epoetin rafiq-epbx (RETACRIT) Injectable 16486 Unit(s) IV Push <User Schedule>  glucagon  Injectable 1 milliGRAM(s) IntraMuscular once  heparin   Injectable 5000 Unit(s) SubCutaneous every 8 hours  influenza   Vaccine 0.5 milliLiter(s) IntraMuscular once  insulin lispro (ADMELOG) corrective regimen sliding scale   SubCutaneous every 6 hours  levothyroxine 25 MICROGram(s) Oral daily  norepinephrine Infusion 0.75 MICROgram(s)/kG/Min (52.6 mL/Hr) IV Continuous <Continuous>  phenylephrine    Infusion 0.1 MICROgram(s)/kG/Min (1.4 mL/Hr) IV Continuous <Continuous>  piperacillin/tazobactam IVPB.. 3.375 Gram(s) IV Intermittent every 12 hours  polyethylene glycol 3350 17 Gram(s) Oral daily  prasugrel 10 milliGRAM(s) Oral daily  propofol Infusion 15 MICROgram(s)/kG/Min (6.73 mL/Hr) IV Continuous <Continuous>  vasopressin Infusion 0.04 Unit(s)/Min (2.4 mL/Hr) IV Continuous <Continuous>      PHYSICAL EXAM:  T(C): 37.8 (03-24-21 @ 20:00), Max: 37.9 (03-23-21 @ 23:25)  HR: 60 (03-24-21 @ 20:00) (0 - 97)  BP: 32/11 (03-24-21 @ 20:00) (30/10 - 114/55)  RR: 26 (03-24-21 @ 20:00) (16 - 32)  SpO2: 98% (03-24-21 @ 19:30) (89% - 100%)  Wt(kg): --  I&O's Summary    23 Mar 2021 07:01  -  24 Mar 2021 07:00  --------------------------------------------------------  IN: 1779.1 mL / OUT: 10 mL / NET: 1769.1 mL    24 Mar 2021 07:01  -  24 Mar 2021 20:20  --------------------------------------------------------  IN: 2388.6 mL / OUT: 0 mL / NET: 2388.6 mL          Appearance: Intubated 	  HEENT: Dry  oral mucosa,	  Lymphatic: No lymphadenopathy , + HD catheter   Cardiovascular: Normal S1 S2, No JVD, + murmurs , Peripheral pulses palpable 2+ bilaterally  Respiratory: ventilated   Gastrointestinal:  Soft, Non-tender, + BS	  Skin: No rashes, + ecchymoses, No cyanosis, warm to touch  Musculoskeletal: decreased  range of motion and strength  Psychiatry: sedated   Ext: No edema      LABS:    CARDIAC MARKERS:  CARDIAC MARKERS ( 24 Mar 2021 00:07 )  x     / x     / x     / x     / 13.7 ng/mL                                10.4   20.92 )-----------( 297      ( 24 Mar 2021 12:26 )             36.3     03-24    133<L>  |  95<L>  |  85<H>  ----------------------------<  142<H>  6.7<HH>   |  12<L>  |  5.53<H>    Ca    7.5<L>      24 Mar 2021 12:26  Phos  9.3     03-24  Mg     2.9     03-24    TPro  6.2  /  Alb  2.2<L>  /  TBili  2.0<H>  /  DBili  x   /  AST  220<H>  /  ALT  137<H>  /  AlkPhos  175<H>  03-24    proBNP:   Lipid Profile:   HgA1c:   TSH:     Blood Gas Profile - Arterial (03.24.21 @ 04:55)   pH, Arterial: 7.25   pCO2, Arterial: 50 mmHg   pO2, Arterial: 107 mmHg   HCO3, Arterial: 21 mmoL/L   Base Excess, Arterial: -5.8 mmol/L   Oxygen Saturation, Arterial: 97 %   Total CO2, Arterial: 22 mmoL/L   FIO2, Arterial: 75   Blood Gas Source Arterial: Arterial Blood Gas Arterial, Lactate: 2.6: Elevated lactate. Consider ordering follow-up lactate to trend. mmol/L         TELEMETRY: 	  V paced   ECG:  	  RADIOLOGY: < from: Xray Chest 1 View- PORTABLE-Urgent (Xray Chest 1 View- PORTABLE-Urgent .) (03.24.21 @ 02:45) >  EXAM:  XR CHEST PORTABLE URGENT 1V                            PROCEDURE DATE:  03/24/2021            INTERPRETATION:  A single chest x-ray was obtained on March 24, 2021.    Indication: Status post intubation.    Impression:    The heart is enlarged. No acute consolidation could be identified. Endotracheal tube is in good position. NG tube is in the stomach however the sidehole is in the gastroesophageal junction. A double-lumen catheter is seen on the right and the tip is in the superior vena cava. A pacer is in good position. No pleural effusion. No pneumothorax.          < end of copied text >  < from: Xray Chest 1 View- PORTABLE-Urgent (Xray Chest 1 View- PORTABLE-Urgent .) (03.23.21 @ 19:44) >    EXAM:  XR CHEST PORTABLE URGENT 1V                            PROCEDURE DATE:  03/23/2021            INTERPRETATION:  A single chest x-ray was obtained on March 23, 2021.    Indication: Rule out aspiration pneumonia.    Impression:    The heart is enlarged. No radiographic evidence for aspiration pneumonia. A pacer is in good position. A double-lumen catheter seen on the right and the tip is in superior vena cava. No pleural effusion. No pneumothorax. Status post sternotomy.          < end of copied text >    DIAGNOSTIC TESTING:  [ ] Echocardiogram:  [ ]  Catheterization:  [ ] Stress Test:    OTHER:

## 2021-03-24 NOTE — PROGRESS NOTE ADULT - SUBJECTIVE AND OBJECTIVE BOX
Follow-up Pulm Progress Note  Ezra Barajas MD  [phone]    Events noted  Hypotension and fever during dialysis with subsequent hypoxemic respiratory failure and intubation  Currently in MICU: Sedated CMV with  PEEP 5  On NE, Vasopressin, Phenylephrine for hypotension  TTE with severe AS, sever global LV systolic dysfunction RV disfunction with pulm HTN   CXR limited AP port: likely perihilar congestion with retrocardiac opacity      Medications:  Vital Signs Last 24 Hrs  T(C): 37.2 (24 Mar 2021 12:00), Max: 37.9 (23 Mar 2021 23:25)  T(F): 99 (24 Mar 2021 12:00), Max: 100.2 (23 Mar 2021 23:25)  HR: 97 (24 Mar 2021 13:15) (58 - 97)  BP: 89/50 (24 Mar 2021 13:15) (71/42 - 114/55)  BP(mean): 66 (24 Mar 2021 13:15) (57 - 79)  RR: 29 (24 Mar 2021 13:15) (16 - 32)  SpO2: 98% (24 Mar 2021 13:15) (89% - 100%)  ABG - ( 24 Mar 2021 04:55 )  pH, Arterial: 7.25  pH, Blood: x     /  pCO2: 50    /  pO2: 107   / HCO3: 21    / Base Excess: -5.8  /  SaO2: 97          03-23 @ 07:01  -  03-24 @ 07:00  --------------------------------------------------------  IN: 1779.1 mL / OUT: 10 mL / NET: 1769.1 mL    LABS:                        10.4   20.92 )-----------( 297      ( 24 Mar 2021 12:26 )             36.3     03-24    133<L>  |  95<L>  |  85<H>  ----------------------------<  142<H>  6.7<HH>   |  12<L>  |  5.53<H>    Ca    7.5<L>      24 Mar 2021 12:26  Phos  9.3     03-24  Mg     2.9     03-24    TPro  6.2  /  Alb  2.2<L>  /  TBili  2.0<H>  /  DBili  x   /  AST  220<H>  /  ALT  137<H>  /  AlkPhos  175<H>  03-24        Procalcitonin, Serum: 2.46 ng/mL (03-24-21 @ 00:07)      Physical Examination:  PULM: Scattered rhonchi  CVS: Regular rate and rhythm, no murmurs, rubs, or gallops  ABD: Soft, non-tender  EXT:  No clubbing, cyanosis, or edema    RADIOLOGY REVIEWED  CXR:    PROCEDURE DATE:  03/24/2021        INTERPRETATION:  A single chest x-ray was obtained on March 24, 2021.    Indication: Status post intubation.    Impression:    The heart is enlarged. No acute consolidation could be identified. Endotracheal tube is in good position. NG tube is in the stomach however the sidehole is in the gastroesophageal junction. A double-lumen catheter is seen on the right and the tip is in the superior vena cava. A pacer is in good position. No pleural effusion. No pneumothorax.  CT chest:    TTE:    PROCEDURE: Transthoracic echocardiogram with 2-D, M-Mode  and complete spectral and color flow Doppler. Verbal  consent was obtained for injection of  Ultrasonic Enhancing  Agent following a discussion of risks and benefits.  Following intravenous injection of Ultrasonic Enhancing  Agent , harmonic imaging was performed.  INDICATION: Shock, unspecified (R57.9)  ------------------------------------------------------------------------  Dimensions:    Normal Values:  LA:     5.3    2.0 - 4.0 cm  Ao:     3.5    2.0 - 3.8 cm  SEPTUM: 0.5    0.6 - 1.2 cm  PWT:    0.6    0.6 - 1.1 cm  LVIDd:  6.5    3.0 - 5.6 cm  LVIDs:  5.6    1.8 - 4.0 cm  Derived variables:  LVMI: 80 g/m2  RWT: 0.19  Fractional short: 14 %  EF (Visual Estimate): 15 %  Doppler Peak Velocity (m/sec): MV=1.4 AoV=3.2  ------------------------------------------------------------------------  Observations:  Mitral Valve: Mitral annular calcification and calcified  mitral leaflets with decreased diastolic opening. Mild  mitral regurgitation.  Peak mitral valve gradient equals 9  mm Hg, mean transmitral valve gradient equals 3 mm Hg,  consistent with mild mitral stenosis.  Aortic Valve/Aorta: Calcified aortic valve with decreased  opening. Peak transaortic valve gradient equals 41 mm Hg,  mean transaortic valve gradient equals 21 mm Hg, estimated  aortic valve area equals 0.9 sqcm (by continuity equation),  aortic valve velocity time integral equals 51 cm,  consistent with severe aortic stenosis. Moderate aortic  regurgitation. Peak left ventricular outflow tract gradient  equals 3 mm Hg, mean gradient is equal to 2 mm Hg, LVOT  velocity time integral equals 13 cm.  Aortic Root: 3.5 cm.  LVOT diameter: 2.1 cm.  Left Atrium: Severely dilated left atrium.  LA volume index  = 79 cc/m2.  Left Ventricle: Severe global left ventricular systolic  dysfunction.   Endocardial visualization enhanced with  intravenous injection of Ultrasonic Enhancing Agent  (Definity). No left ventricular thrombus. Moderate left  ventricular enlargement. Severe  diastolic dysfunction  (Stage III).  Right Heart: Severe right atrial enlargement. Right  ventricular enlargement with decreased right ventricular  systolic function. A device wire is noted in the right  heart. Normal tricuspid valve. Moderate tricuspid  regurgitation. Normal pulmonic valve. Moderate pulmonic  regurgitation.  Pericardium/Pleura: Normal pericardium with no pericardial  effusion.  Hemodynamic: Estimated right ventricular systolic pressure  equals 53 mm Hg, assuming right atrial pressure equals 12  mm Hg, consistent with moderate pulmonary hypertension.  ------------------------------------------------------------------------  Conclusions:  1. Calcified aortic valve with decreased opening. Peak  transaortic valve gradient equals 41 mm Hg, mean  transaortic valve gradient equals 21 mm Hg, estimated  aortic valve area equals 0.9 sqcm (by continuity equation),  aortic valve velocity time integral equals 51 cm,  consistent with severe aortic stenosis. Moderate aortic  regurgitation.  2. Moderate left ventricular enlargement.  3. Severe global left ventricular systolic dysfunction.  Endocardial visualization enhanced with intravenous  injection of Ultrasonic Enhancing Agent (Definity). No left  ventricular thrombus.  4. Severe right atrial enlargement.  5. Right ventricular enlargement with decreased right  ventricular systolic function. A device wire is noted in  the right heart.  6. Normal tricuspid valve. Moderate tricuspid  regurgitation.  7. Estimated pulmonary artery systolic pressure equals 53  mm Hg, assuming right atrial pressure equals 12 mm Hg,  consistent with moderate pulmonary pressures.  *** Compared with echocardiogram of 2/18/2021, no  significant changes noted.

## 2021-03-24 NOTE — PROGRESS NOTE ADULT - SUBJECTIVE AND OBJECTIVE BOX
      On Phenylephrine, Neosynephrine, and Vasopressin gtts  On vent; sedated on Propofol gtt      VITAL:  T(C): , Max: 37.9 (03-23-21 @ 23:25)  T(F): , Max: 100.2 (03-23-21 @ 23:25)  HR: 85 (03-24-21 @ 07:30)  BP: 93/52 (03-24-21 @ 07:30)  BP(mean): 68 (03-24-21 @ 07:30)  RR: 20 (03-24-21 @ 07:30)  SpO2: 95% (03-24-21 @ 07:30)      PHYSICAL EXAM:  Constitutional: intubated/sedated  HEENT: NCAT, DMM  Neck: Supple, no JVD  Respiratory: CTA-b/l  Cardiovascular: RRR s1s2, no m/r/g  Gastrointestinal: BS+, soft, NT; (+)mild distension  Extremities:  no edema b/l  Neurological: no focal deficits; strength grossly intact  Back: no CVAT b/l  Skin: No rashes, no nevi  Access: RI tunneled cath    LABS:                        9.7    24.94 )-----------( 253      ( 23 Mar 2021 23:12 )             31.8     Na(132)/K(5.8)/Cl(98)/HCO3(15)/BUN(84)/Cr(5.21)Glu(147)/Ca(7.9)/Mg(2.8)/PO4(8.0)    03-24 @ 03:21  Na(134)/K(6.2)/Cl(98)/HCO3(19)/BUN(82)/Cr(5.35)Glu(92)/Ca(8.3)/Mg(2.8)/PO4(7.7)    03-23 @ 23:12  Na(133)/K(6.0)/Cl(96)/HCO3(20)/BUN(74)/Cr(4.99)Glu(85)/Ca(9.5)/Mg(--)/PO4(--)    03-23 @ 06:32  Na(134)/K(5.4)/Cl(96)/HCO3(24)/BUN(60)/Cr(4.37)Glu(146)/Ca(9.3)/Mg(--)/PO4(--)    03-22 @ 10:15        IMPRESSION: 80M w/ DM2, CAD-CABG, and CKD, 2/16/21 a/w azotemia/lyte derangements/weakness; c/b newly ESRD; c/b COVID(+)    (1)Renal - newly ESRD as of this admission - unable to tolerate HD last night due to hypotension - appears that we will have to switch over to CRRT at this point    (2)Hyperkalemia     (3)Metabolic acidosis - type A lactic acidosis/ESRD-associated     (4)Hyperphosphatemia     (5)CV - newly in shock/on vent + 3 pressors. MICU input appreciated; presumed septic shock/?aspiration pneumonia; now on IV Vanco/Zosyn; CT I+ being contemplated    (6)Goals of care - full code        RECOMMEND:  (1)CVVHDF - 0K dialysate+replacement for now; no net UF  (2)Dose abx for GFR<10/CVVHDF (Vanco 1gm iv q24h; Zosyn 3.375gm iv q6h)            Suraj Garcia MD  [facility]  Office: [phone]  Cell: [phone]               On Phenylephrine, Neosynephrine, and Vasopressin gtts  On vent; sedated on Propofol gtt      VITAL:  T(C): , Max: 37.9 (03-23-21 @ 23:25)  T(F): , Max: 100.2 (03-23-21 @ 23:25)  HR: 85 (03-24-21 @ 07:30)  BP: 93/52 (03-24-21 @ 07:30)  BP(mean): 68 (03-24-21 @ 07:30)  RR: 20 (03-24-21 @ 07:30)  SpO2: 95% (03-24-21 @ 07:30)      PHYSICAL EXAM:  Constitutional: intubated/sedated  HEENT: NCAT, DMM  Neck: Supple, no JVD  Respiratory: CTA-b/l  Cardiovascular: RRR s1s2, no m/r/g  Gastrointestinal: BS+, soft, NT; (+)mild distension  Extremities:  no edema b/l  Neurological: no focal deficits; strength grossly intact  Back: no CVAT b/l  Skin: No rashes, no nevi  Access: RI tunneled cath    LABS:                        9.7    24.94 )-----------( 253      ( 23 Mar 2021 23:12 )             31.8     Na(132)/K(5.8)/Cl(98)/HCO3(15)/BUN(84)/Cr(5.21)Glu(147)/Ca(7.9)/Mg(2.8)/PO4(8.0)    03-24 @ 03:21  Na(134)/K(6.2)/Cl(98)/HCO3(19)/BUN(82)/Cr(5.35)Glu(92)/Ca(8.3)/Mg(2.8)/PO4(7.7)    03-23 @ 23:12  Na(133)/K(6.0)/Cl(96)/HCO3(20)/BUN(74)/Cr(4.99)Glu(85)/Ca(9.5)/Mg(--)/PO4(--)    03-23 @ 06:32  Na(134)/K(5.4)/Cl(96)/HCO3(24)/BUN(60)/Cr(4.37)Glu(146)/Ca(9.3)/Mg(--)/PO4(--)    03-22 @ 10:15        IMPRESSION: 80M w/ DM2, CAD-CABG, and CKD, 2/16/21 a/w azotemia/lyte derangements/weakness; c/b newly ESRD; c/b COVID(+)    (1)Renal - newly ESRD as of this admission - unable to tolerate HD last night due to hypotension - appears that we will have to switch over to CRRT at this point    (2)Hyperkalemia     (3)Metabolic acidosis - type A lactic acidosis/ESRD-associated     (4)Hyperphosphatemia     (5)CV - newly in shock/on vent + 3 pressors. MICU input appreciated; presumed septic shock/?aspiration pneumonia; now on IV Vanco/Zosyn; CT I+ being contemplated    (6)Goals of care - full code        RECOMMEND:  (1)CVVHDF - 0K dialysate+replacement for now; no net UF  (2)Dose abx for GFR<10/CVVHDF (Vanco 1gm iv q24h; Zosyn 3.375gm iv q6h)  (3)No objection to CT with IV contrast        Suraj Garcia MD  [facility]  Office: [phone]  Cell: [phone]               On Phenylephrine, Neosynephrine, and Vasopressin gtts  On vent; sedated on Propofol gtt      VITAL:  T(C): , Max: 37.9 (03-23-21 @ 23:25)  T(F): , Max: 100.2 (03-23-21 @ 23:25)  HR: 85 (03-24-21 @ 07:30)  BP: 93/52 (03-24-21 @ 07:30)  BP(mean): 68 (03-24-21 @ 07:30)  RR: 20 (03-24-21 @ 07:30)  SpO2: 95% (03-24-21 @ 07:30)      PHYSICAL EXAM:  Constitutional: intubated/sedated  HEENT: NCAT, DMM  Neck: Supple, no JVD  Respiratory: CTA-b/l  Cardiovascular: RRR s1s2, no m/r/g  Gastrointestinal: BS+, soft, NT; (+)mild distension  Extremities:  no edema b/l  Neurological: no focal deficits; strength grossly intact  Back: no CVAT b/l  Skin: No rashes, no nevi  Access: RI tunneled cath    LABS:                        9.7    24.94 )-----------( 253      ( 23 Mar 2021 23:12 )             31.8     Na(132)/K(5.8)/Cl(98)/HCO3(15)/BUN(84)/Cr(5.21)Glu(147)/Ca(7.9)/Mg(2.8)/PO4(8.0)    03-24 @ 03:21  Na(134)/K(6.2)/Cl(98)/HCO3(19)/BUN(82)/Cr(5.35)Glu(92)/Ca(8.3)/Mg(2.8)/PO4(7.7)    03-23 @ 23:12  Na(133)/K(6.0)/Cl(96)/HCO3(20)/BUN(74)/Cr(4.99)Glu(85)/Ca(9.5)/Mg(--)/PO4(--)    03-23 @ 06:32  Na(134)/K(5.4)/Cl(96)/HCO3(24)/BUN(60)/Cr(4.37)Glu(146)/Ca(9.3)/Mg(--)/PO4(--)    03-22 @ 10:15        IMPRESSION: 80M w/ DM2, CAD-CABG, and CKD, 2/16/21 a/w azotemia/lyte derangements/weakness; c/b newly ESRD; c/b COVID(+)    (1)Renal - newly ESRD as of this admission - unable to tolerate HD last night due to hypotension. Given that he is on maximum doses of 3 pressors at present, HD (CRRT) would be medically futile    (2)Hyperkalemia     (3)Metabolic acidosis - type A lactic acidosis/ESRD-associated     (4)Hyperphosphatemia     (5)CV - newly in shock/on vent + 3 pressors. MICU input appreciated; presumed septic shock/?aspiration pneumonia; now on IV Vanco/Zosyn          RECOMMEND:  (1)No HD for now, in accordance with MICU recommendations, given medical futility;  (2)Meds for GFR<10 (present dosing is acceptable)      Suraj Garcia MD  [facility]  Office: [phone]  Cell: [phone]               On Phenylephrine, Neosynephrine, and Vasopressin gtts  On vent; sedated on Propofol gtt      VITAL:  T(C): , Max: 37.9 (03-23-21 @ 23:25)  T(F): , Max: 100.2 (03-23-21 @ 23:25)  HR: 85 (03-24-21 @ 07:30)  BP: 93/52 (03-24-21 @ 07:30)  BP(mean): 68 (03-24-21 @ 07:30)  RR: 20 (03-24-21 @ 07:30)  SpO2: 95% (03-24-21 @ 07:30)      PHYSICAL EXAM:  Constitutional: intubated/sedated  HEENT: NCAT, DMM  Neck: Supple, no JVD  Respiratory: CTA-b/l  Cardiovascular: RRR s1s2, no m/r/g  Gastrointestinal: BS+, soft, NT; (+)mild distension  Extremities:  no edema b/l  Neurological: no focal deficits; strength grossly intact  Back: no CVAT b/l  Skin: cyanotic changes b/l feet  Access: [facility] tunneled cath    LABS:                        9.7    24.94 )-----------( 253      ( 23 Mar 2021 23:12 )             31.8     Na(132)/K(5.8)/Cl(98)/HCO3(15)/BUN(84)/Cr(5.21)Glu(147)/Ca(7.9)/Mg(2.8)/PO4(8.0)    03-24 @ 03:21  Na(134)/K(6.2)/Cl(98)/HCO3(19)/BUN(82)/Cr(5.35)Glu(92)/Ca(8.3)/Mg(2.8)/PO4(7.7)    03-23 @ 23:12  Na(133)/K(6.0)/Cl(96)/HCO3(20)/BUN(74)/Cr(4.99)Glu(85)/Ca(9.5)/Mg(--)/PO4(--)    03-23 @ 06:32  Na(134)/K(5.4)/Cl(96)/HCO3(24)/BUN(60)/Cr(4.37)Glu(146)/Ca(9.3)/Mg(--)/PO4(--)    03-22 @ 10:15        IMPRESSION: 80M w/ DM2, CAD-CABG, and CKD, 2/16/21 a/w azotemia/lyte derangements/weakness; c/b newly ESRD; c/b COVID(+)    (1)Renal - newly ESRD as of this admission - unable to tolerate HD last night due to hypotension. Given that he is on maximum doses of 3 pressors at present, HD (CRRT) would be medically futile    (2)Hyperkalemia     (3)Metabolic acidosis - type A lactic acidosis/ESRD-associated     (4)Hyperphosphatemia     (5)CV - newly in shock/on vent + 3 pressors. MICU input appreciated; presumed septic shock/?aspiration pneumonia; now on IV Vanco/Zosyn          RECOMMEND:  (1)No HD for now, in accordance with MICU recommendations, given medical futility;  (2)Meds for GFR<10 (present dosing is acceptable)      Suraj Garcia MD  [facility]  Office: [phone]  Cell: [phone]

## 2021-03-24 NOTE — PROGRESS NOTE ADULT - ATTENDING COMMENTS
Advanced care planning was discussed with patient and family.  Advanced care planning forms were reviewed and discussed as appropriate.  Differential diagnosis and plan of care discussed with patient after the evaluation.   Pain assessed and judicious use of narcotics when appropriate was discussed.  Importance of Fall prevention discussed.  Counseling on Smoking and Alcohol cessation was offered when appropriate.  Counseling on Diet, exercise, and medication compliance was done.
Advanced care planning was discussed with patient and family.  Advanced care planning forms were reviewed and discussed.  Risks, benefits and alternatives of gastroenterologic procedures were discussed in detail and all questions were answered.    30 minutes spent.
Advanced care planning was discussed with patient and family.  Advanced care planning forms were reviewed and discussed as appropriate.  Differential diagnosis and plan of care discussed with patient after the evaluation.   Pain assessed and judicious use of narcotics when appropriate was discussed.  Importance of Fall prevention discussed.  Counseling on Smoking and Alcohol cessation was offered when appropriate.  Counseling on Diet, exercise, and medication compliance was done.
Advanced care planning was discussed with patient and family.  Advanced care planning forms were reviewed and discussed.  Risks, benefits and alternatives of gastroenterologic procedures were discussed in detail and all questions were answered.    30 minutes spent.
Advanced care planning was discussed with patient and family.  Advanced care planning forms were reviewed and discussed as appropriate.  Differential diagnosis and plan of care discussed with patient after the evaluation.   Pain assessed and judicious use of narcotics when appropriate was discussed.  Importance of Fall prevention discussed.  Counseling on Smoking and Alcohol cessation was offered when appropriate.  Counseling on Diet, exercise, and medication compliance was done.
Advanced care planning was discussed with patient and family.  Advanced care planning forms were reviewed and discussed as appropriate.  Differential diagnosis and plan of care discussed with patient after the evaluation.   Pain assessed and judicious use of narcotics when appropriate was discussed.  Importance of Fall prevention discussed.  Counseling on Smoking and Alcohol cessation was offered when appropriate.  Counseling on Diet, exercise, and medication compliance was done.
No acute events reported overnight.  The patient had an episode of chest pain yesterday that lasted for approximate 15 to 20 minutes.  Denies any further episodes of chest pain/tightness/discomfort.  Has been laying in in the bed.  He is currently laying flat.  Denies any shortness of breath, change in orthopnea or episodes of paroxysmal nocturnal dyspnea.  Denies any palpitations.    Patient is scheduled to get cardiac CT scan from which an aortic valve calcium score will be derived.  Reviewed the patient's cardiac catheterization which was performed in 2018 that demonstrated ejection fraction 10 to 15%, mild to moderate aortic valve stenosis and patent LIMA to the LAD.  At that time the patient underwent stenting of his saphenous vein graft that jumped to his first diagonal and obtuse marginal artery.  Does not appear that there was any change in the patient ejection fraction following the intervention.    Concern for rising BUN/creatinine.  Based upon the patient's aortic valve calcium score he will be further assessed for possible transcatheter aortic valve replacement.  However, there was concern due to the patient's comorbidity that he may be considered a prohibitive risk.  If the patient is to proceed with transcatheter aortic valve replacement would recommend that he be started on dialysis prior to the procedure.  If he was on dialysis a CTA could be performed for appropriate sizing and planning of his procedure.    All questions and concerns were addressed.    Findings and plan were discussed with medicine NP, cardiology/Dr. Velásquez, cardiac surgery/Dr. Alvarez and structural heart team.
Advanced care planning was discussed with patient and family.  Advanced care planning forms were reviewed and discussed as appropriate.  Differential diagnosis and plan of care discussed with patient after the evaluation.   Pain assessed and judicious use of narcotics when appropriate was discussed.  Importance of Fall prevention discussed.  Counseling on Smoking and Alcohol cessation was offered when appropriate.  Counseling on Diet, exercise, and medication compliance was done.
The patient and his wife were called at the time of examination.  The patients hospital course and medical conditions were addressed.  His significant mixed valvular disease of his aortic valve was reviewed along with his severe ischemic cardiomyopathy were addressed.  The patient has acute on chronic worsening renal failure.  He appears euvolemic on examination.  As part of patients workup for TAVR and the procedure he will need to get contrast administration.  The patient and his wife are unsure if he wants to be started on dialysis.  Prior to any further workup recommend that he be further assessed by nephrology and family discussion be held.    All questions and concerns were addressed.
Advanced care planning was discussed with patient and family.  Advanced care planning forms were reviewed and discussed as appropriate.  Differential diagnosis and plan of care discussed with patient after the evaluation.   Pain assessed and judicious use of narcotics when appropriate was discussed.  Importance of Fall prevention discussed.  Counseling on Smoking and Alcohol cessation was offered when appropriate.  Counseling on Diet, exercise, and medication compliance was done.
Patient examined and care reviewed in detail on bedside rounds  Critically ill on vent/pressors with MOFS High dose pressors prohibit dialysis attempt  due to combined severe AS/LVF/PNA/septic shock Family aware of dismal prognosis  Frequent bedside visits with therapy change today.   I have personally provided 35+ minutes of critical care time concurrently with the resident/fellow; this excludes time spent on separate procedures.  Additional 70 min critical care time provided today
Advanced care planning was discussed with patient and family.  Advanced care planning forms were reviewed and discussed as appropriate.  Differential diagnosis and plan of care discussed with patient after the evaluation.   Pain assessed and judicious use of narcotics when appropriate was discussed.  Importance of Fall prevention discussed.  Counseling on Smoking and Alcohol cessation was offered when appropriate.  Counseling on Diet, exercise, and medication compliance was done.
The patient's past medical history and signs/symptoms that led to his current hospitalization were reviewed.  He has severe ischemic cardiomyopathy with low flow low gradient severe AS versus pseudosevere aortic valve stenosis along with moderate to severe aortic regurgitation.  TTE also demonstrated moderate pulmonary hypertension (RVSP fifty-one), moderate tricuspid valve regurgitation, ejection fraction 15% and mild mitral valve stenosis with mean gradient five.  The patient reports that he does not walk with a walker/cane.  He ambulates short distances and experiences at times shortness of breath/dyspnea upon exertion, fatigue and chest discomfort.  He also notes episodes of acute worsening of swelling in his legs.    It is not clear if the patient is prohibitive risk to undergo a transcatheter aortic valve replacement procedure.  However, prior to proceeding would recommend that his outpatient cath (along with images be obtained if possible).  A cardiac CT scan will be performed to determine calcium score.  He may benefit from assessment by the heart failure team along with inotrope therapy.    Based upon the above results consideration would be made for further assessment potentially for transcatheter aortic valve replacement.  If the patient was to proceed with transcatheter aortic valve replacement he will need to be administered contrast at the time of a CT scan and the valve replacement procedure.  The patient is at markedly increased risk for going into worsening renal failure/need dialysis.    EKG, laboratory studies and imaging studies were personally reviewed.    Findings and plan were discussed with cardiology/Dr. Velásquez and structural heart team.
Advanced care planning was discussed with patient and family.  Advanced care planning forms were reviewed and discussed as appropriate.  Differential diagnosis and plan of care discussed with patient after the evaluation.   Pain assessed and judicious use of narcotics when appropriate was discussed.  Importance of Fall prevention discussed.  Counseling on Smoking and Alcohol cessation was offered when appropriate.  Counseling on Diet, exercise, and medication compliance was done.

## 2021-03-24 NOTE — PROGRESS NOTE ADULT - PROVIDER SPECIALTY LIST ADULT
Gastroenterology
Infectious Disease
Infectious Disease
Internal Medicine
Nephrology
Cardiology
Gastroenterology
Infectious Disease
Infectious Disease
Internal Medicine
MICU
Nephrology
Structural Heart
Cardiology
Gastroenterology
Infectious Disease
Internal Medicine
Nephrology
Pulmonology
Structural Heart
Cardiology
Electrophysiology
Gastroenterology
Infectious Disease
Internal Medicine
Cardiology
Gastroenterology
Infectious Disease
Infectious Disease
Internal Medicine
Cardiology
Internal Medicine
Structural Heart
Cardiology
Internal Medicine
Cardiology
Cardio-OB
Cardiology

## 2021-03-24 NOTE — PROCEDURE NOTE - NSPROCNAME_GEN_A_CORE
Interventional Radiology
Interventional Radiology
CRT-D (Cardiac Resynchronization Therapy with Defibrillation Capabilities) Interrogation Note
CRT-D (Cardiac Resynchronization Therapy with Defibrillation Capabilities) Interrogation Note

## 2021-03-24 NOTE — PROGRESS NOTE ADULT - PROBLEM SELECTOR PLAN 3
ICM s/p ICD   Cont amiodarone loading   [facility] from 2 years ago obtained and reviewed. Severe MV native CAD. LIMA graft patent. SVG grat to D1 and OM stented.  had trivial AS by cath at that time.   TTE reviewed. Severe global systolic dysfunction with severe AS/AI. Given advanced CKD, poor EF and overall general medical condition would likely be a poor TAVR candidate   TAVR team consulted.  Cancelled Dobutamine stress echo to assess true AS vs. Pseudo AS due to incessant VT. He is  scheduled to get cardiac CT scan from which an aortic valve calcium score will be derived.  EP input and change in AV delay setting appreciated   Cont metoprolol. Up titrate as BP tolerates
ICM s/p ICD   Completed amiodarone loading   [facility] from 2 years ago obtained and reviewed. Severe MV native CAD. LIMA graft patent. SVG grat to D1 and OM stented.  had trivial AS by cath at that time.   TTE reviewed. Severe global systolic dysfunction with severe AS/AI. Given advanced CKD, poor EF and overall general medical condition would likely be a poor TAVR candidate   TAVR team consulted.  Cancelled Dobutamine stress echo to assess true AS vs. Pseudo AS due to incessant VT. s/p cardiac CT with severely calcified AoV. Structural heart team follow up for TAVR. Will have to accept possibility of HD with TAVR  EP input and change in AV delay setting appreciated   Cont metoprolol. Up titrate as BP tolerates
ICM s/p ICD   Completed amiodarone loading   [facility] from 2 years ago obtained and reviewed. Severe MV native CAD. LIMA graft patent. SVG grat to D1 and OM stented.  had trivial AS by cath at that time.   TTE reviewed. Severe global systolic dysfunction with severe AS/AI. Given advanced CKD, poor EF and overall general medical condition would likely be a poor TAVR candidate   TAVR team consulted.  Cancelled Dobutamine stress echo to assess true AS vs. Pseudo AS due to incessant VT. s/p cardiac CT with severely calcified AoV. Structural heart team follow up for TAVR. Will have to accept possibility of HD with TAVR  EP input and change in AV delay setting appreciated   Cont metoprolol. Up titrate as BP tolerates
ICM s/p ICD   Completed amiodarone loading   [facility] from 2 years ago obtained and reviewed. Severe MV native CAD. LIMA graft patent. SVG grat to D1 and OM stented.  had trivial AS by cath at that time.   TTE reviewed. Severe global systolic dysfunction with severe AS/AI. Given advanced CKD, poor EF and overall general medical condition would likely be a poor TAVR candidate   TAVR team consulted.  Cancelled Dobutamine stress echo to assess true AS vs. Pseudo AS due to incessant VT. s/p cardiac CT with severely calcified AoV. Structural heart team follow up for TAVR. Will have to accept possibility of HD with TAVR  EP input and change in AV delay setting appreciated   Cont metoprolol. Up titrate as BP tolerates
ICM s/p ICD   Completed amiodarone loading   [facility] from 2 years ago obtained and reviewed. Severe MV native CAD. LIMA graft patent. SVG grat to D1 and OM stented.  had trivial AS by cath at that time.   TTE reviewed. Severe global systolic dysfunction with severe AS/AI. Given advanced CKD, poor EF and overall general medical condition would likely be a poor TAVR candidate   TAVR team consulted.  Cancelled Dobutamine stress echo to assess true AS vs. Pseudo AS due to incessant VT. s/p cardiac CT with severely calcified AoV. Structural heart team follow up for TAVR. Will have to accept possibility of HD with TAVR  EP input and change in AV delay setting appreciated   Cont metoprolol. Up titrate as BP tolerates
ICM s/p ICD   end stage heartfailure  [facility] from 2 years ago obtained and reviewed. Severe MV native CAD. LIMA graft patent. SVG grat to D1 and OM stented.  had trivial AS by cath at that time.   TTE reviewed. Severe global systolic dysfunction with severe AS/AI. Given advanced CKD, poor EF and overall general medical condition would likely be a poor TAVR candidate
ICM s/p ICD   Completed amiodarone loading   [facility] from 2 years ago obtained and reviewed. Severe MV native CAD. LIMA graft patent. SVG grat to D1 and OM stented.  had trivial AS by cath at that time.   TTE reviewed. Severe global systolic dysfunction with severe AS/AI. Given advanced CKD, poor EF and overall general medical condition would likely be a poor TAVR candidate   TAVR team consulted.  Cancelled Dobutamine stress echo to assess true AS vs. Pseudo AS due to incessant VT. s/p cardiac CT with severely calcified AoV. Structural heart team follow up for TAVR. Will have to accept possibility of HD with TAVR  EP input and change in AV delay setting appreciated   Cont metoprolol. Up titrate as BP tolerates
ICM s/p ICD   Completed amiodarone loading   [facility] from 2 years ago obtained and reviewed. Severe MV native CAD. LIMA graft patent. SVG grat to D1 and OM stented.  had trivial AS by cath at that time.   TTE reviewed. Severe global systolic dysfunction with severe AS/AI. Given advanced CKD, poor EF and overall general medical condition would likely be a poor TAVR candidate   TAVR team consulted.  Cancelled Dobutamine stress echo to assess true AS vs. Pseudo AS due to incessant VT. s/p cardiac CT with severely calcified AoV. Structural heart team follow up for TAVR. Will have to accept possibility of HD with TAVR  EP input and change in AV delay setting appreciated   Cont metoprolol. Up titrate as BP tolerates
ICM s/p ICD   Completed amiodarone loading   [facility] from 2 years ago obtained and reviewed. Severe MV native CAD. LIMA graft patent. SVG grat to D1 and OM stented.  had trivial AS by cath at that time.   TTE reviewed. Severe global systolic dysfunction with severe AS/AI. Given advanced CKD, poor EF and overall general medical condition would likely be a poor TAVR candidate   TAVR team consulted.  Cancelled Dobutamine stress echo to assess true AS vs. Pseudo AS due to incessant VT. s/p cardiac CT with severely calcified AoV. Structural heart team follow up for TAVR. Will have to accept possibility of HD with TAVR  EP input and change in AV delay setting appreciated   Cont metoprolol. Up titrate as BP tolerates
ICM s/p ICD   Completed amiodarone loading   [facility] from 2 years ago obtained and reviewed. Severe MV native CAD. LIMA graft patent. SVG grat to D1 and OM stented.  had trivial AS by cath at that time.   TTE reviewed. Severe global systolic dysfunction with severe AS/AI. Given advanced CKD, poor EF and overall general medical condition would likely be a poor TAVR candidate   TAVR team consulted.  Cancelled Dobutamine stress echo to assess true AS vs. Pseudo AS due to incessant VT. s/p cardiac CT with severely calcified AoV. Structural heart team follow up for TAVR. Will have to accept possibility of HD with TAVR  EP input and change in AV delay setting appreciated   Cont metoprolol. Up titrate as BP tolerates
ICM s/p ICD   On amiodarone loading   [facility] from 2 years ago obtained and reviewed. Severe MV native CAD. LIMA graft patent. SVG grat to D1 and OM stented.  had trivial AS by cath at that time.   TTE reviewed. Severe global systolic dysfunction with severe AS/AI. Given advanced CKD, poor EF and overall general medical condition would likely be a poor TAVR candidate   TAVR team consulted.  Cancelled Dobutamine stress echo to assess true AS vs. Pseudo AS due to incessant VT. s/p cardiac CT with severely calcified AoV. Structural heart team follow up for TAVR. Will have to accept possibility of HD with TAVR  EP input and change in AV delay setting appreciated   Cont metoprolol. Up titrate as BP tolerates
ICM s/p ICD   Completed amiodarone loading   [facility] from 2 years ago obtained and reviewed. Severe MV native CAD. LIMA graft patent. SVG grat to D1 and OM stented.  had trivial AS by cath at that time.   TTE reviewed. Severe global systolic dysfunction with severe AS/AI. Given advanced CKD, poor EF and overall general medical condition would likely be a poor TAVR candidate   TAVR team consulted.  Cancelled Dobutamine stress echo to assess true AS vs. Pseudo AS due to incessant VT. s/p cardiac CT with severely calcified AoV. Structural heart team follow up for TAVR. Will have to accept possibility of HD with TAVR  EP input and change in AV delay setting appreciated   Cont metoprolol. Up titrate as BP tolerates
ICM s/p ICD   Completed amiodarone loading   [facility] from 2 years ago obtained and reviewed. Severe MV native CAD. LIMA graft patent. SVG grat to D1 and OM stented.  had trivial AS by cath at that time.   TTE reviewed. Severe global systolic dysfunction with severe AS/AI. Given advanced CKD, poor EF and overall general medical condition would likely be a poor TAVR candidate   TAVR team consulted.  Cancelled Dobutamine stress echo to assess true AS vs. Pseudo AS due to incessant VT. s/p cardiac CT with severely calcified AoV. Structural heart team follow up for TAVR. Will have to accept possibility of HD with TAVR  EP input and change in AV delay setting appreciated   Cont metoprolol. Up titrate as BP tolerates
ICM s/p ICD   On amiodarone loading   [facility] from 2 years ago obtained and reviewed. Severe MV native CAD. LIMA graft patent. SVG grat to D1 and OM stented.  had trivial AS by cath at that time.   TTE reviewed. Severe global systolic dysfunction with severe AS/AI. Given advanced CKD, poor EF and overall general medical condition would likely be a poor TAVR candidate   TAVR team consulted.  Cancelled Dobutamine stress echo to assess true AS vs. Pseudo AS due to incessant VT. s/p cardiac CT with severely calcified AoV. Structural heart team follow up for TAVR. Will have to accept possibility of HD with TAVR  EP input and change in AV delay setting appreciated   Cont metoprolol. Up titrate as BP tolerates
ICM s/p ICD   he had an angiogram last year in [facility]. As per Dr. Sharpe, severe MV CAD not amenable to revascularization anymore. Obtain records   TTE reviewed. Severe global systolic dysfunction with severe AS/AI. Given advanced CKD, poor EF and overall general medical condition would likely be a poor TAVR candidate   TAVR team consulted.  Dobutamine stress echo to assess true AS vs. Pseudo AS in setting of cardiomyopathy  EP input and change in AV delay setting appreciated   Cont metoprolol. Up titrate as BP tolerates
ICM s/p ICD   Completed amiodarone loading   [facility] from 2 years ago obtained and reviewed. Severe MV native CAD. LIMA graft patent. SVG grat to D1 and OM stented.  had trivial AS by cath at that time.   TTE reviewed. Severe global systolic dysfunction with severe AS/AI. Given advanced CKD, poor EF and overall general medical condition would likely be a poor TAVR candidate   TAVR team consulted.  Cancelled Dobutamine stress echo to assess true AS vs. Pseudo AS due to incessant VT. s/p cardiac CT with severely calcified AoV. Structural heart team follow up for TAVR. Will have to accept possibility of HD with TAVR  EP input and change in AV delay setting appreciated   Cont metoprolol. Up titrate as BP tolerates
ICM s/p ICD   Completed amiodarone loading   [facility] from 2 years ago obtained and reviewed. Severe MV native CAD. LIMA graft patent. SVG grat to D1 and OM stented.  had trivial AS by cath at that time.   TTE reviewed. Severe global systolic dysfunction with severe AS/AI. Given advanced CKD, poor EF and overall general medical condition would likely be a poor TAVR candidate   TAVR team consulted.  Cancelled Dobutamine stress echo to assess true AS vs. Pseudo AS due to incessant VT. s/p cardiac CT with severely calcified AoV. Structural heart team follow up for TAVR. Will have to accept possibility of HD with TAVR  EP input and change in AV delay setting appreciated   Cont metoprolol. Up titrate as BP tolerates
ICM s/p ICD   Completed amiodarone loading   [facility] from 2 years ago obtained and reviewed. Severe MV native CAD. LIMA graft patent. SVG grat to D1 and OM stented.  had trivial AS by cath at that time.   TTE reviewed. Severe global systolic dysfunction with severe AS/AI. Given advanced CKD, poor EF and overall general medical condition would likely be a poor TAVR candidate   TAVR team consulted.  Cancelled Dobutamine stress echo to assess true AS vs. Pseudo AS due to incessant VT. s/p cardiac CT with severely calcified AoV. Structural heart team follow up for TAVR. Will have to accept possibility of HD with TAVR  EP input and change in AV delay setting appreciated   Cont metoprolol. Up titrate as BP tolerates
ICM s/p ICD   Completed amiodarone loading   [facility] from 2 years ago obtained and reviewed. Severe MV native CAD. LIMA graft patent. SVG grat to D1 and OM stented.  had trivial AS by cath at that time.   TTE reviewed. Severe global systolic dysfunction with severe AS/AI. Given advanced CKD, poor EF and overall general medical condition would likely be a poor TAVR candidate   TAVR team consulted.  Cancelled Dobutamine stress echo to assess true AS vs. Pseudo AS due to incessant VT. s/p cardiac CT with severely calcified AoV. Structural heart team follow up for TAVR. Will have to accept possibility of HD with TAVR  EP input and change in AV delay setting appreciated   Cont metoprolol. Up titrate as BP tolerates
ICM s/p ICD   Completed amiodarone loading   [facility] from 2 years ago obtained and reviewed. Severe MV native CAD. LIMA graft patent. SVG grat to D1 and OM stented.  had trivial AS by cath at that time.   TTE reviewed. Severe global systolic dysfunction with severe AS/AI. Given advanced CKD, poor EF and overall general medical condition would likely be a poor TAVR candidate   TAVR team consulted.  Cancelled Dobutamine stress echo to assess true AS vs. Pseudo AS due to incessant VT. s/p cardiac CT with severely calcified AoV. Structural heart team follow up for TAVR. Will have to accept possibility of HD with TAVR  EP input and change in AV delay setting appreciated   Cont metoprolol. Up titrate as BP tolerates
ICM s/p ICD   Completed amiodarone loading   [facility] from 2 years ago obtained and reviewed. Severe MV native CAD. LIMA graft patent. SVG grat to D1 and OM stented.  had trivial AS by cath at that time.   TTE reviewed. Severe global systolic dysfunction with severe AS/AI. Given advanced CKD, poor EF and overall general medical condition would likely be a poor TAVR candidate   TAVR team consulted.  Cancelled Dobutamine stress echo to assess true AS vs. Pseudo AS due to incessant VT. s/p cardiac CT with severely calcified AoV. Structural heart team follow up for TAVR. Will have to accept possibility of HD with TAVR  EP input and change in AV delay setting appreciated   Cont metoprolol. Up titrate as BP tolerates
ICM s/p ICD   Completed amiodarone loading   [facility] from 2 years ago obtained and reviewed. Severe MV native CAD. LIMA graft patent. SVG grat to D1 and OM stented.  had trivial AS by cath at that time.   TTE reviewed. Severe global systolic dysfunction with severe AS/AI. Given advanced CKD, poor EF and overall general medical condition would likely be a poor TAVR candidate   TAVR team consulted.  Cancelled Dobutamine stress echo to assess true AS vs. Pseudo AS due to incessant VT. s/p cardiac CT with severely calcified AoV. Structural heart team follow up for TAVR. Will have to accept possibility of HD with TAVR  EP input and change in AV delay setting appreciated   Cont metoprolol. Up titrate as BP tolerates
ICM s/p ICD   he had an angiogram last year in [facility]. As per Dr. Sharpe, severe MV CAD not amenable to revascularization anymore  Check TTE  EP consult to optimize device.  Agree with change to metoprolol. Up titrate as BP tolerates
ICM s/p ICD   he had an angiogram last year in [facility]. As per Dr. Sharpe, severe MV CAD not amenable to revascularization anymore. Obtain records   TTE reviewed. Severe global systolic dysfunction with severe AS/AI. Given advanced CKD, poor EF and overall general medical condition would likely be a poor TAVR candidate   TAVR team consulted.  Dobutamine stress echo to assess true AS vs. Pseudo AS in setting of cardiomyopathy  EP input and change in AV delay setting appreciated   Cont metoprolol. Up titrate as BP tolerates
ICM s/p ICD   Completed amiodarone loading   [facility] from 2 years ago obtained and reviewed. Severe MV native CAD. LIMA graft patent. SVG grat to D1 and OM stented.  had trivial AS by cath at that time.   TTE reviewed. Severe global systolic dysfunction with severe AS/AI. Given advanced CKD, poor EF and overall general medical condition would likely be a poor TAVR candidate   TAVR team consulted.  Cancelled Dobutamine stress echo to assess true AS vs. Pseudo AS due to incessant VT. s/p cardiac CT with severely calcified AoV. Structural heart team follow up for TAVR. Will have to accept possibility of HD with TAVR  EP input and change in AV delay setting appreciated   Cont metoprolol. Up titrate as BP tolerates
ICM s/p ICD   he had an angiogram last year in [facility]. As per Dr. Sharpe, severe MV CAD not amenable to revascularization anymore. Obtain records   TTE reviewed. Severe global systolic dysfunction with severe AS/AI. Given advanced CKD, poor EF and overall general medical condition would likely be a poor TAVR candidate   TAVR team consulted.  Dobutamine stress echo to assess true AS vs. Pseudo AS in setting of cardiomyopathy  EP input and change in AV delay setting appreciated   Cont metoprolol. Up titrate as BP tolerates
ICM s/p ICD   Completed amiodarone loading   [facility] from 2 years ago obtained and reviewed. Severe MV native CAD. LIMA graft patent. SVG grat to D1 and OM stented.  had trivial AS by cath at that time.   TTE reviewed. Severe global systolic dysfunction with severe AS/AI. Given advanced CKD, poor EF and overall general medical condition would likely be a poor TAVR candidate   TAVR team consulted.  Cancelled Dobutamine stress echo to assess true AS vs. Pseudo AS due to incessant VT. s/p cardiac CT with severely calcified AoV. Structural heart team follow up for TAVR. Will have to accept possibility of HD with TAVR  EP input and change in AV delay setting appreciated   Cont metoprolol. Up titrate as BP tolerates
ICM s/p ICD   Completed amiodarone loading   [facility] from 2 years ago obtained and reviewed. Severe MV native CAD. LIMA graft patent. SVG grat to D1 and OM stented.  had trivial AS by cath at that time.   TTE reviewed. Severe global systolic dysfunction with severe AS/AI. Given advanced CKD, poor EF and overall general medical condition would likely be a poor TAVR candidate   TAVR team consulted.  Cancelled Dobutamine stress echo to assess true AS vs. Pseudo AS due to incessant VT. s/p cardiac CT with severely calcified AoV. Structural heart team follow up for TAVR. Will have to accept possibility of HD with TAVR  EP input and change in AV delay setting appreciated   Cont metoprolol. Up titrate as BP tolerates
ICM s/p ICD   Started amiodarone loading   he had an angiogram last year in [facility]. As per Dr. Sharpe, severe MV CAD not amenable to revascularization anymore. Obtain records   TTE reviewed. Severe global systolic dysfunction with severe AS/AI. Given advanced CKD, poor EF and overall general medical condition would likely be a poor TAVR candidate   TAVR team consulted.  Dobutamine stress echo to assess true AS vs. Pseudo AS in setting of cardiomyopathy  EP input and change in AV delay setting appreciated   Cont metoprolol. Up titrate as BP tolerates
ICM s/p ICD   Completed amiodarone loading   [facility] from 2 years ago obtained and reviewed. Severe MV native CAD. LIMA graft patent. SVG grat to D1 and OM stented.  had trivial AS by cath at that time.   TTE reviewed. Severe global systolic dysfunction with severe AS/AI. Given advanced CKD, poor EF and overall general medical condition would likely be a poor TAVR candidate   TAVR team consulted.  Cancelled Dobutamine stress echo to assess true AS vs. Pseudo AS due to incessant VT. s/p cardiac CT with severely calcified AoV. Structural heart team follow up for TAVR. Will have to accept possibility of HD with TAVR  EP input and change in AV delay setting appreciated   Cont metoprolol. Up titrate as BP tolerates
ICM s/p ICD   he had an angiogram last year in [facility]. As per Dr. Sharpe, severe MV CAD not amenable to revascularization anymore  TTE reviewed. Severe global systolic dysfunction with severe AS/AI. Given advanced CKD, poor EF and overall general medical condition would likely be a poor TAVR candidate but will consult TAVR team for official opinion.   EP input and change in AV delay setting appreciated   Cont metoprolol. Up titrate as BP tolerates
ICM s/p ICD   Completed amiodarone loading   [facility] from 2 years ago obtained and reviewed. Severe MV native CAD. LIMA graft patent. SVG grat to D1 and OM stented.  had trivial AS by cath at that time.   TTE reviewed. Severe global systolic dysfunction with severe AS/AI. Given advanced CKD, poor EF and overall general medical condition would likely be a poor TAVR candidate   TAVR team consulted.  Cancelled Dobutamine stress echo to assess true AS vs. Pseudo AS due to incessant VT. s/p cardiac CT with severely calcified AoV. Structural heart team follow up for TAVR. Will have to accept possibility of HD with TAVR  EP input and change in AV delay setting appreciated   Cont metoprolol. Up titrate as BP tolerates
ICM s/p ICD   Cont amiodarone loading   [facility] from 2 years ago obtained and reviewed. Severe MV native CAD. LIMA graft patent. SVG grat to D1 and OM stented.  had trivial AS by cath at that time.   TTE reviewed. Severe global systolic dysfunction with severe AS/AI. Given advanced CKD, poor EF and overall general medical condition would likely be a poor TAVR candidate   TAVR team consulted.  Cancelled Dobutamine stress echo to assess true AS vs. Pseudo AS due to incessant VT. s/p cardiac CT with severely calcified AoV. Structural heart team follow up for TAVR. Will have to accept possibility of HD with TAVR  EP input and change in AV delay setting appreciated   Cont metoprolol. Up titrate as BP tolerates
ICM s/p ICD   Completed amiodarone loading   [facility] from 2 years ago obtained and reviewed. Severe MV native CAD. LIMA graft patent. SVG grat to D1 and OM stented.  had trivial AS by cath at that time.   TTE reviewed. Severe global systolic dysfunction with severe AS/AI. Given advanced CKD, poor EF and overall general medical condition would likely be a poor TAVR candidate   TAVR team consulted.  Cancelled Dobutamine stress echo to assess true AS vs. Pseudo AS due to incessant VT. s/p cardiac CT with severely calcified AoV. Structural heart team follow up for TAVR. Will have to accept possibility of HD with TAVR  EP input and change in AV delay setting appreciated   Cont metoprolol. Up titrate as BP tolerates
ICM s/p ICD   Completed amiodarone loading   [facility] from 2 years ago obtained and reviewed. Severe MV native CAD. LIMA graft patent. SVG grat to D1 and OM stented.  had trivial AS by cath at that time.   TTE reviewed. Severe global systolic dysfunction with severe AS/AI. Given advanced CKD, poor EF and overall general medical condition would likely be a poor TAVR candidate   TAVR team consulted.  Cancelled Dobutamine stress echo to assess true AS vs. Pseudo AS due to incessant VT. s/p cardiac CT with severely calcified AoV. Structural heart team follow up for TAVR. Will have to accept possibility of HD with TAVR  EP input and change in AV delay setting appreciated   Cont metoprolol. Up titrate as BP tolerates
ICM s/p ICD   Completed amiodarone loading   [facility] from 2 years ago obtained and reviewed. Severe MV native CAD. LIMA graft patent. SVG grat to D1 and OM stented.  had trivial AS by cath at that time.   TTE reviewed. Severe global systolic dysfunction with severe AS/AI. Given advanced CKD, poor EF and overall general medical condition would likely be a poor TAVR candidate   TAVR team consulted.  Cancelled Dobutamine stress echo to assess true AS vs. Pseudo AS due to incessant VT. s/p cardiac CT with severely calcified AoV. Structural heart team follow up for TAVR. Will have to accept possibility of HD with TAVR  EP input and change in AV delay setting appreciated   Cont metoprolol. Up titrate as BP tolerates

## 2021-03-24 NOTE — PROGRESS NOTE ADULT - PROBLEM SELECTOR PROBLEM 2
Renal failure

## 2021-03-24 NOTE — PROGRESS NOTE ADULT - REASON FOR ADMISSION
acute renal failure

## 2021-03-24 NOTE — PROGRESS NOTE ADULT - PROBLEM SELECTOR PROBLEM 1
Systolic heart failure
Aortic valve stenosis, etiology of cardiac valve disease unspecified
Systolic heart failure

## 2021-03-24 NOTE — CHART NOTE - NSCHARTNOTEFT_GEN_A_CORE
Asystole noted on cardiac monitor at 20:57.    On physical exam, no spontaneous movements were present. Patient did not respond to verbal or physical stimuli. Pupils were mid-dilated and fixed. No breath sounds were appreciated over either lung field. No carotid pulses were palpable. No heart sounds were auscultated. Patient pronounced dead at 20:59. Attending notified.  Family at bedside. Family declined autopsy. Asystole noted on cardiac monitor at 20:49.    On physical exam, no spontaneous movements were present. Patient did not respond to verbal or physical stimuli. Pupils were mid-dilated and fixed. No breath sounds were appreciated over either lung field. No carotid pulses were palpable. No heart sounds were auscultated. Patient pronounced dead at 20:49. Attending notified.  Family at bedside. Family declined autopsy.

## 2021-03-24 NOTE — PROGRESS NOTE ADULT - ASSESSMENT
Shock - unclear etiology: ? vasoplegic due to infection and/or cardiogenic component  Severe AS   Acute on chronic systolic CHF  Pulmonary hypertension  Aspiration pneumonia with sepsis: ? LLL opacity  ESRD  Nosocomial COVID 19 infection  Metabolic acidosis    REC    Continue Zosyn  Check MRSA PCR  Deferr weaning at this time  Alkaline HD if feasible  Taper catechols per MICU team

## 2021-03-24 NOTE — PROGRESS NOTE ADULT - PROBLEM SELECTOR PROBLEM 4
Diabetes mellitus

## 2021-03-25 LAB
GRAM STN FLD: SIGNIFICANT CHANGE UP
GRAM STN FLD: SIGNIFICANT CHANGE UP
METHOD TYPE: SIGNIFICANT CHANGE UP
MSSA DNA SPEC QL NAA+PROBE: SIGNIFICANT CHANGE UP
SPECIMEN SOURCE: SIGNIFICANT CHANGE UP

## 2021-03-26 LAB
-  AMPICILLIN/SULBACTAM: SIGNIFICANT CHANGE UP
-  CEFAZOLIN: SIGNIFICANT CHANGE UP
-  CLINDAMYCIN: SIGNIFICANT CHANGE UP
-  ERYTHROMYCIN: SIGNIFICANT CHANGE UP
-  GENTAMICIN: SIGNIFICANT CHANGE UP
-  OXACILLIN: SIGNIFICANT CHANGE UP
-  PENICILLIN: SIGNIFICANT CHANGE UP
-  RIFAMPIN: SIGNIFICANT CHANGE UP
-  TETRACYCLINE: SIGNIFICANT CHANGE UP
-  TRIMETHOPRIM/SULFAMETHOXAZOLE: SIGNIFICANT CHANGE UP
-  VANCOMYCIN: SIGNIFICANT CHANGE UP
CULTURE RESULTS: SIGNIFICANT CHANGE UP
METHOD TYPE: SIGNIFICANT CHANGE UP
ORGANISM # SPEC MICROSCOPIC CNT: SIGNIFICANT CHANGE UP
SPECIMEN SOURCE: SIGNIFICANT CHANGE UP

## 2021-03-29 LAB
CULTURE RESULTS: SIGNIFICANT CHANGE UP
SPECIMEN SOURCE: SIGNIFICANT CHANGE UP

## 2022-01-20 NOTE — PROGRESS NOTE ADULT - ASSESSMENT
Left msg. Informing patient that prescription was approved and sent to pharmacy. Asked patient to call and schedule appointment.  Abdominal pain.  - now resolved  - lfts downtrending   - Tolerating diet   - suspect ascites 2/2 chf    Renal failure.   - nephrology input noted.   -  HD per renal     COVID-19  - management as per ID

## 2022-08-15 RX ORDER — ATORVASTATIN CALCIUM 80 MG/1
1 TABLET, FILM COATED ORAL
Qty: 0 | Refills: 0 | DISCHARGE

## 2022-08-15 RX ORDER — SODIUM CHLORIDE 0.65 %
2 AEROSOL, SPRAY (ML) NASAL
Qty: 0 | Refills: 0 | DISCHARGE

## 2022-08-15 RX ORDER — PRASUGREL 5 MG/1
1 TABLET, FILM COATED ORAL
Qty: 0 | Refills: 0 | DISCHARGE

## 2022-08-15 RX ORDER — METOPROLOL TARTRATE 50 MG
1 TABLET ORAL
Qty: 0 | Refills: 0 | DISCHARGE

## 2022-08-15 RX ORDER — METFORMIN HYDROCHLORIDE 850 MG/1
1 TABLET ORAL
Qty: 0 | Refills: 0 | DISCHARGE

## 2022-08-15 RX ORDER — IRBESARTAN 75 MG/1
1 TABLET ORAL
Qty: 0 | Refills: 0 | DISCHARGE

## 2022-08-15 RX ORDER — OMEGA-3 ACID ETHYL ESTERS 1 G
0 CAPSULE ORAL
Qty: 0 | Refills: 0 | DISCHARGE

## 2022-08-15 RX ORDER — FUROSEMIDE 40 MG
1 TABLET ORAL
Qty: 0 | Refills: 0 | DISCHARGE

## 2022-08-15 RX ORDER — FERROUS SULFATE 325(65) MG
1 TABLET ORAL
Qty: 0 | Refills: 0 | DISCHARGE

## 2022-08-15 RX ORDER — GLIMEPIRIDE 1 MG
0.5 TABLET ORAL
Qty: 0 | Refills: 0 | DISCHARGE

## 2022-08-15 RX ORDER — ASPIRIN/CALCIUM CARB/MAGNESIUM 324 MG
1 TABLET ORAL
Qty: 0 | Refills: 0 | DISCHARGE

## 2023-10-02 NOTE — PROGRESS NOTE ADULT - SUBJECTIVE AND OBJECTIVE BOX
INTERVAL HPI/OVERNIGHT EVENTS:    overnight events noted  no new GI events         MEDICATIONS  (STANDING):  aMIOdarone    Tablet 400 milliGRAM(s) Oral every 8 hours  aspirin  chewable 81 milliGRAM(s) Oral daily  atorvastatin 80 milliGRAM(s) Oral at bedtime  chlorhexidine 4% Liquid 1 Application(s) Topical <User Schedule>  dextrose 40% Gel 15 Gram(s) Oral once  dextrose 5%. 1000 milliLiter(s) (50 mL/Hr) IV Continuous <Continuous>  dextrose 5%. 1000 milliLiter(s) (100 mL/Hr) IV Continuous <Continuous>  dextrose 50% Injectable 25 Gram(s) IV Push once  dextrose 50% Injectable 12.5 Gram(s) IV Push once  dextrose 50% Injectable 25 Gram(s) IV Push once  ferrous    sulfate 325 milliGRAM(s) Oral two times a day  glucagon  Injectable 1 milliGRAM(s) IntraMuscular once  heparin   Injectable 5000 Unit(s) SubCutaneous every 12 hours  influenza   Vaccine 0.5 milliLiter(s) IntraMuscular once  insulin lispro (ADMELOG) corrective regimen sliding scale   SubCutaneous at bedtime  insulin lispro (ADMELOG) corrective regimen sliding scale   SubCutaneous three times a day before meals  metoprolol tartrate 25 milliGRAM(s) Oral every 12 hours  midodrine. 10 milliGRAM(s) Oral <User Schedule>  pantoprazole    Tablet 40 milliGRAM(s) Oral before breakfast  prasugrel 10 milliGRAM(s) Oral daily    MEDICATIONS  (PRN):  sodium chloride 0.9% lock flush 10 milliLiter(s) IV Push every 1 hour PRN Pre/post blood products, medications, blood draw, and to maintain line patency      Allergies    No Known Allergies    Intolerances        Review of Systems:    General:  No wt loss, fevers, chills, night sweats,fatigue,   Eyes:  Good vision, no reported pain  ENT:  No sore throat, pain, runny nose, dysphagia  CV:  No pain, palpitatioins, hypo/hypertension  Resp:  No dyspnea, cough, tachypnea, wheezing  GI:  No pain, No nausea, No vomiting, No diarrhea, No constipatiion, No weight loss, No fever, No pruritis, No rectal bleeding, No tarry stools, No dysphagia,  :  No pain, bleeding, incontinence, nocturia  Muscle:  No pain, weakness  Neuro:  No weakness, tingling, memory problems  Psych:  No fatigue, insomnia, mood problems, depression  Endocrine:  No polyuria, polydypsia, cold/heat intolerance  Heme:  No petechiae, ecchymosis, easy bruisability  Skin:  No rash, tattoos, scars, edema      Vital Signs Last 24 Hrs  T(C): 36.3 (26 Feb 2021 15:55), Max: 36.9 (25 Feb 2021 20:35)  T(F): 97.4 (26 Feb 2021 15:55), Max: 98.4 (25 Feb 2021 20:35)  HR: 60 (26 Feb 2021 15:55) (54 - 61)  BP: 92/52 (26 Feb 2021 15:55) (90/59 - 121/55)  BP(mean): --  RR: 18 (26 Feb 2021 15:55) (18 - 19)  SpO2: 97% (26 Feb 2021 15:55) (94% - 99%)    PHYSICAL EXAM:    GENERAL:  no distress  HEENT:  NC/AT  ABDOMEN:  Soft, non-tender, non-distended  EXTEREMITIES:  +bilateral LE edema   NEURO:  Alert, oriented, no asterixis, no tremor, no encephalopathy    LABS:                        9.7    10.08 )-----------( 151      ( 26 Feb 2021 11:23 )             32.0     02-26    132<L>  |  98  |  127<H>  ----------------------------<  169<H>  4.9   |  15<L>  |  5.35<H>    Ca    9.0      26 Feb 2021 11:23    TPro  6.7  /  Alb  3.1<L>  /  TBili  1.8<H>  /  DBili  x   /  AST  103<H>  /  ALT  81<H>  /  AlkPhos  193<H>  02-25    PT/INR - ( 25 Feb 2021 21:01 )   PT: 16.3 sec;   INR: 1.38 ratio         PTT - ( 25 Feb 2021 21:01 )  PTT:33.7 sec      RADIOLOGY & ADDITIONAL TESTS:   Tranexamic Acid Pregnancy And Lactation Text: It is unknown if this medication is safe during pregnancy or breast feeding.

## 2024-04-23 NOTE — PRE-ANESTHESIA EVALUATION ADULT - MALLAMPATI CLASS
Class II - visualization of the soft palate, fauces, and uvula MSK XR negative - No fracture, No dislocation, No foreign body

## 2025-04-17 NOTE — PROGRESS NOTE ADULT - PROBLEM SELECTOR PROBLEM 3
Called patient x2. No answer and VM is full. Unable to leave message.  
CAD (coronary artery disease)